# Patient Record
Sex: MALE | Race: WHITE | Employment: OTHER | URBAN - METROPOLITAN AREA
[De-identification: names, ages, dates, MRNs, and addresses within clinical notes are randomized per-mention and may not be internally consistent; named-entity substitution may affect disease eponyms.]

---

## 2018-07-19 ENCOUNTER — HOSPITAL ENCOUNTER (OUTPATIENT)
Dept: CT IMAGING | Age: 73
Discharge: HOME | End: 2018-07-21
Payer: MEDICARE

## 2018-07-19 DIAGNOSIS — R63.4 WEIGHT LOSS: ICD-10-CM

## 2018-07-19 PROCEDURE — 2500000003 HC RX 250 WO HCPCS: Performed by: INTERNAL MEDICINE

## 2018-07-19 PROCEDURE — 2580000003 HC RX 258: Performed by: INTERNAL MEDICINE

## 2018-07-19 PROCEDURE — 6360000004 HC RX CONTRAST MEDICATION: Performed by: INTERNAL MEDICINE

## 2018-07-19 PROCEDURE — 74177 CT ABD & PELVIS W/CONTRAST: CPT

## 2018-07-19 RX ORDER — SODIUM CHLORIDE 0.9 % (FLUSH) 0.9 %
10 SYRINGE (ML) INJECTION ONCE
Status: COMPLETED | OUTPATIENT
Start: 2018-07-19 | End: 2018-07-19

## 2018-07-19 RX ADMIN — IOPAMIDOL 100 ML: 612 INJECTION, SOLUTION INTRAVENOUS at 11:16

## 2018-07-19 RX ADMIN — Medication 10 ML: at 11:16

## 2018-07-19 RX ADMIN — BARIUM SULFATE 450 ML: 21 SUSPENSION ORAL at 11:16

## 2018-12-11 PROBLEM — C61 MALIGNANT NEOPLASM OF PROSTATE (HCC): Status: ACTIVE | Noted: 2018-12-11

## 2018-12-11 PROBLEM — C91.41 HAIRY CELL LEUKEMIA, IN REMISSION (HCC): Status: ACTIVE | Noted: 2018-12-11

## 2019-11-04 LAB — GLUCOSE BLD-MCNC: 106 MG/DL (ref 74–99)

## 2023-05-15 LAB
ALANINE AMINOTRANSFERASE (SGPT) (U/L) IN SER/PLAS: 24 U/L (ref 10–52)
ALBUMIN (G/DL) IN SER/PLAS: 4.3 G/DL (ref 3.4–5)
ALKALINE PHOSPHATASE (U/L) IN SER/PLAS: 36 U/L (ref 33–136)
ANION GAP IN SER/PLAS: 10 MMOL/L (ref 10–20)
ASPARTATE AMINOTRANSFERASE (SGOT) (U/L) IN SER/PLAS: 20 U/L (ref 9–39)
BILIRUBIN TOTAL (MG/DL) IN SER/PLAS: 0.5 MG/DL (ref 0–1.2)
CALCIUM (MG/DL) IN SER/PLAS: 9.2 MG/DL (ref 8.6–10.3)
CARBON DIOXIDE, TOTAL (MMOL/L) IN SER/PLAS: 30 MMOL/L (ref 21–32)
CHLORIDE (MMOL/L) IN SER/PLAS: 103 MMOL/L (ref 98–107)
CREATININE (MG/DL) IN SER/PLAS: 1.25 MG/DL (ref 0.5–1.3)
GFR MALE: 58 ML/MIN/1.73M2
GLUCOSE (MG/DL) IN SER/PLAS: 236 MG/DL (ref 74–99)
POTASSIUM (MMOL/L) IN SER/PLAS: 5.1 MMOL/L (ref 3.5–5.3)
PROTEIN TOTAL: 6.5 G/DL (ref 6.4–8.2)
SODIUM (MMOL/L) IN SER/PLAS: 138 MMOL/L (ref 136–145)
THYROTROPIN (MIU/L) IN SER/PLAS BY DETECTION LIMIT <= 0.05 MIU/L: 0.17 MIU/L (ref 0.44–3.98)
THYROXINE (T4) FREE (NG/DL) IN SER/PLAS: 0.73 NG/DL (ref 0.61–1.12)
UREA NITROGEN (MG/DL) IN SER/PLAS: 23 MG/DL (ref 6–23)

## 2023-05-16 LAB — TRIIODOTHYRONINE (T3) FREE (PG/ML) IN SER/PLAS: 3.8 PG/ML (ref 2.3–4.2)

## 2023-05-23 LAB — THYROID STIMULATING IMMUNOGLOBULIN: <1 TSI INDEX

## 2023-08-28 LAB
ALANINE AMINOTRANSFERASE (SGPT) (U/L) IN SER/PLAS: 23 U/L (ref 10–52)
ALBUMIN (G/DL) IN SER/PLAS: 4.5 G/DL (ref 3.4–5)
ALKALINE PHOSPHATASE (U/L) IN SER/PLAS: 36 U/L (ref 33–136)
ANION GAP IN SER/PLAS: 12 MMOL/L (ref 10–20)
ASPARTATE AMINOTRANSFERASE (SGOT) (U/L) IN SER/PLAS: 18 U/L (ref 9–39)
BILIRUBIN TOTAL (MG/DL) IN SER/PLAS: 0.5 MG/DL (ref 0–1.2)
CALCIUM (MG/DL) IN SER/PLAS: 10 MG/DL (ref 8.6–10.3)
CARBON DIOXIDE, TOTAL (MMOL/L) IN SER/PLAS: 26 MMOL/L (ref 21–32)
CHLORIDE (MMOL/L) IN SER/PLAS: 102 MMOL/L (ref 98–107)
CREATININE (MG/DL) IN SER/PLAS: 1.32 MG/DL (ref 0.5–1.3)
GFR MALE: 55 ML/MIN/1.73M2
GLUCOSE (MG/DL) IN SER/PLAS: 166 MG/DL (ref 74–99)
POTASSIUM (MMOL/L) IN SER/PLAS: 4.8 MMOL/L (ref 3.5–5.3)
PROTEIN TOTAL: 7 G/DL (ref 6.4–8.2)
SODIUM (MMOL/L) IN SER/PLAS: 135 MMOL/L (ref 136–145)
THYROTROPIN (MIU/L) IN SER/PLAS BY DETECTION LIMIT <= 0.05 MIU/L: 3.52 MIU/L (ref 0.44–3.98)
THYROXINE (T4) FREE (NG/DL) IN SER/PLAS: 0.71 NG/DL (ref 0.61–1.12)
TRIIODOTHYRONINE (T3) FREE (PG/ML) IN SER/PLAS: 2.9 PG/ML (ref 2.3–4.2)
UREA NITROGEN (MG/DL) IN SER/PLAS: 31 MG/DL (ref 6–23)

## 2023-08-31 LAB
PROSTATE SPECIFIC AG (NG/ML) IN SER/PLAS: 0.7 NG/ML (ref 0–4)
PROSTATE SPECIFIC AG FREE (NG/ML) IN SER/PLAS: <0.1 NG/ML
PROSTATE SPECIFIC AG FREE/PROSTATE SPECIFIC AG TOTAL IN SER/PLAS: <14 %

## 2023-09-07 PROBLEM — M20.41 ACQUIRED HAMMERTOE OF RIGHT FOOT: Status: ACTIVE | Noted: 2023-09-07

## 2023-09-07 PROBLEM — H92.01 OTALGIA OF RIGHT EAR: Status: ACTIVE | Noted: 2023-09-07

## 2023-09-07 PROBLEM — E05.90 HYPERTHYROIDISM: Status: ACTIVE | Noted: 2023-09-07

## 2023-09-07 PROBLEM — E04.2 MULTINODULAR GOITER: Status: ACTIVE | Noted: 2023-09-07

## 2023-09-07 PROBLEM — M20.5X2 HALLUX LIMITUS OF LEFT FOOT: Status: ACTIVE | Noted: 2023-09-07

## 2023-09-07 PROBLEM — L29.9 ITCHING OF EAR: Status: ACTIVE | Noted: 2023-09-07

## 2023-09-07 PROBLEM — D12.6 TUBULAR ADENOMA OF COLON: Status: ACTIVE | Noted: 2023-09-07

## 2023-09-07 PROBLEM — R12 HEARTBURN: Status: ACTIVE | Noted: 2023-09-07

## 2023-09-07 PROBLEM — I48.0 PAROXYSMAL ATRIAL FIBRILLATION (MULTI): Status: ACTIVE | Noted: 2023-09-07

## 2023-09-07 PROBLEM — E05.10 TOXIC THYROID NODULE: Status: ACTIVE | Noted: 2023-09-07

## 2023-09-07 PROBLEM — R13.19 ESOPHAGEAL DYSPHAGIA: Status: ACTIVE | Noted: 2023-09-07

## 2023-09-07 RX ORDER — ALBUTEROL SULFATE 90 UG/1
1 AEROSOL, METERED RESPIRATORY (INHALATION) EVERY 4 HOURS PRN
COMMUNITY
End: 2024-03-28 | Stop reason: ALTCHOICE

## 2023-09-07 RX ORDER — DULOXETIN HYDROCHLORIDE 60 MG/1
120 CAPSULE, DELAYED RELEASE ORAL DAILY
COMMUNITY

## 2023-09-07 RX ORDER — METFORMIN HYDROCHLORIDE 500 MG/1
250 TABLET ORAL
COMMUNITY
End: 2024-01-09 | Stop reason: WASHOUT

## 2023-09-07 RX ORDER — CARVEDILOL 25 MG/1
1 TABLET ORAL DAILY
Status: ON HOLD | COMMUNITY
End: 2024-01-22 | Stop reason: WASHOUT

## 2023-09-07 RX ORDER — NAPROXEN SODIUM 220 MG/1
81 TABLET, FILM COATED ORAL DAILY
COMMUNITY
End: 2024-03-28 | Stop reason: ALTCHOICE

## 2023-09-07 RX ORDER — GABAPENTIN 600 MG/1
TABLET ORAL
COMMUNITY

## 2023-09-07 RX ORDER — POLYETHYLENE GLYCOL 3350, SODIUM CHLORIDE, SODIUM BICARBONATE, POTASSIUM CHLORIDE 420; 11.2; 5.72; 1.48 G/4L; G/4L; G/4L; G/4L
POWDER, FOR SOLUTION ORAL
COMMUNITY
Start: 2022-05-23 | End: 2024-02-08 | Stop reason: WASHOUT

## 2023-09-07 RX ORDER — VITAMIN E MIXED 400 UNIT
400 CAPSULE ORAL DAILY
COMMUNITY
End: 2024-03-28 | Stop reason: ALTCHOICE

## 2023-09-07 RX ORDER — ISOSORBIDE MONONITRATE 30 MG/1
1 TABLET, EXTENDED RELEASE ORAL DAILY
COMMUNITY
Start: 2022-01-12

## 2023-09-07 RX ORDER — FERROUS SULFATE 325(65) MG
65 TABLET ORAL DAILY
COMMUNITY
End: 2024-03-28 | Stop reason: ALTCHOICE

## 2023-09-07 RX ORDER — NITROGLYCERIN 0.4 MG/1
0.4 TABLET SUBLINGUAL EVERY 5 MIN PRN
COMMUNITY
End: 2024-03-28 | Stop reason: ALTCHOICE

## 2023-09-07 RX ORDER — SIMVASTATIN 40 MG/1
40 TABLET, FILM COATED ORAL NIGHTLY
COMMUNITY
End: 2023-12-13 | Stop reason: WASHOUT

## 2023-09-07 RX ORDER — ISOSORBIDE DINITRATE 30 MG/1
30 TABLET ORAL DAILY
COMMUNITY
End: 2023-12-13 | Stop reason: WASHOUT

## 2023-09-07 RX ORDER — METHIMAZOLE 5 MG/1
5 TABLET ORAL DAILY
COMMUNITY
End: 2023-12-13 | Stop reason: WASHOUT

## 2023-09-07 RX ORDER — FENOFIBRATE 160 MG/1
1 TABLET ORAL DAILY
COMMUNITY
Start: 2022-03-11

## 2023-09-07 RX ORDER — CYANOCOBALAMIN 1000 UG/ML
1000 INJECTION, SOLUTION INTRAMUSCULAR; SUBCUTANEOUS
COMMUNITY
End: 2024-03-28 | Stop reason: ALTCHOICE

## 2023-09-07 RX ORDER — ACETAMINOPHEN 500 MG
2000 TABLET ORAL DAILY
COMMUNITY
End: 2024-03-04 | Stop reason: HOSPADM

## 2023-09-07 RX ORDER — IPRATROPIUM BROMIDE 21 UG/1
2 SPRAY, METERED NASAL 3 TIMES DAILY PRN
COMMUNITY
End: 2024-03-28 | Stop reason: ALTCHOICE

## 2023-09-07 RX ORDER — FERROUS GLUCONATE 256(28)MG
TABLET ORAL
COMMUNITY
End: 2023-12-13 | Stop reason: WASHOUT

## 2023-09-07 RX ORDER — DOFETILIDE 0.25 MG/1
1 CAPSULE ORAL 2 TIMES DAILY
COMMUNITY
End: 2024-01-22 | Stop reason: HOSPADM

## 2023-09-07 RX ORDER — OMEPRAZOLE 20 MG/1
20 CAPSULE, DELAYED RELEASE ORAL
Status: ON HOLD | COMMUNITY
End: 2024-01-22 | Stop reason: SDUPTHER

## 2023-09-07 RX ORDER — ROSUVASTATIN CALCIUM 20 MG/1
20 TABLET, COATED ORAL DAILY
COMMUNITY

## 2023-09-07 RX ORDER — AMOXICILLIN 500 MG/1
2000 CAPSULE ORAL
COMMUNITY
Start: 2021-10-14 | End: 2024-02-08 | Stop reason: WASHOUT

## 2023-09-07 RX ORDER — LISINOPRIL 40 MG/1
1 TABLET ORAL DAILY
COMMUNITY
Start: 2022-01-12 | End: 2024-03-04

## 2023-10-12 ENCOUNTER — OFFICE VISIT (OUTPATIENT)
Dept: ENDOCRINOLOGY | Facility: CLINIC | Age: 78
End: 2023-10-12
Payer: MEDICARE

## 2023-10-12 VITALS
DIASTOLIC BLOOD PRESSURE: 62 MMHG | HEART RATE: 72 BPM | WEIGHT: 216.6 LBS | SYSTOLIC BLOOD PRESSURE: 104 MMHG | RESPIRATION RATE: 16 BRPM | BODY MASS INDEX: 29.34 KG/M2 | HEIGHT: 72 IN

## 2023-10-12 DIAGNOSIS — E05.10 TOXIC THYROID NODULE: Primary | ICD-10-CM

## 2023-10-12 PROCEDURE — 1126F AMNT PAIN NOTED NONE PRSNT: CPT | Performed by: STUDENT IN AN ORGANIZED HEALTH CARE EDUCATION/TRAINING PROGRAM

## 2023-10-12 PROCEDURE — 1159F MED LIST DOCD IN RCRD: CPT | Performed by: STUDENT IN AN ORGANIZED HEALTH CARE EDUCATION/TRAINING PROGRAM

## 2023-10-12 PROCEDURE — 99214 OFFICE O/P EST MOD 30 MIN: CPT | Performed by: STUDENT IN AN ORGANIZED HEALTH CARE EDUCATION/TRAINING PROGRAM

## 2023-10-12 RX ORDER — METHIMAZOLE 5 MG/1
5 TABLET ORAL DAILY
Qty: 90 TABLET | Refills: 1 | Status: SHIPPED | OUTPATIENT
Start: 2023-10-12 | End: 2024-01-09 | Stop reason: WASHOUT

## 2023-10-12 ASSESSMENT — PAIN SCALES - GENERAL: PAINLEVEL: 0-NO PAIN

## 2023-10-12 ASSESSMENT — ENCOUNTER SYMPTOMS: CONSTITUTIONAL NEGATIVE: 1

## 2023-10-12 NOTE — PROGRESS NOTES
Subjective   Patient ID: Galen Villasenor is a 78 y.o. male who presents for Hyperthyroidism (Labs done 9/28/23).  HPI    The patient is a 78-year-old male with history of A-fib new diagnosed hyperthyroidism presents to follow up.   He is doing well , no new complain apart from right ear pain/itch for which he was seen by ENT    History :  5/16/23 Thyroid US showed MNG with dominant 24 mm nodule on left side.   6/13/23 Thyroid Uptake showed uptake 13 % corresponding to left thyroid nodule , with suppression of the rest of the gland     He reports not being aware of any thyroid issues prior to April lans   4/17/2023 routine work-up TSH was 0.15 , free T4 not available.  Recently he had issues with shortness of breath and chest tightness for he which has been evaluated by cardiology and PCP.  Of note he also has history of a LL prior to that he had hairy cell leukemia currently remission.  He had steroid injection to his back 1 month prior to thyroid labs  He denies overt symptoms of hyperthyroidism including weight loss , diarrhea ,anxiety and palpitations    Review of Systems   Constitutional: Negative.        Objective   Physical Exam  Constitutional:       Appearance: Normal appearance.   Neck:      Comments: Nodular thyromegaly   Cardiovascular:      Rate and Rhythm: Normal rate.   Pulmonary:      Effort: Pulmonary effort is normal.      Breath sounds: Normal breath sounds.   Neurological:      Mental Status: He is alert.   Psychiatric:         Mood and Affect: Mood normal.         Assessment/Plan     -New onset hyperthyroidism due to toxic left thyroid nodule in background of MNG  5/16/23 Thyroid US showed MNG with dominant 24 mm nodule on left side. rt sided muliple nodues ranginf from 7 mm to 12 mm( 12 mm nodule is spongiform )  6/13/23 Thyroid Uptake showed uptake 13 % corresponding to left thyroid nodule , with suppression of the rest of the gland      Doing well on MMI 5 mg daily ran out 3 weeks ago , refill  sent    - longstanding A-fib ,on beta-blockers and Eliquis managed by EP.   - DM2 managed by pcp       RTC 4-5  months with las

## 2023-11-13 ENCOUNTER — TELEPHONE (OUTPATIENT)
Dept: HEMATOLOGY/ONCOLOGY | Facility: CLINIC | Age: 78
End: 2023-11-13
Payer: MEDICARE

## 2023-11-14 DIAGNOSIS — C91.42 HAIRY CELL LEUKEMIA, IN RELAPSE (MULTI): ICD-10-CM

## 2023-11-14 DIAGNOSIS — D61.818 PANCYTOPENIA (MULTI): Primary | ICD-10-CM

## 2023-11-14 NOTE — TELEPHONE ENCOUNTER
Pt. Did bring in outside labs for review.  Per Dr. Kruse-blood counts are concerning and he recommends pt. Consider a bone marrow biopsy test.  I spoke with pt and explained Dr. Kruse's review and recommendations.  He is amenable to a bone marrow biopsy and realizes the next availability is 11/28.  He is aware he will also be scheduled for a follow up with Dr. Kruse approximately 2 weeks after the bone marrow test.  He voiced understanding and is aware he will be called with these apt.  He had no further questions.

## 2023-11-28 ENCOUNTER — PROCEDURE VISIT (OUTPATIENT)
Dept: HEMATOLOGY/ONCOLOGY | Facility: CLINIC | Age: 78
End: 2023-11-28
Payer: MEDICARE

## 2023-11-28 ENCOUNTER — LAB (OUTPATIENT)
Dept: LAB | Facility: CLINIC | Age: 78
End: 2023-11-28
Payer: MEDICARE

## 2023-11-28 VITALS
OXYGEN SATURATION: 98 % | SYSTOLIC BLOOD PRESSURE: 161 MMHG | HEART RATE: 69 BPM | RESPIRATION RATE: 16 BRPM | WEIGHT: 214.51 LBS | TEMPERATURE: 97 F | BODY MASS INDEX: 29.09 KG/M2 | DIASTOLIC BLOOD PRESSURE: 55 MMHG

## 2023-11-28 DIAGNOSIS — C91.42 HAIRY CELL LEUKEMIA, IN RELAPSE (MULTI): ICD-10-CM

## 2023-11-28 DIAGNOSIS — D61.818 PANCYTOPENIA (MULTI): ICD-10-CM

## 2023-11-28 LAB
BASOPHILS # BLD AUTO: 0.01 X10*3/UL (ref 0–0.1)
BASOPHILS NFR BLD AUTO: 0.5 %
EOSINOPHIL # BLD AUTO: 0.06 X10*3/UL (ref 0–0.4)
EOSINOPHIL NFR BLD AUTO: 2.8 %
ERYTHROCYTE [DISTWIDTH] IN BLOOD BY AUTOMATED COUNT: 16.3 % (ref 11.5–14.5)
HCT VFR BLD AUTO: 32.5 % (ref 41–52)
HGB BLD-MCNC: 10.2 G/DL (ref 13.5–17.5)
IMM GRANULOCYTES # BLD AUTO: 0.01 X10*3/UL (ref 0–0.5)
IMM GRANULOCYTES NFR BLD AUTO: 0.5 % (ref 0–0.9)
LYMPHOCYTES # BLD AUTO: 1.04 X10*3/UL (ref 0.8–3)
LYMPHOCYTES NFR BLD AUTO: 47.9 %
MCH RBC QN AUTO: 32.5 PG (ref 26–34)
MCHC RBC AUTO-ENTMCNC: 31.4 G/DL (ref 32–36)
MCV RBC AUTO: 104 FL (ref 80–100)
MONOCYTES # BLD AUTO: 0.04 X10*3/UL (ref 0.05–0.8)
MONOCYTES NFR BLD AUTO: 1.8 %
NEUTROPHILS # BLD AUTO: 1.01 X10*3/UL (ref 1.6–5.5)
NEUTROPHILS NFR BLD AUTO: 46.5 %
PLATELET # BLD AUTO: 90 X10*3/UL (ref 150–450)
RBC # BLD AUTO: 3.14 X10*6/UL (ref 4.5–5.9)
WBC # BLD AUTO: 2.2 X10*3/UL (ref 4.4–11.3)

## 2023-11-28 PROCEDURE — 85025 COMPLETE CBC W/AUTO DIFF WBC: CPT | Performed by: INTERNAL MEDICINE

## 2023-11-28 PROCEDURE — 36415 COLL VENOUS BLD VENIPUNCTURE: CPT | Performed by: PHYSICIAN ASSISTANT

## 2023-11-28 ASSESSMENT — PAIN SCALES - GENERAL: PAINLEVEL: 0-NO PAIN

## 2023-12-07 ENCOUNTER — LAB (OUTPATIENT)
Dept: LAB | Facility: CLINIC | Age: 78
End: 2023-12-07
Payer: MEDICARE

## 2023-12-07 ENCOUNTER — PROCEDURE VISIT (OUTPATIENT)
Dept: HEMATOLOGY/ONCOLOGY | Facility: CLINIC | Age: 78
End: 2023-12-07
Payer: MEDICARE

## 2023-12-07 VITALS
SYSTOLIC BLOOD PRESSURE: 130 MMHG | WEIGHT: 214.51 LBS | HEART RATE: 53 BPM | DIASTOLIC BLOOD PRESSURE: 54 MMHG | RESPIRATION RATE: 16 BRPM | TEMPERATURE: 97.3 F | BODY MASS INDEX: 29.09 KG/M2 | OXYGEN SATURATION: 95 %

## 2023-12-07 DIAGNOSIS — D61.818 PANCYTOPENIA (MULTI): Primary | ICD-10-CM

## 2023-12-07 DIAGNOSIS — C91.42 HAIRY CELL LEUKEMIA, IN RELAPSE (MULTI): ICD-10-CM

## 2023-12-07 DIAGNOSIS — D61.818 PANCYTOPENIA (MULTI): ICD-10-CM

## 2023-12-07 LAB
BASOPHILS # BLD AUTO: 0.01 X10*3/UL (ref 0–0.1)
BASOPHILS NFR BLD AUTO: 0.5 %
EOSINOPHIL # BLD AUTO: 0.05 X10*3/UL (ref 0–0.4)
EOSINOPHIL NFR BLD AUTO: 2.5 %
ERYTHROCYTE [DISTWIDTH] IN BLOOD BY AUTOMATED COUNT: 16.2 % (ref 11.5–14.5)
HCT VFR BLD AUTO: 33 % (ref 41–52)
HGB BLD-MCNC: 10.7 G/DL (ref 13.5–17.5)
HOLD SPECIMEN: NORMAL
IMM GRANULOCYTES # BLD AUTO: 0 X10*3/UL (ref 0–0.5)
IMM GRANULOCYTES NFR BLD AUTO: 0 % (ref 0–0.9)
LYMPHOCYTES # BLD AUTO: 0.88 X10*3/UL (ref 0.8–3)
LYMPHOCYTES NFR BLD AUTO: 44 %
MCH RBC QN AUTO: 32.8 PG (ref 26–34)
MCHC RBC AUTO-ENTMCNC: 32.4 G/DL (ref 32–36)
MCV RBC AUTO: 101 FL (ref 80–100)
MONOCYTES # BLD AUTO: 0.06 X10*3/UL (ref 0.05–0.8)
MONOCYTES NFR BLD AUTO: 3 %
NEUTROPHILS # BLD AUTO: 1 X10*3/UL (ref 1.6–5.5)
NEUTROPHILS NFR BLD AUTO: 50 %
PLATELET # BLD AUTO: 80 X10*3/UL (ref 150–450)
RBC # BLD AUTO: 3.26 X10*6/UL (ref 4.5–5.9)
WBC # BLD AUTO: 2 X10*3/UL (ref 4.4–11.3)

## 2023-12-07 PROCEDURE — 85025 COMPLETE CBC W/AUTO DIFF WBC: CPT

## 2023-12-07 PROCEDURE — 88311 DECALCIFY TISSUE: CPT | Performed by: PATHOLOGY

## 2023-12-07 PROCEDURE — 85097 BONE MARROW INTERPRETATION: CPT | Mod: TC | Performed by: INTERNAL MEDICINE

## 2023-12-07 PROCEDURE — 85097 BONE MARROW INTERPRETATION: CPT | Mod: TC,SUR | Performed by: INTERNAL MEDICINE

## 2023-12-07 PROCEDURE — 88341 IMHCHEM/IMCYTCHM EA ADD ANTB: CPT | Performed by: PATHOLOGY

## 2023-12-07 PROCEDURE — 36415 COLL VENOUS BLD VENIPUNCTURE: CPT

## 2023-12-07 PROCEDURE — 88237 TISSUE CULTURE BONE MARROW: CPT | Performed by: INTERNAL MEDICINE

## 2023-12-07 PROCEDURE — 88189 FLOWCYTOMETRY/READ 16 & >: CPT | Performed by: PATHOLOGY

## 2023-12-07 PROCEDURE — 88185 FLOWCYTOMETRY/TC ADD-ON: CPT | Mod: TC,MUE | Performed by: INTERNAL MEDICINE

## 2023-12-07 PROCEDURE — 88305 TISSUE EXAM BY PATHOLOGIST: CPT | Performed by: PATHOLOGY

## 2023-12-07 PROCEDURE — 38222 DX BONE MARROW BX & ASPIR: CPT | Performed by: PHYSICIAN ASSISTANT

## 2023-12-07 PROCEDURE — 88342 IMHCHEM/IMCYTCHM 1ST ANTB: CPT | Performed by: PATHOLOGY

## 2023-12-07 PROCEDURE — 88313 SPECIAL STAINS GROUP 2: CPT | Performed by: PATHOLOGY

## 2023-12-07 ASSESSMENT — ENCOUNTER SYMPTOMS
OCCASIONAL FEELINGS OF UNSTEADINESS: 1
LOSS OF SENSATION IN FEET: 1
DEPRESSION: 0

## 2023-12-07 ASSESSMENT — PAIN SCALES - GENERAL: PAINLEVEL: 0-NO PAIN

## 2023-12-07 NOTE — PROGRESS NOTES
Patient ID: Galen Villasenor is a 78 y.o. male.    Biopsy bone marrow    Date/Time: 12/7/2023 10:13 AM    Performed by: Tony Bynum PA-C  Authorized by: Tony Bynum PA-C    Consent:     Consent obtained:  Written    Consent given by:  Patient    Risks, benefits, and alternatives were discussed: yes      Risks discussed:  Bleeding, infection and pain  Universal protocol:     Procedure explained and questions answered to patient or proxy's satisfaction: yes      Relevant documents present and verified: yes      Test results available: yes      Imaging studies available: yes      Required blood products, implants, devices, and special equipment available: yes      Site/side marked: yes      Immediately prior to procedure, a time out was called: yes      Patient identity confirmed:  Verbally with patient  Indications:     Indications:  Disease assessment  Pre-procedure details:     Skin preparation:  Chlorhexidine    Preparation: Patient was prepped and draped in the usual sterile fashion    Sedation:     Sedation type:  None  Anesthesia:     Anesthesia method:  Local infiltration (Patient has a listed allergy to lidocaine ([place] early 1960's w [place] symptoms).  Located documentation of prior BMbx (7/17/18) performed using 2% Lidocaine w/o incident.)    Local anesthetic:  Lidocaine 1% w/o epi  Procedure specific details:      The procedure was explained & potential complications reviewed with the patient including the risks for bleeding, infection, & discomfort at the bone marrow biopsy site. The patient was given time for questions regarding the procedure. The patient agreeded to proceed & electronic signed consent was obtained.  The patient's most recent history & physical exam were reviewed & relevant findings were noted.  The patient's allergy history was reviewed.  Next, a pre-procedure time out was performed to properly identify the patient & the procedure to be performed.  The  patient was placed in the prone position & the left posterior iliac crest bone marrow biopsy site was identified & marked.  Next, the skin at the marked bone marrow biopsy site was cleansed with Chlorhexidine solution & sterile drapes were placed.  The skin, subcutaneous tissue, & periosteum below the marked bone marrow biopsy site were anesthetized using 10 ml of 1% Lidocaine solution.  Next, a Jamshidi needle was inserted at the marked biopsy site & slowly advanced into the posterior iliac crest.  Marrow aspirate & core biopsy were obtained & sent to Pathology for review & testing.  The needle was removed & sterile dressing was applied to the biopsy site.  The paatient tolerated the procedure well without incident.  Prior to discharge, the biopsy site was inspected & the patient was given written post procedure care instructions.   Post-procedure details:     Procedure completion:  Tolerated well, no immediate complications

## 2023-12-12 ENCOUNTER — APPOINTMENT (OUTPATIENT)
Dept: HEMATOLOGY/ONCOLOGY | Facility: CLINIC | Age: 78
End: 2023-12-12
Payer: MEDICARE

## 2023-12-12 LAB
CELL COUNT (BLOOD): 0.88 X10*3/UL
CELL POPULATIONS: NORMAL
DIAGNOSIS: NORMAL
FLOW DIFFERENTIAL: NORMAL
FLOW TEST ORDERED: NORMAL
LAB TEST METHOD: NORMAL
NUMBER OF CELLS COLLECTED: NORMAL PER TUBE
PATH REPORT.TOTAL CANCER: NORMAL
SIGNATURE COMMENT: NORMAL
SPECIMEN VIABILITY: NORMAL

## 2023-12-13 ENCOUNTER — OFFICE VISIT (OUTPATIENT)
Dept: HEMATOLOGY/ONCOLOGY | Facility: CLINIC | Age: 78
End: 2023-12-13
Payer: MEDICARE

## 2023-12-13 ENCOUNTER — OFFICE VISIT (OUTPATIENT)
Dept: CARDIOLOGY | Facility: CLINIC | Age: 78
End: 2023-12-13
Payer: MEDICARE

## 2023-12-13 VITALS
HEART RATE: 67 BPM | TEMPERATURE: 95.3 F | WEIGHT: 214.8 LBS | SYSTOLIC BLOOD PRESSURE: 112 MMHG | BODY MASS INDEX: 29.09 KG/M2 | DIASTOLIC BLOOD PRESSURE: 50 MMHG | HEIGHT: 72 IN

## 2023-12-13 VITALS
DIASTOLIC BLOOD PRESSURE: 54 MMHG | HEART RATE: 61 BPM | BODY MASS INDEX: 29.09 KG/M2 | OXYGEN SATURATION: 98 % | RESPIRATION RATE: 16 BRPM | WEIGHT: 214.51 LBS | TEMPERATURE: 96.6 F | SYSTOLIC BLOOD PRESSURE: 107 MMHG

## 2023-12-13 DIAGNOSIS — E78.2 MIXED HYPERLIPIDEMIA: ICD-10-CM

## 2023-12-13 DIAGNOSIS — I48.0 PAROXYSMAL ATRIAL FIBRILLATION (MULTI): Primary | ICD-10-CM

## 2023-12-13 DIAGNOSIS — I48.0 PAROXYSMAL ATRIAL FIBRILLATION (MULTI): ICD-10-CM

## 2023-12-13 DIAGNOSIS — I25.10 CORONARY ARTERY DISEASE INVOLVING NATIVE CORONARY ARTERY OF NATIVE HEART WITHOUT ANGINA PECTORIS: ICD-10-CM

## 2023-12-13 DIAGNOSIS — E05.90 HYPERTHYROIDISM: ICD-10-CM

## 2023-12-13 DIAGNOSIS — I10 PRIMARY HYPERTENSION: ICD-10-CM

## 2023-12-13 DIAGNOSIS — D61.818 PANCYTOPENIA (MULTI): ICD-10-CM

## 2023-12-13 DIAGNOSIS — C91.42 HAIRY CELL LEUKEMIA, IN RELAPSE (MULTI): Primary | ICD-10-CM

## 2023-12-13 PROCEDURE — 1126F AMNT PAIN NOTED NONE PRSNT: CPT | Performed by: INTERNAL MEDICINE

## 2023-12-13 PROCEDURE — 99214 OFFICE O/P EST MOD 30 MIN: CPT | Performed by: INTERNAL MEDICINE

## 2023-12-13 PROCEDURE — 1159F MED LIST DOCD IN RCRD: CPT | Performed by: INTERNAL MEDICINE

## 2023-12-13 PROCEDURE — 1036F TOBACCO NON-USER: CPT | Performed by: INTERNAL MEDICINE

## 2023-12-13 PROCEDURE — 3078F DIAST BP <80 MM HG: CPT | Performed by: INTERNAL MEDICINE

## 2023-12-13 PROCEDURE — 3074F SYST BP LT 130 MM HG: CPT | Performed by: INTERNAL MEDICINE

## 2023-12-13 PROCEDURE — 99213 OFFICE O/P EST LOW 20 MIN: CPT | Performed by: INTERNAL MEDICINE

## 2023-12-13 RX ORDER — DIPHENHYDRAMINE HYDROCHLORIDE 50 MG/ML
50 INJECTION INTRAMUSCULAR; INTRAVENOUS AS NEEDED
Status: CANCELLED | OUTPATIENT
Start: 2024-01-31

## 2023-12-13 RX ORDER — PROCHLORPERAZINE MALEATE 10 MG
10 TABLET ORAL EVERY 6 HOURS PRN
Qty: 30 TABLET | Refills: 5 | Status: SHIPPED | OUTPATIENT
Start: 2023-12-13 | End: 2024-01-09 | Stop reason: ALTCHOICE

## 2023-12-13 RX ORDER — FAMOTIDINE 10 MG/ML
20 INJECTION INTRAVENOUS ONCE AS NEEDED
Status: CANCELLED | OUTPATIENT
Start: 2024-01-31

## 2023-12-13 RX ORDER — FAMOTIDINE 10 MG/ML
20 INJECTION INTRAVENOUS ONCE AS NEEDED
Status: CANCELLED | OUTPATIENT
Start: 2024-01-30

## 2023-12-13 RX ORDER — PROCHLORPERAZINE EDISYLATE 5 MG/ML
10 INJECTION INTRAMUSCULAR; INTRAVENOUS EVERY 6 HOURS PRN
Status: CANCELLED | OUTPATIENT
Start: 2024-01-30

## 2023-12-13 RX ORDER — FAMOTIDINE 10 MG/ML
20 INJECTION INTRAVENOUS ONCE AS NEEDED
Status: CANCELLED | OUTPATIENT
Start: 2024-02-01

## 2023-12-13 RX ORDER — FAMOTIDINE 10 MG/ML
20 INJECTION INTRAVENOUS ONCE AS NEEDED
Status: CANCELLED | OUTPATIENT
Start: 2024-01-29

## 2023-12-13 RX ORDER — EPINEPHRINE 0.3 MG/.3ML
0.3 INJECTION SUBCUTANEOUS EVERY 5 MIN PRN
Status: CANCELLED | OUTPATIENT
Start: 2024-01-29

## 2023-12-13 RX ORDER — DIPHENHYDRAMINE HYDROCHLORIDE 50 MG/ML
50 INJECTION INTRAMUSCULAR; INTRAVENOUS AS NEEDED
Status: CANCELLED | OUTPATIENT
Start: 2024-02-02

## 2023-12-13 RX ORDER — PROCHLORPERAZINE MALEATE 10 MG
10 TABLET ORAL EVERY 6 HOURS PRN
Status: CANCELLED | OUTPATIENT
Start: 2024-02-01

## 2023-12-13 RX ORDER — PROCHLORPERAZINE MALEATE 10 MG
10 TABLET ORAL EVERY 6 HOURS PRN
Status: CANCELLED | OUTPATIENT
Start: 2024-02-02

## 2023-12-13 RX ORDER — ALBUTEROL SULFATE 0.83 MG/ML
3 SOLUTION RESPIRATORY (INHALATION) AS NEEDED
Status: CANCELLED | OUTPATIENT
Start: 2024-02-01

## 2023-12-13 RX ORDER — ALBUTEROL SULFATE 0.83 MG/ML
3 SOLUTION RESPIRATORY (INHALATION) AS NEEDED
Status: CANCELLED | OUTPATIENT
Start: 2024-01-30

## 2023-12-13 RX ORDER — FAMOTIDINE 10 MG/ML
20 INJECTION INTRAVENOUS ONCE AS NEEDED
Status: CANCELLED | OUTPATIENT
Start: 2024-02-02

## 2023-12-13 RX ORDER — DIPHENHYDRAMINE HYDROCHLORIDE 50 MG/ML
50 INJECTION INTRAMUSCULAR; INTRAVENOUS AS NEEDED
Status: CANCELLED | OUTPATIENT
Start: 2024-01-30

## 2023-12-13 RX ORDER — PROCHLORPERAZINE MALEATE 10 MG
10 TABLET ORAL EVERY 6 HOURS PRN
Status: CANCELLED | OUTPATIENT
Start: 2024-01-30

## 2023-12-13 RX ORDER — EPINEPHRINE 0.3 MG/.3ML
0.3 INJECTION SUBCUTANEOUS EVERY 5 MIN PRN
Status: CANCELLED | OUTPATIENT
Start: 2024-01-31

## 2023-12-13 RX ORDER — PROCHLORPERAZINE EDISYLATE 5 MG/ML
10 INJECTION INTRAMUSCULAR; INTRAVENOUS EVERY 6 HOURS PRN
Status: CANCELLED | OUTPATIENT
Start: 2024-01-29

## 2023-12-13 RX ORDER — ALBUTEROL SULFATE 0.83 MG/ML
3 SOLUTION RESPIRATORY (INHALATION) AS NEEDED
Status: CANCELLED | OUTPATIENT
Start: 2024-01-29

## 2023-12-13 RX ORDER — ALBUTEROL SULFATE 0.83 MG/ML
3 SOLUTION RESPIRATORY (INHALATION) AS NEEDED
Status: CANCELLED | OUTPATIENT
Start: 2024-01-31

## 2023-12-13 RX ORDER — DIPHENHYDRAMINE HYDROCHLORIDE 50 MG/ML
50 INJECTION INTRAMUSCULAR; INTRAVENOUS AS NEEDED
Status: CANCELLED | OUTPATIENT
Start: 2024-02-01

## 2023-12-13 RX ORDER — PROCHLORPERAZINE EDISYLATE 5 MG/ML
10 INJECTION INTRAMUSCULAR; INTRAVENOUS EVERY 6 HOURS PRN
Status: CANCELLED | OUTPATIENT
Start: 2024-01-31

## 2023-12-13 RX ORDER — PROCHLORPERAZINE MALEATE 10 MG
10 TABLET ORAL EVERY 6 HOURS PRN
Status: CANCELLED | OUTPATIENT
Start: 2024-01-31

## 2023-12-13 RX ORDER — PROCHLORPERAZINE EDISYLATE 5 MG/ML
10 INJECTION INTRAMUSCULAR; INTRAVENOUS EVERY 6 HOURS PRN
Status: CANCELLED | OUTPATIENT
Start: 2024-02-01

## 2023-12-13 RX ORDER — EPINEPHRINE 0.3 MG/.3ML
0.3 INJECTION SUBCUTANEOUS EVERY 5 MIN PRN
Status: CANCELLED | OUTPATIENT
Start: 2024-01-30

## 2023-12-13 RX ORDER — ALBUTEROL SULFATE 0.83 MG/ML
3 SOLUTION RESPIRATORY (INHALATION) AS NEEDED
Status: CANCELLED | OUTPATIENT
Start: 2024-02-02

## 2023-12-13 RX ORDER — EPINEPHRINE 0.3 MG/.3ML
0.3 INJECTION SUBCUTANEOUS EVERY 5 MIN PRN
Status: CANCELLED | OUTPATIENT
Start: 2024-02-01

## 2023-12-13 RX ORDER — HEPARIN 100 UNIT/ML
500 SYRINGE INTRAVENOUS AS NEEDED
Status: CANCELLED | OUTPATIENT
Start: 2023-12-13

## 2023-12-13 RX ORDER — EPINEPHRINE 0.3 MG/.3ML
0.3 INJECTION SUBCUTANEOUS EVERY 5 MIN PRN
Status: CANCELLED | OUTPATIENT
Start: 2024-02-02

## 2023-12-13 RX ORDER — PROCHLORPERAZINE MALEATE 10 MG
10 TABLET ORAL EVERY 6 HOURS PRN
Status: CANCELLED | OUTPATIENT
Start: 2024-01-29

## 2023-12-13 RX ORDER — DIPHENHYDRAMINE HYDROCHLORIDE 50 MG/ML
50 INJECTION INTRAMUSCULAR; INTRAVENOUS AS NEEDED
Status: CANCELLED | OUTPATIENT
Start: 2024-01-29

## 2023-12-13 RX ORDER — HEPARIN SODIUM,PORCINE/PF 10 UNIT/ML
50 SYRINGE (ML) INTRAVENOUS AS NEEDED
Status: CANCELLED | OUTPATIENT
Start: 2023-12-13

## 2023-12-13 RX ORDER — PROCHLORPERAZINE EDISYLATE 5 MG/ML
10 INJECTION INTRAMUSCULAR; INTRAVENOUS EVERY 6 HOURS PRN
Status: CANCELLED | OUTPATIENT
Start: 2024-02-02

## 2023-12-13 ASSESSMENT — PATIENT HEALTH QUESTIONNAIRE - PHQ9
1. LITTLE INTEREST OR PLEASURE IN DOING THINGS: NOT AT ALL
2. FEELING DOWN, DEPRESSED OR HOPELESS: NOT AT ALL
SUM OF ALL RESPONSES TO PHQ9 QUESTIONS 1 AND 2: 0

## 2023-12-13 ASSESSMENT — PAIN SCALES - GENERAL: PAINLEVEL: 0-NO PAIN

## 2023-12-13 NOTE — PROGRESS NOTES
Patient ID: Galen Villasenor is a 78 y.o. male.  Referring Physician: Saturnino Kruse MD  [id number]  [place] [address]  Primary Care Provider: Bladimir Ackerman MD  Visit Type: Follow Up      Subjective    HPI  What did my bone marrow show?    Review of Systems   Constitutional: Negative.    HENT:  Negative.     Eyes: Negative.    Respiratory: Negative.     Cardiovascular: Negative.    Gastrointestinal: Negative.    Endocrine: Negative.    Genitourinary: Negative.     Musculoskeletal: Negative.    Skin: Negative.    Neurological: Negative.    Hematological: Negative.    Psychiatric/Behavioral: Negative.          Objective   BSA: 2.22 meters squared  /54   Pulse 61   Temp 35.9 °C (96.6 °F)   Resp 16   Wt 97.3 kg (214 lb 8.1 oz)   SpO2 98%   BMI 29.09 kg/m²      has a past medical history of Atherosclerotic heart disease of native coronary artery without angina pectoris (04/04/2017), Personal history of malignant neoplasm of prostate (04/04/2017), Personal history of other diseases of the circulatory system (04/04/2017), Personal history of other diseases of the respiratory system (04/04/2017), Personal history of other endocrine, nutritional and metabolic disease (04/04/2017), Personal history of other endocrine, nutritional and metabolic disease (04/04/2017), and Personal history of other specified conditions (04/04/2017).   has a past surgical history that includes Rotator cuff repair (04/04/2017); Total knee arthroplasty (04/04/2017); Carpal tunnel release (04/04/2017); Coronary angioplasty (04/04/2017); Other surgical history (04/04/2017); and Spinal fusion (04/04/2017).  Family History   Problem Relation Name Age of Onset    Lung cancer Mother      Alcohol abuse Father      Other (cardiac disorder) Father      Brain cancer Sister       Oncology History    No history exists.       Galen Villasenor  reports that he quit smoking about 43 years ago. His smoking use included cigarettes  and pipe. He started smoking about 66 years ago. He has a 23.00 pack-year smoking history. He has been exposed to tobacco smoke. He has never used smokeless tobacco.  He  reports current alcohol use of about 7.0 standard drinks of alcohol per week.  He  reports that he does not currently use drugs.    Physical Exam  Vitals reviewed.   HENT:      Head: Normocephalic.      Mouth/Throat:      Mouth: Mucous membranes are moist.   Eyes:      Extraocular Movements: Extraocular movements intact.      Pupils: Pupils are equal, round, and reactive to light.   Cardiovascular:      Rate and Rhythm: Normal rate and regular rhythm.      Heart sounds: Normal heart sounds.   Pulmonary:      Breath sounds: Normal breath sounds.   Abdominal:      General: Bowel sounds are normal.      Palpations: Abdomen is soft.   Musculoskeletal:         General: Normal range of motion.      Cervical back: Normal range of motion and neck supple.   Skin:     General: Skin is warm and dry.   Neurological:      General: No focal deficit present.      Mental Status: He is alert.   Psychiatric:         Mood and Affect: Mood normal.         WBC   Date/Time Value Ref Range Status   12/07/2023 08:48 AM 2.0 (L) 4.4 - 11.3 x10*3/uL Final   11/28/2023 11:01 AM 2.2 (L) 4.4 - 11.3 x10*3/uL Final   07/21/2023 11:06 AM 2.8 (L) 4.4 - 11.3 x10E9/L Final   07/21/2023 11:04 AM 2.8 (L) 4.4 - 11.3 x10E9/L Final   10/25/2019 12:13 PM 6.4 4.4 - 11.3 x10E9/L Final     nRBC   Date Value Ref Range Status   10/25/2019 0.0 0.0 - 0.0 /100 WBC Final   09/07/2018 0.4 0.0 - 0.0 /100 WBC Final     RBC   Date Value Ref Range Status   12/07/2023 3.26 (L) 4.50 - 5.90 x10*6/uL Final   11/28/2023 3.14 (L) 4.50 - 5.90 x10*6/uL Final   07/21/2023 3.82 (L) 4.50 - 5.90 x10E12/L Final   07/21/2023 3.82 (L) 4.50 - 5.90 x10E12/L Final   10/25/2019 4.61 4.50 - 5.90 x10E12/L Final     Hemoglobin   Date Value Ref Range Status   12/07/2023 10.7 (L) 13.5 - 17.5 g/dL Final   11/28/2023 10.2 (L)  "13.5 - 17.5 g/dL Final   07/21/2023 12.5 (L) 13.5 - 17.5 g/dL Final   07/21/2023 12.5 (L) 13.5 - 17.5 g/dL Final   10/25/2019 13.5 13.5 - 17.5 g/dL Final     Hematocrit   Date Value Ref Range Status   12/07/2023 33.0 (L) 41.0 - 52.0 % Final   11/28/2023 32.5 (L) 41.0 - 52.0 % Final   07/21/2023 37.0 (L) 41.0 - 52.0 % Final   07/21/2023 37.0 (L) 41.0 - 52.0 % Final   10/25/2019 41.5 41.0 - 52.0 % Final     MCV   Date/Time Value Ref Range Status   12/07/2023 08:48  (H) 80 - 100 fL Final   11/28/2023 11:01  (H) 80 - 100 fL Final   07/21/2023 11:06 AM 97 80 - 100 fL Final   07/21/2023 11:04 AM 97 80 - 100 fL Final   10/25/2019 12:13 PM 90 80 - 100 fL Final     MCH   Date/Time Value Ref Range Status   12/07/2023 08:48 AM 32.8 26.0 - 34.0 pg Final   11/28/2023 11:01 AM 32.5 26.0 - 34.0 pg Final     MCHC   Date/Time Value Ref Range Status   12/07/2023 08:48 AM 32.4 32.0 - 36.0 g/dL Final   11/28/2023 11:01 AM 31.4 (L) 32.0 - 36.0 g/dL Final   07/21/2023 11:06 AM 33.8 32.0 - 36.0 g/dL Final   07/21/2023 11:04 AM 33.8 32.0 - 36.0 g/dL Final   10/25/2019 12:13 PM 32.5 32.0 - 36.0 g/dL Final     RDW   Date/Time Value Ref Range Status   12/07/2023 08:48 AM 16.2 (H) 11.5 - 14.5 % Final   11/28/2023 11:01 AM 16.3 (H) 11.5 - 14.5 % Final   07/21/2023 11:06 AM 13.8 11.5 - 14.5 % Final   07/21/2023 11:04 AM 13.8 11.5 - 14.5 % Final   10/25/2019 12:13 PM 13.7 11.5 - 14.5 % Final     Platelets   Date/Time Value Ref Range Status   12/07/2023 08:48 AM 80 (L) 150 - 450 x10*3/uL Final   11/28/2023 11:01 AM 90 (L) 150 - 450 x10*3/uL Final   07/21/2023 11:06  (L) 150 - 450 x10E9/L Final   07/21/2023 11:04  (L) 150 - 450 x10E9/L Final   10/25/2019 12:13  150 - 450 x10E9/L Final     No results found for: \"MPV\"  Neutrophils %   Date/Time Value Ref Range Status   12/07/2023 08:48 AM 50.0 40.0 - 80.0 % Final   11/28/2023 11:01 AM 46.5 40.0 - 80.0 % Final   07/21/2023 11:06 AM 57.0 40.0 - 80.0 % Final   07/21/2023 " 11:04 AM 57.0 40.0 - 80.0 % Final   09/07/2018 11:57 AM 55.4 40.0 - 80.0 % Final     Immature Granulocytes %, Automated   Date/Time Value Ref Range Status   12/07/2023 08:48 AM 0.0 0.0 - 0.9 % Final     Comment:     Immature Granulocyte Count (IG) includes promyelocytes, myelocytes and metamyelocytes but does not include bands. Percent differential counts (%) should be interpreted in the context of the absolute cell counts (cells/UL).   11/28/2023 11:01 AM 0.5 0.0 - 0.9 % Final     Comment:     Immature Granulocyte Count (IG) includes promyelocytes, myelocytes and metamyelocytes but does not include bands. Percent differential counts (%) should be interpreted in the context of the absolute cell counts (cells/UL).   07/21/2023 11:06 AM 0.0 0.0 - 0.9 % Final     Comment:      Immature Granulocyte Count (IG) includes promyelocytes,    myelocytes and metamyelocytes but does not include bands.   Percent differential counts (%) should be interpreted in the   context of the absolute cell counts (cells/L).     07/21/2023 11:04 AM 0.0 0.0 - 0.9 % Final     Comment:      Immature Granulocyte Count (IG) includes promyelocytes,    myelocytes and metamyelocytes but does not include bands.   Percent differential counts (%) should be interpreted in the   context of the absolute cell counts (cells/L).     09/07/2018 11:57 AM 0.5 0.0 - 0.9 % Final     Comment:      Percent differential counts (%) should be interpreted in the   context of the absolute cell counts (cells/L).       Lymphocytes %   Date/Time Value Ref Range Status   12/07/2023 08:48 AM 44.0 13.0 - 44.0 % Final   11/28/2023 11:01 AM 47.9 13.0 - 44.0 % Final   07/21/2023 11:06 AM 38.0 13.0 - 44.0 % Final   07/21/2023 11:04 AM 38.0 13.0 - 44.0 % Final   09/07/2018 11:57 AM 25.4 13.0 - 44.0 % Final     Monocytes %   Date/Time Value Ref Range Status   12/07/2023 08:48 AM 3.0 2.0 - 10.0 % Final   11/28/2023 11:01 AM 1.8 2.0 - 10.0 % Final   07/21/2023 11:06 AM 1.4 2.0 - 10.0  % Final   07/21/2023 11:04 AM 1.4 2.0 - 10.0 % Final   09/07/2018 11:57 AM 13.7 2.0 - 10.0 % Final     Eosinophils %   Date/Time Value Ref Range Status   12/07/2023 08:48 AM 2.5 0.0 - 6.0 % Final   11/28/2023 11:01 AM 2.8 0.0 - 6.0 % Final   07/21/2023 11:06 AM 3.6 0.0 - 6.0 % Final   07/21/2023 11:04 AM 3.6 0.0 - 6.0 % Final   09/07/2018 11:57 AM 4.6 0.0 - 6.0 % Final     Basophils %   Date/Time Value Ref Range Status   12/07/2023 08:48 AM 0.5 0.0 - 2.0 % Final   11/28/2023 11:01 AM 0.5 0.0 - 2.0 % Final   07/21/2023 11:06 AM 0.0 0.0 - 2.0 % Final   07/21/2023 11:04 AM 0.0 0.0 - 2.0 % Final   09/07/2018 11:57 AM 0.4 0.0 - 2.0 % Final     Neutrophils Absolute   Date/Time Value Ref Range Status   12/07/2023 08:48 AM 1.00 (L) 1.60 - 5.50 x10*3/uL Final     Comment:     Percent differential counts (%) should be interpreted in the context of the absolute cell counts (cells/uL).   11/28/2023 11:01 AM 1.01 (L) 1.60 - 5.50 x10*3/uL Final     Comment:     Percent differential counts (%) should be interpreted in the context of the absolute cell counts (cells/uL).   07/21/2023 11:06 AM 1.59 (L) 1.60 - 5.50 x10E9/L Final   07/21/2023 11:04 AM 1.59 (L) 1.60 - 5.50 x10E9/L Final   09/07/2018 11:57 AM 3.16 1.60 - 5.50 x10E9/L Final     Immature Granulocytes Absolute, Automated   Date/Time Value Ref Range Status   12/07/2023 08:48 AM 0.00 0.00 - 0.50 x10*3/uL Final   11/28/2023 11:01 AM 0.01 0.00 - 0.50 x10*3/uL Final     Lymphocytes Absolute   Date/Time Value Ref Range Status   12/07/2023 08:48 AM 0.88 0.80 - 3.00 x10*3/uL Final   11/28/2023 11:01 AM 1.04 0.80 - 3.00 x10*3/uL Final   07/21/2023 11:06 AM 1.06 0.80 - 3.00 x10E9/L Final   07/21/2023 11:04 AM 1.06 0.80 - 3.00 x10E9/L Final   09/07/2018 11:57 AM 1.45 0.80 - 3.00 x10E9/L Final     Monocytes Absolute   Date/Time Value Ref Range Status   12/07/2023 08:48 AM 0.06 0.05 - 0.80 x10*3/uL Final   11/28/2023 11:01 AM 0.04 (L) 0.05 - 0.80 x10*3/uL Final   07/21/2023 11:06 AM  "0.04 (L) 0.05 - 0.80 x10E9/L Final   07/21/2023 11:04 AM 0.04 (L) 0.05 - 0.80 x10E9/L Final   09/07/2018 11:57 AM 0.78 0.05 - 0.80 x10E9/L Final     Eosinophils Absolute   Date/Time Value Ref Range Status   12/07/2023 08:48 AM 0.05 0.00 - 0.40 x10*3/uL Final   11/28/2023 11:01 AM 0.06 0.00 - 0.40 x10*3/uL Final   07/21/2023 11:06 AM 0.10 0.00 - 0.40 x10E9/L Final   07/21/2023 11:04 AM 0.10 0.00 - 0.40 x10E9/L Final   09/07/2018 11:57 AM 0.26 0.00 - 0.40 x10E9/L Final     Basophils Absolute   Date/Time Value Ref Range Status   12/07/2023 08:48 AM 0.01 0.00 - 0.10 x10*3/uL Final   11/28/2023 11:01 AM 0.01 0.00 - 0.10 x10*3/uL Final   07/21/2023 11:06 AM 0.00 0.00 - 0.10 x10E9/L Final   07/21/2023 11:04 AM 0.00 0.00 - 0.10 x10E9/L Final   09/07/2018 11:57 AM 0.02 0.00 - 0.10 x10E9/L Final       No components found for: \"PT\"  No results found for: \"APTT\"  Medication Documentation Review Audit       Reviewed by Gayle Olsen MA (Medical Assistant) on 12/13/23 at 1043      Medication Order Taking? Sig Documenting Provider Last Dose Status   albuterol (Ventolin HFA) 90 mcg/actuation inhaler [id number] Yes Inhale 1 puff every 4 hours if needed. Historical Provider, MD Taking Active   amoxicillin (Amoxil) 500 mg capsule [id number] Yes Take 4 capsules (2,000 mg) by mouth.  ONE HOUR PRIOR TO DENTAL APPOINTMENT THEN TAKE 2 ONE HOUR AFTER DENTAL APPOINTMENT Historical Provider, MD Taking Active   apixaban (Eliquis) 5 mg tablet [id number] Yes Take by mouth 2 times a day. Historical Provider, MD Taking Active   aspirin 81 mg chewable tablet [id number] Yes Chew 1 tablet (81 mg) once daily. Historical Provider, MD Taking Active   carvedilol (Coreg) 25 mg tablet [id number] Yes Take 1 tablet (25 mg) by mouth once daily. Historical Provider, MD Taking Active   cholecalciferol (Vitamin D-3) 50 mcg (2,000 unit) capsule [id number] Yes Take by mouth. Historical Provider, MD Taking Active   cyanocobalamin (Vitamin B-12) 1,000 mcg/mL " injection [id number] Yes Cyanocobalamin 1000 MCG/ML Injection Solution   Refills: 0       Active Historical MD Luz Taking Active   dofetilide (Tikosyn) 250 mcg capsule [id number] Yes Take 1 capsule (250 mcg) by mouth 2 times a day. Calli Escobar MD Taking Active   DULoxetine (Cymbalta) 60 mg DR capsule [id number] Yes Take 1 capsule (60 mg) by mouth 2 times a day. Calli Escobar MD Taking Active   fenofibrate (Triglide) 160 mg tablet [id number] Yes Take 1 tablet (160 mg) by mouth once daily.  WITH FOOD Historical MD Luz Taking Active     Discontinued 12/13/23 0935   ferrous sulfate 325 (65 Fe) MG tablet [id number] Yes Take 1 tablet by mouth once daily. Historical MD Luz Taking Active   gabapentin (Neurontin) 600 mg tablet [id number] Yes Take 1 tablet (600 mg) by mouth 3 times a day. Historical MD Luz Taking Active   ipratropium (Atrovent) 21 mcg (0.03 %) nasal spray [id number] Yes Administer 2 sprays into each nostril. 2-3 times daily Calli Escobar MD Taking Active     Discontinued 12/13/23 0935   isosorbide mononitrate ER (Imdur) 30 mg 24 hr tablet [id number] Yes Take 1 tablet (30 mg) by mouth once daily. Historical MD Luz Taking Active   lisinopril 40 mg tablet [id number] Yes Take 1 tablet (40 mg) by mouth once daily. Historical MD Luz Taking Active   MEN'S MULTI-VITAMIN ORAL [id number] Yes Take 1 tablet by mouth once daily. Historical MD Luz Taking Active   metFORMIN (Glucophage) 500 mg tablet [id number] Yes Take 0.5 tablets (250 mg) by mouth 2 times a day with meals. Historical MD Luz Taking Active     Discontinued 12/13/23 0935   methIMAzole (Tapazole) 5 mg tablet [id number] Yes Take 1 tablet (5 mg) by mouth once daily. Brisa Skinner MD Taking Active   nitroglycerin (Nitrostat) 0.4 mg SL tablet [id number] Yes Place 1 tablet (0.4 mg) under the tongue every 5 minutes if needed for chest pain. Historical MD Luz Taking Active   omeprazole (PriLOSEC) 20  mg DR capsule [id number] Yes Take 1 capsule (20 mg) by mouth once daily in the morning. Take before meals. Historical Provider, MD Taking Active   polyethylene glycol-electrolytes 420 gram solution [id number] Yes Take by mouth. Historical Provider, MD Taking Active   rosuvastatin (Crestor) 20 mg tablet [id number] Yes Take 1 tablet (20 mg) by mouth once daily. Historical Provider, MD Taking Active     Discontinued 12/13/23 0936   tiotropium-olodateroL (Stiolto Respimat) 2.5-2.5 mcg/actuation mist inhaler [id number] Yes Inhale 2 Inhalations once daily. Historical Provider, MD Taking Active   vit A/vit C/vit E/zinc/copper (PRESERVISION AREDS ORAL) [id number] Yes Take 1 tablet by mouth twice a day. Historical Provider, MD Taking Active   vitamin E 180 mg (400 unit) capsule [id number] Yes Take by mouth. 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  -had bone marrow bx done on 11/28/2023--showed extensive marrow involvement by hairy cell leukemia; no evidence of T cell LGL; positive for BRAF  -discussed treatment options--cladribine vs cladribine + rituximab  -he opted for cladribine alone  -in the future, if he relapses again, he could go on either ibrutinib or vemurafenib + rituximab  -last recorded CBC showed wbc 2.0, hgb 10.7, plt 80,000, ANC 1000  -he has a cardiac ablation booked for Jan 9, 2024; because of potential for treatment induced cytopenias, will start cladribine about 2 weeks after the ablation  -benefits, risks, potential morbidity related to cladribine were reviewed with Galen and he signed informed consent to proceed  -each day starting January 22 he will  receive cladribine 0.14 mg/kg IV  -tumor lysis labs will be followed on Mon, Wed and Fri    2) CAD  -on ASA  -on imdur     3) atrial fibrillation  -on dofetilide  -on eliquis  -will have ablation done on 1/9     4) hyperlipidemia  -on fenofibrate  -on rosuvastatin     5) hypertension  -on lisinopril     6) diabetes  -on metformin     7) hyperthyroidism  -on methimazole      Problem List Items Addressed This Visit             ICD-10-CM    Hairy cell leukemia, in relapse (CMS/HCC) - Primary C91.42    Relevant Medications    prochlorperazine (Compazine) 10 mg tablet    Other Relevant Orders    Clinic Appointment Request Chemo Follow Up; SATURNINO KRUSE; [facility]    Infusion Appointment Request [facility]    CBC and Auto Differential    Comprehensive metabolic panel    Hepatitis B surface antigen    Hepatitis B Core Antibody, Total    Hepatitis B surface antibody    Uric Acid    Phosphorus    Lactate dehydrogenase    Infusion Appointment Request [facility] INFUSION    Infusion Appointment Request [facility] INFUSION    CBC and Auto Differential    Comprehensive metabolic panel    Uric Acid    Phosphorus    Lactate dehydrogenase    Infusion Appointment Request [facility] INFUSION    Infusion Appointment Request [facility] INFUSION    CBC and Auto Differential    Comprehensive metabolic panel    Uric Acid    Phosphorus    Lactate dehydrogenase     Other Visit Diagnoses         Codes    Pancytopenia (CMS/HCC)     D61.818                 Saturnino Kruse MD

## 2023-12-13 NOTE — PATIENT INSTRUCTIONS
Your cardiac ablation will be on January 9.    Your course of cladribine will be the week of Jan 22, 2024

## 2023-12-14 ENCOUNTER — EDUCATION (OUTPATIENT)
Dept: HEMATOLOGY/ONCOLOGY | Facility: CLINIC | Age: 78
End: 2023-12-14
Payer: MEDICARE

## 2023-12-14 PROBLEM — I10 PRIMARY HYPERTENSION: Status: ACTIVE | Noted: 2023-12-14

## 2023-12-14 PROBLEM — D61.818 PANCYTOPENIA (MULTI): Status: ACTIVE | Noted: 2023-12-14

## 2023-12-14 PROBLEM — I25.10 CORONARY ARTERY DISEASE INVOLVING NATIVE CORONARY ARTERY OF NATIVE HEART WITHOUT ANGINA PECTORIS: Status: ACTIVE | Noted: 2023-12-14

## 2023-12-14 PROBLEM — E78.2 MIXED HYPERLIPIDEMIA: Status: ACTIVE | Noted: 2023-12-14

## 2023-12-14 LAB
CHROM ANALY OVERALL INTERP-IMP: NORMAL
ELECTRONICALLY SIGNED BY CYTOGENETICS: NORMAL
PATH REPORT.ADDENDUM SPEC: NORMAL
PATH REPORT.COMMENTS IMP SPEC: NORMAL
PATH REPORT.FINAL DX SPEC: NORMAL
PATH REPORT.GROSS SPEC: NORMAL
PATH REPORT.MICROSCOPIC SPEC OTHER STN: NORMAL
PATH REPORT.RELEVANT HX SPEC: NORMAL
PATH REPORT.TOTAL CANCER: NORMAL

## 2023-12-14 PROCEDURE — 88342 IMHCHEM/IMCYTCHM 1ST ANTB: CPT | Mod: TC,SUR | Performed by: INTERNAL MEDICINE

## 2023-12-14 PROCEDURE — 88342 IMHCHEM/IMCYTCHM 1ST ANTB: CPT | Performed by: PATHOLOGY

## 2023-12-14 ASSESSMENT — ENCOUNTER SYMPTOMS
GASTROINTESTINAL NEGATIVE: 1
CARDIOVASCULAR NEGATIVE: 1
NEUROLOGICAL NEGATIVE: 1
MUSCULOSKELETAL NEGATIVE: 1
HEMATOLOGIC/LYMPHATIC NEGATIVE: 1
PSYCHIATRIC NEGATIVE: 1
RESPIRATORY NEGATIVE: 1
ENDOCRINE NEGATIVE: 1
CONSTITUTIONAL NEGATIVE: 1
EYES NEGATIVE: 1

## 2023-12-14 NOTE — PROGRESS NOTES
I met with pt and his spouse Vesta.  Pt. Was initially treated with cladribine in 2002 for HCL.  He met with Dr. Kruse and elected to repeat this treatment.  He will wait until after a cardiac ablation in JAN. 2024.  He states he had a mediport placed with his initial treatment and remembered a home infusion pump.  I explained that the current treatment plan is for him to come in daily x 5 days.  He is aware he will likely be here for 3-4 hours with these infusions.  We discussed possible side effects focusing on blood counts, risk of infection, fever.  He is aware there is a risk of hypersensitivity reactions.  He declined disease information.  He was provided written information on the medication, when to call/fever threshold, red bag/hand book.  He is encouraged to call with any questions.  He will need ongoing support and reinforcement.

## 2024-01-09 ENCOUNTER — OFFICE VISIT (OUTPATIENT)
Dept: CARDIOLOGY | Facility: CLINIC | Age: 79
End: 2024-01-09
Payer: MEDICARE

## 2024-01-09 ENCOUNTER — TELEPHONE (OUTPATIENT)
Dept: HEMATOLOGY/ONCOLOGY | Facility: CLINIC | Age: 79
End: 2024-01-09

## 2024-01-09 VITALS
SYSTOLIC BLOOD PRESSURE: 123 MMHG | WEIGHT: 214 LBS | DIASTOLIC BLOOD PRESSURE: 49 MMHG | RESPIRATION RATE: 16 BRPM | TEMPERATURE: 97.1 F | OXYGEN SATURATION: 96 % | BODY MASS INDEX: 30.64 KG/M2 | HEART RATE: 44 BPM | HEIGHT: 70 IN

## 2024-01-09 DIAGNOSIS — I48.0 PAROXYSMAL ATRIAL FIBRILLATION (MULTI): ICD-10-CM

## 2024-01-09 DIAGNOSIS — C91.42 HAIRY CELL LEUKEMIA, IN RELAPSE (MULTI): Primary | ICD-10-CM

## 2024-01-09 PROCEDURE — 3078F DIAST BP <80 MM HG: CPT | Performed by: INTERNAL MEDICINE

## 2024-01-09 PROCEDURE — 99215 OFFICE O/P EST HI 40 MIN: CPT | Mod: 25 | Performed by: INTERNAL MEDICINE

## 2024-01-09 PROCEDURE — 93005 ELECTROCARDIOGRAM TRACING: CPT

## 2024-01-09 PROCEDURE — 99205 OFFICE O/P NEW HI 60 MIN: CPT | Performed by: INTERNAL MEDICINE

## 2024-01-09 PROCEDURE — 93010 ELECTROCARDIOGRAM REPORT: CPT | Performed by: INTERNAL MEDICINE

## 2024-01-09 PROCEDURE — 3074F SYST BP LT 130 MM HG: CPT | Performed by: INTERNAL MEDICINE

## 2024-01-09 PROCEDURE — 1036F TOBACCO NON-USER: CPT | Performed by: INTERNAL MEDICINE

## 2024-01-09 PROCEDURE — 1159F MED LIST DOCD IN RCRD: CPT | Performed by: INTERNAL MEDICINE

## 2024-01-09 PROCEDURE — 1126F AMNT PAIN NOTED NONE PRSNT: CPT | Performed by: INTERNAL MEDICINE

## 2024-01-09 PROCEDURE — 1157F ADVNC CARE PLAN IN RCRD: CPT | Performed by: INTERNAL MEDICINE

## 2024-01-09 NOTE — PROGRESS NOTES
5 cardioversions  Started on tikosyn 11/16/2020  Last     Out of breath all the time  Feels weak      Referring Provider: Darrick Alfonso MD  Reason for Consult: Atrial fibrillation    History of Present Illness:      Galen Villasenor is a 78 y.o. year old male patient with a history significant for persistent atrial fibrillation, chronic ischemic heart disease, hairy cell leukemia, hyperlipidemia,  who is referred by Dr. Alfonso for management of atrial fibrillation.    Mr. Villasenor was initially diagnosed with paroxysmal atrial fibrillation in 2018 when he had a period of illness that was prolonged and associated with fatigue.  He was found to have atrial fibrillation.  He has had multiple cardioversions 18, and was ultimately started on amiodarone.  He did well over the intervening years, with improvement of his symptoms while in sinus rhythm.  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  He was started on dofetilide in November 2022, which has worked fairly well until recently, where he has begun to have paroxysms of atrial arrhythmias being on dofetilide.  He has a 4% A-fib burden on monitoring.  Since the summer, he has complained of worsening exertional fatigue and dyspnea.  Some of this may be due to recurrence of his hairy cell leukemia, and his hemoglobin has been trending down.  However, he does attribute some of the symptoms to his atrial fibrillation as well.  He is planning to start his chemotherapy for his leukemia soon.    Focused Cardiovascular Problem List:  Persistent atrial fibrillation: [id number] score 5, hypertension, CAD, type 2 diabetes, age.  On Eliquis and dofetilide.  Previously on amiodarone, but caused carditis.  Has had 5 prior cardioversions.  Now with breakthrough on dofetilide.  Chronic ischemic heart disease: Remote PCI, preserved ejection fraction  Hypertension  Hairy-cell leukemia      Past Medical and Surgical History:  Mr. Villasenor  has a past medical history of  Atherosclerotic heart disease of native coronary artery without angina pectoris (2017), Personal history of malignant neoplasm of prostate (2017), Personal history of other diseases of the circulatory system (2017), Personal history of other diseases of the respiratory system (2017), Personal history of other endocrine, nutritional and metabolic disease (2017), Personal history of other endocrine, nutritional and metabolic disease (2017), and Personal history of other specified conditions (2017).    has a past surgical history that includes Rotator cuff repair (2017); Total knee arthroplasty (2017); Carpal tunnel release (2017); Coronary angioplasty (2017); Other surgical history (2017); and Spinal fusion (2017).    Social History:  Social History     Tobacco Use    Smoking status: Former     Packs/day: 1.00     Years: 23.00     Additional pack years: 0.00     Total pack years: 23.00     Types: Cigarettes, Pipe     Start date: 1957     Quit date: 1980     Years since quittin.0     Passive exposure: Past    Smokeless tobacco: Never   Substance Use Topics    Alcohol use: Yes     Alcohol/week: 7.0 standard drinks of alcohol     Types: 7 Glasses of wine per week      Tobacco: Quit /0, prior 4 packs/day for 23 years  Alcohol:  1 glass of wine / day  Drug use:  Denies    Relevant Family History:   Family History   Problem Relation Name Age of Onset    Lung cancer Mother      Alcohol abuse Father      Other (cardiac disorder) Father      Brain cancer Sister          Allergies:  Allergies   Allergen Reactions    Anesthetics - Renetta Type- Parabens Seizure    Procaine Seizure    Quinolones Unknown and Other     drop foot    Xylocaine [Lidocaine Hcl] Unknown        Medications:  Current Outpatient Medications   Medication Instructions    albuterol (Ventolin HFA) 90 mcg/actuation inhaler 1 puff, inhalation, Every 4 hours  PRN    amoxicillin (AMOXIL) 2,000 mg, oral,  ONE HOUR PRIOR TO DENTAL APPOINTMENT THEN TAKE 2 ONE HOUR AFTER DENTAL APPOINTMENT    apixaban (Eliquis) 5 mg tablet oral, 2 times daily    aspirin 81 mg, oral, Daily    carvedilol (Coreg) 25 mg tablet 1 tablet, oral, Daily    cholecalciferol (Vitamin D-3) 50 mcg (2,000 unit) capsule oral    cyanocobalamin (Vitamin B-12) 1,000 mcg/mL injection Cyanocobalamin 1000 MCG/ML Injection Solution   Refills: 0       Active    dofetilide (Tikosyn) 250 mcg capsule 1 capsule, oral, 2 times daily    DULoxetine (CYMBALTA) 60 mg, oral, 2 times daily    empagliflozin (JARDIANCE) 12.5 mg, oral, Daily    fenofibrate (Triglide) 160 mg tablet 1 tablet, oral, Daily,  WITH FOOD    ferrous sulfate 325 (65 Fe) MG tablet 65 mg of iron, oral, Daily    gabapentin (Neurontin) 600 mg tablet 1 tablet, oral, 3 times daily    ipratropium (Atrovent) 21 mcg (0.03 %) nasal spray 2 sprays, Each Nostril, 2-3 times daily    isosorbide mononitrate ER (Imdur) 30 mg 24 hr tablet 1 tablet, oral, Daily    lisinopril 40 mg tablet 1 tablet, oral, Daily    MEN'S MULTI-VITAMIN ORAL 1 tablet, oral, Daily    nitroglycerin (NITROSTAT) 0.4 mg, sublingual, Every 5 min PRN    omeprazole (PRILOSEC) 20 mg, oral, Daily before breakfast    polyethylene glycol-electrolytes 420 gram solution oral    rosuvastatin (CRESTOR) 20 mg, oral, Daily    tiotropium-olodateroL (Stiolto Respimat) 2.5-2.5 mcg/actuation mist inhaler 2 Inhalations, inhalation, Daily RT    vit A/vit C/vit E/zinc/copper (PRESERVISION AREDS ORAL) 1 tablet, oral, 2 times daily    vitamin E 180 mg (400 unit) capsule oral         Objective   Physical Exam:  Last Recorded Vitals:      6/15/2023     9:51 AM 10/12/2023     9:54 AM 11/28/2023    11:08 AM 12/7/2023     8:56 AM 12/13/2023     9:30 AM 12/13/2023    10:42 AM 1/9/2024     8:19 AM   Vitals   Systolic 122 104 161 130 112 107 123   Diastolic 64 62 55 54 50 54 49   Heart Rate 60 72 69 53 67 61 44   Temp   36.1 °C  "(97 °F) 36.3 °C (97.3 °F) 35.2 °C (95.3 °F) 35.9 °C (96.6 °F) 36.2 °C (97.1 °F)   Resp 16 16 16 16  16 16   Height (in)  1.829 m (6')   1.829 m (6')  1.778 m (5' 10\")   Weight (lb) 216.6 216.6 214.51 214.51 214.8 214.51 214   BMI 30.21 kg/m2 29.38 kg/m2 29.09 kg/m2 29.09 kg/m2 29.13 kg/m2 29.09 kg/m2 30.71 kg/m2   BSA (m2) 2.22 m2 2.23 m2 2.22 m2 2.22 m2 2.22 m2 2.22 m2 2.19 m2   Visit Report  Report   Report    Report Report    Report Report    Visit Vitals  BP (!) 123/49 (BP Location: Right arm, Patient Position: Sitting)   Pulse (!) 44   Temp 36.2 °C (97.1 °F) (Temporal)   Resp 16   Ht 1.778 m (5' 10\")   Wt 97.1 kg (214 lb)   SpO2 96%   BMI 30.71 kg/m²   Smoking Status Former   BSA 2.19 m²      Gen: NAD, sitting comfortably  HEENT: NC/AT  Card: RRR, no m/r/g  Pulm: Clear to auscultation bilaterally  Ext: No LE edema  Neuro: No focal deficits    Diagnostic Results      My Interpretation of Reviewed Study(s):  Prior ECGs (reviewed and my interpretation):   1/9/2024: Sinus rhythm, heart rate 78 bpm, ventricular bigeminy, Para-hisian PVCs    Echocardiography:  11/14/2023  CONCLUSIONS:   - Exam indication: Chest Pain   - The left ventricle is normal in size. There is mild left ventricular   hypertrophy. Left ventricular systolic function is normal. EF = 60 ± 5% (2D   biplane) Grade I left ventricular diastolic dysfunction.   - The right ventricle is normal in size. Right ventricular systolic function is   normal.   - The left atrial cavity is mildly dilated.   - Exam was compared with the prior  echocardiographic exam performed on   05/02/2019. No significant change     Stress Test:   6/12/2023  CONCLUSIONS:    1. SPECT Perfusion Study: Normal.    2. There is no scintigraphic evidence for inducible ischemia.    3. No evidence of scarred myocardium.    4. Left ventricle is normal in size. The left ventricle systolic   function is normal.    5. This is a low risk scan.       Relevant Labs:  Lab Results   Component " Value Date    CREATININE 1.32 (H) 08/28/2023    CREATININE 1.28 07/21/2023    CREATININE 1.25 05/15/2023    K 4.8 08/28/2023    K 4.6 07/21/2023    K 5.1 05/15/2023    HGB 10.7 (L) 12/07/2023    HGB 10.2 (L) 11/28/2023    HGB 12.5 (L) 07/21/2023    AST 18 08/28/2023    AST 18 07/21/2023    AST 20 05/15/2023    ALT 23 08/28/2023    ALT 21 07/21/2023    ALT 24 05/15/2023       Assessment/Plan   Assessment and Plan:  Galen Villasenor is a 78 y.o. year old male patient who is referred for management and evaluation of symptomatic drug refractory persistent atrial fibrillation, has been maintained on dofetillide, who presents today for initial consultation. Symptoms include fatigue and exertional dyspnea. Treatment options of atrial fibrillation were discussed with the patient and all questions were answered. These options included: 1. Rate control drug therapy with AVN blockers to slow down the heart rate, 2. Rhythm control with anti-arrhythmic drugs and with cardioversion to restore normal sinus rhythm, and/or 3. Radiofrequency (RF) ablation. In particular, RF ablation of atrial fibrillation was discussed in detail.    We discussed the potential benefits of AF ablation including freedom from AF without any medical therapy or, in some cases, a significant improvement in AF burden on medical therapy. We also discussed that AF ablation has been shown to be the most effective way to maintain a normal rhythm. We also discussed that ablation procedure is not indicated for the purpose of discontinuing oral anticoagulation, and the success rate for catheter ablation for AF, meaning no recurrence of any atrial fibrillation, is currently approximately 60-70% for persistent atrial fibrillation. A minority of patients may require an additional touch-up procedure. However, it is highly successful at reducing overall A-fib burden as well as symptomatic recurrences. The risks of the procedure were also extensively discussed, including  but not limited to infection, blood vessel damage, heart injury or perforation necessitating emergency cardiac surgery, heart attack or stroke, atrial-esophageal fistula, pulmonary vein stenosis, phrenic nerve injury, even death.  The risk of stroke associated with atrial fibrillation was discussed - as the patient has a [id number] score of   5, the patient will be maintained on eliquis for CVA prevention.    Finally, the patient was noted to have ventricular bigeminy with frequent Para-Hisian PVCs.  Explained that we could make an attempt to ablate these PVCs during his A-fib ablation, if they are not suppressed with the anesthesia.  However, I did explain that the PVCs appear to be coming from very close to his cardiac conduction system, and ablating here could permanently damage his normal electrical conduction and require him to be pacemaker dependent.  Therefore, we will be conservative in our approach to ablation and not ablate if it is in close proximity to his conduction system after mapping.    After extensive discussion, Mr. Villasenor wishes to proceed with AF ablation.  Continue his Eliquis uninterrupted including on the day of the procedure.  He should hold his dofetilide and lisinopril on the morning of the procedure.       Return to Clinic:  Return after ablation    Thank you very much for allowing me to participate in the care of this patient. Please do not hesitate to contact me with any further questions or concerns.    Evelyne Ambrose MD  Clinical Cardiac Electrophysiologist  Director of Atrial Fibrillation Ablation, [facility]  Director of Ventricular Arrhythmias Research, [facility]  Office Phone Number: [phone]

## 2024-01-09 NOTE — PATIENT INSTRUCTIONS
Continue Eliquis including the morning of the procedure  Hold Tikosyn, lisinopril, morning of procedure

## 2024-01-09 NOTE — H&P (VIEW-ONLY)
5 cardioversions  Started on tikosyn 11/16/2020  Last     Out of breath all the time  Feels weak      Referring Provider: Darrick Alfonso MD  Reason for Consult: Atrial fibrillation    History of Present Illness:      Galen Villasenor is a 78 y.o. year old male patient with a history significant for persistent atrial fibrillation, chronic ischemic heart disease, hairy cell leukemia, hyperlipidemia,  who is referred by Dr. Alfonso for management of atrial fibrillation.    Mr. Villasenor was initially diagnosed with paroxysmal atrial fibrillation in 2018 when he had a period of illness that was prolonged and associated with fatigue.  He was found to have atrial fibrillation.  He has had multiple cardioversions 18, and was ultimately started on amiodarone.  He did well over the intervening years, with improvement of his symptoms while in sinus rhythm.  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  He was started on dofetilide in November 2022, which has worked fairly well until recently, where he has begun to have paroxysms of atrial arrhythmias being on dofetilide.  He has a 4% A-fib burden on monitoring.  Since the summer, he has complained of worsening exertional fatigue and dyspnea.  Some of this may be due to recurrence of his hairy cell leukemia, and his hemoglobin has been trending down.  However, he does attribute some of the symptoms to his atrial fibrillation as well.  He is planning to start his chemotherapy for his leukemia soon.    Focused Cardiovascular Problem List:  Persistent atrial fibrillation: [id number] score 5, hypertension, CAD, type 2 diabetes, age.  On Eliquis and dofetilide.  Previously on amiodarone, but caused carditis.  Has had 5 prior cardioversions.  Now with breakthrough on dofetilide.  Chronic ischemic heart disease: Remote PCI, preserved ejection fraction  Hypertension  Hairy-cell leukemia      Past Medical and Surgical History:  Mr. Villasenor  has a past medical history of  Atherosclerotic heart disease of native coronary artery without angina pectoris (2017), Personal history of malignant neoplasm of prostate (2017), Personal history of other diseases of the circulatory system (2017), Personal history of other diseases of the respiratory system (2017), Personal history of other endocrine, nutritional and metabolic disease (2017), Personal history of other endocrine, nutritional and metabolic disease (2017), and Personal history of other specified conditions (2017).    has a past surgical history that includes Rotator cuff repair (2017); Total knee arthroplasty (2017); Carpal tunnel release (2017); Coronary angioplasty (2017); Other surgical history (2017); and Spinal fusion (2017).    Social History:  Social History     Tobacco Use    Smoking status: Former     Packs/day: 1.00     Years: 23.00     Additional pack years: 0.00     Total pack years: 23.00     Types: Cigarettes, Pipe     Start date: 1957     Quit date: 1980     Years since quittin.0     Passive exposure: Past    Smokeless tobacco: Never   Substance Use Topics    Alcohol use: Yes     Alcohol/week: 7.0 standard drinks of alcohol     Types: 7 Glasses of wine per week      Tobacco: Quit /0, prior 4 packs/day for 23 years  Alcohol:  1 glass of wine / day  Drug use:  Denies    Relevant Family History:   Family History   Problem Relation Name Age of Onset    Lung cancer Mother      Alcohol abuse Father      Other (cardiac disorder) Father      Brain cancer Sister          Allergies:  Allergies   Allergen Reactions    Anesthetics - Renetta Type- Parabens Seizure    Procaine Seizure    Quinolones Unknown and Other     drop foot    Xylocaine [Lidocaine Hcl] Unknown        Medications:  Current Outpatient Medications   Medication Instructions    albuterol (Ventolin HFA) 90 mcg/actuation inhaler 1 puff, inhalation, Every 4 hours  PRN    amoxicillin (AMOXIL) 2,000 mg, oral,  ONE HOUR PRIOR TO DENTAL APPOINTMENT THEN TAKE 2 ONE HOUR AFTER DENTAL APPOINTMENT    apixaban (Eliquis) 5 mg tablet oral, 2 times daily    aspirin 81 mg, oral, Daily    carvedilol (Coreg) 25 mg tablet 1 tablet, oral, Daily    cholecalciferol (Vitamin D-3) 50 mcg (2,000 unit) capsule oral    cyanocobalamin (Vitamin B-12) 1,000 mcg/mL injection Cyanocobalamin 1000 MCG/ML Injection Solution   Refills: 0       Active    dofetilide (Tikosyn) 250 mcg capsule 1 capsule, oral, 2 times daily    DULoxetine (CYMBALTA) 60 mg, oral, 2 times daily    empagliflozin (JARDIANCE) 12.5 mg, oral, Daily    fenofibrate (Triglide) 160 mg tablet 1 tablet, oral, Daily,  WITH FOOD    ferrous sulfate 325 (65 Fe) MG tablet 65 mg of iron, oral, Daily    gabapentin (Neurontin) 600 mg tablet 1 tablet, oral, 3 times daily    ipratropium (Atrovent) 21 mcg (0.03 %) nasal spray 2 sprays, Each Nostril, 2-3 times daily    isosorbide mononitrate ER (Imdur) 30 mg 24 hr tablet 1 tablet, oral, Daily    lisinopril 40 mg tablet 1 tablet, oral, Daily    MEN'S MULTI-VITAMIN ORAL 1 tablet, oral, Daily    nitroglycerin (NITROSTAT) 0.4 mg, sublingual, Every 5 min PRN    omeprazole (PRILOSEC) 20 mg, oral, Daily before breakfast    polyethylene glycol-electrolytes 420 gram solution oral    rosuvastatin (CRESTOR) 20 mg, oral, Daily    tiotropium-olodateroL (Stiolto Respimat) 2.5-2.5 mcg/actuation mist inhaler 2 Inhalations, inhalation, Daily RT    vit A/vit C/vit E/zinc/copper (PRESERVISION AREDS ORAL) 1 tablet, oral, 2 times daily    vitamin E 180 mg (400 unit) capsule oral         Objective   Physical Exam:  Last Recorded Vitals:      6/15/2023     9:51 AM 10/12/2023     9:54 AM 11/28/2023    11:08 AM 12/7/2023     8:56 AM 12/13/2023     9:30 AM 12/13/2023    10:42 AM 1/9/2024     8:19 AM   Vitals   Systolic 122 104 161 130 112 107 123   Diastolic 64 62 55 54 50 54 49   Heart Rate 60 72 69 53 67 61 44   Temp   36.1 °C  "(97 °F) 36.3 °C (97.3 °F) 35.2 °C (95.3 °F) 35.9 °C (96.6 °F) 36.2 °C (97.1 °F)   Resp 16 16 16 16  16 16   Height (in)  1.829 m (6')   1.829 m (6')  1.778 m (5' 10\")   Weight (lb) 216.6 216.6 214.51 214.51 214.8 214.51 214   BMI 30.21 kg/m2 29.38 kg/m2 29.09 kg/m2 29.09 kg/m2 29.13 kg/m2 29.09 kg/m2 30.71 kg/m2   BSA (m2) 2.22 m2 2.23 m2 2.22 m2 2.22 m2 2.22 m2 2.22 m2 2.19 m2   Visit Report  Report   Report    Report Report    Report Report    Visit Vitals  BP (!) 123/49 (BP Location: Right arm, Patient Position: Sitting)   Pulse (!) 44   Temp 36.2 °C (97.1 °F) (Temporal)   Resp 16   Ht 1.778 m (5' 10\")   Wt 97.1 kg (214 lb)   SpO2 96%   BMI 30.71 kg/m²   Smoking Status Former   BSA 2.19 m²      Gen: NAD, sitting comfortably  HEENT: NC/AT  Card: RRR, no m/r/g  Pulm: Clear to auscultation bilaterally  Ext: No LE edema  Neuro: No focal deficits    Diagnostic Results      My Interpretation of Reviewed Study(s):  Prior ECGs (reviewed and my interpretation):   1/9/2024: Sinus rhythm, heart rate 78 bpm, ventricular bigeminy, Para-hisian PVCs    Echocardiography:  11/14/2023  CONCLUSIONS:   - Exam indication: Chest Pain   - The left ventricle is normal in size. There is mild left ventricular   hypertrophy. Left ventricular systolic function is normal. EF = 60 ± 5% (2D   biplane) Grade I left ventricular diastolic dysfunction.   - The right ventricle is normal in size. Right ventricular systolic function is   normal.   - The left atrial cavity is mildly dilated.   - Exam was compared with the prior  echocardiographic exam performed on   05/02/2019. No significant change     Stress Test:   6/12/2023  CONCLUSIONS:    1. SPECT Perfusion Study: Normal.    2. There is no scintigraphic evidence for inducible ischemia.    3. No evidence of scarred myocardium.    4. Left ventricle is normal in size. The left ventricle systolic   function is normal.    5. This is a low risk scan.       Relevant Labs:  Lab Results   Component " Value Date    CREATININE 1.32 (H) 08/28/2023    CREATININE 1.28 07/21/2023    CREATININE 1.25 05/15/2023    K 4.8 08/28/2023    K 4.6 07/21/2023    K 5.1 05/15/2023    HGB 10.7 (L) 12/07/2023    HGB 10.2 (L) 11/28/2023    HGB 12.5 (L) 07/21/2023    AST 18 08/28/2023    AST 18 07/21/2023    AST 20 05/15/2023    ALT 23 08/28/2023    ALT 21 07/21/2023    ALT 24 05/15/2023       Assessment/Plan   Assessment and Plan:  Galen Villasenor is a 78 y.o. year old male patient who is referred for management and evaluation of symptomatic drug refractory persistent atrial fibrillation, has been maintained on dofetillide, who presents today for initial consultation. Symptoms include fatigue and exertional dyspnea. Treatment options of atrial fibrillation were discussed with the patient and all questions were answered. These options included: 1. Rate control drug therapy with AVN blockers to slow down the heart rate, 2. Rhythm control with anti-arrhythmic drugs and with cardioversion to restore normal sinus rhythm, and/or 3. Radiofrequency (RF) ablation. In particular, RF ablation of atrial fibrillation was discussed in detail.    We discussed the potential benefits of AF ablation including freedom from AF without any medical therapy or, in some cases, a significant improvement in AF burden on medical therapy. We also discussed that AF ablation has been shown to be the most effective way to maintain a normal rhythm. We also discussed that ablation procedure is not indicated for the purpose of discontinuing oral anticoagulation, and the success rate for catheter ablation for AF, meaning no recurrence of any atrial fibrillation, is currently approximately 60-70% for persistent atrial fibrillation. A minority of patients may require an additional touch-up procedure. However, it is highly successful at reducing overall A-fib burden as well as symptomatic recurrences. The risks of the procedure were also extensively discussed, including  but not limited to infection, blood vessel damage, heart injury or perforation necessitating emergency cardiac surgery, heart attack or stroke, atrial-esophageal fistula, pulmonary vein stenosis, phrenic nerve injury, even death.  The risk of stroke associated with atrial fibrillation was discussed - as the patient has a [id number]-MHLf score of   5, the patient will be maintained on eliquis for CVA prevention.    Finally, the patient was noted to have ventricular bigeminy with frequent Para-Hisian PVCs.  Explained that we could make an attempt to ablate these PVCs during his A-fib ablation, if they are not suppressed with the anesthesia.  However, I did explain that the PVCs appear to be coming from very close to his cardiac conduction system, and ablating here could permanently damage his normal electrical conduction and require him to be pacemaker dependent.  Therefore, we will be conservative in our approach to ablation and not ablate if it is in close proximity to his conduction system after mapping.    After extensive discussion, Mr. Villasenor wishes to proceed with AF ablation.  Continue his Eliquis uninterrupted including on the day of the procedure.  He should hold his dofetilide and lisinopril on the morning of the procedure.       Return to Clinic:  Return after ablation    Thank you very much for allowing me to participate in the care of this patient. Please do not hesitate to contact me with any further questions or concerns.    Evelyne Ambrose MD  Clinical Cardiac Electrophysiologist  Director of Atrial Fibrillation Ablation, [facility]  Director of Ventricular Arrhythmias Research, [facility]  Office Phone Number: [phone]

## 2024-01-19 ENCOUNTER — APPOINTMENT (OUTPATIENT)
Dept: HEMATOLOGY/ONCOLOGY | Facility: CLINIC | Age: 79
End: 2024-01-19
Payer: MEDICARE

## 2024-01-22 ENCOUNTER — APPOINTMENT (OUTPATIENT)
Dept: CARDIOLOGY | Facility: HOSPITAL | Age: 79
End: 2024-01-22
Payer: MEDICARE

## 2024-01-22 ENCOUNTER — APPOINTMENT (OUTPATIENT)
Dept: HEMATOLOGY/ONCOLOGY | Facility: CLINIC | Age: 79
End: 2024-01-22
Payer: MEDICARE

## 2024-01-22 ENCOUNTER — ANESTHESIA (OUTPATIENT)
Dept: CARDIOLOGY | Facility: HOSPITAL | Age: 79
End: 2024-01-22
Payer: MEDICARE

## 2024-01-22 ENCOUNTER — ANESTHESIA EVENT (OUTPATIENT)
Dept: CARDIOLOGY | Facility: HOSPITAL | Age: 79
End: 2024-01-22
Payer: MEDICARE

## 2024-01-22 ENCOUNTER — HOSPITAL ENCOUNTER (OUTPATIENT)
Facility: HOSPITAL | Age: 79
Setting detail: OUTPATIENT SURGERY
Discharge: HOME | End: 2024-01-22
Attending: INTERNAL MEDICINE | Admitting: INTERNAL MEDICINE
Payer: MEDICARE

## 2024-01-22 ENCOUNTER — APPOINTMENT (OUTPATIENT)
Dept: RADIOLOGY | Facility: HOSPITAL | Age: 79
End: 2024-01-22
Payer: MEDICARE

## 2024-01-22 VITALS
TEMPERATURE: 97.3 F | SYSTOLIC BLOOD PRESSURE: 107 MMHG | BODY MASS INDEX: 31.58 KG/M2 | WEIGHT: 213.19 LBS | OXYGEN SATURATION: 96 % | HEART RATE: 64 BPM | HEIGHT: 69 IN | RESPIRATION RATE: 16 BRPM | DIASTOLIC BLOOD PRESSURE: 62 MMHG

## 2024-01-22 DIAGNOSIS — I48.0 PAROXYSMAL ATRIAL FIBRILLATION (MULTI): Primary | ICD-10-CM

## 2024-01-22 PROBLEM — J44.9 CHRONIC OBSTRUCTIVE PULMONARY DISEASE (MULTI): Status: ACTIVE | Noted: 2024-01-22

## 2024-01-22 LAB
ABO GROUP (TYPE) IN BLOOD: NORMAL
ALBUMIN SERPL BCP-MCNC: 4.4 G/DL (ref 3.4–5)
ALP SERPL-CCNC: 27 U/L (ref 33–136)
ALT SERPL W P-5'-P-CCNC: 18 U/L (ref 10–52)
ANION GAP SERPL CALC-SCNC: 9 MMOL/L (ref 10–20)
ANTIBODY SCREEN: NORMAL
APTT PPP: 44 SECONDS (ref 27–38)
AST SERPL W P-5'-P-CCNC: 22 U/L (ref 9–39)
BILIRUB SERPL-MCNC: 0.6 MG/DL (ref 0–1.2)
BUN SERPL-MCNC: 30 MG/DL (ref 6–23)
CALCIUM SERPL-MCNC: 9 MG/DL (ref 8.6–10.3)
CHLORIDE SERPL-SCNC: 106 MMOL/L (ref 98–107)
CO2 SERPL-SCNC: 28 MMOL/L (ref 21–32)
CREAT SERPL-MCNC: 1.67 MG/DL (ref 0.5–1.3)
EGFRCR SERPLBLD CKD-EPI 2021: 41 ML/MIN/1.73M*2
ERYTHROCYTE [DISTWIDTH] IN BLOOD BY AUTOMATED COUNT: 16.9 % (ref 11.5–14.5)
GLUCOSE SERPL-MCNC: 129 MG/DL (ref 74–99)
HCT VFR BLD AUTO: 29.5 % (ref 41–52)
HGB BLD-MCNC: 9.8 G/DL (ref 13.5–17.5)
HOLD SPECIMEN: NORMAL
INR PPP: 1.7 (ref 0.9–1.1)
MCH RBC QN AUTO: 33.2 PG (ref 26–34)
MCHC RBC AUTO-ENTMCNC: 33.2 G/DL (ref 32–36)
MCV RBC AUTO: 100 FL (ref 80–100)
NRBC BLD-RTO: 0 /100 WBCS (ref 0–0)
PLATELET # BLD AUTO: 70 X10*3/UL (ref 150–450)
POTASSIUM SERPL-SCNC: 4.3 MMOL/L (ref 3.5–5.3)
PROT SERPL-MCNC: 6.9 G/DL (ref 6.4–8.2)
PROTHROMBIN TIME: 19 SECONDS (ref 9.8–12.8)
RBC # BLD AUTO: 2.95 X10*6/UL (ref 4.5–5.9)
RH FACTOR (ANTIGEN D): NORMAL
SODIUM SERPL-SCNC: 139 MMOL/L (ref 136–145)
WBC # BLD AUTO: 1.6 X10*3/UL (ref 4.4–11.3)

## 2024-01-22 PROCEDURE — 3700000002 HC GENERAL ANESTHESIA TIME - EACH INCREMENTAL 1 MINUTE: Performed by: INTERNAL MEDICINE

## 2024-01-22 PROCEDURE — 85347 COAGULATION TIME ACTIVATED: CPT | Mod: MUE

## 2024-01-22 PROCEDURE — 2500000005 HC RX 250 GENERAL PHARMACY W/O HCPCS: Performed by: NURSE ANESTHETIST, CERTIFIED REGISTERED

## 2024-01-22 PROCEDURE — C1730 CATH, EP, 19 OR FEW ELECT: HCPCS | Performed by: INTERNAL MEDICINE

## 2024-01-22 PROCEDURE — C1769 GUIDE WIRE: HCPCS | Performed by: INTERNAL MEDICINE

## 2024-01-22 PROCEDURE — C1766 INTRO/SHEATH,STRBLE,NON-PEEL: HCPCS | Performed by: INTERNAL MEDICINE

## 2024-01-22 PROCEDURE — 36415 COLL VENOUS BLD VENIPUNCTURE: CPT | Performed by: NURSE PRACTITIONER

## 2024-01-22 PROCEDURE — 2720000007 HC OR 272 NO HCPCS: Performed by: INTERNAL MEDICINE

## 2024-01-22 PROCEDURE — 7100000009 HC PHASE TWO TIME - INITIAL BASE CHARGE: Performed by: INTERNAL MEDICINE

## 2024-01-22 PROCEDURE — 93005 ELECTROCARDIOGRAM TRACING: CPT | Mod: 59

## 2024-01-22 PROCEDURE — 93656 COMPRE EP EVAL ABLTJ ATR FIB: CPT | Performed by: INTERNAL MEDICINE

## 2024-01-22 PROCEDURE — 7100000010 HC PHASE TWO TIME - EACH INCREMENTAL 1 MINUTE: Performed by: INTERNAL MEDICINE

## 2024-01-22 PROCEDURE — 85027 COMPLETE CBC AUTOMATED: CPT | Performed by: NURSE PRACTITIONER

## 2024-01-22 PROCEDURE — 3700000001 HC GENERAL ANESTHESIA TIME - INITIAL BASE CHARGE: Performed by: INTERNAL MEDICINE

## 2024-01-22 PROCEDURE — 85347 COAGULATION TIME ACTIVATED: CPT | Performed by: INTERNAL MEDICINE

## 2024-01-22 PROCEDURE — 86901 BLOOD TYPING SEROLOGIC RH(D): CPT | Performed by: NURSE PRACTITIONER

## 2024-01-22 PROCEDURE — 76937 US GUIDE VASCULAR ACCESS: CPT | Performed by: INTERNAL MEDICINE

## 2024-01-22 PROCEDURE — C1760 CLOSURE DEV, VASC: HCPCS | Performed by: INTERNAL MEDICINE

## 2024-01-22 PROCEDURE — C1732 CATH, EP, DIAG/ABL, 3D/VECT: HCPCS | Performed by: INTERNAL MEDICINE

## 2024-01-22 PROCEDURE — G0269 OCCLUSIVE DEVICE IN VEIN ART: HCPCS | Mod: TC | Performed by: INTERNAL MEDICINE

## 2024-01-22 PROCEDURE — 2500000004 HC RX 250 GENERAL PHARMACY W/ HCPCS (ALT 636 FOR OP/ED): Performed by: NURSE ANESTHETIST, CERTIFIED REGISTERED

## 2024-01-22 PROCEDURE — C1759 CATH, INTRA ECHOCARDIOGRAPHY: HCPCS | Performed by: INTERNAL MEDICINE

## 2024-01-22 PROCEDURE — 80053 COMPREHEN METABOLIC PANEL: CPT | Performed by: NURSE PRACTITIONER

## 2024-01-22 PROCEDURE — 93657 TX L/R ATRIAL FIB ADDL: CPT | Performed by: INTERNAL MEDICINE

## 2024-01-22 PROCEDURE — 93005 ELECTROCARDIOGRAM TRACING: CPT

## 2024-01-22 PROCEDURE — 85730 THROMBOPLASTIN TIME PARTIAL: CPT | Performed by: NURSE PRACTITIONER

## 2024-01-22 PROCEDURE — 71046 X-RAY EXAM CHEST 2 VIEWS: CPT | Mod: FR

## 2024-01-22 PROCEDURE — 2780000003 HC OR 278 NO HCPCS: Performed by: INTERNAL MEDICINE

## 2024-01-22 PROCEDURE — 2500000005 HC RX 250 GENERAL PHARMACY W/O HCPCS: Performed by: INTERNAL MEDICINE

## 2024-01-22 RX ORDER — ROCURONIUM BROMIDE 10 MG/ML
INJECTION, SOLUTION INTRAVENOUS AS NEEDED
Status: DISCONTINUED | OUTPATIENT
Start: 2024-01-22 | End: 2024-01-22

## 2024-01-22 RX ORDER — IBUPROFEN 400 MG/1
400 TABLET ORAL EVERY 8 HOURS PRN
Status: DISCONTINUED | OUTPATIENT
Start: 2024-01-22 | End: 2024-01-22 | Stop reason: HOSPADM

## 2024-01-22 RX ORDER — SODIUM CHLORIDE 9 MG/ML
INJECTION, SOLUTION INTRAVENOUS CONTINUOUS PRN
Status: DISCONTINUED | OUTPATIENT
Start: 2024-01-22 | End: 2024-01-22

## 2024-01-22 RX ORDER — LIDOCAINE HYDROCHLORIDE 10 MG/ML
INJECTION, SOLUTION EPIDURAL; INFILTRATION; INTRACAUDAL; PERINEURAL AS NEEDED
Status: DISCONTINUED | OUTPATIENT
Start: 2024-01-22 | End: 2024-01-22 | Stop reason: HOSPADM

## 2024-01-22 RX ORDER — ACETAMINOPHEN 325 MG/1
650 TABLET ORAL EVERY 4 HOURS PRN
Status: DISCONTINUED | OUTPATIENT
Start: 2024-01-22 | End: 2024-01-22 | Stop reason: HOSPADM

## 2024-01-22 RX ORDER — ONDANSETRON HYDROCHLORIDE 2 MG/ML
4 INJECTION, SOLUTION INTRAVENOUS EVERY 8 HOURS PRN
Status: DISCONTINUED | OUTPATIENT
Start: 2024-01-22 | End: 2024-01-22 | Stop reason: HOSPADM

## 2024-01-22 RX ORDER — PHENYLEPHRINE HCL IN 0.9% NACL 0.4MG/10ML
SYRINGE (ML) INTRAVENOUS AS NEEDED
Status: DISCONTINUED | OUTPATIENT
Start: 2024-01-22 | End: 2024-01-22

## 2024-01-22 RX ORDER — ONDANSETRON HYDROCHLORIDE 2 MG/ML
INJECTION, SOLUTION INTRAVENOUS AS NEEDED
Status: DISCONTINUED | OUTPATIENT
Start: 2024-01-22 | End: 2024-01-22

## 2024-01-22 RX ORDER — PROPOFOL 10 MG/ML
INJECTION, EMULSION INTRAVENOUS AS NEEDED
Status: DISCONTINUED | OUTPATIENT
Start: 2024-01-22 | End: 2024-01-22

## 2024-01-22 RX ORDER — LIDOCAINE HYDROCHLORIDE 20 MG/ML
INJECTION, SOLUTION INFILTRATION; PERINEURAL AS NEEDED
Status: DISCONTINUED | OUTPATIENT
Start: 2024-01-22 | End: 2024-01-22

## 2024-01-22 RX ORDER — HEPARIN SODIUM 1000 [USP'U]/ML
INJECTION, SOLUTION INTRAVENOUS; SUBCUTANEOUS AS NEEDED
Status: DISCONTINUED | OUTPATIENT
Start: 2024-01-22 | End: 2024-01-22

## 2024-01-22 RX ORDER — SODIUM CHLORIDE, SODIUM LACTATE, POTASSIUM CHLORIDE, CALCIUM CHLORIDE 600; 310; 30; 20 MG/100ML; MG/100ML; MG/100ML; MG/100ML
INJECTION, SOLUTION INTRAVENOUS CONTINUOUS PRN
Status: DISCONTINUED | OUTPATIENT
Start: 2024-01-22 | End: 2024-01-22

## 2024-01-22 RX ORDER — PHENYLEPHRINE 10 MG/250 ML(40 MCG/ML)IN 0.9 % SOD.CHLORIDE INTRAVENOUS
CONTINUOUS PRN
Status: DISCONTINUED | OUTPATIENT
Start: 2024-01-22 | End: 2024-01-22

## 2024-01-22 RX ORDER — OMEPRAZOLE 20 MG/1
20 CAPSULE, DELAYED RELEASE ORAL
Qty: 90 CAPSULE | Refills: 0 | Status: SHIPPED | OUTPATIENT
Start: 2024-01-22 | End: 2024-03-07

## 2024-01-22 RX ORDER — DEXAMETHASONE SODIUM PHOSPHATE 4 MG/ML
INJECTION, SOLUTION INTRA-ARTICULAR; INTRALESIONAL; INTRAMUSCULAR; INTRAVENOUS; SOFT TISSUE AS NEEDED
Status: DISCONTINUED | OUTPATIENT
Start: 2024-01-22 | End: 2024-01-22

## 2024-01-22 RX ORDER — FENTANYL CITRATE 50 UG/ML
INJECTION, SOLUTION INTRAMUSCULAR; INTRAVENOUS AS NEEDED
Status: DISCONTINUED | OUTPATIENT
Start: 2024-01-22 | End: 2024-01-22

## 2024-01-22 RX ORDER — PROTAMINE SULFATE 10 MG/ML
INJECTION, SOLUTION INTRAVENOUS AS NEEDED
Status: DISCONTINUED | OUTPATIENT
Start: 2024-01-22 | End: 2024-01-22

## 2024-01-22 RX ADMIN — SODIUM CHLORIDE: 9 INJECTION, SOLUTION INTRAVENOUS at 11:20

## 2024-01-22 RX ADMIN — LIDOCAINE HYDROCHLORIDE 80 MG: 20 INJECTION, SOLUTION INFILTRATION; PERINEURAL at 11:30

## 2024-01-22 RX ADMIN — ROCURONIUM BROMIDE 30 MG: 50 INJECTION, SOLUTION INTRAVENOUS at 12:45

## 2024-01-22 RX ADMIN — HEPARIN SODIUM 10000 UNITS: 1000 INJECTION, SOLUTION INTRAVENOUS; SUBCUTANEOUS at 11:50

## 2024-01-22 RX ADMIN — Medication 100 MCG: at 11:56

## 2024-01-22 RX ADMIN — Medication 200 MCG: at 11:50

## 2024-01-22 RX ADMIN — DEXAMETHASONE SODIUM PHOSPHATE 4 MG: 4 INJECTION, SOLUTION INTRAMUSCULAR; INTRAVENOUS at 11:35

## 2024-01-22 RX ADMIN — PROPOFOL 160 MG: 10 INJECTION, EMULSION INTRAVENOUS at 11:30

## 2024-01-22 RX ADMIN — FENTANYL CITRATE 50 MCG: 50 INJECTION, SOLUTION INTRAMUSCULAR; INTRAVENOUS at 11:29

## 2024-01-22 RX ADMIN — HEPARIN SODIUM 2000 UNITS: 1000 INJECTION, SOLUTION INTRAVENOUS; SUBCUTANEOUS at 12:35

## 2024-01-22 RX ADMIN — Medication 0.2 MCG/KG/MIN: at 12:04

## 2024-01-22 RX ADMIN — SODIUM CHLORIDE, SODIUM LACTATE, POTASSIUM CHLORIDE, AND CALCIUM CHLORIDE: 600; 310; 30; 20 INJECTION, SOLUTION INTRAVENOUS at 11:20

## 2024-01-22 RX ADMIN — Medication 100 MCG: at 12:01

## 2024-01-22 RX ADMIN — ROCURONIUM BROMIDE 50 MG: 50 INJECTION, SOLUTION INTRAVENOUS at 11:30

## 2024-01-22 RX ADMIN — PROTAMINE SULFATE 40 MG: 10 INJECTION, SOLUTION INTRAVENOUS at 14:06

## 2024-01-22 RX ADMIN — ONDANSETRON 4 MG: 2 INJECTION, SOLUTION INTRAMUSCULAR; INTRAVENOUS at 14:13

## 2024-01-22 RX ADMIN — HEPARIN SODIUM 1000 UNITS: 1000 INJECTION, SOLUTION INTRAVENOUS; SUBCUTANEOUS at 12:55

## 2024-01-22 RX ADMIN — Medication 100 MCG: at 11:51

## 2024-01-22 RX ADMIN — SUGAMMADEX 400 MG: 100 INJECTION, SOLUTION INTRAVENOUS at 14:15

## 2024-01-22 RX ADMIN — ROCURONIUM BROMIDE 20 MG: 50 INJECTION, SOLUTION INTRAVENOUS at 13:57

## 2024-01-22 RX ADMIN — Medication 100 MCG: at 12:17

## 2024-01-22 RX ADMIN — FENTANYL CITRATE 50 MCG: 50 INJECTION, SOLUTION INTRAMUSCULAR; INTRAVENOUS at 14:22

## 2024-01-22 SDOH — HEALTH STABILITY: MENTAL HEALTH: CURRENT SMOKER: 0

## 2024-01-22 ASSESSMENT — PAIN SCALES - GENERAL
PAINLEVEL_OUTOF10: 0 - NO PAIN

## 2024-01-22 ASSESSMENT — COLUMBIA-SUICIDE SEVERITY RATING SCALE - C-SSRS
2. HAVE YOU ACTUALLY HAD ANY THOUGHTS OF KILLING YOURSELF?: NO
1. IN THE PAST MONTH, HAVE YOU WISHED YOU WERE DEAD OR WISHED YOU COULD GO TO SLEEP AND NOT WAKE UP?: NO
6. HAVE YOU EVER DONE ANYTHING, STARTED TO DO ANYTHING, OR PREPARED TO DO ANYTHING TO END YOUR LIFE?: NO

## 2024-01-22 ASSESSMENT — PAIN - FUNCTIONAL ASSESSMENT
PAIN_FUNCTIONAL_ASSESSMENT: 0-10
PAIN_FUNCTIONAL_ASSESSMENT: 0-10

## 2024-01-22 NOTE — ANESTHESIA PROCEDURE NOTES
Arterial Line:    Date/Time: 1/22/2024 11:07 AM    Staffing  Performed: attending   Authorized by: RAVEN Lacey-CRNA    Performed by: Omid Lobato MD    An arterial line was placed. Procedure performed using ultrasound guidance.in the pre-op for the following indication(s): continuous blood pressure monitoring and blood sampling needed.    A 20 gauge (size), 1 and 3/8 inch (length), Arrow (type) catheter was placed into the Left radial artery, secured by Tegadeseth   Seldinger technique used.  Events:  patient tolerated procedure well with no complications.

## 2024-01-22 NOTE — ANESTHESIA PREPROCEDURE EVALUATION
Galen Villasenor Jr. is a 78 y.o. male here for:    Ablation A-Fib  With Evelyne Ambrose MD  Pre-Op Diagnosis Codes:     * Atrial fibrillation (irregular heartbeat) [I48.0]    Relevant Problems   Cardiovascular   (+) Coronary artery disease involving native coronary artery of native heart without angina pectoris   (+) Mixed hyperlipidemia   (+) Paroxysmal atrial fibrillation (CMS/HCC)   (+) Primary hypertension      Endocrine   (+) Hyperthyroidism   (+) Multinodular goiter      Pulmonary   (+) Chronic obstructive pulmonary disease (CMS/HCC)      Hematology   (+) Hairy cell leukemia, in relapse (CMS/HCC)   (+) Pancytopenia (CMS/HCC)      Digestive   (+) Esophageal dysphagia      Other   (+) Hairy cell leukemia, in relapse (CMS/HCC)       Lab Results   Component Value Date    HGB 9.8 (L) 2024    HCT 29.5 (L) 2024    WBC 1.6 (L) 2024    PLT 70 (L) 2024     2024    K 4.3 2024     2024    CREATININE 1.67 (H) 2024    BUN 30 (H) 2024     EKG 2024:  Undetermined rhythm  Low voltage QRS  Cannot rule out Inferior infarct , age undetermined  Abnormal ECG    NM Stress :  IMPRESSION:  Normal stress myocardial perfusion imaging in response to  pharmacologic stress. No ischemia or infarct on this non-ECG gated  study.    Social History     Tobacco Use   Smoking Status Former    Packs/day: 1.00    Years: 23.00    Additional pack years: 0.00    Total pack years: 23.00    Types: Cigarettes, Pipe    Start date: 1957    Quit date: 1980    Years since quittin.0    Passive exposure: Past   Smokeless Tobacco Never       Allergies   Allergen Reactions    Anesthetics - Renetta Type- Parabens Seizure    Procaine Seizure    Quinolones Unknown and Other     drop foot       Current Outpatient Medications   Medication Instructions    albuterol (Ventolin HFA) 90 mcg/actuation inhaler 1 puff, inhalation, Every 4 hours PRN    amoxicillin (AMOXIL) 2,000 mg, oral,  ONE  HOUR PRIOR TO DENTAL APPOINTMENT THEN TAKE 2 ONE HOUR AFTER DENTAL APPOINTMENT    apixaban (Eliquis) 5 mg tablet oral, 2 times daily    aspirin 81 mg, oral, Daily    cholecalciferol (Vitamin D-3) 50 mcg (2,000 unit) capsule oral    cyanocobalamin (Vitamin B-12) 1,000 mcg/mL injection Cyanocobalamin 1000 MCG/ML Injection Solution   Refills: 0       Active    dofetilide (Tikosyn) 250 mcg capsule 1 capsule, oral, 2 times daily    DULoxetine (CYMBALTA) 120 mg, oral, Daily    empagliflozin (JARDIANCE) 12.5 mg, oral, Daily    fenofibrate (Triglide) 160 mg tablet 1 tablet, oral, Daily,  WITH FOOD    ferrous sulfate 325 (65 Fe) MG tablet 65 mg of iron, oral, Daily    gabapentin (Neurontin) 600 mg tablet 1 tablet, oral, 3 times daily    ipratropium (Atrovent) 21 mcg (0.03 %) nasal spray 2 sprays, Each Nostril, 2-3 times daily    isosorbide mononitrate ER (Imdur) 30 mg 24 hr tablet 1 tablet, oral, Daily    lisinopril 40 mg tablet 1 tablet, oral, Daily    MEN'S MULTI-VITAMIN ORAL 1 tablet, oral, Daily    nitroglycerin (NITROSTAT) 0.4 mg, sublingual, Every 5 min PRN    omeprazole (PRILOSEC) 20 mg, oral, Daily before breakfast    polyethylene glycol-electrolytes 420 gram solution oral    rosuvastatin (CRESTOR) 20 mg, oral, Daily    tiotropium-olodateroL (Stiolto Respimat) 2.5-2.5 mcg/actuation mist inhaler 2 Inhalations, inhalation, Daily RT    vit A/vit C/vit E/zinc/copper (PRESERVISION AREDS ORAL) 1 tablet, oral, 2 times daily    vitamin E 180 mg (400 unit) capsule oral       Past Surgical History:   Procedure Laterality Date    CARPAL TUNNEL RELEASE  04/04/2017    Neuroplasty Decompression Median Nerve At Carpal Tunnel    CORONARY ANGIOPLASTY  04/04/2017    PTCA    OTHER SURGICAL HISTORY  04/04/2017    Cystoscopy With Insertion Of Permanent Urethral Stent    ROTATOR CUFF REPAIR  04/04/2017    Rotator Cuff Repair    SPINAL FUSION  04/04/2017    Spinal Arthrodesis    TOTAL KNEE ARTHROPLASTY  04/04/2017    Knee Replacement        Family History   Problem Relation Name Age of Onset    Lung cancer Mother      Alcohol abuse Father      Other (cardiac disorder) Father      Brain cancer Sister         NPO Details:  NPO/Void Status  Date of Last Liquid: 01/22/24  Time of Last Liquid: 0630  Date of Last Solid: 01/21/24  Time of Last Solid: 2330        Physical Exam    Airway  Mallampati: III  TM distance: >3 FB     Cardiovascular    Dental - normal exam  (+) implants     Pulmonary    Abdominal            Anesthesia Plan    History of general anesthesia?: yes  History of complications of general anesthesia?: no    ASA 3     general     The patient is not a current smoker.    intravenous induction   Postoperative administration of opioids is intended.  Trial extubation is planned.  Anesthetic plan and risks discussed with patient.    Plan discussed with CRNA.

## 2024-01-22 NOTE — ANESTHESIA POSTPROCEDURE EVALUATION
Patient: Galen Villasenor Jr.    Procedure Summary       Date: 01/22/24 Room / Location: [facility] / [facility]    Anesthesia Start: 1120 Anesthesia Stop:     Procedure: Ablation A-Fib Diagnosis:       Paroxysmal atrial fibrillation (CMS/HCC)      (Paroxysmal atrial fibrillation (CMS/HCC) [I48.0])    Providers: Evelyne Ambrose MD Responsible Provider: SHERINE Lacey    Anesthesia Type: general ASA Status: 3            Anesthesia Type: general    Vitals Value Taken Time   /57 01/22/24 1437   Temp 36 01/22/24 1437   Pulse 68 01/22/24 1435   Resp 12 01/22/24 1435   SpO2 99 % 01/22/24 1435   Vitals shown include unvalidated device data.    Anesthesia Post Evaluation    Patient location during evaluation: PACU  Patient participation: waiting for patient participation  Level of consciousness: sleepy but conscious  Pain management: adequate  Airway patency: patent  Cardiovascular status: acceptable  Respiratory status: acceptable  Hydration status: acceptable  Postoperative Nausea and Vomiting: none      No notable events documented.

## 2024-01-22 NOTE — ANESTHESIA PROCEDURE NOTES
Airway  Date/Time: 1/22/2024 11:32 AM  Urgency: elective    Airway not difficult    Staffing  Performed: JULIANA   Authorized by: SHERINE Lacey    Performed by: SHERINE Lacey  Patient location during procedure: OR    Indications and Patient Condition  Indications for airway management: anesthesia  Spontaneous ventilation: present  Sedation level: deep  Preoxygenated: yes  Patient position: sniffing  MILS maintained throughout  Mask difficulty assessment: 2 - vent by mask + OA or adjuvant +/- NMBA    Final Airway Details  Final airway type: endotracheal airway      Successful airway: ETT  Cuffed: yes   Successful intubation technique: direct laryngoscopy  Endotracheal tube insertion site: oral  Blade: Sanna  Blade size: #4  ETT size (mm): 7.5  Cormack-Lehane Classification: grade I - full view of glottis  Placement verified by: capnometry   Measured from: lips  ETT to lips (cm): 24  Number of attempts at approach: 1

## 2024-01-23 ENCOUNTER — APPOINTMENT (OUTPATIENT)
Dept: HEMATOLOGY/ONCOLOGY | Facility: CLINIC | Age: 79
End: 2024-01-23
Payer: MEDICARE

## 2024-01-23 LAB
ACT BLD: 172 SEC (ref 89–169)
ACT BLD: 287 SEC (ref 89–169)
ACT BLD: 289 SEC (ref 89–169)
ACT BLD: 326 SEC (ref 89–169)
ACT BLD: 361 SEC (ref 89–169)
ACT BLD: 368 SEC (ref 89–169)

## 2024-01-23 NOTE — NURSING NOTE
Reviewed discharge instructions with patient and wife. Verbalize understanding of activity restrictions, groin care, medications and follow-up appointment.

## 2024-01-24 ENCOUNTER — APPOINTMENT (OUTPATIENT)
Dept: HEMATOLOGY/ONCOLOGY | Facility: CLINIC | Age: 79
End: 2024-01-24
Payer: MEDICARE

## 2024-01-24 LAB
ATRIAL RATE: 70 BPM
P AXIS: 83 DEGREES
PR INTERVAL: 296 MS
Q ONSET: 226 MS
QRS COUNT: 12 BEATS
QRS DURATION: 82 MS
QT INTERVAL: 426 MS
QTC CALCULATION(BAZETT): 460 MS
QTC FREDERICIA: 448 MS
R AXIS: 15 DEGREES
T AXIS: 7 DEGREES
T OFFSET: 439 MS
VENTRICULAR RATE: 70 BPM

## 2024-01-25 ENCOUNTER — APPOINTMENT (OUTPATIENT)
Dept: HEMATOLOGY/ONCOLOGY | Facility: CLINIC | Age: 79
End: 2024-01-25
Payer: MEDICARE

## 2024-01-26 ENCOUNTER — LAB (OUTPATIENT)
Dept: LAB | Facility: LAB | Age: 79
End: 2024-01-26
Payer: MEDICARE

## 2024-01-26 ENCOUNTER — APPOINTMENT (OUTPATIENT)
Dept: HEMATOLOGY/ONCOLOGY | Facility: CLINIC | Age: 79
End: 2024-01-26
Payer: MEDICARE

## 2024-01-26 DIAGNOSIS — C91.42 HAIRY CELL LEUKEMIA, IN RELAPSE (MULTI): ICD-10-CM

## 2024-01-26 LAB
ALBUMIN SERPL BCP-MCNC: 4.2 G/DL (ref 3.4–5)
ALP SERPL-CCNC: 36 U/L (ref 33–136)
ALT SERPL W P-5'-P-CCNC: 17 U/L (ref 10–52)
ANION GAP SERPL CALC-SCNC: 14 MMOL/L (ref 10–20)
AST SERPL W P-5'-P-CCNC: 18 U/L (ref 9–39)
BASOPHILS # BLD AUTO: 0 X10*3/UL (ref 0–0.1)
BASOPHILS NFR BLD AUTO: 0 %
BILIRUB SERPL-MCNC: 0.7 MG/DL (ref 0–1.2)
BUN SERPL-MCNC: 30 MG/DL (ref 6–23)
CALCIUM SERPL-MCNC: 9.5 MG/DL (ref 8.6–10.3)
CHLORIDE SERPL-SCNC: 105 MMOL/L (ref 98–107)
CO2 SERPL-SCNC: 26 MMOL/L (ref 21–32)
CREAT SERPL-MCNC: 1.35 MG/DL (ref 0.5–1.3)
EGFRCR SERPLBLD CKD-EPI 2021: 53 ML/MIN/1.73M*2
EOSINOPHIL # BLD AUTO: 0.03 X10*3/UL (ref 0–0.4)
EOSINOPHIL NFR BLD AUTO: 1.9 %
ERYTHROCYTE [DISTWIDTH] IN BLOOD BY AUTOMATED COUNT: 17.1 % (ref 11.5–14.5)
GLUCOSE SERPL-MCNC: 155 MG/DL (ref 74–99)
HCT VFR BLD AUTO: 29.3 % (ref 41–52)
HGB BLD-MCNC: 9.5 G/DL (ref 13.5–17.5)
IMM GRANULOCYTES # BLD AUTO: 0.01 X10*3/UL (ref 0–0.5)
IMM GRANULOCYTES NFR BLD AUTO: 0.6 % (ref 0–0.9)
LDH SERPL L TO P-CCNC: 173 U/L (ref 84–246)
LYMPHOCYTES # BLD AUTO: 0.71 X10*3/UL (ref 0.8–3)
LYMPHOCYTES NFR BLD AUTO: 44.1 %
MCH RBC QN AUTO: 32.9 PG (ref 26–34)
MCHC RBC AUTO-ENTMCNC: 32.4 G/DL (ref 32–36)
MCV RBC AUTO: 101 FL (ref 80–100)
MONOCYTES # BLD AUTO: 0.02 X10*3/UL (ref 0.05–0.8)
MONOCYTES NFR BLD AUTO: 1.2 %
NEUTROPHILS # BLD AUTO: 0.84 X10*3/UL (ref 1.6–5.5)
NEUTROPHILS NFR BLD AUTO: 52.2 %
NRBC BLD-RTO: 0 /100 WBCS (ref 0–0)
PHOSPHATE SERPL-MCNC: 3.2 MG/DL (ref 2.5–4.9)
PLATELET # BLD AUTO: 58 X10*3/UL (ref 150–450)
POTASSIUM SERPL-SCNC: 4.7 MMOL/L (ref 3.5–5.3)
PROT SERPL-MCNC: 6.2 G/DL (ref 6.4–8.2)
RBC # BLD AUTO: 2.89 X10*6/UL (ref 4.5–5.9)
SODIUM SERPL-SCNC: 140 MMOL/L (ref 136–145)
URATE SERPL-MCNC: 4.1 MG/DL (ref 4–7.5)
WBC # BLD AUTO: 1.6 X10*3/UL (ref 4.4–11.3)

## 2024-01-26 PROCEDURE — 84550 ASSAY OF BLOOD/URIC ACID: CPT

## 2024-01-26 PROCEDURE — 80053 COMPREHEN METABOLIC PANEL: CPT

## 2024-01-26 PROCEDURE — 36415 COLL VENOUS BLD VENIPUNCTURE: CPT

## 2024-01-26 PROCEDURE — 83615 LACTATE (LD) (LDH) ENZYME: CPT

## 2024-01-26 PROCEDURE — 84100 ASSAY OF PHOSPHORUS: CPT

## 2024-01-26 PROCEDURE — 85025 COMPLETE CBC W/AUTO DIFF WBC: CPT

## 2024-01-29 ENCOUNTER — INFUSION (OUTPATIENT)
Dept: HEMATOLOGY/ONCOLOGY | Facility: CLINIC | Age: 79
End: 2024-01-29
Payer: MEDICARE

## 2024-01-29 ENCOUNTER — OFFICE VISIT (OUTPATIENT)
Dept: HEMATOLOGY/ONCOLOGY | Facility: CLINIC | Age: 79
End: 2024-01-29
Payer: MEDICARE

## 2024-01-29 VITALS
RESPIRATION RATE: 16 BRPM | SYSTOLIC BLOOD PRESSURE: 106 MMHG | TEMPERATURE: 97 F | BODY MASS INDEX: 30.58 KG/M2 | DIASTOLIC BLOOD PRESSURE: 57 MMHG | WEIGHT: 213.63 LBS | OXYGEN SATURATION: 99 % | HEART RATE: 84 BPM | HEIGHT: 70 IN

## 2024-01-29 DIAGNOSIS — E05.90 HYPERTHYROIDISM: ICD-10-CM

## 2024-01-29 DIAGNOSIS — C91.42 HAIRY CELL LEUKEMIA, IN RELAPSE (MULTI): ICD-10-CM

## 2024-01-29 DIAGNOSIS — E11.9 TYPE 2 DIABETES MELLITUS WITHOUT COMPLICATION, WITHOUT LONG-TERM CURRENT USE OF INSULIN (MULTI): ICD-10-CM

## 2024-01-29 DIAGNOSIS — I48.20 CHRONIC ATRIAL FIBRILLATION (MULTI): ICD-10-CM

## 2024-01-29 DIAGNOSIS — C91.42 HAIRY CELL LEUKEMIA, IN RELAPSE (MULTI): Primary | ICD-10-CM

## 2024-01-29 DIAGNOSIS — I10 PRIMARY HYPERTENSION: ICD-10-CM

## 2024-01-29 DIAGNOSIS — E78.2 MIXED HYPERLIPIDEMIA: ICD-10-CM

## 2024-01-29 DIAGNOSIS — I48.0 PAROXYSMAL ATRIAL FIBRILLATION (MULTI): ICD-10-CM

## 2024-01-29 DIAGNOSIS — I25.10 CORONARY ARTERY DISEASE INVOLVING NATIVE CORONARY ARTERY OF NATIVE HEART WITHOUT ANGINA PECTORIS: ICD-10-CM

## 2024-01-29 LAB
ATRIAL RATE: 65 BPM
HBV CORE AB SER QL: NONREACTIVE
HBV SURFACE AB SER-ACNC: <3.1 MIU/ML
HBV SURFACE AG SERPL QL IA: NONREACTIVE
P AXIS: 55 DEGREES
Q ONSET: 224 MS
QRS COUNT: 9 BEATS
QRS DURATION: 88 MS
QT INTERVAL: 442 MS
QTC CALCULATION(BAZETT): 407 MS
QTC FREDERICIA: 419 MS
R AXIS: -2 DEGREES
T AXIS: 18 DEGREES
T OFFSET: 445 MS
VENTRICULAR RATE: 51 BPM

## 2024-01-29 PROCEDURE — 1157F ADVNC CARE PLAN IN RCRD: CPT | Performed by: INTERNAL MEDICINE

## 2024-01-29 PROCEDURE — 96413 CHEMO IV INFUSION 1 HR: CPT

## 2024-01-29 PROCEDURE — 87340 HEPATITIS B SURFACE AG IA: CPT | Performed by: INTERNAL MEDICINE

## 2024-01-29 PROCEDURE — 86706 HEP B SURFACE ANTIBODY: CPT | Performed by: INTERNAL MEDICINE

## 2024-01-29 PROCEDURE — 99214 OFFICE O/P EST MOD 30 MIN: CPT | Performed by: INTERNAL MEDICINE

## 2024-01-29 PROCEDURE — 3078F DIAST BP <80 MM HG: CPT | Performed by: INTERNAL MEDICINE

## 2024-01-29 PROCEDURE — 1159F MED LIST DOCD IN RCRD: CPT | Performed by: INTERNAL MEDICINE

## 2024-01-29 PROCEDURE — 86704 HEP B CORE ANTIBODY TOTAL: CPT | Performed by: INTERNAL MEDICINE

## 2024-01-29 PROCEDURE — 1036F TOBACCO NON-USER: CPT | Performed by: INTERNAL MEDICINE

## 2024-01-29 PROCEDURE — 3074F SYST BP LT 130 MM HG: CPT | Performed by: INTERNAL MEDICINE

## 2024-01-29 PROCEDURE — 1126F AMNT PAIN NOTED NONE PRSNT: CPT | Performed by: INTERNAL MEDICINE

## 2024-01-29 PROCEDURE — 96415 CHEMO IV INFUSION ADDL HR: CPT

## 2024-01-29 PROCEDURE — 2500000004 HC RX 250 GENERAL PHARMACY W/ HCPCS (ALT 636 FOR OP/ED): Performed by: INTERNAL MEDICINE

## 2024-01-29 RX ORDER — PROCHLORPERAZINE EDISYLATE 5 MG/ML
10 INJECTION INTRAMUSCULAR; INTRAVENOUS EVERY 6 HOURS PRN
Status: DISCONTINUED | OUTPATIENT
Start: 2024-01-29 | End: 2024-01-29 | Stop reason: HOSPADM

## 2024-01-29 RX ORDER — HEPARIN SODIUM,PORCINE/PF 10 UNIT/ML
50 SYRINGE (ML) INTRAVENOUS AS NEEDED
Status: CANCELLED | OUTPATIENT
Start: 2024-01-29

## 2024-01-29 RX ORDER — PROCHLORPERAZINE MALEATE 10 MG
10 TABLET ORAL EVERY 6 HOURS PRN
Status: DISCONTINUED | OUTPATIENT
Start: 2024-01-29 | End: 2024-01-29 | Stop reason: HOSPADM

## 2024-01-29 RX ORDER — EPINEPHRINE 0.3 MG/.3ML
0.3 INJECTION SUBCUTANEOUS EVERY 5 MIN PRN
Status: DISCONTINUED | OUTPATIENT
Start: 2024-01-29 | End: 2024-01-29 | Stop reason: HOSPADM

## 2024-01-29 RX ORDER — DIPHENHYDRAMINE HYDROCHLORIDE 50 MG/ML
50 INJECTION INTRAMUSCULAR; INTRAVENOUS AS NEEDED
Status: DISCONTINUED | OUTPATIENT
Start: 2024-01-29 | End: 2024-01-29 | Stop reason: HOSPADM

## 2024-01-29 RX ORDER — FAMOTIDINE 10 MG/ML
20 INJECTION INTRAVENOUS ONCE AS NEEDED
Status: DISCONTINUED | OUTPATIENT
Start: 2024-01-29 | End: 2024-01-29 | Stop reason: HOSPADM

## 2024-01-29 RX ORDER — HEPARIN 100 UNIT/ML
500 SYRINGE INTRAVENOUS AS NEEDED
Status: CANCELLED | OUTPATIENT
Start: 2024-01-29

## 2024-01-29 RX ORDER — ALBUTEROL SULFATE 0.83 MG/ML
3 SOLUTION RESPIRATORY (INHALATION) AS NEEDED
Status: DISCONTINUED | OUTPATIENT
Start: 2024-01-29 | End: 2024-01-29 | Stop reason: HOSPADM

## 2024-01-29 RX ADMIN — SODIUM CHLORIDE 10.2 MG: 9 INJECTION, SOLUTION INTRAVENOUS at 11:38

## 2024-01-29 ASSESSMENT — PAIN SCALES - GENERAL: PAINLEVEL: 5

## 2024-01-29 NOTE — PROGRESS NOTES
"Patient ID: Galen Villasenor Jr. is a 78 y.o. male.  Referring Physician: No referring provider defined for this encounter.  Primary Care Provider: Bladimir Ackerman MD  Visit Type: Follow Up      Subjective    HPI I had the ablation done 1 week ago    Review of Systems   Constitutional:  Positive for fatigue.   HENT:  Negative.     Eyes: Negative.    Respiratory: Negative.     Cardiovascular: Negative.    Gastrointestinal: Negative.    Endocrine: Negative.    Genitourinary: Negative.     Musculoskeletal: Negative.    Skin: Negative.    Neurological: Negative.    Hematological: Negative.    Psychiatric/Behavioral: Negative.          Objective   BSA: 2.19 meters squared  /57 (BP Location: Right arm)   Pulse 84 Comment: 84-94 AFIB (LS)  Temp 36.1 °C (97 °F) (Temporal)   Resp 16   Ht (S) 1.787 m (5' 10.35\")   Wt 96.9 kg (213 lb 10 oz)   SpO2 99%   BMI 30.34 kg/m²      has a past medical history of Atherosclerotic heart disease of native coronary artery without angina pectoris (04/04/2017), Diabetes mellitus (CMS/[facility]), Personal history of malignant neoplasm of prostate (04/04/2017), Personal history of other diseases of the circulatory system (04/04/2017), Personal history of other diseases of the respiratory system (04/04/2017), Personal history of other endocrine, nutritional and metabolic disease (04/04/2017), Personal history of other endocrine, nutritional and metabolic disease (04/04/2017), and Personal history of other specified conditions (04/04/2017).   has a past surgical history that includes Rotator cuff repair (04/04/2017); Total knee arthroplasty (04/04/2017); Carpal tunnel release (04/04/2017); Coronary angioplasty (04/04/2017); Other surgical history (04/04/2017); Spinal fusion (04/04/2017); and Cardiac electrophysiology procedure (N/A, 1/22/2024).  Family History   Problem Relation Name Age of Onset    Lung cancer Mother      Alcohol abuse Father      Other (cardiac disorder) Father      Brain " cancer Sister       Oncology History   Hairy cell leukemia, in relapse (CMS/HCC)   12/13/2023 Initial Diagnosis    Hairy cell leukemia, in relapse (CMS/HCC)     1/29/2024 -  Chemotherapy    Cladribine, 5 Day Cycles         Galen Villasenor JrChloe  reports that he quit smoking about 44 years ago. His smoking use included cigarettes and pipe. He started smoking about 67 years ago. He has a 23.00 pack-year smoking history. He has been exposed to tobacco smoke. He has never used smokeless tobacco.  He  reports current alcohol use of about 7.0 standard drinks of alcohol per week.  He  reports that he does not currently use drugs.    Physical Exam  Vitals reviewed.   HENT:      Head: Normocephalic.      Mouth/Throat:      Mouth: Mucous membranes are moist.   Eyes:      Extraocular Movements: Extraocular movements intact.      Pupils: Pupils are equal, round, and reactive to light.   Cardiovascular:      Rate and Rhythm: Normal rate and regular rhythm.      Heart sounds: Normal heart sounds.   Pulmonary:      Breath sounds: Normal breath sounds.   Abdominal:      General: Bowel sounds are normal.      Palpations: Abdomen is soft.   Musculoskeletal:         General: Normal range of motion.      Cervical back: Normal range of motion and neck supple.   Skin:     General: Skin is warm.   Neurological:      General: No focal deficit present.      Mental Status: He is alert and oriented to person, place, and time.   Psychiatric:         Mood and Affect: Mood normal.         Behavior: Behavior normal.         WBC   Date/Time Value Ref Range Status   01/26/2024 09:16 AM 1.6 (L) 4.4 - 11.3 x10*3/uL Final   01/22/2024 08:58 AM 1.6 (L) 4.4 - 11.3 x10*3/uL Final   12/07/2023 08:48 AM 2.0 (L) 4.4 - 11.3 x10*3/uL Final     nRBC   Date Value Ref Range Status   01/26/2024 0.0 0.0 - 0.0 /100 WBCs Final   01/22/2024 0.0 0.0 - 0.0 /100 WBCs Final   10/25/2019 0.0 0.0 - 0.0 /100 WBC Final   09/07/2018 0.4 0.0 - 0.0 /100 WBC Final     RBC   Date  "Value Ref Range Status   01/26/2024 2.89 (L) 4.50 - 5.90 x10*6/uL Final   01/22/2024 2.95 (L) 4.50 - 5.90 x10*6/uL Final   12/07/2023 3.26 (L) 4.50 - 5.90 x10*6/uL Final     Hemoglobin   Date Value Ref Range Status   01/26/2024 9.5 (L) 13.5 - 17.5 g/dL Final   01/22/2024 9.8 (L) 13.5 - 17.5 g/dL Final   12/07/2023 10.7 (L) 13.5 - 17.5 g/dL Final     Hematocrit   Date Value Ref Range Status   01/26/2024 29.3 (L) 41.0 - 52.0 % Final   01/22/2024 29.5 (L) 41.0 - 52.0 % Final   12/07/2023 33.0 (L) 41.0 - 52.0 % Final     MCV   Date/Time Value Ref Range Status   01/26/2024 09:16  (H) 80 - 100 fL Final   01/22/2024 08:58  80 - 100 fL Final   12/07/2023 08:48  (H) 80 - 100 fL Final     MCH   Date/Time Value Ref Range Status   01/26/2024 09:16 AM 32.9 26.0 - 34.0 pg Final   01/22/2024 08:58 AM 33.2 26.0 - 34.0 pg Final   12/07/2023 08:48 AM 32.8 26.0 - 34.0 pg Final     MCHC   Date/Time Value Ref Range Status   01/26/2024 09:16 AM 32.4 32.0 - 36.0 g/dL Final   01/22/2024 08:58 AM 33.2 32.0 - 36.0 g/dL Final   12/07/2023 08:48 AM 32.4 32.0 - 36.0 g/dL Final     RDW   Date/Time Value Ref Range Status   01/26/2024 09:16 AM 17.1 (H) 11.5 - 14.5 % Final   01/22/2024 08:58 AM 16.9 (H) 11.5 - 14.5 % Final   12/07/2023 08:48 AM 16.2 (H) 11.5 - 14.5 % Final     Platelets   Date/Time Value Ref Range Status   01/26/2024 09:16 AM 58 (L) 150 - 450 x10*3/uL Final   01/22/2024 08:58 AM 70 (L) 150 - 450 x10*3/uL Final     Comment:     Platelet count verified by smear review.   12/07/2023 08:48 AM 80 (L) 150 - 450 x10*3/uL Final     No results found for: \"MPV\"  Neutrophils %   Date/Time Value Ref Range Status   01/26/2024 09:16 AM 52.2 40.0 - 80.0 % Final   12/07/2023 08:48 AM 50.0 40.0 - 80.0 % Final   11/28/2023 11:01 AM 46.5 40.0 - 80.0 % Final     Immature Granulocytes %, Automated   Date/Time Value Ref Range Status   01/26/2024 09:16 AM 0.6 0.0 - 0.9 % Final     Comment:     Immature Granulocyte Count (IG) includes " promyelocytes, myelocytes and metamyelocytes but does not include bands. Percent differential counts (%) should be interpreted in the context of the absolute cell counts (cells/UL).   12/07/2023 08:48 AM 0.0 0.0 - 0.9 % Final     Comment:     Immature Granulocyte Count (IG) includes promyelocytes, myelocytes and metamyelocytes but does not include bands. Percent differential counts (%) should be interpreted in the context of the absolute cell counts (cells/UL).   11/28/2023 11:01 AM 0.5 0.0 - 0.9 % Final     Comment:     Immature Granulocyte Count (IG) includes promyelocytes, myelocytes and metamyelocytes but does not include bands. Percent differential counts (%) should be interpreted in the context of the absolute cell counts (cells/UL).     Lymphocytes %   Date/Time Value Ref Range Status   01/26/2024 09:16 AM 44.1 13.0 - 44.0 % Final   12/07/2023 08:48 AM 44.0 13.0 - 44.0 % Final   11/28/2023 11:01 AM 47.9 13.0 - 44.0 % Final     Monocytes %   Date/Time Value Ref Range Status   01/26/2024 09:16 AM 1.2 2.0 - 10.0 % Final   12/07/2023 08:48 AM 3.0 2.0 - 10.0 % Final   11/28/2023 11:01 AM 1.8 2.0 - 10.0 % Final     Eosinophils %   Date/Time Value Ref Range Status   01/26/2024 09:16 AM 1.9 0.0 - 6.0 % Final   12/07/2023 08:48 AM 2.5 0.0 - 6.0 % Final   11/28/2023 11:01 AM 2.8 0.0 - 6.0 % Final     Basophils %   Date/Time Value Ref Range Status   01/26/2024 09:16 AM 0.0 0.0 - 2.0 % Final   12/07/2023 08:48 AM 0.5 0.0 - 2.0 % Final   11/28/2023 11:01 AM 0.5 0.0 - 2.0 % Final     Neutrophils Absolute   Date/Time Value Ref Range Status   01/26/2024 09:16 AM 0.84 (L) 1.60 - 5.50 x10*3/uL Final     Comment:     Percent differential counts (%) should be interpreted in the context of the absolute cell counts (cells/uL).   12/07/2023 08:48 AM 1.00 (L) 1.60 - 5.50 x10*3/uL Final     Comment:     Percent differential counts (%) should be interpreted in the context of the absolute cell counts (cells/uL).   11/28/2023 11:01 AM  "1.01 (L) 1.60 - 5.50 x10*3/uL Final     Comment:     Percent differential counts (%) should be interpreted in the context of the absolute cell counts (cells/uL).     Immature Granulocytes Absolute, Automated   Date/Time Value Ref Range Status   01/26/2024 09:16 AM 0.01 0.00 - 0.50 x10*3/uL Final   12/07/2023 08:48 AM 0.00 0.00 - 0.50 x10*3/uL Final   11/28/2023 11:01 AM 0.01 0.00 - 0.50 x10*3/uL Final     Lymphocytes Absolute   Date/Time Value Ref Range Status   01/26/2024 09:16 AM 0.71 (L) 0.80 - 3.00 x10*3/uL Final   12/07/2023 08:48 AM 0.88 0.80 - 3.00 x10*3/uL Final   11/28/2023 11:01 AM 1.04 0.80 - 3.00 x10*3/uL Final     Monocytes Absolute   Date/Time Value Ref Range Status   01/26/2024 09:16 AM 0.02 (L) 0.05 - 0.80 x10*3/uL Final   12/07/2023 08:48 AM 0.06 0.05 - 0.80 x10*3/uL Final   11/28/2023 11:01 AM 0.04 (L) 0.05 - 0.80 x10*3/uL Final     Eosinophils Absolute   Date/Time Value Ref Range Status   01/26/2024 09:16 AM 0.03 0.00 - 0.40 x10*3/uL Final   12/07/2023 08:48 AM 0.05 0.00 - 0.40 x10*3/uL Final   11/28/2023 11:01 AM 0.06 0.00 - 0.40 x10*3/uL Final     Basophils Absolute   Date/Time Value Ref Range Status   01/26/2024 09:16 AM 0.00 0.00 - 0.10 x10*3/uL Final   12/07/2023 08:48 AM 0.01 0.00 - 0.10 x10*3/uL Final   11/28/2023 11:01 AM 0.01 0.00 - 0.10 x10*3/uL Final       No components found for: \"PT\"  aPTT   Date/Time Value Ref Range Status   01/22/2024 08:58 AM 44 (H) 27 - 38 seconds Final     Medication Documentation Review Audit       Reviewed by Louann Prather MA (Medical Assistant) on 01/29/24 at 0853      Medication Order Taking? Sig Documenting Provider Last Dose Status   albuterol (Ventolin HFA) 90 mcg/actuation inhaler [id number] Yes Inhale 1 puff every 4 hours if needed. Historical Provider, MD Taking Active   amoxicillin (Amoxil) 500 mg capsule [id number] Yes Take 4 capsules (2,000 mg) by mouth.  ONE HOUR PRIOR TO DENTAL APPOINTMENT THEN TAKE 2 ONE HOUR AFTER DENTAL APPOINTMENT Historical " Provider, MD Taking Active   apixaban (Eliquis) 5 mg tablet [id number] Yes Take by mouth 2 times a day. Historical Provider, MD Taking Active   aspirin 81 mg chewable tablet [id number] Yes Chew 1 tablet (81 mg) once daily. Historical Provider, MD Taking Active   cholecalciferol (Vitamin D-3) 50 mcg (2,000 unit) capsule [id number] Yes Take by mouth. Historical MD Luz Taking Active   cyanocobalamin (Vitamin B-12) 1,000 mcg/mL injection [id number] Yes Cyanocobalamin 1000 MCG/ML Injection Solution   Refills: 0       Active Historical Provider, MD Taking Active   DULoxetine (Cymbalta) 60 mg DR capsule [id number] Yes Take 2 capsules (120 mg) by mouth once daily. Historical MD Luz Taking Active   empagliflozin (Jardiance) 25 mg [id number] Yes Take 0.5 tablets (12.5 mg) by mouth once daily. Historical MD Luz Taking Active   fenofibrate (Triglide) 160 mg tablet [id number] Yes Take 1 tablet (160 mg) by mouth once daily.  WITH FOOD Historical MD Luz Taking Active   ferrous sulfate 325 (65 Fe) MG tablet [id number] Yes Take 1 tablet by mouth once daily. Historical Provider, MD Taking Active   gabapentin (Neurontin) 600 mg tablet [id number] Yes Take 1 tablet (600 mg) by mouth 3 times a day. Historical MD Luz Taking Active   ipratropium (Atrovent) 21 mcg (0.03 %) nasal spray [id number] Yes Administer 2 sprays into each nostril. 2-3 times daily Historical MD Luz Taking Active   isosorbide mononitrate ER (Imdur) 30 mg 24 hr tablet [id number] Yes Take 1 tablet (30 mg) by mouth once daily. Historical Provider, MD Taking Active   lisinopril 40 mg tablet [id number] Yes Take 1 tablet (40 mg) by mouth once daily. Historical Provider, MD Taking Active   MEN'S MULTI-VITAMIN ORAL [id number] Yes Take 1 tablet by mouth once daily. Historical Provider, MD Taking Active   nitroglycerin (Nitrostat) 0.4 mg SL tablet [id number] Yes Place 1 tablet (0.4 mg) under the tongue every 5 minutes if needed for chest pain. Historical  Provider, MD Taking Active   omeprazole (PriLOSEC) 20 mg DR capsule [id number] Yes Take 1 capsule (20 mg) by mouth 2 times a day before meals. For 45 days then resume once daily dosing RAVEN Aldana-CNP Taking Active   polyethylene glycol-electrolytes 420 gram solution [id number] Yes Take by mouth. Historical Provider, MD Taking Active   rosuvastatin (Crestor) 20 mg tablet [id number] Yes Take 1 tablet (20 mg) by mouth once daily. Historical Provider, MD Taking Active   tiotropium-olodateroL (Stiolto Respimat) 2.5-2.5 mcg/actuation mist inhaler [id number] Yes Inhale 2 Inhalations once daily. Historical Provider, MD Taking Active   vit A/vit C/vit E/zinc/copper (PRESERVISION AREDS ORAL) [id number] Yes Take 1 tablet by mouth twice a day. Historical Provider, MD Taking Active   vitamin E 180 mg (400 unit) capsule [id number] Yes Take by mouth. Historical Provider, MD Taking Active                   Assessment/Plan    1) hairy cell leukemia  -old records from outside hematology group were finally obtained on 12/7/2023: on 7/17/2018 CBC showed wbc 8.8, hgb 14.7, plt 109,000; bmbx was done to rule out relapse- bmbx was sent to [facility]:  no morphologic or immunophenotypic evidence of residual hairy cell leukemia; expanded population of CD8+ T cells; mildly hypercellular marrow with maturing trilineage hematopoiesis (30-40%), BRAF negative  -diagnosed in 2002, treated with cladribine  -remained mildly thrombocytopenic ever since  -last flow cytometry of peripheral blood done on 7/21/2023 was negative  -became more neutropenic than usual over the summer--[facility], however, 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  -had bone marrow bx done on 11/28/2023--showed extensive marrow involvement by hairy cell leukemia; no evidence of T cell LGL; positive for BRAF  -discussed treatment options--cladribine vs cladribine + rituximab  -he opted for cladribine alone  -in the future, if he relapses again, he could go  on either ibrutinib or vemurafenib + rituximab  -cardiac ablation was delayed until last Monday--his wife said that it was an outpatient procedure, and after it was done, no one came out to let her know that Larry was doing okay and in the PACU recovery area; Larry said that everyone in the PACU left for the day, and he was left alone in the PACU until the facility was about to close for the day  -he is here to finally start cladribine  -benefits, risks, potential morbidity related to cladribine were reviewed with Galen and he provided informed consent to proceed  -every day this week he will receive cladribine 0.14 mg/kg IV  -tumor lysis labs will be followed on Mon, Wed and Fri (days 1, 3 and 5)  -he was cautioned that his ANC and platelet count may dip down further, after treatment, before they improve  -labs done last Friday included CBC, COMP, and hep B serologies  -results reviewed--wbc 1.6, hgb 9.5, plt 58,000, , phos 3.2, uric acid 4.1, creatinine 1.35, calcium 9.5, AST 18, ALT 17, hep B serologies negative  -will recheck his counts in 1 week and in 2 weeks  -he was cautioned that he may need to be put in prophylactic antibiotics and may have intermittent low grade fevers over the next 30 days     2) CAD  -on ASA  -on imdur     3) atrial fibrillation  -on dofetilide  -on eliquis  -will have ablation done on 1/9     4) hyperlipidemia  -on fenofibrate  -on rosuvastatin     5) hypertension  -on lisinopril     6) diabetes  -on metformin     7) hyperthyroidism  -on methimazole              Problem List Items Addressed This Visit    None           Saturnino Kruse MD

## 2024-01-30 ENCOUNTER — INFUSION (OUTPATIENT)
Dept: HEMATOLOGY/ONCOLOGY | Facility: CLINIC | Age: 79
End: 2024-01-30
Payer: MEDICARE

## 2024-01-30 VITALS
BODY MASS INDEX: 30.41 KG/M2 | WEIGHT: 214.07 LBS | TEMPERATURE: 97.7 F | RESPIRATION RATE: 18 BRPM | DIASTOLIC BLOOD PRESSURE: 59 MMHG | OXYGEN SATURATION: 97 % | SYSTOLIC BLOOD PRESSURE: 168 MMHG | HEART RATE: 66 BPM

## 2024-01-30 DIAGNOSIS — C91.42 HAIRY CELL LEUKEMIA, IN RELAPSE (MULTI): Primary | ICD-10-CM

## 2024-01-30 DIAGNOSIS — I48.20 CHRONIC ATRIAL FIBRILLATION (MULTI): ICD-10-CM

## 2024-01-30 DIAGNOSIS — C91.42 HAIRY CELL LEUKEMIA, IN RELAPSE (MULTI): ICD-10-CM

## 2024-01-30 PROCEDURE — 96415 CHEMO IV INFUSION ADDL HR: CPT

## 2024-01-30 PROCEDURE — 2500000004 HC RX 250 GENERAL PHARMACY W/ HCPCS (ALT 636 FOR OP/ED): Performed by: INTERNAL MEDICINE

## 2024-01-30 PROCEDURE — 2500000004 HC RX 250 GENERAL PHARMACY W/ HCPCS (ALT 636 FOR OP/ED): Mod: JZ | Performed by: INTERNAL MEDICINE

## 2024-01-30 PROCEDURE — 96361 HYDRATE IV INFUSION ADD-ON: CPT | Mod: INF

## 2024-01-30 PROCEDURE — 96413 CHEMO IV INFUSION 1 HR: CPT

## 2024-01-30 RX ORDER — EPINEPHRINE 0.3 MG/.3ML
0.3 INJECTION SUBCUTANEOUS EVERY 5 MIN PRN
Status: DISCONTINUED | OUTPATIENT
Start: 2024-01-30 | End: 2024-01-30 | Stop reason: HOSPADM

## 2024-01-30 RX ORDER — PROCHLORPERAZINE EDISYLATE 5 MG/ML
10 INJECTION INTRAMUSCULAR; INTRAVENOUS EVERY 6 HOURS PRN
Status: DISCONTINUED | OUTPATIENT
Start: 2024-01-30 | End: 2024-01-30 | Stop reason: HOSPADM

## 2024-01-30 RX ORDER — FAMOTIDINE 10 MG/ML
20 INJECTION INTRAVENOUS ONCE AS NEEDED
Status: DISCONTINUED | OUTPATIENT
Start: 2024-01-30 | End: 2024-01-30 | Stop reason: HOSPADM

## 2024-01-30 RX ORDER — DIPHENHYDRAMINE HYDROCHLORIDE 50 MG/ML
50 INJECTION INTRAMUSCULAR; INTRAVENOUS AS NEEDED
Status: DISCONTINUED | OUTPATIENT
Start: 2024-01-30 | End: 2024-01-30 | Stop reason: HOSPADM

## 2024-01-30 RX ORDER — PROCHLORPERAZINE MALEATE 10 MG
10 TABLET ORAL EVERY 6 HOURS PRN
Status: DISCONTINUED | OUTPATIENT
Start: 2024-01-30 | End: 2024-01-30 | Stop reason: HOSPADM

## 2024-01-30 RX ORDER — ALBUTEROL SULFATE 0.83 MG/ML
3 SOLUTION RESPIRATORY (INHALATION) AS NEEDED
Status: DISCONTINUED | OUTPATIENT
Start: 2024-01-30 | End: 2024-01-30 | Stop reason: HOSPADM

## 2024-01-30 RX ADMIN — SODIUM CHLORIDE 10 MG: 9 INJECTION, SOLUTION INTRAVENOUS at 12:17

## 2024-01-30 RX ADMIN — SODIUM CHLORIDE 500 ML: 9 INJECTION, SOLUTION INTRAVENOUS at 11:30

## 2024-01-30 ASSESSMENT — PAIN SCALES - GENERAL: PAINLEVEL: 0-NO PAIN

## 2024-01-30 NOTE — PROGRESS NOTES
"At 1055a Rapid Response team called to parking lot. [facility] called. Patient was being wheeled into [facility] when team arrived to [facility].  Fall witnessed by another patient and patients wife,\" patient was getting out of his car and went to place feet on the ground and lost his balance and fell to his hands and on his Right Knee\". Patient denied any dizziness or symptoms when falling. Patient assessed by Dr Kruse in [facility] . Patient denies any injury from fall.  Squad arrived patient refused to be assessed at ED. Ok per Dr Kruse, Due to patients recent Ablation EKG performed and Dr Kruse notified with results. Ok per Dr Kruse to proceed with treatment today .   "

## 2024-01-31 ENCOUNTER — INFUSION (OUTPATIENT)
Dept: HEMATOLOGY/ONCOLOGY | Facility: CLINIC | Age: 79
End: 2024-01-31
Payer: MEDICARE

## 2024-01-31 VITALS
OXYGEN SATURATION: 99 % | DIASTOLIC BLOOD PRESSURE: 55 MMHG | SYSTOLIC BLOOD PRESSURE: 95 MMHG | BODY MASS INDEX: 30.34 KG/M2 | WEIGHT: 213.63 LBS | RESPIRATION RATE: 18 BRPM | TEMPERATURE: 97 F | HEART RATE: 76 BPM

## 2024-01-31 DIAGNOSIS — C91.42 HAIRY CELL LEUKEMIA, IN RELAPSE (MULTI): ICD-10-CM

## 2024-01-31 LAB
ALBUMIN SERPL BCP-MCNC: 4.3 G/DL (ref 3.4–5)
ALP SERPL-CCNC: 31 U/L (ref 33–136)
ALT SERPL W P-5'-P-CCNC: 15 U/L (ref 10–52)
ANION GAP SERPL CALC-SCNC: 12 MMOL/L (ref 10–20)
AST SERPL W P-5'-P-CCNC: 17 U/L (ref 9–39)
BASOPHILS # BLD AUTO: 0 X10*3/UL (ref 0–0.1)
BASOPHILS NFR BLD AUTO: 0 %
BILIRUB SERPL-MCNC: 0.6 MG/DL (ref 0–1.2)
BUN SERPL-MCNC: 25 MG/DL (ref 6–23)
CALCIUM SERPL-MCNC: 9.2 MG/DL (ref 8.6–10.3)
CHLORIDE SERPL-SCNC: 104 MMOL/L (ref 98–107)
CO2 SERPL-SCNC: 24 MMOL/L (ref 21–32)
CREAT SERPL-MCNC: 1.42 MG/DL (ref 0.5–1.3)
EGFRCR SERPLBLD CKD-EPI 2021: 50 ML/MIN/1.73M*2
EOSINOPHIL # BLD AUTO: 0.03 X10*3/UL (ref 0–0.4)
EOSINOPHIL NFR BLD AUTO: 2.4 %
ERYTHROCYTE [DISTWIDTH] IN BLOOD BY AUTOMATED COUNT: 17.9 % (ref 11.5–14.5)
GLUCOSE SERPL-MCNC: 183 MG/DL (ref 74–99)
HCT VFR BLD AUTO: 28.1 % (ref 41–52)
HGB BLD-MCNC: 9 G/DL (ref 13.5–17.5)
IMM GRANULOCYTES # BLD AUTO: 0.01 X10*3/UL (ref 0–0.5)
IMM GRANULOCYTES NFR BLD AUTO: 0.8 % (ref 0–0.9)
LDH SERPL L TO P-CCNC: 177 U/L (ref 84–246)
LYMPHOCYTES # BLD AUTO: 0.38 X10*3/UL (ref 0.8–3)
LYMPHOCYTES NFR BLD AUTO: 30.2 %
MCH RBC QN AUTO: 32.5 PG (ref 26–34)
MCHC RBC AUTO-ENTMCNC: 32 G/DL (ref 32–36)
MCV RBC AUTO: 101 FL (ref 80–100)
MONOCYTES # BLD AUTO: 0.02 X10*3/UL (ref 0.05–0.8)
MONOCYTES NFR BLD AUTO: 1.6 %
NEUTROPHILS # BLD AUTO: 0.82 X10*3/UL (ref 1.6–5.5)
NEUTROPHILS NFR BLD AUTO: 65 %
NRBC BLD-RTO: ABNORMAL /100{WBCS}
PHOSPHATE SERPL-MCNC: 2.5 MG/DL (ref 2.5–4.9)
PHOSPHATE SERPL-MCNC: 2.5 MG/DL (ref 2.5–4.9)
PLATELET # BLD AUTO: 60 X10*3/UL (ref 150–450)
POTASSIUM SERPL-SCNC: 4.4 MMOL/L (ref 3.5–5.3)
PROT SERPL-MCNC: 6.5 G/DL (ref 6.4–8.2)
RBC # BLD AUTO: 2.77 X10*6/UL (ref 4.5–5.9)
SODIUM SERPL-SCNC: 136 MMOL/L (ref 136–145)
URATE SERPL-MCNC: 3.9 MG/DL (ref 4–7.5)
WBC # BLD AUTO: 1.3 X10*3/UL (ref 4.4–11.3)

## 2024-01-31 PROCEDURE — 2500000004 HC RX 250 GENERAL PHARMACY W/ HCPCS (ALT 636 FOR OP/ED): Performed by: INTERNAL MEDICINE

## 2024-01-31 PROCEDURE — 83615 LACTATE (LD) (LDH) ENZYME: CPT | Performed by: INTERNAL MEDICINE

## 2024-01-31 PROCEDURE — 96415 CHEMO IV INFUSION ADDL HR: CPT

## 2024-01-31 PROCEDURE — 96361 HYDRATE IV INFUSION ADD-ON: CPT | Mod: INF

## 2024-01-31 PROCEDURE — 96413 CHEMO IV INFUSION 1 HR: CPT

## 2024-01-31 PROCEDURE — 84100 ASSAY OF PHOSPHORUS: CPT | Performed by: INTERNAL MEDICINE

## 2024-01-31 PROCEDURE — 2500000004 HC RX 250 GENERAL PHARMACY W/ HCPCS (ALT 636 FOR OP/ED): Mod: JZ | Performed by: INTERNAL MEDICINE

## 2024-01-31 PROCEDURE — 84550 ASSAY OF BLOOD/URIC ACID: CPT | Performed by: INTERNAL MEDICINE

## 2024-01-31 RX ORDER — ALBUTEROL SULFATE 0.83 MG/ML
3 SOLUTION RESPIRATORY (INHALATION) AS NEEDED
Status: DISCONTINUED | OUTPATIENT
Start: 2024-01-31 | End: 2024-01-31 | Stop reason: HOSPADM

## 2024-01-31 RX ORDER — PROCHLORPERAZINE EDISYLATE 5 MG/ML
10 INJECTION INTRAMUSCULAR; INTRAVENOUS EVERY 6 HOURS PRN
Status: DISCONTINUED | OUTPATIENT
Start: 2024-01-31 | End: 2024-01-31 | Stop reason: HOSPADM

## 2024-01-31 RX ORDER — EPINEPHRINE 0.3 MG/.3ML
0.3 INJECTION SUBCUTANEOUS EVERY 5 MIN PRN
Status: DISCONTINUED | OUTPATIENT
Start: 2024-01-31 | End: 2024-01-31 | Stop reason: HOSPADM

## 2024-01-31 RX ORDER — PROCHLORPERAZINE MALEATE 10 MG
10 TABLET ORAL EVERY 6 HOURS PRN
Status: DISCONTINUED | OUTPATIENT
Start: 2024-01-31 | End: 2024-01-31 | Stop reason: HOSPADM

## 2024-01-31 RX ORDER — FAMOTIDINE 10 MG/ML
20 INJECTION INTRAVENOUS ONCE AS NEEDED
Status: DISCONTINUED | OUTPATIENT
Start: 2024-01-31 | End: 2024-01-31 | Stop reason: HOSPADM

## 2024-01-31 RX ORDER — DIPHENHYDRAMINE HYDROCHLORIDE 50 MG/ML
50 INJECTION INTRAMUSCULAR; INTRAVENOUS AS NEEDED
Status: DISCONTINUED | OUTPATIENT
Start: 2024-01-31 | End: 2024-01-31 | Stop reason: HOSPADM

## 2024-01-31 RX ADMIN — SODIUM CHLORIDE 500 ML: 9 INJECTION, SOLUTION INTRAVENOUS at 11:23

## 2024-01-31 RX ADMIN — SODIUM CHLORIDE 10 MG: 9 INJECTION, SOLUTION INTRAVENOUS at 12:11

## 2024-01-31 ASSESSMENT — PAIN SCALES - GENERAL: PAINLEVEL: 5

## 2024-01-31 NOTE — PROGRESS NOTES
Same Nurse Jasmyn Baker RN on 1/30/24 assessed patient today 1/31/24 patient reports no changes as previously charted 1/30/24

## 2024-02-01 ENCOUNTER — APPOINTMENT (OUTPATIENT)
Dept: CARDIOLOGY | Facility: CLINIC | Age: 79
End: 2024-02-01
Payer: MEDICARE

## 2024-02-01 ENCOUNTER — INFUSION (OUTPATIENT)
Dept: HEMATOLOGY/ONCOLOGY | Facility: CLINIC | Age: 79
End: 2024-02-01
Payer: MEDICARE

## 2024-02-01 VITALS
DIASTOLIC BLOOD PRESSURE: 52 MMHG | BODY MASS INDEX: 30.56 KG/M2 | RESPIRATION RATE: 16 BRPM | OXYGEN SATURATION: 97 % | HEART RATE: 71 BPM | SYSTOLIC BLOOD PRESSURE: 120 MMHG | TEMPERATURE: 97.7 F | WEIGHT: 215.17 LBS

## 2024-02-01 DIAGNOSIS — C91.42 HAIRY CELL LEUKEMIA, IN RELAPSE (MULTI): ICD-10-CM

## 2024-02-01 PROCEDURE — 96361 HYDRATE IV INFUSION ADD-ON: CPT | Mod: INF

## 2024-02-01 PROCEDURE — 2500000004 HC RX 250 GENERAL PHARMACY W/ HCPCS (ALT 636 FOR OP/ED): Mod: JZ | Performed by: INTERNAL MEDICINE

## 2024-02-01 PROCEDURE — 96415 CHEMO IV INFUSION ADDL HR: CPT

## 2024-02-01 PROCEDURE — 2500000004 HC RX 250 GENERAL PHARMACY W/ HCPCS (ALT 636 FOR OP/ED): Performed by: INTERNAL MEDICINE

## 2024-02-01 PROCEDURE — 96413 CHEMO IV INFUSION 1 HR: CPT

## 2024-02-01 RX ORDER — HEPARIN 100 UNIT/ML
500 SYRINGE INTRAVENOUS AS NEEDED
Status: CANCELLED | OUTPATIENT
Start: 2024-02-01

## 2024-02-01 RX ORDER — FAMOTIDINE 10 MG/ML
20 INJECTION INTRAVENOUS ONCE AS NEEDED
Status: DISCONTINUED | OUTPATIENT
Start: 2024-02-01 | End: 2024-02-01 | Stop reason: HOSPADM

## 2024-02-01 RX ORDER — ALBUTEROL SULFATE 0.83 MG/ML
3 SOLUTION RESPIRATORY (INHALATION) AS NEEDED
Status: DISCONTINUED | OUTPATIENT
Start: 2024-02-01 | End: 2024-02-01 | Stop reason: HOSPADM

## 2024-02-01 RX ORDER — HEPARIN 100 UNIT/ML
500 SYRINGE INTRAVENOUS AS NEEDED
Status: DISCONTINUED | OUTPATIENT
Start: 2024-02-01 | End: 2024-02-01 | Stop reason: HOSPADM

## 2024-02-01 RX ORDER — PROCHLORPERAZINE EDISYLATE 5 MG/ML
10 INJECTION INTRAMUSCULAR; INTRAVENOUS EVERY 6 HOURS PRN
Status: DISCONTINUED | OUTPATIENT
Start: 2024-02-01 | End: 2024-02-01 | Stop reason: HOSPADM

## 2024-02-01 RX ORDER — PROCHLORPERAZINE MALEATE 10 MG
10 TABLET ORAL EVERY 6 HOURS PRN
Status: DISCONTINUED | OUTPATIENT
Start: 2024-02-01 | End: 2024-02-01 | Stop reason: HOSPADM

## 2024-02-01 RX ORDER — EPINEPHRINE 0.3 MG/.3ML
0.3 INJECTION SUBCUTANEOUS EVERY 5 MIN PRN
Status: DISCONTINUED | OUTPATIENT
Start: 2024-02-01 | End: 2024-02-01 | Stop reason: HOSPADM

## 2024-02-01 RX ORDER — HEPARIN SODIUM,PORCINE/PF 10 UNIT/ML
50 SYRINGE (ML) INTRAVENOUS AS NEEDED
Status: CANCELLED | OUTPATIENT
Start: 2024-02-01

## 2024-02-01 RX ORDER — HEPARIN SODIUM,PORCINE/PF 10 UNIT/ML
50 SYRINGE (ML) INTRAVENOUS AS NEEDED
Status: DISCONTINUED | OUTPATIENT
Start: 2024-02-01 | End: 2024-02-01 | Stop reason: HOSPADM

## 2024-02-01 RX ORDER — DIPHENHYDRAMINE HYDROCHLORIDE 50 MG/ML
50 INJECTION INTRAMUSCULAR; INTRAVENOUS AS NEEDED
Status: DISCONTINUED | OUTPATIENT
Start: 2024-02-01 | End: 2024-02-01 | Stop reason: HOSPADM

## 2024-02-01 RX ADMIN — SODIUM CHLORIDE 500 ML: 9 INJECTION, SOLUTION INTRAVENOUS at 09:18

## 2024-02-01 RX ADMIN — SODIUM CHLORIDE 10 MG: 9 INJECTION, SOLUTION INTRAVENOUS at 09:55

## 2024-02-01 ASSESSMENT — PAIN SCALES - GENERAL: PAINLEVEL: 0-NO PAIN

## 2024-02-02 ENCOUNTER — TELEPHONE (OUTPATIENT)
Dept: HEMATOLOGY/ONCOLOGY | Facility: CLINIC | Age: 79
End: 2024-02-02
Payer: MEDICARE

## 2024-02-02 ENCOUNTER — INFUSION (OUTPATIENT)
Dept: HEMATOLOGY/ONCOLOGY | Facility: CLINIC | Age: 79
End: 2024-02-02
Payer: MEDICARE

## 2024-02-02 VITALS
DIASTOLIC BLOOD PRESSURE: 56 MMHG | BODY MASS INDEX: 30.85 KG/M2 | HEART RATE: 88 BPM | RESPIRATION RATE: 16 BRPM | WEIGHT: 217.15 LBS | TEMPERATURE: 97.3 F | OXYGEN SATURATION: 97 % | SYSTOLIC BLOOD PRESSURE: 119 MMHG

## 2024-02-02 DIAGNOSIS — C91.42 HAIRY CELL LEUKEMIA, IN RELAPSE (MULTI): ICD-10-CM

## 2024-02-02 LAB
ALBUMIN SERPL BCP-MCNC: 4 G/DL (ref 3.4–5)
ALP SERPL-CCNC: 36 U/L (ref 33–136)
ALT SERPL W P-5'-P-CCNC: 13 U/L (ref 10–52)
ANION GAP SERPL CALC-SCNC: 11 MMOL/L (ref 10–20)
AST SERPL W P-5'-P-CCNC: 15 U/L (ref 9–39)
BASOPHILS # BLD AUTO: 0 X10*3/UL (ref 0–0.1)
BASOPHILS NFR BLD AUTO: 0 %
BILIRUB SERPL-MCNC: 0.6 MG/DL (ref 0–1.2)
BUN SERPL-MCNC: 25 MG/DL (ref 6–23)
CALCIUM SERPL-MCNC: 9.2 MG/DL (ref 8.6–10.3)
CHLORIDE SERPL-SCNC: 105 MMOL/L (ref 98–107)
CO2 SERPL-SCNC: 24 MMOL/L (ref 21–32)
CREAT SERPL-MCNC: 1.39 MG/DL (ref 0.5–1.3)
EGFRCR SERPLBLD CKD-EPI 2021: 51 ML/MIN/1.73M*2
EOSINOPHIL # BLD AUTO: 0.02 X10*3/UL (ref 0–0.4)
EOSINOPHIL NFR BLD AUTO: 2.5 %
ERYTHROCYTE [DISTWIDTH] IN BLOOD BY AUTOMATED COUNT: 17.5 % (ref 11.5–14.5)
GLUCOSE SERPL-MCNC: 168 MG/DL (ref 74–99)
HCT VFR BLD AUTO: 25.3 % (ref 41–52)
HGB BLD-MCNC: 8.3 G/DL (ref 13.5–17.5)
IMM GRANULOCYTES # BLD AUTO: 0 X10*3/UL (ref 0–0.5)
IMM GRANULOCYTES NFR BLD AUTO: 0 % (ref 0–0.9)
LDH SERPL L TO P-CCNC: 164 U/L (ref 84–246)
LYMPHOCYTES # BLD AUTO: 0.15 X10*3/UL (ref 0.8–3)
LYMPHOCYTES NFR BLD AUTO: 18.8 %
MCH RBC QN AUTO: 33.2 PG (ref 26–34)
MCHC RBC AUTO-ENTMCNC: 32.8 G/DL (ref 32–36)
MCV RBC AUTO: 101 FL (ref 80–100)
MONOCYTES # BLD AUTO: 0.01 X10*3/UL (ref 0.05–0.8)
MONOCYTES NFR BLD AUTO: 1.3 %
NEUTROPHILS # BLD AUTO: 0.62 X10*3/UL (ref 1.6–5.5)
NEUTROPHILS NFR BLD AUTO: 77.4 %
NRBC BLD-RTO: ABNORMAL /100{WBCS}
PLATELET # BLD AUTO: 63 X10*3/UL (ref 150–450)
POLYCHROMASIA BLD QL SMEAR: NORMAL
POTASSIUM SERPL-SCNC: 4.3 MMOL/L (ref 3.5–5.3)
PROT SERPL-MCNC: 6.3 G/DL (ref 6.4–8.2)
RBC # BLD AUTO: 2.5 X10*6/UL (ref 4.5–5.9)
RBC MORPH BLD: NORMAL
SODIUM SERPL-SCNC: 136 MMOL/L (ref 136–145)
URATE SERPL-MCNC: 3.9 MG/DL (ref 4–7.5)
WBC # BLD AUTO: 0.8 X10*3/UL (ref 4.4–11.3)

## 2024-02-02 PROCEDURE — 83615 LACTATE (LD) (LDH) ENZYME: CPT | Performed by: INTERNAL MEDICINE

## 2024-02-02 PROCEDURE — 84550 ASSAY OF BLOOD/URIC ACID: CPT | Performed by: INTERNAL MEDICINE

## 2024-02-02 PROCEDURE — 96361 HYDRATE IV INFUSION ADD-ON: CPT | Mod: INF

## 2024-02-02 PROCEDURE — 96415 CHEMO IV INFUSION ADDL HR: CPT

## 2024-02-02 PROCEDURE — 96413 CHEMO IV INFUSION 1 HR: CPT

## 2024-02-02 PROCEDURE — 2500000004 HC RX 250 GENERAL PHARMACY W/ HCPCS (ALT 636 FOR OP/ED): Performed by: INTERNAL MEDICINE

## 2024-02-02 RX ORDER — PROCHLORPERAZINE MALEATE 10 MG
10 TABLET ORAL EVERY 6 HOURS PRN
Status: DISCONTINUED | OUTPATIENT
Start: 2024-02-02 | End: 2024-02-02 | Stop reason: HOSPADM

## 2024-02-02 RX ORDER — FAMOTIDINE 10 MG/ML
20 INJECTION INTRAVENOUS ONCE AS NEEDED
Status: DISCONTINUED | OUTPATIENT
Start: 2024-02-02 | End: 2024-02-02 | Stop reason: HOSPADM

## 2024-02-02 RX ORDER — ALBUTEROL SULFATE 0.83 MG/ML
3 SOLUTION RESPIRATORY (INHALATION) AS NEEDED
Status: DISCONTINUED | OUTPATIENT
Start: 2024-02-02 | End: 2024-02-02 | Stop reason: HOSPADM

## 2024-02-02 RX ORDER — EPINEPHRINE 0.3 MG/.3ML
0.3 INJECTION SUBCUTANEOUS EVERY 5 MIN PRN
Status: DISCONTINUED | OUTPATIENT
Start: 2024-02-02 | End: 2024-02-02 | Stop reason: HOSPADM

## 2024-02-02 RX ORDER — PROCHLORPERAZINE EDISYLATE 5 MG/ML
10 INJECTION INTRAMUSCULAR; INTRAVENOUS EVERY 6 HOURS PRN
Status: DISCONTINUED | OUTPATIENT
Start: 2024-02-02 | End: 2024-02-02 | Stop reason: HOSPADM

## 2024-02-02 RX ORDER — DIPHENHYDRAMINE HYDROCHLORIDE 50 MG/ML
50 INJECTION INTRAMUSCULAR; INTRAVENOUS AS NEEDED
Status: DISCONTINUED | OUTPATIENT
Start: 2024-02-02 | End: 2024-02-02 | Stop reason: HOSPADM

## 2024-02-02 RX ADMIN — SODIUM CHLORIDE 500 ML: 9 INJECTION, SOLUTION INTRAVENOUS at 11:47

## 2024-02-02 RX ADMIN — SODIUM CHLORIDE 10 MG: 9 INJECTION, SOLUTION INTRAVENOUS at 12:46

## 2024-02-02 ASSESSMENT — PAIN SCALES - GENERAL: PAINLEVEL: 0-NO PAIN

## 2024-02-05 ENCOUNTER — TELEPHONE (OUTPATIENT)
Dept: HEMATOLOGY/ONCOLOGY | Facility: CLINIC | Age: 79
End: 2024-02-05
Payer: MEDICARE

## 2024-02-05 ENCOUNTER — INFUSION (OUTPATIENT)
Dept: HEMATOLOGY/ONCOLOGY | Facility: CLINIC | Age: 79
End: 2024-02-05
Payer: MEDICARE

## 2024-02-05 VITALS
SYSTOLIC BLOOD PRESSURE: 136 MMHG | RESPIRATION RATE: 18 BRPM | DIASTOLIC BLOOD PRESSURE: 67 MMHG | OXYGEN SATURATION: 96 % | TEMPERATURE: 95.7 F | WEIGHT: 213.41 LBS | BODY MASS INDEX: 30.31 KG/M2 | HEART RATE: 70 BPM

## 2024-02-05 DIAGNOSIS — C91.42 HAIRY CELL LEUKEMIA, IN RELAPSE (MULTI): ICD-10-CM

## 2024-02-05 DIAGNOSIS — D70.2 OTHER DRUG-INDUCED NEUTROPENIA (CMS-HCC): Primary | ICD-10-CM

## 2024-02-05 LAB
BASOPHILS # BLD AUTO: 0 X10*3/UL (ref 0–0.1)
BASOPHILS NFR BLD AUTO: ABNORMAL %
BURR CELLS BLD QL SMEAR: NORMAL
DACRYOCYTES BLD QL SMEAR: NORMAL
EOSINOPHIL # BLD AUTO: 0.02 X10*3/UL (ref 0–0.4)
EOSINOPHIL NFR BLD AUTO: ABNORMAL %
ERYTHROCYTE [DISTWIDTH] IN BLOOD BY AUTOMATED COUNT: 17.9 % (ref 11.5–14.5)
HCT VFR BLD AUTO: 27 % (ref 41–52)
HGB BLD-MCNC: 8.2 G/DL (ref 13.5–17.5)
IMM GRANULOCYTES # BLD AUTO: 0.02 X10*3/UL (ref 0–0.5)
IMM GRANULOCYTES NFR BLD AUTO: ABNORMAL %
LYMPHOCYTES # BLD AUTO: 0.15 X10*3/UL (ref 0.8–3)
LYMPHOCYTES NFR BLD AUTO: ABNORMAL %
MCH RBC QN AUTO: 32.9 PG (ref 26–34)
MCHC RBC AUTO-ENTMCNC: 30.4 G/DL (ref 32–36)
MCV RBC AUTO: 108 FL (ref 80–100)
MONOCYTES # BLD AUTO: 0.02 X10*3/UL (ref 0.05–0.8)
MONOCYTES NFR BLD AUTO: ABNORMAL %
NEUTROPHILS # BLD AUTO: 0.24 X10*3/UL (ref 1.6–5.5)
NEUTROPHILS NFR BLD AUTO: ABNORMAL %
NRBC BLD-RTO: ABNORMAL /100{WBCS}
OVALOCYTES BLD QL SMEAR: NORMAL
PLATELET # BLD AUTO: 65 X10*3/UL (ref 150–450)
POLYCHROMASIA BLD QL SMEAR: NORMAL
RBC # BLD AUTO: 2.49 X10*6/UL (ref 4.5–5.9)
RBC MORPH BLD: NORMAL
WBC # BLD AUTO: 0.5 X10*3/UL (ref 4.4–11.3)

## 2024-02-05 PROCEDURE — 36415 COLL VENOUS BLD VENIPUNCTURE: CPT

## 2024-02-05 RX ORDER — SULFAMETHOXAZOLE AND TRIMETHOPRIM 800; 160 MG/1; MG/1
1 TABLET ORAL 3 TIMES WEEKLY
Qty: 12 TABLET | Refills: 1 | Status: SHIPPED | OUTPATIENT
Start: 2024-02-05 | End: 2024-03-04 | Stop reason: HOSPADM

## 2024-02-05 RX ORDER — ACYCLOVIR 400 MG/1
400 TABLET ORAL 2 TIMES DAILY
Qty: 60 TABLET | Refills: 1 | Status: SHIPPED | OUTPATIENT
Start: 2024-02-05 | End: 2024-04-05

## 2024-02-05 ASSESSMENT — PAIN SCALES - GENERAL: PAINLEVEL: 0-NO PAIN

## 2024-02-05 NOTE — TELEPHONE ENCOUNTER
Spoke with the patient. Provided update from Dr. Kruse. Pt verbalized understanding. Also discussed that patient needs to call the office back should he develop a fever or other s/s infection- reviewed. Pt verbalized understanding and had no further questions or concerns at this time.

## 2024-02-08 ENCOUNTER — OFFICE VISIT (OUTPATIENT)
Dept: CARDIOLOGY | Facility: CLINIC | Age: 79
End: 2024-02-08
Payer: MEDICARE

## 2024-02-08 VITALS
BODY MASS INDEX: 27.7 KG/M2 | SYSTOLIC BLOOD PRESSURE: 112 MMHG | HEIGHT: 73 IN | WEIGHT: 209 LBS | DIASTOLIC BLOOD PRESSURE: 60 MMHG | HEART RATE: 68 BPM

## 2024-02-08 DIAGNOSIS — I48.0 PAROXYSMAL ATRIAL FIBRILLATION (MULTI): Primary | ICD-10-CM

## 2024-02-08 DIAGNOSIS — I25.10 CORONARY ARTERY DISEASE INVOLVING NATIVE CORONARY ARTERY OF NATIVE HEART WITHOUT ANGINA PECTORIS: ICD-10-CM

## 2024-02-08 PROCEDURE — 3074F SYST BP LT 130 MM HG: CPT | Performed by: NURSE PRACTITIONER

## 2024-02-08 PROCEDURE — 3078F DIAST BP <80 MM HG: CPT | Performed by: NURSE PRACTITIONER

## 2024-02-08 PROCEDURE — 99213 OFFICE O/P EST LOW 20 MIN: CPT | Performed by: NURSE PRACTITIONER

## 2024-02-08 PROCEDURE — 1036F TOBACCO NON-USER: CPT | Performed by: NURSE PRACTITIONER

## 2024-02-08 PROCEDURE — 1159F MED LIST DOCD IN RCRD: CPT | Performed by: NURSE PRACTITIONER

## 2024-02-08 PROCEDURE — 1157F ADVNC CARE PLAN IN RCRD: CPT | Performed by: NURSE PRACTITIONER

## 2024-02-08 PROCEDURE — 1126F AMNT PAIN NOTED NONE PRSNT: CPT | Performed by: NURSE PRACTITIONER

## 2024-02-08 ASSESSMENT — ENCOUNTER SYMPTOMS
ORTHOPNEA: 0
MUSCULOSKELETAL NEGATIVE: 1
FATIGUE: 1
NEUROLOGICAL NEGATIVE: 1
HEMATOLOGIC/LYMPHATIC NEGATIVE: 1
NEAR-SYNCOPE: 0
PND: 0
MUSCULOSKELETAL NEGATIVE: 1
GASTROINTESTINAL NEGATIVE: 1
EYES NEGATIVE: 1
DYSPNEA ON EXERTION: 0
PSYCHIATRIC NEGATIVE: 1
CONSTITUTIONAL NEGATIVE: 1
ENDOCRINE NEGATIVE: 1
RESPIRATORY NEGATIVE: 1
ENDOCRINE NEGATIVE: 1
RESPIRATORY NEGATIVE: 1
PALPITATIONS: 0
GASTROINTESTINAL NEGATIVE: 1
EYES NEGATIVE: 1
CARDIOVASCULAR NEGATIVE: 1

## 2024-02-08 NOTE — PROGRESS NOTES
CARDIOLOGY OFFICE VISIT      CHIEF COMPLAINT  Chief Complaint   Patient presents with    Follow-up     Follow up from hospital   Paroxysmal atrial fibrillation (CMS/HCC)       HISTORY OF PRESENT ILLNESS  78-year-old male with hospital discharge follow-up visit for pulmonary vein isolation with Dr. Ambrose.  No complications.  Patient denies any chest pain or shortness of breath.  Also denies any groin.  Bilateral pedal pulses present.  Right groin stable with small hematoma.  Zero bruit.  Denies any difficulty ambulating.  Patient has scheduled follow-up for the appropriate consultants.      Past Medical History  Past Medical History:   Diagnosis Date    Atherosclerotic heart disease of native coronary artery without angina pectoris 2017    Coronary artery arteriosclerosis    Diabetes mellitus (CMS/HCC)     Personal history of malignant neoplasm of prostate 2017    History of malignant neoplasm of prostate    Personal history of other diseases of the circulatory system 2017    History of hypertension    Personal history of other diseases of the respiratory system 2017    History of chronic obstructive lung disease    Personal history of other endocrine, nutritional and metabolic disease 2017    History of diabetes mellitus    Personal history of other endocrine, nutritional and metabolic disease 2017    History of hyperlipidemia    Personal history of other specified conditions 2017    History of urinary incontinence       Social History  Social History     Tobacco Use    Smoking status: Former     Packs/day: 1.00     Years: 23.00     Additional pack years: 0.00     Total pack years: 23.00     Types: Cigarettes, Pipe     Start date: 1957     Quit date: 1980     Years since quittin.1     Passive exposure: Past    Smokeless tobacco: Never   Substance Use Topics    Alcohol use: Yes     Alcohol/week: 7.0 standard drinks of alcohol     Types: 7 Glasses of wine  per week     Comment: once a night    Drug use: Not Currently       Family History     Family History   Problem Relation Name Age of Onset    Lung cancer Mother      Cancer Father      Alcohol abuse Father      Other (cardiac disorder) Father      Brain cancer Sister      Cancer Brother          Allergies:  Allergies   Allergen Reactions    Anesthetics - Renetta Type- Parabens Seizure    Procaine Seizure    Quinolones Unknown and Other     drop foot        Outpatient Medications:  Current Outpatient Medications   Medication Instructions    acyclovir (ZOVIRAX) 400 mg, oral, 2 times daily    albuterol (Ventolin HFA) 90 mcg/actuation inhaler 1 puff, inhalation, Every 4 hours PRN    apixaban (Eliquis) 5 mg tablet oral, 2 times daily    aspirin 81 mg, oral, Daily    cholecalciferol (Vitamin D-3) 50 mcg (2,000 unit) capsule oral    cyanocobalamin (Vitamin B-12) 1,000 mcg/mL injection Cyanocobalamin 1000 MCG/ML Injection Solution   Refills: 0       Active    DULoxetine (CYMBALTA) 120 mg, oral, Daily    empagliflozin (JARDIANCE) 12.5 mg, oral, Daily    fenofibrate (Triglide) 160 mg tablet 1 tablet, oral, Daily,  WITH FOOD    ferrous sulfate 325 (65 Fe) MG tablet 65 mg of iron, oral, Daily    gabapentin (Neurontin) 600 mg tablet 1 tablet, oral, 3 times daily    ipratropium (Atrovent) 21 mcg (0.03 %) nasal spray 2 sprays, Each Nostril, 2-3 times daily    isosorbide mononitrate ER (Imdur) 30 mg 24 hr tablet 1 tablet, oral, Daily    lisinopril 40 mg tablet 1 tablet, oral, Daily    MEN'S MULTI-VITAMIN ORAL 1 tablet, oral, Daily    nitroglycerin (NITROSTAT) 0.4 mg, sublingual, Every 5 min PRN    omeprazole (PRILOSEC) 20 mg, oral, 2 times daily before meals, For 45 days then resume once daily dosing    rosuvastatin (CRESTOR) 20 mg, oral, Daily    sulfamethoxazole-trimethoprim (Bactrim DS) 800-160 mg tablet 1 tablet, oral, 3 times weekly, On Mondays, Wednesdays and Fridays    tiotropium-olodateroL (Stiolto Respimat) 2.5-2.5  mcg/actuation mist inhaler 2 Inhalations, inhalation, Daily RT    vit A/vit C/vit E/zinc/copper (PRESERVISION AREDS ORAL) 1 tablet, oral, 2 times daily    vitamin E 180 mg (400 unit) capsule oral          REVIEW OF SYSTEMS  Review of Systems   Constitutional: Negative.   HENT: Negative.     Eyes: Negative.    Cardiovascular:  Negative for chest pain, dyspnea on exertion, near-syncope, orthopnea, palpitations and paroxysmal nocturnal dyspnea.   Respiratory: Negative.     Endocrine: Negative.    Skin: Negative.    Musculoskeletal: Negative.    Gastrointestinal: Negative.          VITALS  Vitals:    02/08/24 1433   BP: 112/60   Pulse: 68       PHYSICAL EXAM  Vitals reviewed.   Constitutional:       Appearance: Healthy appearance.   Eyes:      Pupils: Pupils are equal, round, and reactive to light.   Pulmonary:      Effort: Pulmonary effort is normal.   Musculoskeletal:      Cervical back: Normal range of motion. Skin:     General: Skin is warm and dry.           ASSESSMENT AND PLAN  Wound check status post pulmonary vein isolation  -Status post pulmonary vein isolation with no complications.  Groin site stable.  Pulses palpable.  Paroxysmal paroxysmal atrial fibrillation  -Continue rate controlling measures and anticoagulation with Eliquis  Hypertension  -Continue antihypertensive      [facility]  Adult Gerontology Acute Care Nurse Practitioner   [facility] [place]   [facility]  Pager: [phone]    [unfilled]    **Disclaimer: This note was dictated by speech recognition, and every effort has been made to prevent any error in transcription, however minor errors may be present**

## 2024-02-12 ENCOUNTER — LAB (OUTPATIENT)
Dept: LAB | Facility: CLINIC | Age: 79
End: 2024-02-12
Payer: MEDICARE

## 2024-02-12 ENCOUNTER — DOCUMENTATION (OUTPATIENT)
Dept: HOME HEALTH SERVICES | Facility: HOME HEALTH | Age: 79
End: 2024-02-12

## 2024-02-12 ENCOUNTER — INFUSION (OUTPATIENT)
Dept: HEMATOLOGY/ONCOLOGY | Facility: CLINIC | Age: 79
End: 2024-02-12
Payer: MEDICARE

## 2024-02-12 ENCOUNTER — OFFICE VISIT (OUTPATIENT)
Dept: HEMATOLOGY/ONCOLOGY | Facility: CLINIC | Age: 79
End: 2024-02-12
Payer: MEDICARE

## 2024-02-12 ENCOUNTER — HOME HEALTH ADMISSION (OUTPATIENT)
Dept: HOME HEALTH SERVICES | Facility: HOME HEALTH | Age: 79
End: 2024-02-12
Payer: MEDICARE

## 2024-02-12 VITALS
HEART RATE: 77 BPM | SYSTOLIC BLOOD PRESSURE: 112 MMHG | TEMPERATURE: 98.4 F | DIASTOLIC BLOOD PRESSURE: 57 MMHG | OXYGEN SATURATION: 95 % | RESPIRATION RATE: 18 BRPM

## 2024-02-12 DIAGNOSIS — C91.42 HAIRY CELL LEUKEMIA, IN RELAPSE (MULTI): ICD-10-CM

## 2024-02-12 DIAGNOSIS — E78.2 MIXED HYPERLIPIDEMIA: ICD-10-CM

## 2024-02-12 DIAGNOSIS — I48.0 PAROXYSMAL ATRIAL FIBRILLATION (MULTI): ICD-10-CM

## 2024-02-12 DIAGNOSIS — E11.9 TYPE 2 DIABETES MELLITUS WITHOUT COMPLICATION, WITHOUT LONG-TERM CURRENT USE OF INSULIN (MULTI): ICD-10-CM

## 2024-02-12 DIAGNOSIS — I10 PRIMARY HYPERTENSION: ICD-10-CM

## 2024-02-12 DIAGNOSIS — I25.10 CORONARY ARTERY DISEASE INVOLVING NATIVE CORONARY ARTERY OF NATIVE HEART WITHOUT ANGINA PECTORIS: Primary | ICD-10-CM

## 2024-02-12 DIAGNOSIS — E05.90 HYPERTHYROIDISM: ICD-10-CM

## 2024-02-12 LAB
ALBUMIN SERPL BCP-MCNC: 4.1 G/DL (ref 3.4–5)
ALP SERPL-CCNC: 32 U/L (ref 33–136)
ALT SERPL W P-5'-P-CCNC: 15 U/L (ref 10–52)
ANION GAP SERPL CALC-SCNC: 13 MMOL/L (ref 10–20)
AST SERPL W P-5'-P-CCNC: 18 U/L (ref 9–39)
BASOPHILS # BLD AUTO: 0 X10*3/UL (ref 0–0.1)
BASOPHILS NFR BLD AUTO: 0 %
BILIRUB SERPL-MCNC: 0.9 MG/DL (ref 0–1.2)
BUN SERPL-MCNC: 29 MG/DL (ref 6–23)
CALCIUM SERPL-MCNC: 9.5 MG/DL (ref 8.6–10.3)
CHLORIDE SERPL-SCNC: 105 MMOL/L (ref 98–107)
CO2 SERPL-SCNC: 22 MMOL/L (ref 21–32)
CREAT SERPL-MCNC: 1.32 MG/DL (ref 0.5–1.3)
EGFRCR SERPLBLD CKD-EPI 2021: 55 ML/MIN/1.73M*2
EOSINOPHIL # BLD AUTO: 0.02 X10*3/UL (ref 0–0.4)
EOSINOPHIL NFR BLD AUTO: 3.8 %
ERYTHROCYTE [DISTWIDTH] IN BLOOD BY AUTOMATED COUNT: 16.9 % (ref 11.5–14.5)
GLUCOSE SERPL-MCNC: 133 MG/DL (ref 74–99)
HCT VFR BLD AUTO: 26.2 % (ref 41–52)
HGB BLD-MCNC: 8.7 G/DL (ref 13.5–17.5)
IMM GRANULOCYTES # BLD AUTO: 0 X10*3/UL (ref 0–0.5)
IMM GRANULOCYTES NFR BLD AUTO: 0 % (ref 0–0.9)
LYMPHOCYTES # BLD AUTO: 0.29 X10*3/UL (ref 0.8–3)
LYMPHOCYTES NFR BLD AUTO: 54.7 %
MCH RBC QN AUTO: 32.8 PG (ref 26–34)
MCHC RBC AUTO-ENTMCNC: 33.2 G/DL (ref 32–36)
MCV RBC AUTO: 99 FL (ref 80–100)
MONOCYTES # BLD AUTO: 0.01 X10*3/UL (ref 0.05–0.8)
MONOCYTES NFR BLD AUTO: 1.9 %
NEUTROPHILS # BLD AUTO: 0.21 X10*3/UL (ref 1.6–5.5)
NEUTROPHILS NFR BLD AUTO: 39.6 %
NRBC BLD-RTO: ABNORMAL /100{WBCS}
OVALOCYTES BLD QL SMEAR: NORMAL
PLATELET # BLD AUTO: 83 X10*3/UL (ref 150–450)
POTASSIUM SERPL-SCNC: 4.2 MMOL/L (ref 3.5–5.3)
PROT SERPL-MCNC: 6.7 G/DL (ref 6.4–8.2)
RBC # BLD AUTO: 2.65 X10*6/UL (ref 4.5–5.9)
RBC MORPH BLD: NORMAL
SODIUM SERPL-SCNC: 136 MMOL/L (ref 136–145)
WBC # BLD AUTO: 0.5 X10*3/UL (ref 4.4–11.3)

## 2024-02-12 PROCEDURE — 85025 COMPLETE CBC W/AUTO DIFF WBC: CPT

## 2024-02-12 PROCEDURE — 99214 OFFICE O/P EST MOD 30 MIN: CPT | Performed by: INTERNAL MEDICINE

## 2024-02-12 PROCEDURE — 1159F MED LIST DOCD IN RCRD: CPT | Performed by: INTERNAL MEDICINE

## 2024-02-12 PROCEDURE — 3078F DIAST BP <80 MM HG: CPT | Performed by: INTERNAL MEDICINE

## 2024-02-12 PROCEDURE — 1036F TOBACCO NON-USER: CPT | Performed by: INTERNAL MEDICINE

## 2024-02-12 PROCEDURE — 36415 COLL VENOUS BLD VENIPUNCTURE: CPT

## 2024-02-12 PROCEDURE — 3074F SYST BP LT 130 MM HG: CPT | Performed by: INTERNAL MEDICINE

## 2024-02-12 PROCEDURE — 80053 COMPREHEN METABOLIC PANEL: CPT

## 2024-02-12 PROCEDURE — 1157F ADVNC CARE PLAN IN RCRD: CPT | Performed by: INTERNAL MEDICINE

## 2024-02-12 PROCEDURE — 1126F AMNT PAIN NOTED NONE PRSNT: CPT | Performed by: INTERNAL MEDICINE

## 2024-02-12 ASSESSMENT — PAIN SCALES - GENERAL: PAINLEVEL: 0-NO PAIN

## 2024-02-12 NOTE — PROGRESS NOTES
Patient ID: Galen Villasenor Jr. is a 78 y.o. male.  Referring Physician: Saturnino Kruse MD  [address]  [place] [address]  Primary Care Provider: Bladimir Ackerman MD  Visit Type: Follow Up      Subjective    HPI    Review of Systems - Oncology     Objective   BSA: There is no height or weight on file to calculate BSA.  /57 (BP Location: Right arm)   Pulse 77   Temp 36.9 °C (98.4 °F) (Temporal)   Resp 18   SpO2 95%      has a past medical history of Atherosclerotic heart disease of native coronary artery without angina pectoris (04/04/2017), Diabetes mellitus (CMS/[facility]), Personal history of malignant neoplasm of prostate (04/04/2017), Personal history of other diseases of the circulatory system (04/04/2017), Personal history of other diseases of the respiratory system (04/04/2017), Personal history of other endocrine, nutritional and metabolic disease (04/04/2017), Personal history of other endocrine, nutritional and metabolic disease (04/04/2017), and Personal history of other specified conditions (04/04/2017).   has a past surgical history that includes Rotator cuff repair (04/04/2017); Total knee arthroplasty (04/04/2017); Carpal tunnel release (04/04/2017); Coronary angioplasty (04/04/2017); Other surgical history (04/04/2017); Spinal fusion (04/04/2017); and Cardiac electrophysiology procedure (N/A, 1/22/2024).  Family History   Problem Relation Name Age of Onset    Lung cancer Mother      Cancer Father      Alcohol abuse Father      Other (cardiac disorder) Father      Brain cancer Sister      Cancer Brother       Oncology History   Hairy cell leukemia, in relapse (CMS/HCC)   12/13/2023 Initial Diagnosis    Hairy cell leukemia, in relapse (CMS/[facility])     1/29/2024 -  Chemotherapy    Cladribine, 5 Day Cycles         Galen Villasenor Jr.  reports that he quit smoking about 44 years ago. His smoking use included cigarettes and pipe. He started smoking about 67 years ago. He has a 23.00  pack-year smoking history. He has been exposed to tobacco smoke. He has never used smokeless tobacco.  He  reports current alcohol use of about 7.0 standard drinks of alcohol per week.  He  reports that he does not currently use drugs.    Physical Exam    WBC   Date/Time Value Ref Range Status   02/05/2024 12:45 PM 0.5 (LL) 4.4 - 11.3 x10*3/uL Final   02/02/2024 11:06 AM 0.8 (LL) 4.4 - 11.3 x10*3/uL Final   01/31/2024 10:53 AM 1.3 (L) 4.4 - 11.3 x10*3/uL Final     nRBC   Date Value Ref Range Status   02/05/2024   Final     Comment:     Not Measured   02/02/2024   Final     Comment:     Not Measured   01/31/2024   Final     Comment:     Not Measured     RBC   Date Value Ref Range Status   02/05/2024 2.49 (L) 4.50 - 5.90 x10*6/uL Final   02/02/2024 2.50 (L) 4.50 - 5.90 x10*6/uL Final   01/31/2024 2.77 (L) 4.50 - 5.90 x10*6/uL Final     Hemoglobin   Date Value Ref Range Status   02/05/2024 8.2 (L) 13.5 - 17.5 g/dL Final   02/02/2024 8.3 (L) 13.5 - 17.5 g/dL Final   01/31/2024 9.0 (L) 13.5 - 17.5 g/dL Final     Hematocrit   Date Value Ref Range Status   02/05/2024 27.0 (L) 41.0 - 52.0 % Final   02/02/2024 25.3 (L) 41.0 - 52.0 % Final   01/31/2024 28.1 (L) 41.0 - 52.0 % Final     MCV   Date/Time Value Ref Range Status   02/05/2024 12:45  (H) 80 - 100 fL Final   02/02/2024 11:06  (H) 80 - 100 fL Final   01/31/2024 10:53  (H) 80 - 100 fL Final     MCH   Date/Time Value Ref Range Status   02/05/2024 12:45 PM 32.9 26.0 - 34.0 pg Final   02/02/2024 11:06 AM 33.2 26.0 - 34.0 pg Final   01/31/2024 10:53 AM 32.5 26.0 - 34.0 pg Final     MCHC   Date/Time Value Ref Range Status   02/05/2024 12:45 PM 30.4 (L) 32.0 - 36.0 g/dL Final   02/02/2024 11:06 AM 32.8 32.0 - 36.0 g/dL Final   01/31/2024 10:53 AM 32.0 32.0 - 36.0 g/dL Final     RDW   Date/Time Value Ref Range Status   02/05/2024 12:45 PM 17.9 (H) 11.5 - 14.5 % Final   02/02/2024 11:06 AM 17.5 (H) 11.5 - 14.5 % Final   01/31/2024 10:53 AM 17.9 (H) 11.5 - 14.5  "% Final     Platelets   Date/Time Value Ref Range Status   02/05/2024 12:45 PM 65 (L) 150 - 450 x10*3/uL Final   02/02/2024 11:06 AM 63 (L) 150 - 450 x10*3/uL Final   01/31/2024 10:53 AM 60 (L) 150 - 450 x10*3/uL Final     No results found for: \"MPV\"  Neutrophils %   Date/Time Value Ref Range Status   02/05/2024 12:45 PM   Final     Comment:     Not Measured   02/02/2024 11:06 AM 77.4 40.0 - 80.0 % Final   01/31/2024 10:53 AM 65.0 40.0 - 80.0 % Final     Immature Granulocytes %, Automated   Date/Time Value Ref Range Status   02/05/2024 12:45 PM   Final     Comment:     Not Measured   02/02/2024 11:06 AM 0.0 0.0 - 0.9 % Final     Comment:     Immature Granulocyte Count (IG) includes promyelocytes, myelocytes and metamyelocytes but does not include bands. Percent differential counts (%) should be interpreted in the context of the absolute cell counts (cells/UL).   01/31/2024 10:53 AM 0.8 0.0 - 0.9 % Final     Comment:     Immature Granulocyte Count (IG) includes promyelocytes, myelocytes and metamyelocytes but does not include bands. Percent differential counts (%) should be interpreted in the context of the absolute cell counts (cells/UL).     Lymphocytes %   Date/Time Value Ref Range Status   02/05/2024 12:45 PM   Final     Comment:     Not Measured   02/02/2024 11:06 AM 18.8 13.0 - 44.0 % Final   01/31/2024 10:53 AM 30.2 13.0 - 44.0 % Final     Monocytes %   Date/Time Value Ref Range Status   02/05/2024 12:45 PM   Final     Comment:     Not Measured   02/02/2024 11:06 AM 1.3 2.0 - 10.0 % Final   01/31/2024 10:53 AM 1.6 2.0 - 10.0 % Final     Eosinophils %   Date/Time Value Ref Range Status   02/05/2024 12:45 PM   Final     Comment:     Not Measured   02/02/2024 11:06 AM 2.5 0.0 - 6.0 % Final   01/31/2024 10:53 AM 2.4 0.0 - 6.0 % Final     Basophils %   Date/Time Value Ref Range Status   02/05/2024 12:45 PM   Final     Comment:     Not Measured   02/02/2024 11:06 AM 0.0 0.0 - 2.0 % Final   01/31/2024 10:53 AM 0.0 " "0.0 - 2.0 % Final     Neutrophils Absolute   Date/Time Value Ref Range Status   02/05/2024 12:45 PM 0.24 (L) 1.60 - 5.50 x10*3/uL Final   02/02/2024 11:06 AM 0.62 (L) 1.60 - 5.50 x10*3/uL Final     Comment:     Percent differential counts (%) should be interpreted in the context of the absolute cell counts (cells/uL).   01/31/2024 10:53 AM 0.82 (L) 1.60 - 5.50 x10*3/uL Final     Comment:     Percent differential counts (%) should be interpreted in the context of the absolute cell counts (cells/uL).     Immature Granulocytes Absolute, Automated   Date/Time Value Ref Range Status   02/05/2024 12:45 PM 0.02 0.00 - 0.50 x10*3/uL Final   02/02/2024 11:06 AM 0.00 0.00 - 0.50 x10*3/uL Final   01/31/2024 10:53 AM 0.01 0.00 - 0.50 x10*3/uL Final     Lymphocytes Absolute   Date/Time Value Ref Range Status   02/05/2024 12:45 PM 0.15 (L) 0.80 - 3.00 x10*3/uL Final   02/02/2024 11:06 AM 0.15 (L) 0.80 - 3.00 x10*3/uL Final   01/31/2024 10:53 AM 0.38 (L) 0.80 - 3.00 x10*3/uL Final     Monocytes Absolute   Date/Time Value Ref Range Status   02/05/2024 12:45 PM 0.02 (L) 0.05 - 0.80 x10*3/uL Final   02/02/2024 11:06 AM 0.01 (L) 0.05 - 0.80 x10*3/uL Final   01/31/2024 10:53 AM 0.02 (L) 0.05 - 0.80 x10*3/uL Final     Eosinophils Absolute   Date/Time Value Ref Range Status   02/05/2024 12:45 PM 0.02 0.00 - 0.40 x10*3/uL Final   02/02/2024 11:06 AM 0.02 0.00 - 0.40 x10*3/uL Final   01/31/2024 10:53 AM 0.03 0.00 - 0.40 x10*3/uL Final     Basophils Absolute   Date/Time Value Ref Range Status   02/05/2024 12:45 PM 0.00 0.00 - 0.10 x10*3/uL Final     Comment:     Automated WBC differential has been confirmed by manual smear.   02/02/2024 11:06 AM 0.00 0.00 - 0.10 x10*3/uL Final     Comment:     Automated WBC differential has been confirmed by manual smear.   01/31/2024 10:53 AM 0.00 0.00 - 0.10 x10*3/uL Final       No components found for: \"PT\"  aPTT   Date/Time Value Ref Range Status   01/22/2024 08:58 AM 44 (H) 27 - 38 seconds Final "     Medication Documentation Review Audit       Reviewed by Stalin Schwab MA (Medical Assistant) on 02/08/24 at 1433      Medication Order Taking? Sig Documenting Provider Last Dose Status   acyclovir (Zovirax) 400 mg tablet [id number] Yes Take 1 tablet (400 mg) by mouth 2 times a day. Saturnino Kruse MD Taking Active   albuterol (Ventolin HFA) 90 mcg/actuation inhaler [id number] Yes Inhale 1 puff every 4 hours if needed. Calli Escobar MD Taking Active     Discontinued 02/08/24 1430   apixaban (Eliquis) 5 mg tablet [id number] Yes Take by mouth 2 times a day. Historical MD Luz Taking Active   aspirin 81 mg chewable tablet [id number] Yes Chew 1 tablet (81 mg) once daily. Historical Provider, MD Taking Active   cholecalciferol (Vitamin D-3) 50 mcg (2,000 unit) capsule [id number] Yes Take by mouth. Historical MD Luz Taking Active   cyanocobalamin (Vitamin B-12) 1,000 mcg/mL injection [id number] Yes Cyanocobalamin 1000 MCG/ML Injection Solution   Refills: 0       Active Historical Provider, MD Taking Active   DULoxetine (Cymbalta) 60 mg DR capsule [id number] Yes Take 2 capsules (120 mg) by mouth once daily. Historical Provider, MD Taking Active   empagliflozin (Jardiance) 25 mg [id number] Yes Take 0.5 tablets (12.5 mg) by mouth once daily. Historical MD Luz Taking Active   fenofibrate (Triglide) 160 mg tablet [id number] Yes Take 1 tablet (160 mg) by mouth once daily.  WITH FOOD Historical MD Luz Taking Active   ferrous sulfate 325 (65 Fe) MG tablet [id number] Yes Take 1 tablet by mouth once daily. Historical Provider, MD Taking Active   gabapentin (Neurontin) 600 mg tablet [id number] Yes Take 1 tablet (600 mg) by mouth 3 times a day. Historical Provider, MD Taking Active   ipratropium (Atrovent) 21 mcg (0.03 %) nasal spray [id number] Yes Administer 2 sprays into each nostril. 2-3 times daily Historical MD Luz Taking Active   isosorbide mononitrate ER (Imdur) 30 mg 24 hr tablet [id number] Yes  Take 1 tablet (30 mg) by mouth once daily. Historical MD Luz Taking Active   lisinopril 40 mg tablet [id number] Yes Take 1 tablet (40 mg) by mouth once daily. Calli Escobar MD Taking Active   MEN'S MULTI-VITAMIN ORAL [id number] Yes Take 1 tablet by mouth once daily. Calli Escobar MD Taking Active   nitroglycerin (Nitrostat) 0.4 mg SL tablet [id number] Yes Place 1 tablet (0.4 mg) under the tongue every 5 minutes if needed for chest pain. Calli Escobar MD Taking Active   omeprazole (PriLOSEC) 20 mg DR capsule [id number] Yes Take 1 capsule (20 mg) by mouth 2 times a day before meals. For 45 days then resume once daily dosing RAVEN Aldana-CNP Taking Active     Discontinued 02/08/24 1432   rosuvastatin (Crestor) 20 mg tablet [id number] Yes Take 1 tablet (20 mg) by mouth once daily. Calli Escobar MD Taking Active   sulfamethoxazole-trimethoprim (Bactrim DS) 800-160 mg tablet [id number] Yes Take 1 tablet by mouth 3 (three) times a week. On Mondays, Wednesdays and Fridays Saturnino Kruse MD Taking Active   tiotropium-olodateroL (Stiolto Respimat) 2.5-2.5 mcg/actuation mist inhaler [id number] Yes Inhale 2 Inhalations once daily. Calli Escobar MD Taking Active   vit A/vit C/vit E/zinc/copper (PRESERVISION AREDS ORAL) [id number] Yes Take 1 tablet by mouth twice a day. Calli Escobar MD Taking Active   vitamin E 180 mg (400 unit) capsule [id number] Yes Take by mouth. Historical MD Luz Taking Active                   Assessment/Plan    1) hairy cell leukemia  -old records from outside hematology group were finally obtained on 12/7/2023: on 7/17/2018 CBC showed wbc 8.8, hgb 14.7, plt 109,000; bmbx was done to rule out relapse- bmbx was sent to [facility]:  no morphologic or immunophenotypic evidence of residual hairy cell leukemia; expanded population of CD8+ T cells; mildly hypercellular marrow with maturing trilineage hematopoiesis (30-40%), BRAF negative  -diagnosed in 2002,  treated with cladribine  -remained mildly thrombocytopenic ever since  -last flow cytometry of peripheral blood done on 7/21/2023 was negative  -became more neutropenic than usual over the summer--[facility], [facility], 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  -had bone marrow bx done on 11/28/2023--showed extensive marrow involvement by hairy cell leukemia; no evidence of T cell LGL; positive for BRAF  -discussed treatment options--cladribine vs cladribine + rituximab  -he opted for cladribine alone  -in the future, if he relapses again, he could go on either ibrutinib or vemurafenib + rituximab  -he completed course of cladribine from January 29 through Feb 2, 2024  -he was cautioned that his ANC and platelet count may dip down further, after treatment, before they improve and that he may have intermittent low grade fevers  -this is day 16 from start of cladribine  -WBC has dipped down to 0.5, and so 1 week ago he was put on bactrim three times per week and acyclovir BID, which he will continue taking until his wbc is at least 2000 or ANC is at least 1000  -today we checked CBC + COMP  -results reviewed--wbc 0.5, hgb 8.7, plt 83,000, , creatinine 1.32, calcium 9.5, AST 18, ALT 15  -he was already feeling weak and lightheaded (and in fact on day 2 of chemo, his knee gave out under him outside our office and he fell to his knees--our nurse manager called wily, when Dennis DILL arrived, Galen refused to go as he wanted to get his chemo) after the ablation, and after completion of cladribine, he has gotten even weaker  -per wife--he spends most of his time in bed, and then will have sudden good days (like this past Saturday, however, by Saturday evening, he was extremely pooped again); he says when he is seated, he is okay, but when he attempts to stand up, his legs started shaking and he is afraid he will fall  -I think he would benefit from physical therapy/occupational therapy, but  given that he is markedly immunocompromised (we do not know when his WBC/ANC will improve to normal), I have recommended that he limit contact with groups of people (he is wearing a mask in the office) and therefore would benefit for now from home based PT/OT  -we will continue to check his CBC weekly     2) CAD  -on ASA  -on imdur     3) atrial fibrillation  -on dofetilide  -on eliquis  -his ablation was delayed until January 22, 2024 at [facility]  -since then he has been feeling very weak and lightheaded at times, legs will just give out under him, so that he at times is afraid to get up to walk around     4) hyperlipidemia  -on fenofibrate  -on rosuvastatin     5) hypertension  -on lisinopril     6) diabetes  -on metformin     7) hyperthyroidism  -on methimazole              Problem List Items Addressed This Visit             ICD-10-CM    Hairy cell leukemia, in relapse (CMS/HCC) C91.42    Relevant Orders    Infusion Appointment Request [facility] INFUSION    CBC and Auto Differential    Comprehensive Metabolic Panel    Clinic Appointment Request Follow Up; SATURNINO KRUSE; [facility] MEDONC1    CBC and Auto Differential    Comprehensive metabolic panel    Referral to Home Health            Saturnino Kruse MD

## 2024-02-12 NOTE — HH CARE COORDINATION
Home Care received a Referral for Physical Therapy. We have processed the referral for a Start of Care on 2.13.24.     If you have any questions or concerns, please feel free to contact us at [phone]. Follow the prompts, enter your five digit zip code, and you will be directed to your care team on [facility] 2.

## 2024-02-14 ENCOUNTER — HOME CARE VISIT (OUTPATIENT)
Dept: HOME HEALTH SERVICES | Facility: HOME HEALTH | Age: 79
End: 2024-02-14
Payer: MEDICARE

## 2024-02-14 VITALS
TEMPERATURE: 97.3 F | SYSTOLIC BLOOD PRESSURE: 112 MMHG | DIASTOLIC BLOOD PRESSURE: 58 MMHG | HEART RATE: 72 BPM | RESPIRATION RATE: 14 BRPM

## 2024-02-14 PROCEDURE — 169592 NO-PAY CLAIM PROCEDURE

## 2024-02-14 PROCEDURE — 1090000001 HH PPS REVENUE CREDIT

## 2024-02-14 PROCEDURE — G0151 HHCP-SERV OF PT,EA 15 MIN: HCPCS | Mod: HHH

## 2024-02-14 PROCEDURE — 0023 HH SOC

## 2024-02-14 PROCEDURE — 1090000002 HH PPS REVENUE DEBIT

## 2024-02-14 SDOH — HEALTH STABILITY: PHYSICAL HEALTH: PHYSICAL EXERCISE: SEATED

## 2024-02-14 SDOH — HEALTH STABILITY: PHYSICAL HEALTH: PHYSICAL EXERCISE: 15

## 2024-02-14 SDOH — HEALTH STABILITY: PHYSICAL HEALTH: EXERCISE ACTIVITY: LAQ

## 2024-02-14 SDOH — HEALTH STABILITY: PHYSICAL HEALTH: EXERCISE ACTIVITY: MARCHING

## 2024-02-14 SDOH — HEALTH STABILITY: PHYSICAL HEALTH: EXERCISE ACTIVITIES SETS: 1

## 2024-02-14 SDOH — HEALTH STABILITY: PHYSICAL HEALTH: EXERCISE ACTIVITY: ANKLE DF/PF

## 2024-02-14 SDOH — HEALTH STABILITY: PHYSICAL HEALTH: EXERCISE TYPE: LE EXERCISES

## 2024-02-14 ASSESSMENT — ACTIVITIES OF DAILY LIVING (ADL)
CURRENT_FUNCTION: STAND BY ASSIST
AMBULATION ASSISTANCE ON FLAT SURFACES: 1
AMBULATION ASSISTANCE: STAND BY ASSIST
AMBULATION_DISTANCE/DURATION_TOLERATED: 50 FT
ENTERING_EXITING_HOME: STAND BY ASSIST
OASIS_M1830: 03

## 2024-02-14 ASSESSMENT — ENCOUNTER SYMPTOMS
HYPERTENSION: 1
DENIES PAIN: 1
SHORTNESS OF BREATH: T
MUSCLE WEAKNESS: 1
OCCASIONAL FEELINGS OF UNSTEADINESS: 1
DYSPNEA ON EXERTION: 1
PERSON REPORTING PAIN: PATIENT

## 2024-02-14 ASSESSMENT — BALANCE ASSESSMENTS
ATTEMPTS TO ARISE: 2 - ABLE TO RISE, ONE ATTEMPT
TURNING 360 DEGREES STEPS: 1 - CONTINUOUS STEPS
NUDGED SCORE: 1
EYES CLOSED AT MAXIMUM POSITION NUDGED: 0 - UNSTEADY
ARISING SCORE: 1
BALANCE SCORE: 11
SITTING DOWN: 1 - USES ARMS OR NOT SMOOTH MOTION
IMMEDIATE STANDING BALANCE FIRST 5 SECONDS: 2 - STEADY WITHOUT WALKER OR OTHER SUPPORT
ARISES: 1 - ABLE, USES ARMS TO HELP
SITTING BALANCE: 1 - STEADY, SAFE
NUDGED: 1 - STAGGERS, GRABS, CATCHES SELF
STANDING BALANCE: 1 - STEADY BUT WIDE STANCE AND USES CANE OR OTHER SUPPORT

## 2024-02-14 ASSESSMENT — GAIT ASSESSMENTS
BALANCE AND GAIT SCORE: 21
PATH: 1 - MILD/MODERATE DEVIATION OR USES WALKING AID
TRUNK: 1 - NO SWAY BUT FLEXION OF KNEES OR BACK OR SPREADS ARMS WHILE WALKING
INITIATION OF GAIT IMMEDIATELY AFTER GO: 1 - NO HESITANCY
STEP SYMMETRY: 1 - RIGHT AND LEFT STEP LENGTH APPEAR EQUAL
PATH SCORE: 1
STEP CONTINUITY: 1 - STEPS APPEAR CONTINUOUS
TRUNK SCORE: 1
WALKING STANCE: 1 - HEELS ALMOST TOUCHING WHILE WALKING
GAIT SCORE: 10

## 2024-02-15 ENCOUNTER — HOME CARE VISIT (OUTPATIENT)
Dept: HOME HEALTH SERVICES | Facility: HOME HEALTH | Age: 79
End: 2024-02-15
Payer: MEDICARE

## 2024-02-15 VITALS
OXYGEN SATURATION: 96 % | RESPIRATION RATE: 18 BRPM | SYSTOLIC BLOOD PRESSURE: 122 MMHG | HEART RATE: 72 BPM | DIASTOLIC BLOOD PRESSURE: 56 MMHG | TEMPERATURE: 97.3 F

## 2024-02-15 PROCEDURE — 1090000002 HH PPS REVENUE DEBIT

## 2024-02-15 PROCEDURE — G0152 HHCP-SERV OF OT,EA 15 MIN: HCPCS | Mod: HHH

## 2024-02-15 PROCEDURE — 1090000001 HH PPS REVENUE CREDIT

## 2024-02-15 SDOH — ECONOMIC STABILITY: HOUSING INSECURITY
HOME SAFETY: 80 YO MALE WITH HISTORY OF HAIRY CELL LEUKEMIA AND CAD, REFERRED TO HH SERVICES AFTER RECENT OFFICE VISIT AND REPORT OF INCREASED WEAKNESS AND FATIGUE.   PLOF: INDEP, COOKING , LAUNDRY, ADLS, NOT USING AD IN HOME, SPC IN COMMUNITY, DRIVING  PATIENT L

## 2024-02-15 SDOH — ECONOMIC STABILITY: HOUSING INSECURITY
HOME SAFETY: IVES IN A 1SH, 2SH WITH HR, LIVES WITH SUPPORTIVE SPOUSE  OVER THE PAST MONTH, SPOUSE COMPLETING IADLS, PATIENT COMPLETING ADLS WITH INCREASED TIME AND EFFORT, REQUIRING INCREASED REST BREAKS SECONDARY TO FATIGUE.

## 2024-02-15 ASSESSMENT — ACTIVITIES OF DAILY LIVING (ADL)
BATHING_CURRENT_FUNCTION: SUPERVISION
DRESSING_UB_CURRENT_FUNCTION: INDEPENDENT
CURRENT_FUNCTION: SUPERVISION
PHYSICAL TRANSFERS ASSESSED: 1
GROOMING ASSESSED: 1
FEEDING_WITHIN_DEFINED_LIMITS: 1
GROOMING_WITHIN_DEFINED_LIMITS: 1
WASHING_UPB_CURRENT_FUNCTION: SUPERVISION
TOILETING: 1
GROOMING_CURRENT_FUNCTION: INDEPENDENT
AMBULATION ASSISTANCE: SUPERVISION
DRESSING_LB_CURRENT_FUNCTION: SUPERVISION
WASHING_LB_CURRENT_FUNCTION: SUPERVISION
AMBULATION ASSISTANCE: 1
TOILETING: INDEPENDENT
BATHING ASSESSED: 1

## 2024-02-15 ASSESSMENT — ENCOUNTER SYMPTOMS
PAIN LOCATION - PAIN FREQUENCY: WITH ACTIVITY
HIGHEST PAIN SEVERITY IN PAST 24 HOURS: 5/10
PAIN LOCATION - PAIN QUALITY: ACHING
SUBJECTIVE PAIN PROGRESSION: UNCHANGED
PAIN LOCATION - PAIN SEVERITY: 5/10
PAIN LOCATION: BACK
PAIN LOCATION - PAIN QUALITY: ACHING
PAIN LOCATION - PAIN SEVERITY: 5/10
LOWEST PAIN SEVERITY IN PAST 24 HOURS: 0/10
PAIN LOCATION - PAIN QUALITY: HEAVINESS
PAIN SEVERITY GOAL: 0/10
PAIN LOCATION - PAIN QUALITY: HEAVINESS
PAIN LOCATION - PAIN SEVERITY: 5/10
PAIN LOCATION: RIGHT LEG
PAIN LOCATION: ABDOMEN
PAIN LOCATION - PAIN FREQUENCY: INTERMITTENT
PAIN LOCATION: LEFT LEG
PAIN LOCATION - PAIN SEVERITY: 5/10
FATIGUE: 1
PAIN: 1
PAIN LOCATION - PAIN FREQUENCY: WITH ACTIVITY
PAIN LOCATION - PAIN FREQUENCY: INTERMITTENT
PERSON REPORTING PAIN: PATIENT

## 2024-02-16 PROCEDURE — 1090000002 HH PPS REVENUE DEBIT

## 2024-02-16 PROCEDURE — 1090000001 HH PPS REVENUE CREDIT

## 2024-02-17 PROCEDURE — 1090000001 HH PPS REVENUE CREDIT

## 2024-02-17 PROCEDURE — 1090000002 HH PPS REVENUE DEBIT

## 2024-02-18 PROCEDURE — 1090000002 HH PPS REVENUE DEBIT

## 2024-02-18 PROCEDURE — 1090000001 HH PPS REVENUE CREDIT

## 2024-02-19 ENCOUNTER — INFUSION (OUTPATIENT)
Dept: HEMATOLOGY/ONCOLOGY | Facility: CLINIC | Age: 79
End: 2024-02-19
Payer: MEDICARE

## 2024-02-19 DIAGNOSIS — C91.42 HAIRY CELL LEUKEMIA, IN RELAPSE (MULTI): ICD-10-CM

## 2024-02-19 LAB
ALBUMIN SERPL BCP-MCNC: 4 G/DL (ref 3.4–5)
ALP SERPL-CCNC: 38 U/L (ref 33–136)
ALT SERPL W P-5'-P-CCNC: 13 U/L (ref 10–52)
ANION GAP SERPL CALC-SCNC: 13 MMOL/L (ref 10–20)
AST SERPL W P-5'-P-CCNC: 16 U/L (ref 9–39)
BASOPHILS # BLD AUTO: 0 X10*3/UL (ref 0–0.1)
BASOPHILS NFR BLD AUTO: 0 %
BILIRUB SERPL-MCNC: 0.8 MG/DL (ref 0–1.2)
BUN SERPL-MCNC: 29 MG/DL (ref 6–23)
CALCIUM SERPL-MCNC: 9.2 MG/DL (ref 8.6–10.3)
CHLORIDE SERPL-SCNC: 104 MMOL/L (ref 98–107)
CO2 SERPL-SCNC: 23 MMOL/L (ref 21–32)
CREAT SERPL-MCNC: 1.46 MG/DL (ref 0.5–1.3)
DACRYOCYTES BLD QL SMEAR: NORMAL
EGFRCR SERPLBLD CKD-EPI 2021: 48 ML/MIN/1.73M*2
EOSINOPHIL # BLD AUTO: 0.01 X10*3/UL (ref 0–0.4)
EOSINOPHIL NFR BLD AUTO: 1.7 %
ERYTHROCYTE [DISTWIDTH] IN BLOOD BY AUTOMATED COUNT: 17 % (ref 11.5–14.5)
GLUCOSE SERPL-MCNC: 108 MG/DL (ref 74–99)
HCT VFR BLD AUTO: 24.9 % (ref 41–52)
HGB BLD-MCNC: 8 G/DL (ref 13.5–17.5)
IMM GRANULOCYTES # BLD AUTO: 0 X10*3/UL (ref 0–0.5)
IMM GRANULOCYTES NFR BLD AUTO: 0 % (ref 0–0.9)
LYMPHOCYTES # BLD AUTO: 0.19 X10*3/UL (ref 0.8–3)
LYMPHOCYTES NFR BLD AUTO: 31.7 %
MCH RBC QN AUTO: 33.1 PG (ref 26–34)
MCHC RBC AUTO-ENTMCNC: 32.1 G/DL (ref 32–36)
MCV RBC AUTO: 103 FL (ref 80–100)
MONOCYTES # BLD AUTO: 0.01 X10*3/UL (ref 0.05–0.8)
MONOCYTES NFR BLD AUTO: 1.7 %
NEUTROPHILS # BLD AUTO: 0.39 X10*3/UL (ref 1.6–5.5)
NEUTROPHILS NFR BLD AUTO: 64.9 %
NRBC BLD-RTO: ABNORMAL /100{WBCS}
OVALOCYTES BLD QL SMEAR: NORMAL
PLATELET # BLD AUTO: 92 X10*3/UL (ref 150–450)
POLYCHROMASIA BLD QL SMEAR: NORMAL
POTASSIUM SERPL-SCNC: 4.2 MMOL/L (ref 3.5–5.3)
PROT SERPL-MCNC: 6.4 G/DL (ref 6.4–8.2)
RBC # BLD AUTO: 2.42 X10*6/UL (ref 4.5–5.9)
RBC MORPH BLD: NORMAL
SODIUM SERPL-SCNC: 136 MMOL/L (ref 136–145)
WBC # BLD AUTO: 0.6 X10*3/UL (ref 4.4–11.3)

## 2024-02-19 PROCEDURE — 1090000002 HH PPS REVENUE DEBIT

## 2024-02-19 PROCEDURE — 36415 COLL VENOUS BLD VENIPUNCTURE: CPT

## 2024-02-19 PROCEDURE — 80053 COMPREHEN METABOLIC PANEL: CPT | Performed by: INTERNAL MEDICINE

## 2024-02-19 PROCEDURE — 1090000001 HH PPS REVENUE CREDIT

## 2024-02-19 PROCEDURE — 85025 COMPLETE CBC W/AUTO DIFF WBC: CPT | Performed by: INTERNAL MEDICINE

## 2024-02-19 RX ORDER — HEPARIN SODIUM,PORCINE/PF 10 UNIT/ML
50 SYRINGE (ML) INTRAVENOUS AS NEEDED
Status: CANCELLED | OUTPATIENT
Start: 2024-02-19

## 2024-02-19 RX ORDER — HEPARIN 100 UNIT/ML
500 SYRINGE INTRAVENOUS AS NEEDED
Status: CANCELLED | OUTPATIENT
Start: 2024-02-19

## 2024-02-20 ENCOUNTER — OFFICE VISIT (OUTPATIENT)
Dept: CARDIOLOGY | Facility: CLINIC | Age: 79
End: 2024-02-20
Payer: MEDICARE

## 2024-02-20 ENCOUNTER — HOME CARE VISIT (OUTPATIENT)
Dept: HOME HEALTH SERVICES | Facility: HOME HEALTH | Age: 79
End: 2024-02-20
Payer: MEDICARE

## 2024-02-20 VITALS
SYSTOLIC BLOOD PRESSURE: 100 MMHG | HEIGHT: 71 IN | WEIGHT: 202 LBS | RESPIRATION RATE: 16 BRPM | TEMPERATURE: 96.6 F | OXYGEN SATURATION: 100 % | BODY MASS INDEX: 28.28 KG/M2 | DIASTOLIC BLOOD PRESSURE: 35 MMHG | HEART RATE: 79 BPM

## 2024-02-20 VITALS
DIASTOLIC BLOOD PRESSURE: 50 MMHG | OXYGEN SATURATION: 99 % | SYSTOLIC BLOOD PRESSURE: 118 MMHG | RESPIRATION RATE: 16 BRPM | HEART RATE: 62 BPM | TEMPERATURE: 97.2 F

## 2024-02-20 DIAGNOSIS — I48.4 ATYPICAL ATRIAL FLUTTER (MULTI): Primary | ICD-10-CM

## 2024-02-20 DIAGNOSIS — I48.0 PAROXYSMAL ATRIAL FIBRILLATION (MULTI): ICD-10-CM

## 2024-02-20 PROBLEM — I48.92 ATRIAL FLUTTER (MULTI): Status: ACTIVE | Noted: 2024-02-20

## 2024-02-20 PROCEDURE — 99213 OFFICE O/P EST LOW 20 MIN: CPT | Mod: 25 | Performed by: INTERNAL MEDICINE

## 2024-02-20 PROCEDURE — 1159F MED LIST DOCD IN RCRD: CPT | Performed by: INTERNAL MEDICINE

## 2024-02-20 PROCEDURE — 1036F TOBACCO NON-USER: CPT | Performed by: INTERNAL MEDICINE

## 2024-02-20 PROCEDURE — 1090000002 HH PPS REVENUE DEBIT

## 2024-02-20 PROCEDURE — 99213 OFFICE O/P EST LOW 20 MIN: CPT | Performed by: INTERNAL MEDICINE

## 2024-02-20 PROCEDURE — 1090000001 HH PPS REVENUE CREDIT

## 2024-02-20 PROCEDURE — 93005 ELECTROCARDIOGRAM TRACING: CPT | Performed by: INTERNAL MEDICINE

## 2024-02-20 PROCEDURE — G0151 HHCP-SERV OF PT,EA 15 MIN: HCPCS | Mod: HHH

## 2024-02-20 PROCEDURE — 1126F AMNT PAIN NOTED NONE PRSNT: CPT | Performed by: INTERNAL MEDICINE

## 2024-02-20 PROCEDURE — 93010 ELECTROCARDIOGRAM REPORT: CPT | Performed by: INTERNAL MEDICINE

## 2024-02-20 PROCEDURE — 3078F DIAST BP <80 MM HG: CPT | Performed by: INTERNAL MEDICINE

## 2024-02-20 PROCEDURE — 1157F ADVNC CARE PLAN IN RCRD: CPT | Performed by: INTERNAL MEDICINE

## 2024-02-20 PROCEDURE — 3074F SYST BP LT 130 MM HG: CPT | Performed by: INTERNAL MEDICINE

## 2024-02-20 SDOH — HEALTH STABILITY: PHYSICAL HEALTH: EXERCISE ACTIVITY: MARCHING

## 2024-02-20 SDOH — HEALTH STABILITY: PHYSICAL HEALTH: RESISTANCE: ORANGE THERABAND

## 2024-02-20 SDOH — HEALTH STABILITY: PHYSICAL HEALTH: PHYSICAL EXERCISE: SEATED

## 2024-02-20 SDOH — HEALTH STABILITY: PHYSICAL HEALTH: PHYSICAL EXERCISE: 15

## 2024-02-20 SDOH — HEALTH STABILITY: PHYSICAL HEALTH: EXERCISE ACTIVITY: HIP EXTENSION

## 2024-02-20 SDOH — HEALTH STABILITY: PHYSICAL HEALTH: EXERCISE ACTIVITIES SETS: 1

## 2024-02-20 SDOH — HEALTH STABILITY: PHYSICAL HEALTH: EXERCISE TYPE: LE EXERCISES

## 2024-02-20 SDOH — HEALTH STABILITY: PHYSICAL HEALTH

## 2024-02-20 SDOH — HEALTH STABILITY: PHYSICAL HEALTH: EXERCISE ACTIVITY: LAQ

## 2024-02-20 SDOH — HEALTH STABILITY: PHYSICAL HEALTH: EXERCISE ACTIVITY: ANKLE DF/PF

## 2024-02-20 SDOH — HEALTH STABILITY: PHYSICAL HEALTH: EXERCISE ACTIVITY: HIP ABDUCTION

## 2024-02-20 SDOH — HEALTH STABILITY: PHYSICAL HEALTH: EXERCISE ACTIVITY: HS CURLS

## 2024-02-20 ASSESSMENT — ENCOUNTER SYMPTOMS
PERSON REPORTING PAIN: PATIENT
PAIN: 1
LOWEST PAIN SEVERITY IN PAST 24 HOURS: 0/10
PAIN LOCATION: BACK
PAIN LOCATION - PAIN QUALITY: ACHING
PAIN LOCATION - EXACERBATING FACTORS: MOVEMENT, BENDING
PAIN SEVERITY GOAL: 0/10
PAIN LOCATION - PAIN SEVERITY: 1/10
SUBJECTIVE PAIN PROGRESSION: UNCHANGED
MUSCLE WEAKNESS: 1
PAIN LOCATION - RELIEVING FACTORS: SITTING, RESTING
HIGHEST PAIN SEVERITY IN PAST 24 HOURS: 7/10
PAIN LOCATION - PAIN FREQUENCY: INTERMITTENT

## 2024-02-20 ASSESSMENT — ACTIVITIES OF DAILY LIVING (ADL)
AMBULATION ASSISTANCE ON FLAT SURFACES: 1
AMBULATION_DISTANCE/DURATION_TOLERATED: 75 FT X 2

## 2024-02-20 NOTE — Clinical Note
Arik Sinha,  Mr. Villasenor unfortunately has recurrent atrial flutter after his ablation. I think we should probably restart his dofetilide if possible and do a cardioversion. Would like to hold off on repeat ablation for a while given his chemotherapy. He would prefer to have it done at [facility]. Would you want to admit him for Tikosyn loading and cardioversion at [facility], or do you want me to do it at [facility]? Thanks!   Evelyne

## 2024-02-20 NOTE — PROGRESS NOTES
Referring Provider: No ref. provider found  Reason for Consult: Atrial fibrillation    History of Present Illness:      Galen Villasenor Jr. is a 78 y.o. year old male patient with a history significant for persistent atrial fibrillation, chronic ischemic heart disease, hairy cell leukemia, hyperlipidemia,  who was referred by Dr. Alfonso for management of atrial fibrillation. He is here today for follow-up after recent ablation at the end of January.     Mr. Villasenor was initially diagnosed with paroxysmal atrial fibrillation in 2018 when he had a period of illness that was prolonged and associated with fatigue.  He was found to have atrial fibrillation.  He has had multiple cardioversions 18, and was ultimately started on amiodarone.  He did well over the intervening years, with improvement of his symptoms while in sinus rhythm.  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  He was started on dofetilide in November 2022, which has worked fairly well until recently, where he has begun to have paroxysms of atrial arrhythmias being on dofetilide.  He has a 4% A-fib burden on monitoring.  Since the summer, he has complained of worsening exertional fatigue and dyspnea.  Some of this may be due to recurrence of his hairy cell leukemia, and his hemoglobin has been trending down.  However, he does attribute some of the symptoms to his atrial fibrillation as well.  He is planning to start his chemotherapy for his leukemia soon.    He had a successful AF ablation on 1/22/2024 with PVI, LA PWI, and CTI line. No complications after the procedure. An EKG done on 1/30 showed atypical atrial flutter, but this was not brought to my attention. Since the ablation, he has started chemotherapy, and has significant weakness and fatigue since then.     Focused Cardiovascular Problem List:  Persistent atrial fibrillation: [id number] score 5, hypertension, CAD, type 2 diabetes, age.  On Eliquis and dofetilide.  Previously on amiodarone,  but caused carditis.  Has had 5 prior cardioversions and developed breakthrough on dofetilide. He underwent successful AF ablation on 2024 with PVI, LA PWI, and CTI line. His dofetilide was stopped after the procedure. On 2024, he had an EKG which showed atypical atrial flutter.   Chronic ischemic heart disease: Remote PCI, preserved ejection fraction  Hypertension  Hairy-cell leukemia      Past Medical and Surgical History:  Mr. Villasenor  has a past medical history of Atherosclerotic heart disease of native coronary artery without angina pectoris (2017), Diabetes mellitus (CMS/[facility]), Personal history of malignant neoplasm of prostate (2017), Personal history of other diseases of the circulatory system (2017), Personal history of other diseases of the respiratory system (2017), Personal history of other endocrine, nutritional and metabolic disease (2017), Personal history of other endocrine, nutritional and metabolic disease (2017), and Personal history of other specified conditions (2017).    has a past surgical history that includes Rotator cuff repair (2017); Total knee arthroplasty (2017); Carpal tunnel release (2017); Coronary angioplasty (2017); Other surgical history (2017); Spinal fusion (2017); and Cardiac electrophysiology procedure (N/A, 2024).    Social History:  Social History     Tobacco Use    Smoking status: Former     Packs/day: 1.00     Years: 23.00     Additional pack years: 0.00     Total pack years: 23.00     Types: Cigarettes, Pipe     Start date: 1957     Quit date: 1980     Years since quittin.1     Passive exposure: Past    Smokeless tobacco: Never   Substance Use Topics    Alcohol use: Yes     Alcohol/week: 7.0 standard drinks of alcohol     Types: 7 Glasses of wine per week     Comment: once a night      Tobacco: Quit /0, prior 4 packs/day for 23 years  Alcohol:  1 glass  of wine / day  Drug use:  Denies    Relevant Family History:   Family History   Problem Relation Name Age of Onset    Lung cancer Mother      Cancer Father      Alcohol abuse Father      Other (cardiac disorder) Father      Brain cancer Sister      Cancer Brother          Allergies:  Allergies   Allergen Reactions    Anesthetics - Renetta Type- Parabens Seizure    Procaine Seizure    Quinolones Unknown and Other     drop foot        Medications:  Current Outpatient Medications   Medication Instructions    acyclovir (ZOVIRAX) 400 mg, oral, 2 times daily    albuterol (Ventolin HFA) 90 mcg/actuation inhaler 1 puff, inhalation, Every 4 hours PRN    apixaban (Eliquis) 5 mg tablet oral, 2 times daily    aspirin 81 mg, oral, Daily    cholecalciferol (Vitamin D-3) 50 mcg (2,000 unit) capsule oral    cyanocobalamin (Vitamin B-12) 1,000 mcg/mL injection Cyanocobalamin 1000 MCG/ML Injection Solution   Refills: 0       Active    DULoxetine (CYMBALTA) 120 mg, oral, Daily    empagliflozin (JARDIANCE) 12.5 mg, oral, Daily    fenofibrate (Triglide) 160 mg tablet 1 tablet, oral, Daily,  WITH FOOD    ferrous sulfate 325 (65 Fe) MG tablet 65 mg of iron, oral, Daily    gabapentin (Neurontin) 600 mg tablet 1 tablet, oral, 3 times daily    ipratropium (Atrovent) 21 mcg (0.03 %) nasal spray 2 sprays, Each Nostril, 2-3 times daily    isosorbide mononitrate ER (Imdur) 30 mg 24 hr tablet 1 tablet, oral, Daily    lisinopril 40 mg tablet 1 tablet, oral, Daily    MEN'S MULTI-VITAMIN ORAL 1 tablet, oral, Daily    nitroglycerin (NITROSTAT) 0.4 mg, sublingual, Every 5 min PRN    omeprazole (PRILOSEC) 20 mg, oral, 2 times daily before meals, For 45 days then resume once daily dosing    rosuvastatin (CRESTOR) 20 mg, oral, Daily    sulfamethoxazole-trimethoprim (Bactrim DS) 800-160 mg tablet 1 tablet, oral, 3 times weekly, On Mondays, Wednesdays and Fridays    tiotropium-olodateroL (Stiolto Respimat) 2.5-2.5 mcg/actuation mist inhaler 2 Inhalations,  "inhalation, Daily RT    vit A/vit C/vit E/zinc/copper (PRESERVISION AREDS ORAL) 1 tablet, oral, 2 times daily    vitamin E 180 mg (400 unit) capsule oral         Objective   Physical Exam:  Last Recorded Vitals:      2/2/2024    11:15 AM 2/5/2024    12:37 PM 2/8/2024     2:33 PM 2/12/2024    11:35 AM 2/14/2024     9:10 AM 2/15/2024     9:20 AM 2/20/2024     9:25 AM   Vitals   Systolic 119 136 112 112 112 122 100   Diastolic 56 67 60 57 58 56 35   Heart Rate 88 70 68 77 72 72 79   Temp 36.3 °C (97.3 °F) 35.4 °C (95.7 °F)  36.9 °C (98.4 °F) 36.3 °C (97.3 °F) 36.3 °C (97.3 °F) 35.9 °C (96.6 °F)   Resp 16 18  18 14 18 16   Height (in)   1.854 m (6' 1\")    1.791 m (5' 10.5\")   Weight (lb) 217.15 213.41 209    202   BMI 30.85 kg/m2 30.31 kg/m2 27.57 kg/m2    28.57 kg/m2   BSA (m2) 2.21 m2 2.19 m2 2.21 m2    2.13 m2   Visit Report   Report Report   Report    Visit Vitals  BP (!) 100/35   Pulse 79   Temp 35.9 °C (96.6 °F) (Temporal)   Resp 16   Ht 1.791 m (5' 10.5\")   Wt 91.6 kg (202 lb)   SpO2 100%   BMI 28.57 kg/m²   Smoking Status Former   BSA 2.13 m²      Gen: NAD, sitting comfortably  HEENT: NC/AT  Card: Irregularly irregular, no m/r/g  Pulm: Clear to auscultation bilaterally  Ext: No LE edema  Neuro: No focal deficits    Diagnostic Results      My Interpretation of Reviewed Study(s):  Prior ECGs (reviewed and my interpretation):   2/20/2024: Atypical atrial flutter, variable AV block, HR 74 bpm  1/30/2024: Atypical atrial flutter with variable AV block, HR 70bpm  1/9/2024: Sinus rhythm, heart rate 78 bpm, ventricular bigeminy, Para-hisian PVCs    Echocardiography:  11/14/2023  CONCLUSIONS:   - Exam indication: Chest Pain   - The left ventricle is normal in size. There is mild left ventricular   hypertrophy. Left ventricular systolic function is normal. EF = 60 ± 5% (2D   biplane) Grade I left ventricular diastolic dysfunction.   - The right ventricle is normal in size. Right ventricular systolic function is   normal. "   - The left atrial cavity is mildly dilated.   - Exam was compared with the prior  echocardiographic exam performed on   05/02/2019. No significant change     Stress Test:   6/12/2023  CONCLUSIONS:    1. SPECT Perfusion Study: Normal.    2. There is no scintigraphic evidence for inducible ischemia.    3. No evidence of scarred myocardium.    4. Left ventricle is normal in size. The left ventricle systolic   function is normal.    5. This is a low risk scan.       Relevant Labs:  Lab Results   Component Value Date    CREATININE 1.46 (H) 02/19/2024    CREATININE 1.32 (H) 02/12/2024    CREATININE 1.39 (H) 02/02/2024    K 4.2 02/19/2024    K 4.2 02/12/2024    K 4.3 02/02/2024    HGB 8.0 (L) 02/19/2024    HGB 8.7 (L) 02/12/2024    HGB 8.2 (L) 02/05/2024    INR 1.7 (H) 01/22/2024    AST 16 02/19/2024    AST 18 02/12/2024    AST 15 02/02/2024    ALT 13 02/19/2024    ALT 15 02/12/2024    ALT 13 02/02/2024       Assessment/Plan   Assessment and Plan:  Galen Villasenor Jr. is a 78 y.o. year old male patient who underwent successful AF ablation on 1/22/2024 with PVI, LA PWI, and CTI line. No complications after the procedure. His dofetilide was stopped after the procedure. An EKG done on 1/30 showed atypical atrial flutter.     I will discuss with Dr. Alfonso regarding arranging outpatient cardioversion and dofetilide loading again. If he has recurrence of arrhythmia while on dofetilide, we can discuss a repeat procedure. However, given his active chemotherapy at this time, would prefer a medical approach if possible.     Return to Clinic:  Return after ablation    Thank you very much for allowing me to participate in the care of this patient. Please do not hesitate to contact me with any further questions or concerns.    Evelyne Ambrose MD  Clinical Cardiac Electrophysiologist  Director of Atrial Fibrillation Ablation, [facility]  Director of Ventricular Arrhythmias Research, [facility]  Office  Phone Number: [phone]

## 2024-02-21 ENCOUNTER — HOME CARE VISIT (OUTPATIENT)
Dept: HOME HEALTH SERVICES | Facility: HOME HEALTH | Age: 79
End: 2024-02-21
Payer: MEDICARE

## 2024-02-21 PROCEDURE — 1090000002 HH PPS REVENUE DEBIT

## 2024-02-21 PROCEDURE — 1090000001 HH PPS REVENUE CREDIT

## 2024-02-21 PROCEDURE — G0180 MD CERTIFICATION HHA PATIENT: HCPCS | Performed by: INTERNAL MEDICINE

## 2024-02-22 ENCOUNTER — HOME CARE VISIT (OUTPATIENT)
Dept: HOME HEALTH SERVICES | Facility: HOME HEALTH | Age: 79
End: 2024-02-22
Payer: MEDICARE

## 2024-02-22 VITALS
HEART RATE: 66 BPM | SYSTOLIC BLOOD PRESSURE: 122 MMHG | TEMPERATURE: 97.3 F | RESPIRATION RATE: 16 BRPM | DIASTOLIC BLOOD PRESSURE: 60 MMHG

## 2024-02-22 PROCEDURE — 1090000002 HH PPS REVENUE DEBIT

## 2024-02-22 PROCEDURE — G0151 HHCP-SERV OF PT,EA 15 MIN: HCPCS | Mod: HHH

## 2024-02-22 PROCEDURE — 1090000001 HH PPS REVENUE CREDIT

## 2024-02-22 SDOH — HEALTH STABILITY: PHYSICAL HEALTH: EXERCISE ACTIVITY: HIP EXTENSION

## 2024-02-22 SDOH — HEALTH STABILITY: PHYSICAL HEALTH: PHYSICAL EXERCISE: SEATED

## 2024-02-22 SDOH — HEALTH STABILITY: PHYSICAL HEALTH: EXERCISE ACTIVITY: ANKLE DF/PF

## 2024-02-22 SDOH — HEALTH STABILITY: PHYSICAL HEALTH: PHYSICAL EXERCISE: 15

## 2024-02-22 SDOH — HEALTH STABILITY: PHYSICAL HEALTH: EXERCISE ACTIVITY: HIP ABDUCTION

## 2024-02-22 SDOH — HEALTH STABILITY: PHYSICAL HEALTH: EXERCISE ACTIVITIES SETS: 1

## 2024-02-22 SDOH — HEALTH STABILITY: PHYSICAL HEALTH: EXERCISE ACTIVITY: HS CURLS

## 2024-02-22 SDOH — HEALTH STABILITY: PHYSICAL HEALTH: EXERCISE ACTIVITY: MARCHING

## 2024-02-22 SDOH — HEALTH STABILITY: PHYSICAL HEALTH: EXERCISE ACTIVITY: LAQ

## 2024-02-22 SDOH — HEALTH STABILITY: PHYSICAL HEALTH: EXERCISE TYPE: LE EXERCISES

## 2024-02-22 ASSESSMENT — ACTIVITIES OF DAILY LIVING (ADL)
AMBULATION ASSISTANCE: STAND BY ASSIST
CURRENT_FUNCTION: STAND BY ASSIST
AMBULATION_DISTANCE/DURATION_TOLERATED: 75 FT X 2
AMBULATION ASSISTANCE ON FLAT SURFACES: 1

## 2024-02-22 ASSESSMENT — ENCOUNTER SYMPTOMS
DENIES PAIN: 1
MUSCLE WEAKNESS: 1
PERSON REPORTING PAIN: PATIENT

## 2024-02-23 ENCOUNTER — TELEPHONE (OUTPATIENT)
Dept: HEMATOLOGY/ONCOLOGY | Facility: CLINIC | Age: 79
End: 2024-02-23
Payer: MEDICARE

## 2024-02-23 PROCEDURE — 1090000001 HH PPS REVENUE CREDIT

## 2024-02-23 PROCEDURE — 1090000002 HH PPS REVENUE DEBIT

## 2024-02-23 NOTE — TELEPHONE ENCOUNTER
Vesta calling to report patient has had diarrhea for last two days.  Patient has not taken anything to help stop the diarrhea.  Patient drinks water, but been sleeping a lot and not drinking or eating very much.  Please call Vesta at: [phone]

## 2024-02-23 NOTE — TELEPHONE ENCOUNTER
"Spoke with the patient's wife- Giselle & patient. States patient has had diarrhea for \"a couple days.\" States patient is weak today. States he is having loose stools, lots of gas & going 3-4 times per day. States he has abd \"aching\" all the time. No vomiting. Has taken pepto bismol x1 for the diarrhea. Discussed how to take imodium, keep hydrated and eating small meals, BRAT diet. Also discussed s/s when to go to the ED. Pt was able to verbalize back to me this plan. He had no further questions or concerns at this time & aware to call back with any further concerns/questions.  "

## 2024-02-24 PROCEDURE — 1090000001 HH PPS REVENUE CREDIT

## 2024-02-24 PROCEDURE — 1090000002 HH PPS REVENUE DEBIT

## 2024-02-25 PROCEDURE — 1090000001 HH PPS REVENUE CREDIT

## 2024-02-25 PROCEDURE — 1090000002 HH PPS REVENUE DEBIT

## 2024-02-26 ENCOUNTER — OFFICE VISIT (OUTPATIENT)
Dept: HEMATOLOGY/ONCOLOGY | Facility: CLINIC | Age: 79
End: 2024-02-26
Payer: MEDICARE

## 2024-02-26 ENCOUNTER — LAB (OUTPATIENT)
Dept: LAB | Facility: CLINIC | Age: 79
End: 2024-02-26
Payer: MEDICARE

## 2024-02-26 VITALS
DIASTOLIC BLOOD PRESSURE: 58 MMHG | TEMPERATURE: 97.2 F | RESPIRATION RATE: 18 BRPM | HEART RATE: 71 BPM | BODY MASS INDEX: 28.04 KG/M2 | OXYGEN SATURATION: 100 % | SYSTOLIC BLOOD PRESSURE: 109 MMHG | WEIGHT: 198.19 LBS

## 2024-02-26 DIAGNOSIS — I10 PRIMARY HYPERTENSION: ICD-10-CM

## 2024-02-26 DIAGNOSIS — E11.9 TYPE 2 DIABETES MELLITUS WITHOUT COMPLICATION, WITHOUT LONG-TERM CURRENT USE OF INSULIN (MULTI): ICD-10-CM

## 2024-02-26 DIAGNOSIS — C91.42 HAIRY CELL LEUKEMIA, IN RELAPSE (MULTI): ICD-10-CM

## 2024-02-26 DIAGNOSIS — E78.2 MIXED HYPERLIPIDEMIA: ICD-10-CM

## 2024-02-26 DIAGNOSIS — E05.90 HYPERTHYROIDISM: ICD-10-CM

## 2024-02-26 DIAGNOSIS — I25.10 CORONARY ARTERY DISEASE INVOLVING NATIVE CORONARY ARTERY OF NATIVE HEART WITHOUT ANGINA PECTORIS: ICD-10-CM

## 2024-02-26 DIAGNOSIS — I48.0 PAROXYSMAL ATRIAL FIBRILLATION (MULTI): ICD-10-CM

## 2024-02-26 DIAGNOSIS — D70.1 CHEMOTHERAPY-INDUCED NEUTROPENIA (CMS-HCC): Primary | ICD-10-CM

## 2024-02-26 DIAGNOSIS — T45.1X5A CHEMOTHERAPY-INDUCED NEUTROPENIA (CMS-HCC): Primary | ICD-10-CM

## 2024-02-26 LAB
ALBUMIN SERPL BCP-MCNC: 4.3 G/DL (ref 3.4–5)
ALP SERPL-CCNC: 34 U/L (ref 33–136)
ALT SERPL W P-5'-P-CCNC: 13 U/L (ref 10–52)
ANION GAP SERPL CALC-SCNC: 11 MMOL/L (ref 10–20)
AST SERPL W P-5'-P-CCNC: 20 U/L (ref 9–39)
BASOPHILS # BLD AUTO: 0 X10*3/UL (ref 0–0.1)
BASOPHILS NFR BLD AUTO: 0 %
BILIRUB SERPL-MCNC: 0.8 MG/DL (ref 0–1.2)
BUN SERPL-MCNC: 35 MG/DL (ref 6–23)
CALCIUM SERPL-MCNC: 9.5 MG/DL (ref 8.6–10.3)
CHLORIDE SERPL-SCNC: 104 MMOL/L (ref 98–107)
CO2 SERPL-SCNC: 24 MMOL/L (ref 21–32)
CREAT SERPL-MCNC: 1.51 MG/DL (ref 0.5–1.3)
DACRYOCYTES BLD QL SMEAR: NORMAL
EGFRCR SERPLBLD CKD-EPI 2021: 46 ML/MIN/1.73M*2
EOSINOPHIL # BLD AUTO: 0.01 X10*3/UL (ref 0–0.4)
EOSINOPHIL NFR BLD AUTO: 1.7 %
ERYTHROCYTE [DISTWIDTH] IN BLOOD BY AUTOMATED COUNT: 18 % (ref 11.5–14.5)
GLUCOSE SERPL-MCNC: 96 MG/DL (ref 74–99)
HCT VFR BLD AUTO: 22.4 % (ref 41–52)
HGB BLD-MCNC: 7.4 G/DL (ref 13.5–17.5)
IMM GRANULOCYTES # BLD AUTO: 0 X10*3/UL (ref 0–0.5)
IMM GRANULOCYTES NFR BLD AUTO: 0 % (ref 0–0.9)
LYMPHOCYTES # BLD AUTO: 0.17 X10*3/UL (ref 0.8–3)
LYMPHOCYTES NFR BLD AUTO: 28.8 %
MCH RBC QN AUTO: 34.3 PG (ref 26–34)
MCHC RBC AUTO-ENTMCNC: 33 G/DL (ref 32–36)
MCV RBC AUTO: 104 FL (ref 80–100)
MONOCYTES # BLD AUTO: 0.01 X10*3/UL (ref 0.05–0.8)
MONOCYTES NFR BLD AUTO: 1.7 %
NEUTROPHILS # BLD AUTO: 0.4 X10*3/UL (ref 1.6–5.5)
NEUTROPHILS NFR BLD AUTO: 67.8 %
NRBC BLD-RTO: ABNORMAL /100{WBCS}
OVALOCYTES BLD QL SMEAR: NORMAL
PLATELET # BLD AUTO: 92 X10*3/UL (ref 150–450)
POLYCHROMASIA BLD QL SMEAR: NORMAL
POTASSIUM SERPL-SCNC: 4.4 MMOL/L (ref 3.5–5.3)
PROT SERPL-MCNC: 6.6 G/DL (ref 6.4–8.2)
RBC # BLD AUTO: 2.16 X10*6/UL (ref 4.5–5.9)
RBC MORPH BLD: NORMAL
SCHISTOCYTES BLD QL SMEAR: NORMAL
SODIUM SERPL-SCNC: 135 MMOL/L (ref 136–145)
WBC # BLD AUTO: 0.6 X10*3/UL (ref 4.4–11.3)

## 2024-02-26 PROCEDURE — 1159F MED LIST DOCD IN RCRD: CPT | Performed by: INTERNAL MEDICINE

## 2024-02-26 PROCEDURE — 99214 OFFICE O/P EST MOD 30 MIN: CPT | Performed by: INTERNAL MEDICINE

## 2024-02-26 PROCEDURE — 1090000001 HH PPS REVENUE CREDIT

## 2024-02-26 PROCEDURE — 80053 COMPREHEN METABOLIC PANEL: CPT

## 2024-02-26 PROCEDURE — 3074F SYST BP LT 130 MM HG: CPT | Performed by: INTERNAL MEDICINE

## 2024-02-26 PROCEDURE — 1157F ADVNC CARE PLAN IN RCRD: CPT | Performed by: INTERNAL MEDICINE

## 2024-02-26 PROCEDURE — 3078F DIAST BP <80 MM HG: CPT | Performed by: INTERNAL MEDICINE

## 2024-02-26 PROCEDURE — 36415 COLL VENOUS BLD VENIPUNCTURE: CPT

## 2024-02-26 PROCEDURE — 1126F AMNT PAIN NOTED NONE PRSNT: CPT | Performed by: INTERNAL MEDICINE

## 2024-02-26 PROCEDURE — 85025 COMPLETE CBC W/AUTO DIFF WBC: CPT

## 2024-02-26 PROCEDURE — 1090000002 HH PPS REVENUE DEBIT

## 2024-02-26 PROCEDURE — 1036F TOBACCO NON-USER: CPT | Performed by: INTERNAL MEDICINE

## 2024-02-26 RX ORDER — EPINEPHRINE 0.3 MG/.3ML
0.3 INJECTION SUBCUTANEOUS EVERY 5 MIN PRN
Status: CANCELLED | OUTPATIENT
Start: 2024-03-04

## 2024-02-26 RX ORDER — ALBUTEROL SULFATE 0.83 MG/ML
3 SOLUTION RESPIRATORY (INHALATION) AS NEEDED
Status: CANCELLED | OUTPATIENT
Start: 2024-03-04

## 2024-02-26 RX ORDER — DIPHENHYDRAMINE HYDROCHLORIDE 50 MG/ML
50 INJECTION INTRAMUSCULAR; INTRAVENOUS AS NEEDED
Status: CANCELLED | OUTPATIENT
Start: 2024-03-04

## 2024-02-26 RX ORDER — FAMOTIDINE 10 MG/ML
20 INJECTION INTRAVENOUS ONCE AS NEEDED
Status: CANCELLED | OUTPATIENT
Start: 2024-03-04

## 2024-02-26 ASSESSMENT — PAIN SCALES - GENERAL: PAINLEVEL: 3

## 2024-02-26 NOTE — PROGRESS NOTES
Patient ID: Galen Villasenor Jr. is a 78 y.o. male.  Referring Physician: Saturnino Kruse MD  [address]  [place] [address]  Primary Care Provider: Bladimir Ackerman MD  Visit Type: Follow Up      Subjective    HPI  My atrial fibrillation is acting up again    Review of Systems   Constitutional:  Positive for fatigue.   HENT:  Negative.     Respiratory: Negative.     Cardiovascular:  Positive for palpitations.   Gastrointestinal: Negative.    Endocrine: Negative.    Genitourinary: Negative.     Musculoskeletal: Negative.    Skin: Negative.    Neurological:  Positive for light-headedness.   Hematological: Negative.    Psychiatric/Behavioral: Negative.          Objective   BSA: 2.11 meters squared  /58 (BP Location: Right arm)   Pulse 71   Temp 36.2 °C (97.2 °F) (Temporal)   Resp 18   Wt 89.9 kg (198 lb 3.1 oz)   SpO2 100%   BMI 28.04 kg/m²      has a past medical history of Atherosclerotic heart disease of native coronary artery without angina pectoris (04/04/2017), Diabetes mellitus (CMS/[facility]), Personal history of malignant neoplasm of prostate (04/04/2017), Personal history of other diseases of the circulatory system (04/04/2017), Personal history of other diseases of the respiratory system (04/04/2017), Personal history of other endocrine, nutritional and metabolic disease (04/04/2017), Personal history of other endocrine, nutritional and metabolic disease (04/04/2017), and Personal history of other specified conditions (04/04/2017).   has a past surgical history that includes Rotator cuff repair (04/04/2017); Total knee arthroplasty (04/04/2017); Carpal tunnel release (04/04/2017); Coronary angioplasty (04/04/2017); Other surgical history (04/04/2017); Spinal fusion (04/04/2017); and Cardiac electrophysiology procedure (N/A, 1/22/2024).  Family History   Problem Relation Name Age of Onset    Lung cancer Mother      Cancer Father      Alcohol abuse Father      Other (cardiac disorder)  Father      Brain cancer Sister      Cancer Brother       Oncology History   Hairy cell leukemia, in relapse (CMS/HCC)   12/13/2023 Initial Diagnosis    Hairy cell leukemia, in relapse (CMS/HCC)     1/29/2024 -  Chemotherapy    Cladribine, 5 Day Cycles         Galen SALINAS Jacklyn John  reports that he quit smoking about 44 years ago. His smoking use included cigarettes and pipe. He started smoking about 67 years ago. He has a 23.00 pack-year smoking history. He has been exposed to tobacco smoke. He has never used smokeless tobacco.  He  reports current alcohol use of about 7.0 standard drinks of alcohol per week.  He  reports that he does not currently use drugs.    Physical Exam  Vitals reviewed.   Constitutional:       Appearance: Normal appearance.   HENT:      Head: Normocephalic.      Mouth/Throat:      Mouth: Mucous membranes are moist.   Eyes:      Extraocular Movements: Extraocular movements intact.      Pupils: Pupils are equal, round, and reactive to light.   Cardiovascular:      Rate and Rhythm: Tachycardia present. Rhythm irregular.      Heart sounds: Normal heart sounds.   Pulmonary:      Breath sounds: Normal breath sounds.   Abdominal:      General: Bowel sounds are normal.      Palpations: Abdomen is soft.   Musculoskeletal:         General: Normal range of motion.      Cervical back: Normal range of motion and neck supple.   Skin:     General: Skin is warm.   Neurological:      General: No focal deficit present.      Mental Status: He is alert and oriented to person, place, and time.   Psychiatric:         Mood and Affect: Mood normal.         Behavior: Behavior normal.         WBC   Date/Time Value Ref Range Status   02/19/2024 02:08 PM 0.6 (LL) 4.4 - 11.3 x10*3/uL Final   02/12/2024 11:44 AM 0.5 (LL) 4.4 - 11.3 x10*3/uL Final   02/05/2024 12:45 PM 0.5 (LL) 4.4 - 11.3 x10*3/uL Final     nRBC   Date Value Ref Range Status   02/19/2024   Final     Comment:     Not Measured   02/12/2024   Final      "Comment:     Not Measured   02/05/2024   Final     Comment:     Not Measured     RBC   Date Value Ref Range Status   02/19/2024 2.42 (L) 4.50 - 5.90 x10*6/uL Final   02/12/2024 2.65 (L) 4.50 - 5.90 x10*6/uL Final   02/05/2024 2.49 (L) 4.50 - 5.90 x10*6/uL Final     Hemoglobin   Date Value Ref Range Status   02/19/2024 8.0 (L) 13.5 - 17.5 g/dL Final   02/12/2024 8.7 (L) 13.5 - 17.5 g/dL Final   02/05/2024 8.2 (L) 13.5 - 17.5 g/dL Final     Hematocrit   Date Value Ref Range Status   02/19/2024 24.9 (L) 41.0 - 52.0 % Final   02/12/2024 26.2 (L) 41.0 - 52.0 % Final   02/05/2024 27.0 (L) 41.0 - 52.0 % Final     MCV   Date/Time Value Ref Range Status   02/19/2024 02:08  (H) 80 - 100 fL Final   02/12/2024 11:44 AM 99 80 - 100 fL Final   02/05/2024 12:45  (H) 80 - 100 fL Final     MCH   Date/Time Value Ref Range Status   02/19/2024 02:08 PM 33.1 26.0 - 34.0 pg Final   02/12/2024 11:44 AM 32.8 26.0 - 34.0 pg Final   02/05/2024 12:45 PM 32.9 26.0 - 34.0 pg Final     MCHC   Date/Time Value Ref Range Status   02/19/2024 02:08 PM 32.1 32.0 - 36.0 g/dL Final   02/12/2024 11:44 AM 33.2 32.0 - 36.0 g/dL Final   02/05/2024 12:45 PM 30.4 (L) 32.0 - 36.0 g/dL Final     RDW   Date/Time Value Ref Range Status   02/19/2024 02:08 PM 17.0 (H) 11.5 - 14.5 % Final   02/12/2024 11:44 AM 16.9 (H) 11.5 - 14.5 % Final   02/05/2024 12:45 PM 17.9 (H) 11.5 - 14.5 % Final     Platelets   Date/Time Value Ref Range Status   02/19/2024 02:08 PM 92 (L) 150 - 450 x10*3/uL Final   02/12/2024 11:44 AM 83 (L) 150 - 450 x10*3/uL Final   02/05/2024 12:45 PM 65 (L) 150 - 450 x10*3/uL Final     No results found for: \"MPV\"  Neutrophils %   Date/Time Value Ref Range Status   02/19/2024 02:08 PM 64.9 40.0 - 80.0 % Final   02/12/2024 11:44 AM 39.6 40.0 - 80.0 % Final   02/05/2024 12:45 PM   Final     Comment:     Not Measured     Immature Granulocytes %, Automated   Date/Time Value Ref Range Status   02/19/2024 02:08 PM 0.0 0.0 - 0.9 % Final     " Comment:     Immature Granulocyte Count (IG) includes promyelocytes, myelocytes and metamyelocytes but does not include bands. Percent differential counts (%) should be interpreted in the context of the absolute cell counts (cells/UL).   02/12/2024 11:44 AM 0.0 0.0 - 0.9 % Final     Comment:     Immature Granulocyte Count (IG) includes promyelocytes, myelocytes and metamyelocytes but does not include bands. Percent differential counts (%) should be interpreted in the context of the absolute cell counts (cells/UL).   02/05/2024 12:45 PM   Final     Comment:     Not Measured     Lymphocytes %   Date/Time Value Ref Range Status   02/19/2024 02:08 PM 31.7 13.0 - 44.0 % Final   02/12/2024 11:44 AM 54.7 13.0 - 44.0 % Final   02/05/2024 12:45 PM   Final     Comment:     Not Measured     Monocytes %   Date/Time Value Ref Range Status   02/19/2024 02:08 PM 1.7 2.0 - 10.0 % Final   02/12/2024 11:44 AM 1.9 2.0 - 10.0 % Final   02/05/2024 12:45 PM   Final     Comment:     Not Measured     Eosinophils %   Date/Time Value Ref Range Status   02/19/2024 02:08 PM 1.7 0.0 - 6.0 % Final   02/12/2024 11:44 AM 3.8 0.0 - 6.0 % Final   02/05/2024 12:45 PM   Final     Comment:     Not Measured     Basophils %   Date/Time Value Ref Range Status   02/19/2024 02:08 PM 0.0 0.0 - 2.0 % Final   02/12/2024 11:44 AM 0.0 0.0 - 2.0 % Final   02/05/2024 12:45 PM   Final     Comment:     Not Measured     Neutrophils Absolute   Date/Time Value Ref Range Status   02/19/2024 02:08 PM 0.39 (L) 1.60 - 5.50 x10*3/uL Final     Comment:     Percent differential counts (%) should be interpreted in the context of the absolute cell counts (cells/uL).   02/12/2024 11:44 AM 0.21 (L) 1.60 - 5.50 x10*3/uL Final     Comment:     Percent differential counts (%) should be interpreted in the context of the absolute cell counts (cells/uL).   02/05/2024 12:45 PM 0.24 (L) 1.60 - 5.50 x10*3/uL Final     Immature Granulocytes Absolute, Automated   Date/Time Value Ref Range  "Status   02/19/2024 02:08 PM 0.00 0.00 - 0.50 x10*3/uL Final   02/12/2024 11:44 AM 0.00 0.00 - 0.50 x10*3/uL Final   02/05/2024 12:45 PM 0.02 0.00 - 0.50 x10*3/uL Final     Lymphocytes Absolute   Date/Time Value Ref Range Status   02/19/2024 02:08 PM 0.19 (L) 0.80 - 3.00 x10*3/uL Final   02/12/2024 11:44 AM 0.29 (L) 0.80 - 3.00 x10*3/uL Final   02/05/2024 12:45 PM 0.15 (L) 0.80 - 3.00 x10*3/uL Final     Monocytes Absolute   Date/Time Value Ref Range Status   02/19/2024 02:08 PM 0.01 (L) 0.05 - 0.80 x10*3/uL Final   02/12/2024 11:44 AM 0.01 (L) 0.05 - 0.80 x10*3/uL Final   02/05/2024 12:45 PM 0.02 (L) 0.05 - 0.80 x10*3/uL Final     Eosinophils Absolute   Date/Time Value Ref Range Status   02/19/2024 02:08 PM 0.01 0.00 - 0.40 x10*3/uL Final   02/12/2024 11:44 AM 0.02 0.00 - 0.40 x10*3/uL Final   02/05/2024 12:45 PM 0.02 0.00 - 0.40 x10*3/uL Final     Basophils Absolute   Date/Time Value Ref Range Status   02/19/2024 02:08 PM 0.00 0.00 - 0.10 x10*3/uL Final     Comment:     Automated WBC differential has been confirmed by manual smear.   02/12/2024 11:44 AM 0.00 0.00 - 0.10 x10*3/uL Final     Comment:     Automated WBC differential has been confirmed by manual smear.   02/05/2024 12:45 PM 0.00 0.00 - 0.10 x10*3/uL Final     Comment:     Automated WBC differential has been confirmed by manual smear.       No components found for: \"PT\"  aPTT   Date/Time Value Ref Range Status   01/22/2024 08:58 AM 44 (H) 27 - 38 seconds Final     Medication Documentation Review Audit       Reviewed by Bryson Phoenix Mackson, MA (Medical Assistant) on 03/05/24 at 1146      Medication Order Taking? Sig Documenting Provider Last Dose Status   acyclovir (Zovirax) 400 mg tablet [id number] Yes Take 1 tablet (400 mg) by mouth 2 times a day. Saturnino Kruse MD Taking Active   albuterol (Ventolin HFA) 90 mcg/actuation inhaler [id number] Yes Inhale 1 puff every 4 hours if needed for shortness of breath or wheezing. Historical Provider, MD Taking " Active   apixaban (Eliquis) 5 mg tablet [id number] Yes Take 1 tablet (5 mg) by mouth 2 times a day. Historical Provider, MD Taking Active   aspirin 81 mg chewable tablet [id number] Yes Chew 1 tablet (81 mg) once daily. Historical Provider, MD Taking Active   atovaquone (Mepron) 750 mg/5 mL suspension [id number] Yes Take 10 mL (1,500 mg) by mouth once daily. ANISHA Albarran Taking Active   atovaquone (Mepron) 750 mg/5 mL suspension [id number] Yes Take 10 mL (1,500 mg) by mouth once daily. ANISHA Albarran Taking Active     Discontinued 03/04/24 1218   cyanocobalamin (Vitamin B-12) 1,000 mcg/mL injection [id number] Yes Inject 1 mL (1,000 mcg) into the muscle every 30 (thirty) days. Historical Provider, MD Taking Active   dofetilide (Tikosyn) 125 mcg capsule [id number] Yes Take 1 capsule (125 mcg) by mouth every 12 hours. ANISHA Albarran Taking Active   DULoxetine (Cymbalta) 60 mg DR capsule [id number] Yes Take 2 capsules (120 mg) by mouth once daily. Historical Provider, MD Taking Active   empagliflozin (Jardiance) 25 mg [id number] Yes Take 0.5 tablets (12.5 mg) by mouth once daily. Historical Provider, MD Taking Active   fenofibrate (Triglide) 160 mg tablet [id number] Yes Take 1 tablet (160 mg) by mouth once daily.  WITH FOOD Historical Provider, MD Taking Active   ferrous sulfate 325 (65 Fe) MG tablet [id number] Yes Take 1 tablet by mouth once daily. Historical Provider, MD Taking Active   gabapentin (Neurontin) 600 mg tablet [id number] Yes Take 1 tablet (600 mg) by mouth in the evening. Take 2 tablets (1,200 mg) by mouth at bedtime. Historical Provider, MD Taking Active   ipratropium (Atrovent) 21 mcg (0.03 %) nasal spray [id number] Yes Administer 2 sprays into each nostril 3 times a day as needed for rhinitis. Historical Provider, MD Taking Active   isosorbide mononitrate ER (Imdur) 30 mg 24 hr tablet [id number] Yes Take 1 tablet (30 mg) by mouth once daily. Historical Provider, MD Taking Active     Discontinued 03/04/24  1241     Discontinued 02/29/24 0843   methIMAzole (Tapazole) 5 mg tablet [id number] Yes Take 1 tablet (5 mg) by mouth once daily. Historical Provider, MD Taking Active   multivitamin with minerals tablet [id number] Yes Take 1 tablet by mouth once daily. Historical Provider, MD Taking Active   nitroglycerin (Nitrostat) 0.4 mg SL tablet [id number] Yes Place 1 tablet (0.4 mg) under the tongue every 5 minutes if needed for chest pain. Historical Provider, MD Taking Active   omeprazole (PriLOSEC) 20 mg DR capsule [id number] Yes Take 1 capsule (20 mg) by mouth 2 times a day before meals. For 45 days then resume once daily dosing RAVEN Aldana-CNP Taking Active   rosuvastatin (Crestor) 20 mg tablet [id number] Yes Take 1 tablet (20 mg) by mouth once daily. Historical Provider, MD Taking Active     Discontinued 03/04/24 1218   tiotropium-olodateroL (Stiolto Respimat) 2.5-2.5 mcg/actuation mist inhaler [id number] Yes Inhale 2 Inhalations once daily. Historical MD Luz Taking Active   vit A/vit C/vit E/zinc/copper (PRESERVISION AREDS ORAL) [id number] Yes Take 1 tablet by mouth twice a day. Historical Provider, MD Taking Active   vitamin E 180 mg (400 unit) capsule [id number] Yes Take 1 capsule (400 Units) by mouth once daily. Historical Provider, MD Taking Active                   Assessment/Plan    1) hairy cell leukemia  -old records from outside hematology group were finally obtained on 12/7/2023: on 7/17/2018 CBC showed wbc 8.8, hgb 14.7, plt 109,000; bmbx was done to rule out relapse- bmbx was sent to [facility]:  no morphologic or immunophenotypic evidence of residual hairy cell leukemia; expanded population of CD8+ T cells; mildly hypercellular marrow with maturing trilineage hematopoiesis (30-40%), BRAF negative  -diagnosed in 2002, treated with cladribine  -remained mildly thrombocytopenic ever since  -last flow cytometry of peripheral blood done on 7/21/2023 was negative  -became more neutropenic than usual over the  summer--[facility], [facility], 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  -had bone marrow bx done on 11/28/2023--showed extensive marrow involvement by hairy cell leukemia; no evidence of T cell LGL; positive for BRAF  -discussed treatment options--cladribine vs cladribine + rituximab  -he opted for cladribine alone  -in the future, if he relapses again, he could go on either ibrutinib or vemurafenib + rituximab  -he completed course of cladribine from January 29 through Feb 2, 2024  -he was cautioned that his ANC and platelet count may dip down further, after treatment, before they improve and that he may have intermittent low grade fevers  -this is day 29 from start of cladribine  -WBC has dipped down to 0.5, and so 1 week ago he was put on bactrim three times per week and acyclovir BID, which he will continue taking until his wbc is at least 2000 or ANC is at least 1000  -today we checked CBC + COMP  -results reviewed--wbc 0.6, hgb 7.4, plt 92,000, , creatinine 1.51, calcium 9.5, AST 20, ALT 13  -will continue to check his CBC weekly  -on one week he will receive neulasta 6 mg SC     2) CAD  -on ASA  -on imdur     3) atrial fibrillation  -on dofetilide  -on eliquis  -his ablation was delayed until January 22, 2024 at [facility]  -since then he has been feeling very weak and lightheaded at times, legs will just give out under him, so that he at times is afraid to get up to walk around  -he says Dr Alfonso will likely admit him soon for tikosyn load     4) hyperlipidemia  -on fenofibrate  -on rosuvastatin     5) hypertension  -on lisinopril     6) diabetes  -on metformin     7) hyperthyroidism  -on methimazole                 Problem List Items Addressed This Visit             ICD-10-CM    Hairy cell leukemia, in relapse (CMS/HCC) C91.42    Relevant Orders    Infusion Appointment Request [facility] INFUSION (Completed)    CBC and Auto Differential (Completed)    Clinic Appointment Request  Follow Up; SATURNINO KRUSE; [place] [id number]    CBC and Auto Differential    Comprehensive metabolic panel    Chemotherapy-induced neutropenia (CMS/HCC) - Primary D70.1, T45.1X5A    Relevant Orders    Infusion Appointment Request [facility] INFUSION (Completed)    CBC and Auto Differential (Completed)    [facility]    [facility] and Auto Differential    Comprehensive metabolic panel            Saturnino Kruse MD

## 2024-02-26 NOTE — PATIENT INSTRUCTIONS
Continue the 2 antibiotics    You will continue with weekly blood count checks  You will receive neulasta next week

## 2024-02-27 ENCOUNTER — HOME CARE VISIT (OUTPATIENT)
Dept: HOME HEALTH SERVICES | Facility: HOME HEALTH | Age: 79
End: 2024-02-27

## 2024-02-27 PROCEDURE — 1090000002 HH PPS REVENUE DEBIT

## 2024-02-27 PROCEDURE — 1090000001 HH PPS REVENUE CREDIT

## 2024-02-28 ENCOUNTER — HOSPITAL ENCOUNTER (INPATIENT)
Facility: HOSPITAL | Age: 79
LOS: 5 days | Discharge: HOME | DRG: 308 | End: 2024-03-04
Attending: INTERNAL MEDICINE | Admitting: INTERNAL MEDICINE
Payer: MEDICARE

## 2024-02-28 ENCOUNTER — APPOINTMENT (OUTPATIENT)
Dept: CARDIOLOGY | Facility: HOSPITAL | Age: 79
DRG: 308 | End: 2024-02-28
Payer: MEDICARE

## 2024-02-28 ENCOUNTER — HOME CARE VISIT (OUTPATIENT)
Dept: HOME HEALTH SERVICES | Facility: HOME HEALTH | Age: 79
End: 2024-02-28
Payer: MEDICARE

## 2024-02-28 DIAGNOSIS — T45.1X5A CHEMOTHERAPY-INDUCED NEUTROPENIA (CMS-HCC): ICD-10-CM

## 2024-02-28 DIAGNOSIS — D70.1 CHEMOTHERAPY-INDUCED NEUTROPENIA (CMS-HCC): ICD-10-CM

## 2024-02-28 DIAGNOSIS — Z51.81 VISIT FOR MONITORING TIKOSYN THERAPY: Primary | ICD-10-CM

## 2024-02-28 DIAGNOSIS — D61.818 PANCYTOPENIA (MULTI): ICD-10-CM

## 2024-02-28 DIAGNOSIS — I48.92 ATRIAL FLUTTER (MULTI): ICD-10-CM

## 2024-02-28 DIAGNOSIS — I48.4 ATYPICAL ATRIAL FLUTTER (MULTI): ICD-10-CM

## 2024-02-28 DIAGNOSIS — Z79.899 VISIT FOR MONITORING TIKOSYN THERAPY: Primary | ICD-10-CM

## 2024-02-28 DIAGNOSIS — I48.0 PAROXYSMAL ATRIAL FIBRILLATION (MULTI): ICD-10-CM

## 2024-02-28 DIAGNOSIS — C91.42 HAIRY CELL LEUKEMIA, IN RELAPSE (MULTI): ICD-10-CM

## 2024-02-28 DIAGNOSIS — D70.2 OTHER DRUG-INDUCED NEUTROPENIA (CMS-HCC): ICD-10-CM

## 2024-02-28 LAB
ANION GAP SERPL CALC-SCNC: 12 MMOL/L (ref 10–20)
BUN SERPL-MCNC: 38 MG/DL (ref 6–23)
CALCIUM SERPL-MCNC: 9.2 MG/DL (ref 8.6–10.3)
CHLORIDE SERPL-SCNC: 105 MMOL/L (ref 98–107)
CO2 SERPL-SCNC: 23 MMOL/L (ref 21–32)
CREAT SERPL-MCNC: 2.13 MG/DL (ref 0.5–1.3)
EGFRCR SERPLBLD CKD-EPI 2021: 31 ML/MIN/1.73M*2
GLUCOSE SERPL-MCNC: 149 MG/DL (ref 74–99)
MAGNESIUM SERPL-MCNC: 2.45 MG/DL (ref 1.6–2.4)
POTASSIUM SERPL-SCNC: 5 MMOL/L (ref 3.5–5.3)
SODIUM SERPL-SCNC: 135 MMOL/L (ref 136–145)

## 2024-02-28 PROCEDURE — G0158 HHC OT ASSISTANT EA 15: HCPCS | Mod: HHH

## 2024-02-28 PROCEDURE — 83735 ASSAY OF MAGNESIUM: CPT | Performed by: NURSE PRACTITIONER

## 2024-02-28 PROCEDURE — 2500000002 HC RX 250 W HCPCS SELF ADMINISTERED DRUGS (ALT 637 FOR MEDICARE OP, ALT 636 FOR OP/ED): Mod: MUE | Performed by: NURSE PRACTITIONER

## 2024-02-28 PROCEDURE — 2500000001 HC RX 250 WO HCPCS SELF ADMINISTERED DRUGS (ALT 637 FOR MEDICARE OP): Performed by: NURSE PRACTITIONER

## 2024-02-28 PROCEDURE — 2060000001 HC INTERMEDIATE ICU ROOM DAILY

## 2024-02-28 PROCEDURE — 1090000002 HH PPS REVENUE DEBIT

## 2024-02-28 PROCEDURE — 82310 ASSAY OF CALCIUM: CPT | Performed by: NURSE PRACTITIONER

## 2024-02-28 PROCEDURE — 93005 ELECTROCARDIOGRAM TRACING: CPT

## 2024-02-28 PROCEDURE — 1090000001 HH PPS REVENUE CREDIT

## 2024-02-28 PROCEDURE — 93010 ELECTROCARDIOGRAM REPORT: CPT | Performed by: INTERNAL MEDICINE

## 2024-02-28 PROCEDURE — 36415 COLL VENOUS BLD VENIPUNCTURE: CPT | Performed by: NURSE PRACTITIONER

## 2024-02-28 RX ORDER — GABAPENTIN 600 MG/1
600 TABLET ORAL 3 TIMES DAILY
Status: DISCONTINUED | OUTPATIENT
Start: 2024-02-28 | End: 2024-02-28

## 2024-02-28 RX ORDER — IPRATROPIUM BROMIDE AND ALBUTEROL SULFATE 2.5; .5 MG/3ML; MG/3ML
3 SOLUTION RESPIRATORY (INHALATION)
Status: DISCONTINUED | OUTPATIENT
Start: 2024-02-28 | End: 2024-02-29

## 2024-02-28 RX ORDER — GABAPENTIN 600 MG/1
1200 TABLET ORAL NIGHTLY
Status: DISCONTINUED | OUTPATIENT
Start: 2024-02-28 | End: 2024-03-04 | Stop reason: HOSPADM

## 2024-02-28 RX ORDER — IPRATROPIUM BROMIDE 21 UG/1
2 SPRAY, METERED NASAL 3 TIMES DAILY
Status: DISCONTINUED | OUTPATIENT
Start: 2024-02-28 | End: 2024-02-29

## 2024-02-28 RX ORDER — ALBUTEROL SULFATE 90 UG/1
1 AEROSOL, METERED RESPIRATORY (INHALATION) EVERY 4 HOURS PRN
Status: DISCONTINUED | OUTPATIENT
Start: 2024-02-28 | End: 2024-02-28 | Stop reason: CLARIF

## 2024-02-28 RX ORDER — ROSUVASTATIN CALCIUM 20 MG/1
20 TABLET, COATED ORAL NIGHTLY
Status: DISCONTINUED | OUTPATIENT
Start: 2024-02-28 | End: 2024-03-04 | Stop reason: HOSPADM

## 2024-02-28 RX ORDER — ROSUVASTATIN CALCIUM 20 MG/1
20 TABLET, COATED ORAL DAILY
Status: DISCONTINUED | OUTPATIENT
Start: 2024-02-29 | End: 2024-02-28

## 2024-02-28 RX ORDER — DULOXETIN HYDROCHLORIDE 60 MG/1
120 CAPSULE, DELAYED RELEASE ORAL DAILY
Status: DISCONTINUED | OUTPATIENT
Start: 2024-02-29 | End: 2024-03-04 | Stop reason: HOSPADM

## 2024-02-28 RX ORDER — IPRATROPIUM BROMIDE 21 UG/1
2 SPRAY, METERED NASAL 3 TIMES DAILY
Status: DISCONTINUED | OUTPATIENT
Start: 2024-02-28 | End: 2024-02-28 | Stop reason: SDUPTHER

## 2024-02-28 RX ORDER — ACYCLOVIR 400 MG/1
400 TABLET ORAL 2 TIMES DAILY
Status: DISCONTINUED | OUTPATIENT
Start: 2024-02-28 | End: 2024-03-04 | Stop reason: HOSPADM

## 2024-02-28 RX ORDER — LISINOPRIL 40 MG/1
40 TABLET ORAL DAILY
Status: DISCONTINUED | OUTPATIENT
Start: 2024-02-29 | End: 2024-03-04 | Stop reason: HOSPADM

## 2024-02-28 RX ORDER — GABAPENTIN 600 MG/1
600 TABLET ORAL DAILY
Status: DISCONTINUED | OUTPATIENT
Start: 2024-02-29 | End: 2024-03-04 | Stop reason: HOSPADM

## 2024-02-28 RX ORDER — PANTOPRAZOLE SODIUM 40 MG/1
40 TABLET, DELAYED RELEASE ORAL
Status: DISCONTINUED | OUTPATIENT
Start: 2024-02-29 | End: 2024-02-28

## 2024-02-28 RX ORDER — ISOSORBIDE MONONITRATE 30 MG/1
30 TABLET, EXTENDED RELEASE ORAL DAILY
Status: DISCONTINUED | OUTPATIENT
Start: 2024-02-29 | End: 2024-03-04 | Stop reason: HOSPADM

## 2024-02-28 RX ORDER — PANTOPRAZOLE SODIUM 40 MG/1
40 TABLET, DELAYED RELEASE ORAL NIGHTLY
Status: DISCONTINUED | OUTPATIENT
Start: 2024-02-28 | End: 2024-03-04 | Stop reason: HOSPADM

## 2024-02-28 RX ORDER — FENOFIBRATE 160 MG/1
160 TABLET ORAL DAILY
Status: DISCONTINUED | OUTPATIENT
Start: 2024-02-29 | End: 2024-03-04 | Stop reason: HOSPADM

## 2024-02-28 RX ORDER — FERROUS SULFATE 325(65) MG
65 TABLET ORAL DAILY
Status: DISCONTINUED | OUTPATIENT
Start: 2024-02-29 | End: 2024-03-04 | Stop reason: HOSPADM

## 2024-02-28 RX ORDER — ALBUTEROL SULFATE 0.83 MG/ML
2.5 SOLUTION RESPIRATORY (INHALATION) EVERY 4 HOURS PRN
Status: DISCONTINUED | OUTPATIENT
Start: 2024-02-28 | End: 2024-03-01

## 2024-02-28 RX ORDER — FORMOTEROL FUMARATE DIHYDRATE 20 UG/2ML
20 SOLUTION RESPIRATORY (INHALATION)
Status: DISCONTINUED | OUTPATIENT
Start: 2024-02-28 | End: 2024-02-28 | Stop reason: CLARIF

## 2024-02-28 RX ORDER — NAPROXEN SODIUM 220 MG/1
81 TABLET, FILM COATED ORAL DAILY
Status: DISCONTINUED | OUTPATIENT
Start: 2024-02-29 | End: 2024-03-04 | Stop reason: HOSPADM

## 2024-02-28 RX ADMIN — GABAPENTIN 1200 MG: 600 TABLET, FILM COATED ORAL at 22:53

## 2024-02-28 RX ADMIN — PANTOPRAZOLE SODIUM 40 MG: 40 TABLET, DELAYED RELEASE ORAL at 22:54

## 2024-02-28 RX ADMIN — ROSUVASTATIN CALCIUM 20 MG: 20 TABLET, FILM COATED ORAL at 22:53

## 2024-02-28 SDOH — SOCIAL STABILITY: SOCIAL INSECURITY: DOES ANYONE TRY TO KEEP YOU FROM HAVING/CONTACTING OTHER FRIENDS OR DOING THINGS OUTSIDE YOUR HOME?: NO

## 2024-02-28 SDOH — SOCIAL STABILITY: SOCIAL INSECURITY: DO YOU FEEL UNSAFE GOING BACK TO THE PLACE WHERE YOU ARE LIVING?: NO

## 2024-02-28 SDOH — SOCIAL STABILITY: SOCIAL INSECURITY: HAVE YOU HAD THOUGHTS OF HARMING ANYONE ELSE?: NO

## 2024-02-28 SDOH — SOCIAL STABILITY: SOCIAL INSECURITY: WERE YOU ABLE TO COMPLETE ALL THE BEHAVIORAL HEALTH SCREENINGS?: YES

## 2024-02-28 SDOH — SOCIAL STABILITY: SOCIAL INSECURITY: DO YOU FEEL ANYONE HAS EXPLOITED OR TAKEN ADVANTAGE OF YOU FINANCIALLY OR OF YOUR PERSONAL PROPERTY?: NO

## 2024-02-28 SDOH — SOCIAL STABILITY: SOCIAL INSECURITY: HAS ANYONE EVER THREATENED TO HURT YOUR FAMILY OR YOUR PETS?: NO

## 2024-02-28 SDOH — SOCIAL STABILITY: SOCIAL INSECURITY: ARE THERE ANY APPARENT SIGNS OF INJURIES/BEHAVIORS THAT COULD BE RELATED TO ABUSE/NEGLECT?: NO

## 2024-02-28 SDOH — SOCIAL STABILITY: SOCIAL INSECURITY: ARE YOU OR HAVE YOU BEEN THREATENED OR ABUSED PHYSICALLY, EMOTIONALLY, OR SEXUALLY BY ANYONE?: NO

## 2024-02-28 SDOH — SOCIAL STABILITY: SOCIAL INSECURITY: ABUSE: ADULT

## 2024-02-28 ASSESSMENT — COGNITIVE AND FUNCTIONAL STATUS - GENERAL
CLIMB 3 TO 5 STEPS WITH RAILING: A LITTLE
MOBILITY SCORE: 23
PATIENT BASELINE BEDBOUND: NO
DAILY ACTIVITIY SCORE: 24

## 2024-02-28 ASSESSMENT — ENCOUNTER SYMPTOMS
HIGHEST PAIN SEVERITY IN PAST 24 HOURS: 5/10
SUBJECTIVE PAIN PROGRESSION: WAXING AND WANING
PAIN LOCATION - PAIN SEVERITY: 2/10
ANGER WITHIN DEFINED LIMITS: 1
LOWEST PAIN SEVERITY IN PAST 24 HOURS: 2/10
PAIN LOCATION: ABDOMEN
PAIN: 1
AGGRESSION WITHIN DEFINED LIMITS: 1
PAIN SEVERITY GOAL: 1/10
PERSON REPORTING PAIN: PATIENT

## 2024-02-28 ASSESSMENT — PAIN SCALES - PAIN ASSESSMENT IN ADVANCED DEMENTIA (PAINAD)
BREATHING: 0
FACIALEXPRESSION: 0 - SMILING OR INEXPRESSIVE.
CONSOLABILITY: 0
FACIALEXPRESSION: 0
CONSOLABILITY: 0 - NO NEED TO CONSOLE.
BODYLANGUAGE: 0
NEGVOCALIZATION: 0 - NONE.
NEGVOCALIZATION: 0
BODYLANGUAGE: 0 - RELAXED.
TOTALSCORE: 0

## 2024-02-28 ASSESSMENT — ACTIVITIES OF DAILY LIVING (ADL)
ADEQUATE_TO_COMPLETE_ADL: YES
TOILETING: INDEPENDENT
BATHING: INDEPENDENT
HEARING - RIGHT EAR: FUNCTIONAL
GROOMING: INDEPENDENT
WALKS IN HOME: INDEPENDENT
LACK_OF_TRANSPORTATION: NO
PATIENT'S MEMORY ADEQUATE TO SAFELY COMPLETE DAILY ACTIVITIES?: YES
HEARING - LEFT EAR: FUNCTIONAL
DRESSING YOURSELF: INDEPENDENT
JUDGMENT_ADEQUATE_SAFELY_COMPLETE_DAILY_ACTIVITIES: YES
FEEDING YOURSELF: INDEPENDENT

## 2024-02-28 ASSESSMENT — LIFESTYLE VARIABLES
HAS A RELATIVE, FRIEND, DOCTOR, OR ANOTHER HEALTH PROFESSIONAL EXPRESSED CONCERN ABOUT YOUR DRINKING OR SUGGESTED YOU CUT DOWN: NO
HOW MANY STANDARD DRINKS CONTAINING ALCOHOL DO YOU HAVE ON A TYPICAL DAY: 1 OR 2
HOW OFTEN DURING THE LAST YEAR HAVE YOU BEEN UNABLE TO REMEMBER WHAT HAPPENED THE NIGHT BEFORE BECAUSE YOU HAD BEEN DRINKING: NEVER
SKIP TO QUESTIONS 9-10: 1
AUDIT TOTAL SCORE: 4
HOW OFTEN DO YOU HAVE A DRINK CONTAINING ALCOHOL: 4 OR MORE TIMES A WEEK
HOW OFTEN DURING THE LAST YEAR HAVE YOU FAILED TO DO WHAT WAS NORMALLY EXPECTED FROM YOU BECAUSE OF DRINKING: NEVER
HOW OFTEN DURING THE LAST YEAR HAVE YOU HAD A FEELING OF GUILT OR REMORSE AFTER DRINKING: NEVER
AUDIT TOTAL SCORE: 0
HOW OFTEN DURING THE LAST YEAR HAVE YOU NEEDED AN ALCOHOLIC DRINK FIRST THING IN THE MORNING TO GET YOURSELF GOING AFTER A NIGHT OF HEAVY DRINKING: NEVER
HOW OFTEN DO YOU HAVE 6 OR MORE DRINKS ON ONE OCCASION: NEVER
AUDIT-C TOTAL SCORE: 4
AUDIT-C TOTAL SCORE: 4
HOW OFTEN DURING THE LAST YEAR HAVE YOU FOUND THAT YOU WERE NOT ABLE TO STOP DRINKING ONCE YOU HAD STARTED: NEVER
HAVE YOU OR SOMEONE ELSE BEEN INJURED AS A RESULT OF YOUR DRINKING: NO

## 2024-02-28 ASSESSMENT — PAIN - FUNCTIONAL ASSESSMENT
PAIN_FUNCTIONAL_ASSESSMENT: 0-10
PAIN_FUNCTIONAL_ASSESSMENT: 0-10

## 2024-02-28 ASSESSMENT — PATIENT HEALTH QUESTIONNAIRE - PHQ9
1. LITTLE INTEREST OR PLEASURE IN DOING THINGS: MORE THAN HALF THE DAYS
SUM OF ALL RESPONSES TO PHQ9 QUESTIONS 1 & 2: 3
2. FEELING DOWN, DEPRESSED OR HOPELESS: SEVERAL DAYS

## 2024-02-28 ASSESSMENT — COLUMBIA-SUICIDE SEVERITY RATING SCALE - C-SSRS
6. HAVE YOU EVER DONE ANYTHING, STARTED TO DO ANYTHING, OR PREPARED TO DO ANYTHING TO END YOUR LIFE?: NO
1. IN THE PAST MONTH, HAVE YOU WISHED YOU WERE DEAD OR WISHED YOU COULD GO TO SLEEP AND NOT WAKE UP?: NO
2. HAVE YOU ACTUALLY HAD ANY THOUGHTS OF KILLING YOURSELF?: NO

## 2024-02-28 ASSESSMENT — PAIN SCALES - GENERAL
PAINLEVEL_OUTOF10: 0 - NO PAIN

## 2024-02-28 NOTE — H&P
History Of Present Illness  Galen Villasenor Jr. is a 78 y.o. male  with  hx of atrial fib s/p PVI.LA PWI /CTI on 1/22/24,chronic ischemic heart diease .Leukrmia s/p chemo 3 weekss ago. HLD. Who comes in for Tikosyn re-loading . The pt had a PVI by Dr Ambrose on 1/22/24 ,however. On follow up post PVI.EKG shown;atrial flutter on 1/31/2024 along with sx of fatigue /intermittent heart flutter sx for which we plan for reloading Tikosyn.he denies any chest pain or SOB and consistent with  Eliquis without any interruption.      Past Medical History  He has a past medical history of Atherosclerotic heart disease of native coronary artery without angina pectoris (04/04/2017), Diabetes mellitus (CMS/[facility]), Personal history of malignant neoplasm of prostate (04/04/2017), Personal history of other diseases of the circulatory system (04/04/2017), Personal history of other diseases of the respiratory system (04/04/2017), Personal history of other endocrine, nutritional and metabolic disease (04/04/2017), Personal history of other endocrine, nutritional and metabolic disease (04/04/2017), and Personal history of other specified conditions (04/04/2017).    Surgical History  He has a past surgical history that includes Rotator cuff repair (04/04/2017); Total knee arthroplasty (04/04/2017); Carpal tunnel release (04/04/2017); Coronary angioplasty (04/04/2017); Other surgical history (04/04/2017); Spinal fusion (04/04/2017); and Cardiac electrophysiology procedure (N/A, 1/22/2024).     Social History  He reports that he quit smoking about 44 years ago. His smoking use included cigarettes and pipe. He started smoking about 67 years ago. He has a 23.00 pack-year smoking history. He has been exposed to tobacco smoke. He has never used smokeless tobacco. He reports current alcohol use of about 7.0 standard drinks of alcohol per week. He reports that he does not currently use drugs.    Family History  Family History   Problem Relation Name  Age of Onset    Lung cancer Mother      Cancer Father      Alcohol abuse Father      Other (cardiac disorder) Father      Brain cancer Sister      Cancer Brother          Allergies  Anesthetics - terrie type- parabens, Procaine, and Quinolones  Scheduled medications  No current facility-administered medications on file prior to encounter.     Current Outpatient Medications on File Prior to Encounter   Medication Sig Dispense Refill    acyclovir (Zovirax) 400 mg tablet Take 1 tablet (400 mg) by mouth 2 times a day. 60 tablet 1    albuterol (Ventolin HFA) 90 mcg/actuation inhaler Inhale 1 puff every 4 hours if needed.      apixaban (Eliquis) 5 mg tablet Take by mouth 2 times a day.      aspirin 81 mg chewable tablet Chew 1 tablet (81 mg) once daily.      cholecalciferol (Vitamin D-3) 50 mcg (2,000 unit) capsule Take by mouth.      cyanocobalamin (Vitamin B-12) 1,000 mcg/mL injection Cyanocobalamin 1000 MCG/ML Injection Solution   Refills: 0       Active      DULoxetine (Cymbalta) 60 mg DR capsule Take 2 capsules (120 mg) by mouth once daily.      empagliflozin (Jardiance) 25 mg Take 0.5 tablets (12.5 mg) by mouth once daily.      fenofibrate (Triglide) 160 mg tablet Take 1 tablet (160 mg) by mouth once daily.  WITH FOOD      ferrous sulfate 325 (65 Fe) MG tablet Take 1 tablet by mouth once daily.      gabapentin (Neurontin) 600 mg tablet Take 1 tablet (600 mg) by mouth 3 times a day.      ipratropium (Atrovent) 21 mcg (0.03 %) nasal spray Administer 2 sprays into each nostril. 2-3 times daily      isosorbide mononitrate ER (Imdur) 30 mg 24 hr tablet Take 1 tablet (30 mg) by mouth once daily.      lisinopril 40 mg tablet Take 1 tablet (40 mg) by mouth once daily.      MEN'S MULTI-VITAMIN ORAL Take 1 tablet by mouth once daily.      nitroglycerin (Nitrostat) 0.4 mg SL tablet Place 1 tablet (0.4 mg) under the tongue every 5 minutes if needed for chest pain.      omeprazole (PriLOSEC) 20 mg DR capsule Take 1 capsule (20  "mg) by mouth 2 times a day before meals. For 45 days then resume once daily dosing 90 capsule 0    rosuvastatin (Crestor) 20 mg tablet Take 1 tablet (20 mg) by mouth once daily.      sulfamethoxazole-trimethoprim (Bactrim DS) 800-160 mg tablet Take 1 tablet by mouth 3 (three) times a week. On Mondays, Wednesdays and Fridays 12 tablet 1    tiotropium-olodateroL (Stiolto Respimat) 2.5-2.5 mcg/actuation mist inhaler Inhale 2 Inhalations once daily.      vit A/vit C/vit E/zinc/copper (PRESERVISION AREDS ORAL) Take 1 tablet by mouth twice a day.      vitamin E 180 mg (400 unit) capsule Take by mouth.          Review of Systems   Constitutional:  Positive for fatigue.   HENT: Negative.     Eyes: Negative.    Respiratory: Negative.     Cardiovascular:  Positive for palpitations.   Gastrointestinal: Negative.    Endocrine: Negative.    Genitourinary: Negative.    Musculoskeletal: Negative.    Skin: Negative.    Allergic/Immunologic: Negative.    Neurological: Negative.    Hematological: Negative.    Psychiatric/Behavioral: Negative.          Physical Exam  HENT:      Head: Normocephalic.      Nose: Nose normal.   Cardiovascular:      Rate and Rhythm: Rhythm irregular.   Pulmonary:      Effort: Pulmonary effort is normal.   Musculoskeletal:         General: Normal range of motion.      Cervical back: Normal range of motion.   Skin:     General: Skin is warm.   Neurological:      Mental Status: He is alert and oriented to person, place, and time.   Psychiatric:         Behavior: Behavior normal.          Last Recorded Vitals  There were no vitals taken for this visit.    Relevant Results         Labs:     No results found for: \"CKTOTAL\", \"CKMB\", \"CKMBINDEX\", \"TROPONINI\"    No results found for: \"TROPHS\"   Lab Results   Component Value Date    GLUCOSE 96 02/26/2024    CALCIUM 9.5 02/26/2024     (L) 02/26/2024    K 4.4 02/26/2024    CO2 24 02/26/2024     02/26/2024    BUN 35 (H) 02/26/2024    CREATININE 1.51 (H) " "02/26/2024      Lab Results   Component Value Date    WBC 0.6 (LL) 02/26/2024    HGB 7.4 (L) 02/26/2024    HCT 22.4 (L) 02/26/2024     (H) 02/26/2024    PLT 92 (L) 02/26/2024        LABS:  ABGs: No results found for: \"PH\"  PRO-BNP: No results found for: \"PROBNP\"   TSH:   Lab Results   Component Value Date    TSH 3.52 08/28/2023      Lipid Profile: No results found for: \"TRIG\", \"HDL\", \"LDLCALC\", \"CHOL\"   Hemoglobin A1C: No results found for: \"HGBA1C\"   Magnesium:  No results found for: \"MG\"    Past Cardiology Tests (Last 3 Years):    EKG:atrial flutter Qtc 412 ms   Echo: 11/23;left atrial cavity mild dilated      Ejection Fractions:60%     No results found for this or any previous visit from the past 1095 days.            Stress Test:6/12/23; normal   No nuclear medicine results found for the past 12 months          ECG 12 lead (Clinic Performed)    Result Date: 2/20/2024  Atypical atrial flutter, variable AV block, HR 74 bpm          Assessment/Plan   Principal Problem:    Atrial flutter (CMS/HCC)  Active Problems:    Visit for monitoring Tikosyn therapy    Atrial flutter,paroxysmal; Day #1  EKG: atrial flutter  [id number] ms. K+ 4.4 Mg 2.4 creatinine 1,84 GFR 37 ( GFR 55 on 2/12/24)      A  78 y.o. male  with  hx of atrial fib s/p PVI.LA PWI /CTI on 1/22/24,chronic ischemic heart diease .Leukrmia s/p chemo 3 weekss ago. HLD. Who comes in for Tikosyn re-loading . The pt had a PVI by Dr Ambrose on 1/22/24 ,however. On follow up post PVI.EKG shown;atrial flutter on 1/31/2024 along with sx of fatigue /intermittent heart flutter sx for which we plan for reloading Tikosyn.he denies any chest pain or SOB and consistent with  Eliquis without any interruption.     -start Tikosyn 125 mcg bid giving GFR 37   -per protocol, EKG 2-3 hrs after giving Tikosyn  -continue Eliquis 5 mg bid    -lab in am; bmp mg   - discontinue  home bactrim due to contraindicate to Tikosyn  , I communicated with Dr Kruse and pharmacist,will " starting on atovaquone 1500 mg every day to replace bactrim until recover of wbc/neutrophil counts per Dr Kruse in the setting of low cbc , post chemo    -check cbc in am   Npo after 8 am for cardioversion in afternoon tomorrow                 Coty Walter, APRN-CNP

## 2024-02-29 ENCOUNTER — HOME CARE VISIT (OUTPATIENT)
Dept: HOME HEALTH SERVICES | Facility: HOME HEALTH | Age: 79
End: 2024-02-29
Payer: MEDICARE

## 2024-02-29 ENCOUNTER — APPOINTMENT (OUTPATIENT)
Dept: CARDIOLOGY | Facility: HOSPITAL | Age: 79
DRG: 308 | End: 2024-02-29
Payer: MEDICARE

## 2024-02-29 LAB
ANION GAP SERPL CALC-SCNC: 10 MMOL/L (ref 10–20)
BUN SERPL-MCNC: 36 MG/DL (ref 6–23)
CALCIUM SERPL-MCNC: 8.8 MG/DL (ref 8.6–10.3)
CHLORIDE SERPL-SCNC: 106 MMOL/L (ref 98–107)
CO2 SERPL-SCNC: 23 MMOL/L (ref 21–32)
CREAT SERPL-MCNC: 1.84 MG/DL (ref 0.5–1.3)
EGFRCR SERPLBLD CKD-EPI 2021: 37 ML/MIN/1.73M*2
GLUCOSE SERPL-MCNC: 99 MG/DL (ref 74–99)
HOLD SPECIMEN: NORMAL
MAGNESIUM SERPL-MCNC: 2.29 MG/DL (ref 1.6–2.4)
POTASSIUM SERPL-SCNC: 4.2 MMOL/L (ref 3.5–5.3)
SODIUM SERPL-SCNC: 135 MMOL/L (ref 136–145)

## 2024-02-29 PROCEDURE — 2500000002 HC RX 250 W HCPCS SELF ADMINISTERED DRUGS (ALT 637 FOR MEDICARE OP, ALT 636 FOR OP/ED): Mod: MUE | Performed by: INTERNAL MEDICINE

## 2024-02-29 PROCEDURE — 1090000002 HH PPS REVENUE DEBIT

## 2024-02-29 PROCEDURE — 94640 AIRWAY INHALATION TREATMENT: CPT | Mod: MUE

## 2024-02-29 PROCEDURE — 93005 ELECTROCARDIOGRAM TRACING: CPT

## 2024-02-29 PROCEDURE — 36415 COLL VENOUS BLD VENIPUNCTURE: CPT | Performed by: NURSE PRACTITIONER

## 2024-02-29 PROCEDURE — 93010 ELECTROCARDIOGRAM REPORT: CPT | Performed by: INTERNAL MEDICINE

## 2024-02-29 PROCEDURE — 99222 1ST HOSP IP/OBS MODERATE 55: CPT | Performed by: INTERNAL MEDICINE

## 2024-02-29 PROCEDURE — 2500000001 HC RX 250 WO HCPCS SELF ADMINISTERED DRUGS (ALT 637 FOR MEDICARE OP): Performed by: NURSE PRACTITIONER

## 2024-02-29 PROCEDURE — 83735 ASSAY OF MAGNESIUM: CPT | Performed by: NURSE PRACTITIONER

## 2024-02-29 PROCEDURE — 2500000002 HC RX 250 W HCPCS SELF ADMINISTERED DRUGS (ALT 637 FOR MEDICARE OP, ALT 636 FOR OP/ED): Mod: MUE | Performed by: NURSE PRACTITIONER

## 2024-02-29 PROCEDURE — 2060000001 HC INTERMEDIATE ICU ROOM DAILY

## 2024-02-29 PROCEDURE — 2500000002 HC RX 250 W HCPCS SELF ADMINISTERED DRUGS (ALT 637 FOR MEDICARE OP, ALT 636 FOR OP/ED): Performed by: NURSE PRACTITIONER

## 2024-02-29 PROCEDURE — 1090000001 HH PPS REVENUE CREDIT

## 2024-02-29 PROCEDURE — 80048 BASIC METABOLIC PNL TOTAL CA: CPT | Performed by: NURSE PRACTITIONER

## 2024-02-29 RX ORDER — IPRATROPIUM BROMIDE AND ALBUTEROL SULFATE 2.5; .5 MG/3ML; MG/3ML
3 SOLUTION RESPIRATORY (INHALATION) 3 TIMES DAILY
Status: DISCONTINUED | OUTPATIENT
Start: 2024-02-29 | End: 2024-03-01

## 2024-02-29 RX ORDER — MULTIVIT-MIN/IRON FUM/FOLIC AC 7.5 MG-4
1 TABLET ORAL DAILY
COMMUNITY
End: 2024-03-28 | Stop reason: ALTCHOICE

## 2024-02-29 RX ORDER — ATOVAQUONE 750 MG/5ML
1500 SUSPENSION ORAL DAILY
Qty: 300 ML | Refills: 0 | Status: SHIPPED | OUTPATIENT
Start: 2024-02-29 | End: 2024-03-30

## 2024-02-29 RX ORDER — ATOVAQUONE 750 MG/5ML
1500 SUSPENSION ORAL DAILY
Status: DISCONTINUED | OUTPATIENT
Start: 2024-02-29 | End: 2024-03-04 | Stop reason: HOSPADM

## 2024-02-29 RX ORDER — METHIMAZOLE 5 MG/1
5 TABLET ORAL DAILY
COMMUNITY
End: 2024-03-28 | Stop reason: SDUPTHER

## 2024-02-29 RX ORDER — DOFETILIDE 0.12 MG/1
125 CAPSULE ORAL EVERY 12 HOURS SCHEDULED
Status: DISCONTINUED | OUTPATIENT
Start: 2024-02-29 | End: 2024-03-04 | Stop reason: HOSPADM

## 2024-02-29 RX ORDER — METHIMAZOLE 5 MG/1
5 TABLET ORAL DAILY
Status: DISCONTINUED | OUTPATIENT
Start: 2024-02-29 | End: 2024-03-01

## 2024-02-29 RX ADMIN — IPRATROPIUM BROMIDE AND ALBUTEROL SULFATE 3 ML: 2.5; .5 SOLUTION RESPIRATORY (INHALATION) at 13:26

## 2024-02-29 RX ADMIN — EMPAGLIFLOZIN 12.5 MG: 25 TABLET, FILM COATED ORAL at 10:03

## 2024-02-29 RX ADMIN — LISINOPRIL 40 MG: 40 TABLET ORAL at 10:03

## 2024-02-29 RX ADMIN — GABAPENTIN 600 MG: 600 TABLET, FILM COATED ORAL at 10:03

## 2024-02-29 RX ADMIN — ATOVAQUONE 1500 MG: 750 SUSPENSION ORAL at 20:41

## 2024-02-29 RX ADMIN — DOFETILIDE 125 MCG: 0.12 CAPSULE ORAL at 20:40

## 2024-02-29 RX ADMIN — PANTOPRAZOLE SODIUM 40 MG: 40 TABLET, DELAYED RELEASE ORAL at 20:40

## 2024-02-29 RX ADMIN — FENOFIBRATE 160 MG: 160 TABLET ORAL at 10:04

## 2024-02-29 RX ADMIN — GABAPENTIN 1200 MG: 600 TABLET, FILM COATED ORAL at 20:41

## 2024-02-29 RX ADMIN — ACYCLOVIR 400 MG: 400 TABLET ORAL at 10:04

## 2024-02-29 RX ADMIN — ASPIRIN 81 MG CHEWABLE TABLET 81 MG: 81 TABLET CHEWABLE at 10:03

## 2024-02-29 RX ADMIN — IPRATROPIUM BROMIDE AND ALBUTEROL SULFATE 3 ML: 2.5; .5 SOLUTION RESPIRATORY (INHALATION) at 21:10

## 2024-02-29 RX ADMIN — DULOXETINE HYDROCHLORIDE 120 MG: 60 CAPSULE, DELAYED RELEASE ORAL at 10:03

## 2024-02-29 RX ADMIN — ROSUVASTATIN CALCIUM 20 MG: 20 TABLET, FILM COATED ORAL at 20:40

## 2024-02-29 RX ADMIN — DOFETILIDE 125 MCG: 0.12 CAPSULE ORAL at 10:03

## 2024-02-29 RX ADMIN — ACYCLOVIR 400 MG: 400 TABLET ORAL at 21:00

## 2024-02-29 RX ADMIN — FERROUS SULFATE TAB 325 MG (65 MG ELEMENTAL FE) 1 TABLET: 325 (65 FE) TAB at 10:04

## 2024-02-29 RX ADMIN — APIXABAN 5 MG: 5 TABLET, FILM COATED ORAL at 20:40

## 2024-02-29 RX ADMIN — ISOSORBIDE MONONITRATE 30 MG: 30 TABLET, EXTENDED RELEASE ORAL at 10:03

## 2024-02-29 RX ADMIN — APIXABAN 5 MG: 5 TABLET, FILM COATED ORAL at 10:04

## 2024-02-29 RX ADMIN — METHIMAZOLE 5 MG: 5 TABLET ORAL at 22:30

## 2024-02-29 ASSESSMENT — COGNITIVE AND FUNCTIONAL STATUS - GENERAL
DAILY ACTIVITIY SCORE: 24
CLIMB 3 TO 5 STEPS WITH RAILING: A LITTLE
MOBILITY SCORE: 23

## 2024-02-29 ASSESSMENT — PAIN - FUNCTIONAL ASSESSMENT
PAIN_FUNCTIONAL_ASSESSMENT: 0-10

## 2024-02-29 ASSESSMENT — ENCOUNTER SYMPTOMS
MUSCULOSKELETAL NEGATIVE: 1
FATIGUE: 1
GASTROINTESTINAL NEGATIVE: 1
ENDOCRINE NEGATIVE: 1
PALPITATIONS: 1
RESPIRATORY NEGATIVE: 1
ALLERGIC/IMMUNOLOGIC NEGATIVE: 1
PSYCHIATRIC NEGATIVE: 1
EYES NEGATIVE: 1
NEUROLOGICAL NEGATIVE: 1
HEMATOLOGIC/LYMPHATIC NEGATIVE: 1

## 2024-02-29 ASSESSMENT — PAIN SCALES - GENERAL
PAINLEVEL_OUTOF10: 0 - NO PAIN

## 2024-02-29 NOTE — CONSULTS
Nutrition Assessment Note  Nutrition Assessment      Reason for Assessment  Reason for Assessment: Admission nursing screening    Galen Villasenor Jr. is a 78 y.o. male admitted 2/28 for Tikosyn re-loading. Pt also recently started chemotherapy ~3 weeks ago and has had a significant weight loss as a result of decreased intakes with increased weakness.    Past Medical History   has a past medical history of Atherosclerotic heart disease of native coronary artery without angina pectoris (04/04/2017), Diabetes mellitus (CMS/[facility]), Personal history of malignant neoplasm of prostate (04/04/2017), Personal history of other diseases of the circulatory system (04/04/2017), Personal history of other diseases of the respiratory system (04/04/2017), Personal history of other endocrine, nutritional and metabolic disease (04/04/2017), Personal history of other endocrine, nutritional and metabolic disease (04/04/2017), and Personal history of other specified conditions (04/04/2017).  Surgical History   has a past surgical history that includes Rotator cuff repair (04/04/2017); Total knee arthroplasty (04/04/2017); Carpal tunnel release (04/04/2017); Coronary angioplasty (04/04/2017); Other surgical history (04/04/2017); Spinal fusion (04/04/2017); and Cardiac electrophysiology procedure (N/A, 1/22/2024).    History:  Food and Nutrient History  Energy Intake: Fair 50-75 %  Food and Nutrient History: Pt from home where he lives with his spouse independently. He used to do most of the cooking, but explains that now he is too weak. He will help on occasion, but cannot maintain the same routine he did prior to starting chemotherapy ~30 days ago. He confirms that he has lost a significant amount of weight and has gone down one pant size, but now needs to go down another pant size (in the last 30 days). He reports he is not eating as much - even skipping meals at times. However, he continues to maintain a strict adherence to the diabetic  "and cardiac diet - not allowing himself anything too high in salt or fat and limits carbs to only meal times and never between meal snacks.  Vitamin/Herbal Supplement Use: Prior to admit: Vt D3, Vt B12, Ferrous Sulfate, MVI with minerals, Vt E  Current Diet: Adult diet Cardiac; 70 gm fat; 2 - 3 grams Sodium  NPO Diet Except: Sips with meds; Effective midnight  Food allergies: NKFA. is allergic to anesthetics - terrie type- parabens, procaine, and quinolones.    GI assessment: Pt reports no GI issues at this time. LBM 2/25/24  Oral Problems: denies  Mobility: increased weakness, but still independent    Pain Assessment: 0-10  Pain Score: 0 - No pain    Vitals: Blood pressure 114/56, pulse 70, temperature 36.2 °C (97.2 °F), temperature source Temporal, resp. rate 20, height 1.803 m (5' 11\"), weight 91 kg (200 lb 9.9 oz), SpO2 97 %.    Recent Lab Results:  Lab Results   Component Value Date    GLUCOSE 99 02/29/2024    CALCIUM 8.8 02/29/2024     (L) 02/29/2024    K 4.2 02/29/2024    CO2 23 02/29/2024     02/29/2024    BUN 36 (H) 02/29/2024    CREATININE 1.84 (H) 02/29/2024     No results found for: \"HGBA1C\"        Medications reviewed:  acyclovir, 400 mg, oral, BID  apixaban, 5 mg, oral, BID  aspirin, 81 mg, oral, Daily  atovaquone, 1,500 mg, oral, Daily  dofetilide, 125 mcg, oral, q12h RONNELL  DULoxetine, 120 mg, oral, Daily  empagliflozin, 12.5 mg, oral, Daily  fenofibrate, 160 mg, oral, Daily  ferrous sulfate (325 mg ferrous sulfate), 65 mg of iron, oral, Daily  gabapentin, 1,200 mg, oral, Nightly  gabapentin, 600 mg, oral, Daily  ipratropium-albuteroL, 3 mL, nebulization, TID  isosorbide mononitrate ER, 30 mg, oral, Daily  lisinopril, 40 mg, oral, Daily  methIMAzole, 5 mg, oral, Daily  pantoprazole, 40 mg, oral, Nightly  rosuvastatin, 20 mg, oral, Nightly             Height:   Admission Weight: 91 kg (200 lb 9.9 oz)   Weight history/ % weight change:   Significant Weight Loss:  Interpretation of Weight Loss: " "                             Anthropometrics:  Height: 180.3 cm (5' 11\")  Weight: 91 kg (200 lb 9.9 oz)  BMI (Calculated): 27.99    Weight Change: 0    Weight Change  Weight History / % Weight Change: (2/20/24) 91.6kg; (1/29/24) 96.9kg; (11/28/23) 97.3 kg  Significant Weight Loss: Yes  Interpretation of Weight Loss: >5% in 1 month (-5.9kg(6%) in 4.5 weeks)         IBW/kg (Dietitian Calculated): 78.02 kg  Percent of IBW: 116.64 %                           Amputation Calculations:       BMI/Z-Score:  Facility age limit for growth %aditya is 20 years.    Energy Needs:  Calculated Energy Needs Using Equations  Height: 180.3 cm (5' 11\")  Weight Used for Equation Calculations: 91 kg (200 lb 9.9 oz)  Chantel Blanton Equation (Overweight or Obese Patients): 1642  Equation Chosen to Use by RD: Paul Mclean  Activity Factor: 1.3  Stress Factor: 1.1  Total Energy Needs: 2348  Temp: 36.2 °C (97.2 °F)    Estimated Energy Needs  Total Energy Estimated Needs (kCal): 2348 kCal  Method for Estimating Needs: MSJ (1.3) (1.1)    Estimated Protein Needs  Total Protein Estimated Needs (g):  (100-109g)  Method for Estimating Needs: 1.1-1.2g/kg    Estimated Fluid Needs  Method for Estimating Needs: 1mL/kcal or per physician         Nutrition Focused Physical Findings:  Subcutaneous Fat Loss  Orbital Fat Pads: Well nourshed (slightly bulging fat pads)  Buccal Fat Pads: Mild-Moderate (flat cheeks, minimal bounce)  Triceps: Mild-moderate (less than ample fat tissue)  Ribs: Mild-Moderate (ribs protrude, illiac crest prominent)    Muscle Wasting  Temporalis: Mild-Moderate (slight depression)  Pectoralis (Clavicular Region): Mild-Moderate (some protrusion of clavicle)  Deltoid/Trapezius: Well nourished (rounded appearance at arm, shoulder, neck)  Interosseous: Well nourished (muscle bulges)  Trapezius/Infraspinatus/Supraspinatus (Scapular Region): Defer  Quadriceps: Defer  Gastrocnemius: Defer    Edema  Edema: none         Physical Findings " "(Nutrition Deficiency/Toxicity)  Hair: Negative  Eyes: Negative  Mouth: Negative  Nails: Negative  Skin: Negative       Nutrition Diagnosis   Malnutrition Diagnosis  Patient has Malnutrition Diagnosis: Yes  Diagnosis Status: New  Malnutrition Diagnosis: Moderate malnutrition related to acute disease or injury  As Evidenced by: significant weight loss of 5.9kg(6%) in 4.5 weeks along with poor oral intakes of <75% of estimated energy requirement.                                                             Nutrition Interventions/Recommendations   Nutrition Prescription  Individualized Nutrition Prescription Provided for : Oral Diet    Food and/or Nutrient Delivery Interventions  Meals and Snacks: Carbohydrate-modified diet  Goal: Continue CARBOHYDRATE-CONTROLLED DIET (60-75g carbs per meal) with CARDIAC modifier (<70g fat and <3,000mg Sodium per day)                    Medical Food Supplement: Commercial beverage  Goal: Supplement intakes with Glucerna Shake (26g carbohydrates) at meals.    Vitamin Supplement Therapy: Other (Comment)  Goal: Continue vitamin therapy as prescribed prior to admit: Vt D3, Vt B12, Ferrous Sulfate, MVI with min and Vt E                                  Coordination of Nutrition Care by a Nutrition Professional  Goal: MARIANNA Rivera    Education Documentation  Nutrition Care Manual, taught by Trang Edwards RDN, LD at 2/29/2024  3:36 PM.  Learner: Patient  Readiness: Acceptance  Method: Explanation, Handout  Response: Verbalizes Understanding  Comment: Provided the following nutrition handouts: \"Poor Appetite\", \"Nutrition During Cancer Treatment\" and \"Planning Fast and Healthy Dinners\", \"Snack Ideas\" RD name and number provided for any further questions. Pt also given GLUCERNA coupons.             Nutrition Monitoring and Evaluation   Food and Nutrient Related History  Energy Intake: Estimated energy intake  Criteria: Pt to consume >2300 kcal per day with 60-75g carbohydrate at each of his 3 " meals and 30-45g carbs at HS snack.                                       Anthropometrics: Body Composition/Growth/Weight History  Weight: Measured weight  Criteria: Daily weights    Weight Change: Weight loss  Criteria: No further weight loss.              Biochemical Data, Medical Tests and Procedures  Electrolyte and Renal Panel: BUN, Creatinine, Sodium  Criteria: To improve to normal range.                        Nutrition Focused Physical Findings            Digestive System: Decrease in appetite, Constipation  Criteria: Appetite to improve. Constipation to resolve.              Other: Initiate bowel protocol as necessary for LBM >3 days ago.         Follow Up  Time Spent (min): 45 minutes  Last Date of Nutrition Visit: 02/29/24  Nutrition Follow-Up Needed?: 3-8 days  Follow up Comment: KB

## 2024-02-29 NOTE — PROGRESS NOTES
02/29/24 1206   Discharge Planning   Living Arrangements Spouse/significant other   Support Systems Spouse/significant other   Assistance Needed none   Type of Residence Private residence   Number of Stairs to Enter Residence 1   Number of Stairs Within Residence 0   Do you have animals or pets at home? Yes   Type of Animals or Pets dog   Who is requesting discharge planning? Provider   Home or Post Acute Services None   Patient expects to be discharged to: home   Does the patient need discharge transport arranged? No     Met with patient at bedside. Verified address and PCP. Uses [facility] in [place] for medications and has no issues obtaining them. Also states that [facility]. Does use a cane to ambulate especially when out of house, still drives, grocery shops with spouse and is independent with all ADL's. Patient will be discharged home with no needs. TCC team will follow patient for all discharge needs.

## 2024-03-01 ENCOUNTER — HOME CARE VISIT (OUTPATIENT)
Dept: HOME HEALTH SERVICES | Facility: HOME HEALTH | Age: 79
End: 2024-03-01
Payer: MEDICARE

## 2024-03-01 ENCOUNTER — APPOINTMENT (OUTPATIENT)
Dept: CARDIOLOGY | Facility: HOSPITAL | Age: 79
DRG: 308 | End: 2024-03-01
Payer: MEDICARE

## 2024-03-01 ENCOUNTER — HOSPITAL ENCOUNTER (OUTPATIENT)
Dept: CARDIOLOGY | Facility: HOSPITAL | Age: 79
Discharge: HOME | DRG: 308 | End: 2024-03-01
Payer: MEDICARE

## 2024-03-01 LAB
ANION GAP SERPL CALC-SCNC: 9 MMOL/L (ref 10–20)
BUN SERPL-MCNC: 29 MG/DL (ref 6–23)
CALCIUM SERPL-MCNC: 8.9 MG/DL (ref 8.6–10.3)
CHLORIDE SERPL-SCNC: 108 MMOL/L (ref 98–107)
CO2 SERPL-SCNC: 24 MMOL/L (ref 21–32)
CREAT SERPL-MCNC: 1.47 MG/DL (ref 0.5–1.3)
EGFRCR SERPLBLD CKD-EPI 2021: 48 ML/MIN/1.73M*2
ERYTHROCYTE [DISTWIDTH] IN BLOOD BY AUTOMATED COUNT: 19.3 % (ref 11.5–14.5)
GLUCOSE SERPL-MCNC: 117 MG/DL (ref 74–99)
HCT VFR BLD AUTO: 22.3 % (ref 41–52)
HGB BLD-MCNC: 7.1 G/DL (ref 13.5–17.5)
MAGNESIUM SERPL-MCNC: 2.34 MG/DL (ref 1.6–2.4)
MCH RBC QN AUTO: 33.6 PG (ref 26–34)
MCHC RBC AUTO-ENTMCNC: 31.8 G/DL (ref 32–36)
MCV RBC AUTO: 106 FL (ref 80–100)
NRBC BLD-RTO: 0 /100 WBCS (ref 0–0)
PLATELET # BLD AUTO: 92 X10*3/UL (ref 150–450)
POTASSIUM SERPL-SCNC: 4.4 MMOL/L (ref 3.5–5.3)
RBC # BLD AUTO: 2.11 X10*6/UL (ref 4.5–5.9)
SODIUM SERPL-SCNC: 137 MMOL/L (ref 136–145)
WBC # BLD AUTO: 0.5 X10*3/UL (ref 4.4–11.3)

## 2024-03-01 PROCEDURE — 7100000009 HC PHASE TWO TIME - INITIAL BASE CHARGE: Performed by: INTERNAL MEDICINE

## 2024-03-01 PROCEDURE — 2500000002 HC RX 250 W HCPCS SELF ADMINISTERED DRUGS (ALT 637 FOR MEDICARE OP, ALT 636 FOR OP/ED): Performed by: NURSE PRACTITIONER

## 2024-03-01 PROCEDURE — 5A2204Z RESTORATION OF CARDIAC RHYTHM, SINGLE: ICD-10-PCS | Performed by: INTERNAL MEDICINE

## 2024-03-01 PROCEDURE — 93005 ELECTROCARDIOGRAM TRACING: CPT

## 2024-03-01 PROCEDURE — 7100000010 HC PHASE TWO TIME - EACH INCREMENTAL 1 MINUTE: Performed by: INTERNAL MEDICINE

## 2024-03-01 PROCEDURE — 99153 MOD SED SAME PHYS/QHP EA: CPT | Performed by: INTERNAL MEDICINE

## 2024-03-01 PROCEDURE — 2500000001 HC RX 250 WO HCPCS SELF ADMINISTERED DRUGS (ALT 637 FOR MEDICARE OP): Performed by: NURSE PRACTITIONER

## 2024-03-01 PROCEDURE — 92960 CARDIOVERSION ELECTRIC EXT: CPT | Performed by: INTERNAL MEDICINE

## 2024-03-01 PROCEDURE — 99152 MOD SED SAME PHYS/QHP 5/>YRS: CPT | Performed by: INTERNAL MEDICINE

## 2024-03-01 PROCEDURE — 93010 ELECTROCARDIOGRAM REPORT: CPT | Performed by: INTERNAL MEDICINE

## 2024-03-01 PROCEDURE — 2720000007 HC OR 272 NO HCPCS: Performed by: INTERNAL MEDICINE

## 2024-03-01 PROCEDURE — 36415 COLL VENOUS BLD VENIPUNCTURE: CPT | Performed by: NURSE PRACTITIONER

## 2024-03-01 PROCEDURE — 85027 COMPLETE CBC AUTOMATED: CPT | Performed by: NURSE PRACTITIONER

## 2024-03-01 PROCEDURE — 1090000002 HH PPS REVENUE DEBIT

## 2024-03-01 PROCEDURE — 83735 ASSAY OF MAGNESIUM: CPT | Performed by: NURSE PRACTITIONER

## 2024-03-01 PROCEDURE — 1090000001 HH PPS REVENUE CREDIT

## 2024-03-01 PROCEDURE — 82374 ASSAY BLOOD CARBON DIOXIDE: CPT | Performed by: NURSE PRACTITIONER

## 2024-03-01 PROCEDURE — 1200000002 HC GENERAL ROOM WITH TELEMETRY DAILY

## 2024-03-01 RX ORDER — ATOVAQUONE 750 MG/5ML
1500 SUSPENSION ORAL DAILY
Qty: 300 ML | Refills: 0 | Status: SHIPPED | OUTPATIENT
Start: 2024-03-01 | End: 2024-03-31

## 2024-03-01 RX ORDER — DOFETILIDE 0.12 MG/1
125 CAPSULE ORAL EVERY 12 HOURS SCHEDULED
Qty: 60 CAPSULE | Refills: 1 | Status: SHIPPED | OUTPATIENT
Start: 2024-03-01 | End: 2024-04-30

## 2024-03-01 RX ORDER — IPRATROPIUM BROMIDE AND ALBUTEROL SULFATE 2.5; .5 MG/3ML; MG/3ML
3 SOLUTION RESPIRATORY (INHALATION) EVERY 2 HOUR PRN
Status: DISCONTINUED | OUTPATIENT
Start: 2024-03-01 | End: 2024-03-04 | Stop reason: HOSPADM

## 2024-03-01 RX ORDER — METHIMAZOLE 5 MG/1
5 TABLET ORAL NIGHTLY
Status: DISCONTINUED | OUTPATIENT
Start: 2024-03-01 | End: 2024-03-04 | Stop reason: HOSPADM

## 2024-03-01 RX ADMIN — APIXABAN 5 MG: 5 TABLET, FILM COATED ORAL at 20:47

## 2024-03-01 RX ADMIN — ACYCLOVIR 400 MG: 400 TABLET ORAL at 08:32

## 2024-03-01 RX ADMIN — GABAPENTIN 1200 MG: 600 TABLET, FILM COATED ORAL at 20:47

## 2024-03-01 RX ADMIN — DOFETILIDE 125 MCG: 0.12 CAPSULE ORAL at 20:47

## 2024-03-01 RX ADMIN — FENOFIBRATE 160 MG: 160 TABLET ORAL at 22:05

## 2024-03-01 RX ADMIN — APIXABAN 5 MG: 5 TABLET, FILM COATED ORAL at 08:35

## 2024-03-01 RX ADMIN — ASPIRIN 81 MG CHEWABLE TABLET 81 MG: 81 TABLET CHEWABLE at 08:33

## 2024-03-01 RX ADMIN — ROSUVASTATIN CALCIUM 20 MG: 20 TABLET, FILM COATED ORAL at 20:47

## 2024-03-01 RX ADMIN — GABAPENTIN 600 MG: 600 TABLET, FILM COATED ORAL at 08:32

## 2024-03-01 RX ADMIN — PANTOPRAZOLE SODIUM 40 MG: 40 TABLET, DELAYED RELEASE ORAL at 20:47

## 2024-03-01 RX ADMIN — ISOSORBIDE MONONITRATE 30 MG: 30 TABLET, EXTENDED RELEASE ORAL at 08:41

## 2024-03-01 RX ADMIN — DOFETILIDE 125 MCG: 0.12 CAPSULE ORAL at 08:33

## 2024-03-01 RX ADMIN — DULOXETINE HYDROCHLORIDE 120 MG: 60 CAPSULE, DELAYED RELEASE ORAL at 08:32

## 2024-03-01 RX ADMIN — EMPAGLIFLOZIN 12.5 MG: 25 TABLET, FILM COATED ORAL at 08:34

## 2024-03-01 RX ADMIN — ATOVAQUONE 1500 MG: 750 SUSPENSION ORAL at 20:48

## 2024-03-01 RX ADMIN — METHIMAZOLE 5 MG: 5 TABLET ORAL at 22:05

## 2024-03-01 RX ADMIN — ACYCLOVIR 400 MG: 400 TABLET ORAL at 20:48

## 2024-03-01 RX ADMIN — FERROUS SULFATE TAB 325 MG (65 MG ELEMENTAL FE) 1 TABLET: 325 (65 FE) TAB at 08:34

## 2024-03-01 ASSESSMENT — PAIN - FUNCTIONAL ASSESSMENT
PAIN_FUNCTIONAL_ASSESSMENT: 0-10

## 2024-03-01 ASSESSMENT — PAIN SCALES - GENERAL
PAINLEVEL_OUTOF10: 0 - NO PAIN
PAINLEVEL_OUTOF10: 4

## 2024-03-01 ASSESSMENT — PAIN DESCRIPTION - DESCRIPTORS: DESCRIPTORS: SHARP

## 2024-03-01 NOTE — NURSING NOTE
Report called to Jaycee in CCU. Patient taken back to room 2112 via bed w/monitor on and accompanied by cath lab staff X2.

## 2024-03-01 NOTE — CARE PLAN
The patient's goals for the shift include To have no symptoms from my heart arrythmia    Goal met. Pt. Got a cardioversion today and 3rd dose of Tikosyn. Pt. Is now in Sinus arrythmia with PACs. Qtc WDL. Pts wife is at bedside

## 2024-03-01 NOTE — PROGRESS NOTES
"Galen Villasenor Jr. is a 78 y.o. male on day 2 of admission presenting with Atrial flutter (CMS/HCC).    Subjective   Day #2 Tikosyn loading, remain in atrial flutter with slow HR. Hypotensive with systolic bp 108 this am.no sx.remain anemic due to post chemo .  Objective     Physical Exam  HENT:      Head: Normocephalic.   Cardiovascular:      Rate and Rhythm: Rhythm irregular.   Pulmonary:      Effort: Pulmonary effort is normal.   Abdominal:      General: Abdomen is flat.   Musculoskeletal:         General: No swelling.      Cervical back: Normal range of motion.   Skin:     Coloration: Skin is pale.   Neurological:      Mental Status: He is alert and oriented to person, place, and time.   Psychiatric:         Behavior: Behavior normal.         Last Recorded Vitals  Blood pressure 104/54, pulse 72, temperature 35.8 °C (96.4 °F), resp. rate 18, height 1.803 m (5' 11\"), weight 90 kg (198 lb 6.6 oz), SpO2 99 %.  Intake/Output last 3 Shifts:  I/O last 3 completed shifts:  In: 360 (4 mL/kg) [P.O.:360]  Out: - (0 mL/kg)   Weight: 90 kg     Relevant Results           This patient currently has cardiac telemetry ordered; if you would like to modify or discontinue the telemetry order, click here to go to the orders activity to modify/discontinue the order.            Assessment/Plan   Principal Problem:    Atrial flutter (CMS/HCC)  Active Problems:    Visit for monitoring Tikosyn therapy    Atrial flutter.paroxymal: Day #2  cardioversion this afternoon  -continue Tikosyn 125 mcg bid Qtc 445 ms. K+ 4.4 Mg 2.34 creatinine 1.47 GFR 48     Eliquis 5 mg bid   -I communicated with Dr Kruse and pharmacist,will starting on atovaquone 1500 mg every day to replace bactrim until recover of wbc/neutrophil counts per Dr Kruse in the setting of low cbc , post chemo    -check bmp mg  in am        RAVEN Albarran-CNP      "

## 2024-03-01 NOTE — CARE PLAN
S: Shift note.     B: A-flutter.     A:  Patient remains hds. He had no complaints of pain overnight. Patient received his second dose of tikosyn last night. Qtc 2 hours post was 0.445. Patient will be NPO at 0800 this morning for a cardioversion sometime this afternoon.     R: Hourly rounding was performed and patient needs were met. Call light within reach. No other acute events, will continue to monitor.       The patient's goals for the shift include To have no symptoms from my heart arrythmia    The clinical goals for the shift include Patient will remain hemodynamically stable by end of shift 3/1/24 at 0700.

## 2024-03-02 ENCOUNTER — HOSPITAL ENCOUNTER (OUTPATIENT)
Dept: CARDIOLOGY | Facility: HOSPITAL | Age: 79
Discharge: HOME | DRG: 308 | End: 2024-03-02
Payer: MEDICARE

## 2024-03-02 LAB
ANION GAP SERPL CALC-SCNC: 9 MMOL/L (ref 10–20)
ATRIAL RATE: 286 BPM
ATRIAL RATE: 300 BPM
BUN SERPL-MCNC: 32 MG/DL (ref 6–23)
CALCIUM SERPL-MCNC: 9.2 MG/DL (ref 8.6–10.3)
CHLORIDE SERPL-SCNC: 106 MMOL/L (ref 98–107)
CO2 SERPL-SCNC: 25 MMOL/L (ref 21–32)
CREAT SERPL-MCNC: 1.39 MG/DL (ref 0.5–1.3)
EGFRCR SERPLBLD CKD-EPI 2021: 51 ML/MIN/1.73M*2
GLUCOSE SERPL-MCNC: 110 MG/DL (ref 74–99)
HOLD SPECIMEN: NORMAL
MAGNESIUM SERPL-MCNC: 2.29 MG/DL (ref 1.6–2.4)
POTASSIUM SERPL-SCNC: 4.4 MMOL/L (ref 3.5–5.3)
Q ONSET: 223 MS
Q ONSET: 225 MS
QRS COUNT: 10 BEATS
QRS COUNT: 12 BEATS
QRS DURATION: 86 MS
QRS DURATION: 90 MS
QT INTERVAL: 406 MS
QT INTERVAL: 416 MS
QTC CALCULATION(BAZETT): 412 MS
QTC CALCULATION(BAZETT): 445 MS
QTC FREDERICIA: 410 MS
QTC FREDERICIA: 436 MS
R AXIS: -1 DEGREES
R AXIS: 5 DEGREES
SODIUM SERPL-SCNC: 136 MMOL/L (ref 136–145)
T AXIS: 17 DEGREES
T AXIS: 8 DEGREES
T OFFSET: 428 MS
T OFFSET: 431 MS
VENTRICULAR RATE: 62 BPM
VENTRICULAR RATE: 69 BPM

## 2024-03-02 PROCEDURE — 99232 SBSQ HOSP IP/OBS MODERATE 35: CPT | Performed by: INTERNAL MEDICINE

## 2024-03-02 PROCEDURE — 80048 BASIC METABOLIC PNL TOTAL CA: CPT | Performed by: NURSE PRACTITIONER

## 2024-03-02 PROCEDURE — 1090000001 HH PPS REVENUE CREDIT

## 2024-03-02 PROCEDURE — 83735 ASSAY OF MAGNESIUM: CPT | Performed by: NURSE PRACTITIONER

## 2024-03-02 PROCEDURE — 36415 COLL VENOUS BLD VENIPUNCTURE: CPT | Performed by: NURSE PRACTITIONER

## 2024-03-02 PROCEDURE — 1090000002 HH PPS REVENUE DEBIT

## 2024-03-02 PROCEDURE — 2500000002 HC RX 250 W HCPCS SELF ADMINISTERED DRUGS (ALT 637 FOR MEDICARE OP, ALT 636 FOR OP/ED): Performed by: NURSE PRACTITIONER

## 2024-03-02 PROCEDURE — 93005 ELECTROCARDIOGRAM TRACING: CPT

## 2024-03-02 PROCEDURE — 2500000001 HC RX 250 WO HCPCS SELF ADMINISTERED DRUGS (ALT 637 FOR MEDICARE OP): Performed by: NURSE PRACTITIONER

## 2024-03-02 PROCEDURE — 1200000002 HC GENERAL ROOM WITH TELEMETRY DAILY

## 2024-03-02 PROCEDURE — 93010 ELECTROCARDIOGRAM REPORT: CPT | Performed by: INTERNAL MEDICINE

## 2024-03-02 PROCEDURE — 2500000002 HC RX 250 W HCPCS SELF ADMINISTERED DRUGS (ALT 637 FOR MEDICARE OP, ALT 636 FOR OP/ED): Mod: MUE | Performed by: NURSE PRACTITIONER

## 2024-03-02 RX ADMIN — GABAPENTIN 1200 MG: 600 TABLET, FILM COATED ORAL at 21:03

## 2024-03-02 RX ADMIN — APIXABAN 5 MG: 5 TABLET, FILM COATED ORAL at 09:03

## 2024-03-02 RX ADMIN — ACYCLOVIR 400 MG: 400 TABLET ORAL at 09:03

## 2024-03-02 RX ADMIN — DOFETILIDE 125 MCG: 0.12 CAPSULE ORAL at 09:05

## 2024-03-02 RX ADMIN — GABAPENTIN 600 MG: 600 TABLET, FILM COATED ORAL at 09:05

## 2024-03-02 RX ADMIN — ISOSORBIDE MONONITRATE 30 MG: 30 TABLET, EXTENDED RELEASE ORAL at 09:05

## 2024-03-02 RX ADMIN — ASPIRIN 81 MG CHEWABLE TABLET 81 MG: 81 TABLET CHEWABLE at 09:03

## 2024-03-02 RX ADMIN — ATOVAQUONE 1500 MG: 750 SUSPENSION ORAL at 21:02

## 2024-03-02 RX ADMIN — PANTOPRAZOLE SODIUM 40 MG: 40 TABLET, DELAYED RELEASE ORAL at 21:03

## 2024-03-02 RX ADMIN — FERROUS SULFATE TAB 325 MG (65 MG ELEMENTAL FE) 1 TABLET: 325 (65 FE) TAB at 09:04

## 2024-03-02 RX ADMIN — ROSUVASTATIN CALCIUM 20 MG: 20 TABLET, FILM COATED ORAL at 21:03

## 2024-03-02 RX ADMIN — DOFETILIDE 125 MCG: 0.12 CAPSULE ORAL at 21:04

## 2024-03-02 RX ADMIN — EMPAGLIFLOZIN 12.5 MG: 25 TABLET, FILM COATED ORAL at 09:06

## 2024-03-02 RX ADMIN — FENOFIBRATE 160 MG: 160 TABLET ORAL at 09:04

## 2024-03-02 RX ADMIN — LISINOPRIL 40 MG: 40 TABLET ORAL at 09:05

## 2024-03-02 RX ADMIN — APIXABAN 5 MG: 5 TABLET, FILM COATED ORAL at 21:03

## 2024-03-02 RX ADMIN — METHIMAZOLE 5 MG: 5 TABLET ORAL at 21:02

## 2024-03-02 RX ADMIN — ACYCLOVIR 400 MG: 400 TABLET ORAL at 21:00

## 2024-03-02 RX ADMIN — DULOXETINE HYDROCHLORIDE 120 MG: 60 CAPSULE, DELAYED RELEASE ORAL at 09:04

## 2024-03-02 ASSESSMENT — COGNITIVE AND FUNCTIONAL STATUS - GENERAL
MOBILITY SCORE: 23
DAILY ACTIVITIY SCORE: 24
CLIMB 3 TO 5 STEPS WITH RAILING: A LITTLE

## 2024-03-02 ASSESSMENT — PAIN - FUNCTIONAL ASSESSMENT
PAIN_FUNCTIONAL_ASSESSMENT: 0-10

## 2024-03-02 ASSESSMENT — PAIN SCALES - GENERAL
PAINLEVEL_OUTOF10: 0 - NO PAIN

## 2024-03-02 NOTE — PROGRESS NOTES
ELECTROPHYSIOLOGY PROGRESS NOTE    PATIENT NAME: Galen Villasenor Jr.  PATIENT MRN: [id number]  SERVICE DATE:  3/2/2024  SERVICE TIME: 11:40 AM    CONSULTANT: Darrick Alfonso MD  PRIMARY CARE PHYSICIAN: Bladimir Ackerman MD  ATTENDING PHYSICIAN: Darrick Alfonso MD      IMPRESSIONS:  1. Hairy cell leukemia, on chemotherapy per Dr. Saturnino Kruse.  2. Pancytopenia from chemotherapy.  3. Paroxysmal AF/flutter, recurrent in spite of PVI 1/22/2024, back on dofetilide Rx at a low dose.  He is S/P DCC yesterday, and is maintaining SR with Wenckebach second-degree AV block.  4. CKD, stable.    RECOMMENDATIONS:  1.  The patient will continue dofetilide 125 mcg po bid, with dose #6 tonight.  2.  I plan to discharge him 3/3/2024 AM, with a follow-up EKG in 1-2 weeks.       SIGNATURE: Darrick Alfonso MD   OFFICE: [phone]    ================================================================    SUBJECTIVE: The patient is now on day #3 of dofetilide loading, on a low dose of just 125 mcg po bid because of renal insufficiency upon admission.  He was successfully cardioverted yesterday, and remains in sinus rhythm.  He had some minor chest discomfort yesterday that was worse with a deep breath, suggesting a pleuritic or musculoskeletal origin.  As noted previously, the patient is undergoing chemotherapy for hairy cell leukemia, and has pancytopenia with generalized weakness and exertional dyspnea.  He underwent PVI at [facility] by Dr. Ambrose on 1/22/2024, but had recurrence of a form of atrial flutter and is now hospitalized for resumption of dofetilide.    CURRENT CARDIOVASCULAR MEDICATIONS:    apixaban (Eliquis) tablet 5 mg, 5 mg, oral, BID    aspirin chewable tablet 81 mg, 81 mg, oral, Daily    dofetilide (Tikosyn) capsule 125 mcg, 125 mcg, oral, q12h    empagliflozin (Jardiance) tablet 12.5 mg, 12.5 mg, oral, Daily    fenofibrate (Triglide) tablet 160 mg, 160 mg, oral, Daily    isosorbide mononitrate ER (Imdur) 24 hr  "tablet 30 mg, 30 mg, oral, Daily    lisinopril tablet 40 mg, 40 mg, oral, Daily    rosuvastatin (Crestor) tablet 20 mg, 20 mg, oral, Nightly    PHYSICAL EXAM:  /69 (BP Location: Left arm, Patient Position: Lying)   Pulse 74   Temp 36 °C (96.8 °F) (Temporal)   Resp 20   Ht 1.803 m (5' 11\")   Wt 88 kg (194 lb 0.1 oz)   SpO2 98%   BMI 27.06 kg/m²   Gen: Pleasant white male in no distress, sitting comfortably in chair  Skin: Diffuse pallor  Neck: No JVD  Cardiac: Slightly irregular rhythm, with grade I/VI TRIXIE and preserved S2  Resp: Clear to auscultation  Abd: Unremarkable  Ext: No peripheral edema    EKG's: Sinus rhythm with frequent PAC's (some non-conducted) and occasional PVC's; Qtc generally around 440 msec    MONITOR: Sinus rhythm with Wenckebach second-degree AV block    LABS:  Lab Results   Component Value Date    WBC 0.5 (LL) 03/01/2024    HGB 7.1 (L) 03/01/2024    HCT 22.3 (L) 03/01/2024     (H) 03/01/2024    PLT 92 (L) 03/01/2024      Lab Results   Component Value Date    GLUCOSE 110 (H) 03/02/2024    CALCIUM 9.2 03/02/2024     03/02/2024    K 4.4 03/02/2024    CO2 25 03/02/2024     03/02/2024    BUN 32 (H) 03/02/2024    CREATININE 1.39 (H) 03/02/2024          "

## 2024-03-02 NOTE — CARE PLAN
Pt remained hemodynamically stable this RN shift. Pt remained NSR with 2nd degree AV block type 1/Mobitz I. Pt complaining of 4/10 intermittent chest pain, EKG obtained, Dr. Alfonso informed. Pt medicated per order, received 4th dose of tikosyn, QTc 0.427. Pt denied nausea, lightheadedness or SOB. Pt safety maintained, call light at reach, will continue to monitor.

## 2024-03-02 NOTE — NURSING NOTE
"Pt c/o of intermittent chest pain 4/10, no complains of nausea, lightheadedess or dizziness. EKG obtained, reading NSR with 2nd degree AV block (Mobitz I). Dr. Alfonso notified, no new orders at this time. Per Dr. Alfonso, \"EKG looks fine\". Will continue to monitor.    "

## 2024-03-02 NOTE — CARE PLAN
Problem: Safety - Adult  Goal: Free from fall injury  Outcome: Met     Problem: Discharge Planning  Goal: Discharge to home or other facility with appropriate resources  Outcome: Progressing     Problem: Chronic Conditions and Co-morbidities  Goal: Patient's chronic conditions and co-morbidity symptoms are monitored and maintained or improved  Outcome: Progressing     Problem: Arrythmia/Dysrhythmia  Goal: Serial ECG will return to baseline  Outcome: Progressing   The patient's goals for the shift include To have no symptoms from my heart arrythmia    Patient alert and oriented. Maintained on room air. Pt given Tikosyn per order. EKG obtained;  and Qtc  582. Dr. Alfonso notified of EKG results. Per Dr. Alfonso ok for night shift to give the next dose of Tikosyn. Pt had no complaints of pain. Safety maintained.

## 2024-03-03 ENCOUNTER — HOSPITAL ENCOUNTER (OUTPATIENT)
Dept: CARDIOLOGY | Facility: HOSPITAL | Age: 79
Discharge: HOME | DRG: 308 | End: 2024-03-03
Payer: MEDICARE

## 2024-03-03 DIAGNOSIS — I48.4 ATYPICAL ATRIAL FLUTTER (MULTI): Primary | ICD-10-CM

## 2024-03-03 LAB
ANION GAP SERPL CALC-SCNC: 10 MMOL/L (ref 10–20)
BASOPHILS # BLD AUTO: 0 X10*3/UL (ref 0–0.1)
BASOPHILS NFR BLD AUTO: 0 %
BUN SERPL-MCNC: 27 MG/DL (ref 6–23)
CALCIUM SERPL-MCNC: 9.4 MG/DL (ref 8.6–10.3)
CHLORIDE SERPL-SCNC: 104 MMOL/L (ref 98–107)
CO2 SERPL-SCNC: 25 MMOL/L (ref 21–32)
CREAT SERPL-MCNC: 1.43 MG/DL (ref 0.5–1.3)
EGFRCR SERPLBLD CKD-EPI 2021: 50 ML/MIN/1.73M*2
EOSINOPHIL # BLD AUTO: 0.02 X10*3/UL (ref 0–0.4)
EOSINOPHIL NFR BLD AUTO: 4.7 %
ERYTHROCYTE [DISTWIDTH] IN BLOOD BY AUTOMATED COUNT: 18.8 % (ref 11.5–14.5)
GLUCOSE BLD MANUAL STRIP-MCNC: 105 MG/DL (ref 74–99)
GLUCOSE SERPL-MCNC: 157 MG/DL (ref 74–99)
HCT VFR BLD AUTO: 21.7 % (ref 41–52)
HGB BLD-MCNC: 7.1 G/DL (ref 13.5–17.5)
IMM GRANULOCYTES # BLD AUTO: 0 X10*3/UL (ref 0–0.5)
IMM GRANULOCYTES NFR BLD AUTO: 0 % (ref 0–0.9)
LYMPHOCYTES # BLD AUTO: 0.16 X10*3/UL (ref 0.8–3)
LYMPHOCYTES NFR BLD AUTO: 37.2 %
MAGNESIUM SERPL-MCNC: 2.07 MG/DL (ref 1.6–2.4)
MCH RBC QN AUTO: 34.1 PG (ref 26–34)
MCHC RBC AUTO-ENTMCNC: 32.7 G/DL (ref 32–36)
MCV RBC AUTO: 104 FL (ref 80–100)
MONOCYTES # BLD AUTO: 0 X10*3/UL (ref 0.05–0.8)
MONOCYTES NFR BLD AUTO: 0 %
NEUTROPHILS # BLD AUTO: 0.25 X10*3/UL (ref 1.6–5.5)
NEUTROPHILS NFR BLD AUTO: 58.1 %
NRBC BLD-RTO: 0 /100 WBCS (ref 0–0)
PLATELET # BLD AUTO: 81 X10*3/UL (ref 150–450)
POTASSIUM SERPL-SCNC: 4.3 MMOL/L (ref 3.5–5.3)
RBC # BLD AUTO: 2.08 X10*6/UL (ref 4.5–5.9)
SODIUM SERPL-SCNC: 135 MMOL/L (ref 136–145)
WBC # BLD AUTO: 0.4 X10*3/UL (ref 4.4–11.3)

## 2024-03-03 PROCEDURE — 99238 HOSP IP/OBS DSCHRG MGMT 30/<: CPT | Performed by: INTERNAL MEDICINE

## 2024-03-03 PROCEDURE — 1090000001 HH PPS REVENUE CREDIT

## 2024-03-03 PROCEDURE — 2500000004 HC RX 250 GENERAL PHARMACY W/ HCPCS (ALT 636 FOR OP/ED): Performed by: INTERNAL MEDICINE

## 2024-03-03 PROCEDURE — 36415 COLL VENOUS BLD VENIPUNCTURE: CPT | Performed by: INTERNAL MEDICINE

## 2024-03-03 PROCEDURE — 93010 ELECTROCARDIOGRAM REPORT: CPT | Performed by: INTERNAL MEDICINE

## 2024-03-03 PROCEDURE — 82947 ASSAY GLUCOSE BLOOD QUANT: CPT

## 2024-03-03 PROCEDURE — 2500000001 HC RX 250 WO HCPCS SELF ADMINISTERED DRUGS (ALT 637 FOR MEDICARE OP): Performed by: NURSE PRACTITIONER

## 2024-03-03 PROCEDURE — 2500000002 HC RX 250 W HCPCS SELF ADMINISTERED DRUGS (ALT 637 FOR MEDICARE OP, ALT 636 FOR OP/ED): Mod: MUE | Performed by: NURSE PRACTITIONER

## 2024-03-03 PROCEDURE — 2500000004 HC RX 250 GENERAL PHARMACY W/ HCPCS (ALT 636 FOR OP/ED): Performed by: NURSE PRACTITIONER

## 2024-03-03 PROCEDURE — 93005 ELECTROCARDIOGRAM TRACING: CPT

## 2024-03-03 PROCEDURE — 85025 COMPLETE CBC W/AUTO DIFF WBC: CPT | Performed by: INTERNAL MEDICINE

## 2024-03-03 PROCEDURE — 2500000002 HC RX 250 W HCPCS SELF ADMINISTERED DRUGS (ALT 637 FOR MEDICARE OP, ALT 636 FOR OP/ED): Performed by: NURSE PRACTITIONER

## 2024-03-03 PROCEDURE — 85060 BLOOD SMEAR INTERPRETATION: CPT | Performed by: INTERNAL MEDICINE

## 2024-03-03 PROCEDURE — 83735 ASSAY OF MAGNESIUM: CPT | Performed by: INTERNAL MEDICINE

## 2024-03-03 PROCEDURE — 80048 BASIC METABOLIC PNL TOTAL CA: CPT | Performed by: INTERNAL MEDICINE

## 2024-03-03 PROCEDURE — 1200000002 HC GENERAL ROOM WITH TELEMETRY DAILY

## 2024-03-03 PROCEDURE — 1090000002 HH PPS REVENUE DEBIT

## 2024-03-03 RX ADMIN — PANTOPRAZOLE SODIUM 40 MG: 40 TABLET, DELAYED RELEASE ORAL at 20:50

## 2024-03-03 RX ADMIN — APIXABAN 5 MG: 5 TABLET, FILM COATED ORAL at 20:50

## 2024-03-03 RX ADMIN — GABAPENTIN 1200 MG: 600 TABLET, FILM COATED ORAL at 20:51

## 2024-03-03 RX ADMIN — ASPIRIN 81 MG CHEWABLE TABLET 81 MG: 81 TABLET CHEWABLE at 08:37

## 2024-03-03 RX ADMIN — GABAPENTIN 600 MG: 600 TABLET, FILM COATED ORAL at 08:38

## 2024-03-03 RX ADMIN — DOFETILIDE 125 MCG: 0.12 CAPSULE ORAL at 08:39

## 2024-03-03 RX ADMIN — FENOFIBRATE 160 MG: 160 TABLET ORAL at 08:38

## 2024-03-03 RX ADMIN — FERROUS SULFATE TAB 325 MG (65 MG ELEMENTAL FE) 1 TABLET: 325 (65 FE) TAB at 08:37

## 2024-03-03 RX ADMIN — ISOSORBIDE MONONITRATE 30 MG: 30 TABLET, EXTENDED RELEASE ORAL at 10:14

## 2024-03-03 RX ADMIN — LISINOPRIL 40 MG: 40 TABLET ORAL at 10:14

## 2024-03-03 RX ADMIN — EMPAGLIFLOZIN 12.5 MG: 25 TABLET, FILM COATED ORAL at 08:38

## 2024-03-03 RX ADMIN — DULOXETINE HYDROCHLORIDE 120 MG: 60 CAPSULE, DELAYED RELEASE ORAL at 08:37

## 2024-03-03 RX ADMIN — ACYCLOVIR 400 MG: 400 TABLET ORAL at 08:37

## 2024-03-03 RX ADMIN — ROSUVASTATIN CALCIUM 20 MG: 20 TABLET, FILM COATED ORAL at 20:50

## 2024-03-03 RX ADMIN — DOFETILIDE 125 MCG: 0.12 CAPSULE ORAL at 20:50

## 2024-03-03 RX ADMIN — SODIUM CHLORIDE 250 ML: 9 INJECTION, SOLUTION INTRAVENOUS at 14:42

## 2024-03-03 RX ADMIN — METHIMAZOLE 5 MG: 5 TABLET ORAL at 20:50

## 2024-03-03 RX ADMIN — APIXABAN 5 MG: 5 TABLET, FILM COATED ORAL at 08:37

## 2024-03-03 RX ADMIN — SODIUM CHLORIDE 250 ML: 9 INJECTION, SOLUTION INTRAVENOUS at 18:41

## 2024-03-03 RX ADMIN — ATOVAQUONE 1500 MG: 750 SUSPENSION ORAL at 19:29

## 2024-03-03 RX ADMIN — ACYCLOVIR 400 MG: 400 TABLET ORAL at 20:50

## 2024-03-03 ASSESSMENT — COGNITIVE AND FUNCTIONAL STATUS - GENERAL
CLIMB 3 TO 5 STEPS WITH RAILING: A LITTLE
MOBILITY SCORE: 23
DAILY ACTIVITIY SCORE: 24

## 2024-03-03 ASSESSMENT — PAIN - FUNCTIONAL ASSESSMENT
PAIN_FUNCTIONAL_ASSESSMENT: 0-10

## 2024-03-03 ASSESSMENT — PAIN SCALES - GENERAL
PAINLEVEL_OUTOF10: 0 - NO PAIN

## 2024-03-03 NOTE — PROGRESS NOTES
" ELECTROPHYSIOLOGY PROGRESS NOTE    PATIENT NAME: Galen Villasenor Jr.  PATIENT MRN: [id number]  SERVICE DATE:  3/3/2024  SERVICE TIME: 11:49 AM    CONSULTANT: Darrick Alfonso MD  PRIMARY CARE PHYSICIAN: Bladimir Ackerman MD  ATTENDING PHYSICIAN: Darrick Alfonso MD      IMPRESSIONS:  1. Hairy cell leukemia, on chemotherapy per Dr. Saturnino Kruse.  2. Pancytopenia from chemotherapy, at risk of infection.  3. Paroxysmal AF/flutter despite PVI 1/22/2024, and recurring even on low-dose dofetilide within 36 hours post-cardioversion.  4. CKD, stable.     RECOMMENDATIONS:  1.  The patient was given the option of staying in-hospital for another cardioversion on 3/4/2024.  2.  Instead, he wishes to be discharged home, and will likely undergo another elective cardioversion attempt in 1-2 weeks, when his dofetilide is at steady-state.      SIGNATURE: Darrick Alfonso MD   OFFICE: [phone]    ================================================================    SUBJECTIVE: The patient is now status post 7 total doses of dofetilide at just 125 mcg p.o. twice daily.  He was successfully cardioverted out of atrial flutter on 3/1/2024, but unfortunately that recurrence of this rhythm by the evening of 3/2/2024.  He is anxious to go home.    CURRENT CARDIOVASCULAR MEDICATIONS:    apixaban (Eliquis) tablet 5 mg, 5 mg, oral, BI    aspirin chewable tablet 81 mg, 81 mg, oral, Daily    dofetilide (Tikosyn) capsule 125 mcg, 125 mcg, oral, q12h    empagliflozin (Jardiance) tablet 12.5 mg, 12.5 mg, oral, Daily    fenofibrate (Triglide) tablet 160 mg, 160 mg, oral, Daily    isosorbide mononitrate ER (Imdur) 24 hr tablet 30 mg, 30 mg, oral, Daily    lisinopril tablet 40 mg, 40 mg, oral, Daily    rosuvastatin (Crestor) tablet 20 mg, 20 mg, oral, Nightly    VITALS:  /50 (BP Location: Left arm, Patient Position: Lying)   Pulse 62   Temp 36.3 °C (97.3 °F) (Temporal)   Resp 20   Ht 1.803 m (5' 11\")   Wt 88.2 kg (194 lb 7.1 oz)   " SpO2 97%   BMI 27.12 kg/m²   Exam: Not examined by me today    EKG's (3/2/2024 PM and 3/3/2024 AM): Recurrent atrial flutter with ventricular response around 60-70 bpm; Qtc around 430 msec    LABS:  Lab Results   Component Value Date    WBC 0.4 (LL) 03/03/2024    HGB 7.1 (L) 03/03/2024    HCT 21.7 (L) 03/03/2024     (H) 03/03/2024    PLT 81 (L) 03/03/2024      Lab Results   Component Value Date    GLUCOSE 157 (H) 03/03/2024    CALCIUM 9.4 03/03/2024     (L) 03/03/2024    K 4.3 03/03/2024    CO2 25 03/03/2024     03/03/2024    BUN 27 (H) 03/03/2024    CREATININE 1.43 (H) 03/03/2024

## 2024-03-03 NOTE — SIGNIFICANT EVENT
Rapid response called for patient with hypotension.  Patient in discharge status.  IV was discontinued.  Patient seen and evaluated.  Mentating all right and blood pressure is in 60s on repeated check.  Patient reporting little bit of lightheadedness but no other complain.  Will give 250 cc bolus and cancel discharge.  Will hold off on lisinopril and Imdur for now.  Primary service notified via secure messaging  Will continue to monitor and reestablish IV  Family at bedside

## 2024-03-03 NOTE — DISCHARGE SUMMARY
Discharge Diagnosis  Atrial flutter (CMS/HCC)    Issues Requiring Follow-Up  EKG in 1 week    Hospital Course  The patient was electively admitted on 2/28/2024 because of recurrent atrial flutter following a pulmonary vein isolation procedure on 1/22/2024.  His admission laboratory studies showed some new renal insufficiency (BUN 38, creatinine 2.13), as well as chemotherapy-induced pancytopenia (white blood cell count 500, hemoglobin 7.1, platelets 92,000).  On 2/29/2024, the patient was started on dofetilide 125 mcg p.o. twice daily, lower than his previous dose of 250 mcg p.o. twice daily because of his renal insufficiency.  He remained in a form of atrial flutter over his first few days in hospital, but was successfully cardioverted on 3/1/2024 after his third dose of dofetilide.  He maintains sinus rhythm with Wenckebach second-degree AV block until the evening of 3/2/2024, when his atrial flutter recurred.  His renal function improved somewhat, with a BUN of 27 and creatinine of 1.43 on the day of discharge, but his dofetilide was kept at 125 mcg p.o. twice daily.  His corrected QT was variable, but typically around 440 ms.  We elected to discharge the patient home on his low-dose dofetilide, even though he was back in atrial flutter with a ventricular response in the 60s, with a plan for another cardioversion attempt in 1 to 2 weeks.    Pertinent Physical Exam At Time of Discharge  Irregular rhythm with variable S1; diffuse pallor    Home Medications     Medication List      START taking these medications     * atovaquone 750 mg/5 mL suspension; Commonly known as: Mepron; Take 10   mL (1,500 mg) by mouth once daily.   * atovaquone 750 mg/5 mL suspension; Commonly known as: Mepron; Take 10   mL (1,500 mg) by mouth once daily.   dofetilide 125 mcg capsule; Commonly known as: Tikosyn; Take 1 capsule   (125 mcg) by mouth every 12 hours.  * This list has 2 medication(s) that are the same as other medications    prescribed for you. Read the directions carefully, and ask your doctor or   other care provider to review them with you.     CONTINUE taking these medications     acyclovir 400 mg tablet; Commonly known as: Zovirax; Take 1 tablet (400   mg) by mouth 2 times a day.   aspirin 81 mg chewable tablet   Cymbalta 60 mg DR capsule; Generic drug: DULoxetine   Eliquis 5 mg tablet; Generic drug: apixaban   fenofibrate 160 mg tablet; Commonly known as: Triglide   ferrous sulfate (325 mg ferrous sulfate) tablet   gabapentin 600 mg tablet; Commonly known as: Neurontin   isosorbide mononitrate ER 30 mg 24 hr tablet; Commonly known as: Imdur   Jardiance 25 mg; Generic drug: empagliflozin   lisinopril 40 mg tablet   methIMAzole 5 mg tablet; Commonly known as: Tapazole   omeprazole 20 mg DR capsule; Commonly known as: PriLOSEC; Take 1 capsule   (20 mg) by mouth 2 times a day before meals. For 45 days then resume once   daily dosing   rosuvastatin 20 mg tablet; Commonly known as: Crestor     STOP taking these medications     cholecalciferol 50 mcg (2,000 unit) capsule; Commonly known as: Vitamin   D-3   cyanocobalamin 1,000 mcg/mL injection; Commonly known as: Vitamin B-12   ipratropium 21 mcg (0.03 %) nasal spray; Commonly known as: Atrovent   multivitamin with minerals tablet   Nitrostat 0.4 mg SL tablet; Generic drug: nitroglycerin   PRESERVISION AREDS ORAL   sulfamethoxazole-trimethoprim 800-160 mg tablet; Commonly known as:   Bactrim DS   tiotropium-olodateroL 2.5-2.5 mcg/actuation mist inhaler; Commonly known   as: Stiolto Respimat   Ventolin HFA 90 mcg/actuation inhaler; Generic drug: albuterol   vitamin E 180 mg (400 unit) capsule       Outpatient Follow-Up  Future Appointments   Date Time Provider Department Center   3/4/2024  1:15 PM INF 03 [id number] [place]   3/11/2024  1:00 PM [id number] [place]   3/28/2024 10:00 AM Brisa Skinner MD [id number] [place]   4/22/2024  1:00 PM MARU PERES  ECG/[facility]TER [id number] [place]   5/21/2024  9:00 AM Evelyne Ambrose MD [id number] [place]   6/12/2024  9:20 AM Darrick Alfonso MD [id number] Sabino Alfonso MD

## 2024-03-03 NOTE — NURSING NOTE
While attempting to discharge patient, patient started to complain of dizziness, BP checked SBP 60 to 70s (see VS flowsheet). Rapid response called.

## 2024-03-03 NOTE — CARE PLAN
Pt remained HDS this RN shift. Pt back into A fib. Pt denied chest pain, nausea and SOB. Pt Qtc post tikosyn 0.415. Pt medicated per order, safety maintained. Pt WBC this AM 0.4, [place] notified. Pt to be DC home today

## 2024-03-03 NOTE — CARE PLAN
Pt alert and oriented. Pt was to be discharged today, prior to discharge pt complained of dizziness; BP checked SBP 60 to 70s. Rapid response called. Fluids given per order. Discharge placed on hold per provider. Pt had episode of bradycardia, pt was asymptomatic and, Dr. Alfonso notified. Bed alarm on. Safety maintained.       Problem: Discharge Planning  Goal: Discharge to home or other facility with appropriate resources  Outcome: Met     Problem: Chronic Conditions and Co-morbidities  Goal: Patient's chronic conditions and co-morbidity symptoms are monitored and maintained or improved  Outcome: Progressing     Problem: Arrythmia/Dysrhythmia  Goal: Serial ECG will return to baseline  Outcome: Progressing  Goal: Vital signs return to baseline  Outcome: Progressing

## 2024-03-04 ENCOUNTER — HOSPITAL ENCOUNTER (OUTPATIENT)
Dept: CARDIOLOGY | Facility: HOSPITAL | Age: 79
Discharge: HOME | DRG: 308 | End: 2024-03-04
Payer: MEDICARE

## 2024-03-04 VITALS
TEMPERATURE: 97.7 F | SYSTOLIC BLOOD PRESSURE: 134 MMHG | HEART RATE: 64 BPM | BODY MASS INDEX: 27.16 KG/M2 | OXYGEN SATURATION: 99 % | HEIGHT: 71 IN | RESPIRATION RATE: 18 BRPM | DIASTOLIC BLOOD PRESSURE: 72 MMHG | WEIGHT: 194 LBS

## 2024-03-04 LAB — PATH REVIEW-CBC DIFFERENTIAL: NORMAL

## 2024-03-04 PROCEDURE — 99231 SBSQ HOSP IP/OBS SF/LOW 25: CPT | Performed by: INTERNAL MEDICINE

## 2024-03-04 PROCEDURE — 1090000001 HH PPS REVENUE CREDIT

## 2024-03-04 PROCEDURE — 1090000002 HH PPS REVENUE DEBIT

## 2024-03-04 PROCEDURE — 2500000001 HC RX 250 WO HCPCS SELF ADMINISTERED DRUGS (ALT 637 FOR MEDICARE OP): Performed by: NURSE PRACTITIONER

## 2024-03-04 PROCEDURE — 93010 ELECTROCARDIOGRAM REPORT: CPT | Performed by: INTERNAL MEDICINE

## 2024-03-04 PROCEDURE — 93005 ELECTROCARDIOGRAM TRACING: CPT

## 2024-03-04 PROCEDURE — 2500000002 HC RX 250 W HCPCS SELF ADMINISTERED DRUGS (ALT 637 FOR MEDICARE OP, ALT 636 FOR OP/ED): Performed by: NURSE PRACTITIONER

## 2024-03-04 RX ADMIN — ACYCLOVIR 400 MG: 400 TABLET ORAL at 08:56

## 2024-03-04 RX ADMIN — DOFETILIDE 125 MCG: 0.12 CAPSULE ORAL at 08:57

## 2024-03-04 RX ADMIN — FENOFIBRATE 160 MG: 160 TABLET ORAL at 08:57

## 2024-03-04 RX ADMIN — APIXABAN 5 MG: 5 TABLET, FILM COATED ORAL at 11:01

## 2024-03-04 RX ADMIN — FERROUS SULFATE TAB 325 MG (65 MG ELEMENTAL FE) 1 TABLET: 325 (65 FE) TAB at 08:58

## 2024-03-04 RX ADMIN — DULOXETINE HYDROCHLORIDE 120 MG: 60 CAPSULE, DELAYED RELEASE ORAL at 08:57

## 2024-03-04 RX ADMIN — EMPAGLIFLOZIN 12.5 MG: 25 TABLET, FILM COATED ORAL at 08:57

## 2024-03-04 RX ADMIN — GABAPENTIN 600 MG: 600 TABLET, FILM COATED ORAL at 08:57

## 2024-03-04 RX ADMIN — ASPIRIN 81 MG CHEWABLE TABLET 81 MG: 81 TABLET CHEWABLE at 08:57

## 2024-03-04 ASSESSMENT — COGNITIVE AND FUNCTIONAL STATUS - GENERAL
DAILY ACTIVITIY SCORE: 21
CLIMB 3 TO 5 STEPS WITH RAILING: A LITTLE
DRESSING REGULAR UPPER BODY CLOTHING: A LITTLE
HELP NEEDED FOR BATHING: A LITTLE
MOBILITY SCORE: 23
PERSONAL GROOMING: A LITTLE

## 2024-03-04 ASSESSMENT — PAIN SCALES - GENERAL
PAINLEVEL_OUTOF10: 0 - NO PAIN

## 2024-03-04 ASSESSMENT — PAIN - FUNCTIONAL ASSESSMENT
PAIN_FUNCTIONAL_ASSESSMENT: 0-10

## 2024-03-04 NOTE — PROGRESS NOTES
ELECTROPHYSIOLOGY PROGRESS NOTE    PATIENT NAME: Galen Villasenor Jr.  PATIENT MRN: [id number]  SERVICE DATE:  3/4/2024  SERVICE TIME: 11:24 AM    CONSULTANT: Darrick Alfonso MD  PRIMARY CARE PHYSICIAN: Bladimir Ackerman MD  ATTENDING PHYSICIAN: Darrick Alfonso MD      IMPRESSIONS:  1.  Hairy cell leukemia, managed by oncologist Dr. Saturnino Kruse.  2.  Pancytopenia from chemotherapy, at risk of infection.  3.  Paroxysmal AF/flutter despite PVI 1/22/2024, and recurring after 3/1/2024 cardioversion.  The patient spontaneously converted back to sinus rhythm 3/4/2024 AM, however, and does not require  repeat cardioversion today  4.  Orthostatic hypotension, prompting discontinuation of the patient's nitrates and lisinopril.  5.  CKD, stable.     RECOMMENDATIONS:  1.  The patient is doing well today, and is free for discharge home on dofetilide 125 mcg po bid.  2.  He will be seen in Dr. Kruse's office today in regard to his malignancy and pancytopenia.  3.  He will follow up in 1-2 weeks with me for an EKG to see if his rhythm remains sinus and his corrected QT is <500 msec.       SIGNATURE: Darrick Alfonso MD   OFFICE: [phone]    ================================================================    SUBJECTIVE: The patient's history is detailed in prior notes.  He was hospitalized on 2/28/2024 and started on dofetilide on 2/29/2024.  He was successfully cardioverted on 3/1/2024 but had recurrent atrial flutter by the evening of 3/2/2024.  I wrote for his discharge on 3/3/2024, but he subsequently was found to be lightheaded with orthostatic hypotension and a systolic blood pressure dropped to the 70s.  His discharge was canceled and he was kept in hospital for some intravenous fluids.  His lisinopril was discontinued.  I had originally planned another cardioversion on 3/4/2024, then discharging the patient, as he has an appointment with Dr. Saturnino Kruse this afternoon for a Neupogen injection, I  "believe.    CURRENT CARDIOVASCULAR MEDICATIONS:    apixaban (Eliquis) tablet 5 mg, 5 mg, oral, BID    aspirin chewable tablet 81 mg, 81 mg, oral, Daily    dofetilide (Tikosyn) capsule 125 mcg, 125 mcg, oral, q12h    empagliflozin (Jardiance) tablet 12.5 mg, 12.5 mg, oral, Daily    fenofibrate (Triglide) tablet 160 mg, 160 mg, oral, Daily    [Held by provider] isosorbide mononitrate ER (Imdur) 24 hr tablet 30 mg, 30 mg, oral, Daily    [Held by provider] lisinopril tablet 40 mg, 40 mg, oral, Daily    rosuvastatin (Crestor) tablet 20 mg, 20 mg, oral, Nightly    VITALS:  /72   Pulse 64   Temp 36.5 °C (97.7 °F) (Temporal)   Resp 18   Ht 1.803 m (5' 11\")   Wt 88 kg (194 lb 0.1 oz)   SpO2 99%   BMI 27.06 kg/m²     MONITOR: Atrial flutter with controlled ventricular responses overnight, but spontaneous conversion back to sinus rhythm 3/4/2024 at 06:38 AM; now in sinus rhythm with Wenckebach second-degree AV block and occasional PVCs    LABS:  Lab Results   Component Value Date    WBC 0.4 (LL) 03/03/2024    HGB 7.1 (L) 03/03/2024    HCT 21.7 (L) 03/03/2024     (H) 03/03/2024    PLT 81 (L) 03/03/2024      Lab Results   Component Value Date    GLUCOSE 157 (H) 03/03/2024    CALCIUM 9.4 03/03/2024     (L) 03/03/2024    K 4.3 03/03/2024    CO2 25 03/03/2024     03/03/2024    BUN 27 (H) 03/03/2024    CREATININE 1.43 (H) 03/03/2024          "

## 2024-03-04 NOTE — SIGNIFICANT EVENT
discontinue lisinopril at discharge due to hypotension ,informed the pt,family and nurse and hand writing on the discharge meds on after visiting information

## 2024-03-04 NOTE — NURSING NOTE
Pt remained safe throughout shift. Pt endorses lightheadedness with soft BP after second 250ml bolus. Jonelle Bourgeois notified. RUMA hose applied and orthostatic BP obtained. BP and associated lightheadedness resolved throughout shift. Rhythm remains A-fib with rates . Brief dips in HR as low as 45 occurred near end of shift. Pt remained asymptomatic and vitals otherwise stable.

## 2024-03-05 ENCOUNTER — PATIENT OUTREACH (OUTPATIENT)
Dept: PRIMARY CARE | Facility: CLINIC | Age: 79
End: 2024-03-05
Payer: MEDICARE

## 2024-03-05 ENCOUNTER — TELEPHONE (OUTPATIENT)
Dept: HEMATOLOGY/ONCOLOGY | Facility: CLINIC | Age: 79
End: 2024-03-05
Payer: MEDICARE

## 2024-03-05 ENCOUNTER — INFUSION (OUTPATIENT)
Dept: HEMATOLOGY/ONCOLOGY | Facility: CLINIC | Age: 79
End: 2024-03-05
Payer: MEDICARE

## 2024-03-05 VITALS
SYSTOLIC BLOOD PRESSURE: 113 MMHG | HEART RATE: 69 BPM | RESPIRATION RATE: 16 BRPM | TEMPERATURE: 97.3 F | WEIGHT: 194.89 LBS | BODY MASS INDEX: 27.18 KG/M2 | DIASTOLIC BLOOD PRESSURE: 52 MMHG | OXYGEN SATURATION: 100 %

## 2024-03-05 DIAGNOSIS — Z79.899 VISIT FOR MONITORING TIKOSYN THERAPY: ICD-10-CM

## 2024-03-05 DIAGNOSIS — Z51.81 VISIT FOR MONITORING TIKOSYN THERAPY: ICD-10-CM

## 2024-03-05 DIAGNOSIS — D70.1 CHEMOTHERAPY-INDUCED NEUTROPENIA (CMS-HCC): ICD-10-CM

## 2024-03-05 DIAGNOSIS — T45.1X5A CHEMOTHERAPY-INDUCED NEUTROPENIA (CMS-HCC): ICD-10-CM

## 2024-03-05 DIAGNOSIS — C91.42 HAIRY CELL LEUKEMIA, IN RELAPSE (MULTI): ICD-10-CM

## 2024-03-05 DIAGNOSIS — I48.92 ATRIAL FLUTTER, UNSPECIFIED TYPE (MULTI): ICD-10-CM

## 2024-03-05 LAB
BASOPHILS # BLD AUTO: 0.01 X10*3/UL (ref 0–0.1)
BASOPHILS NFR BLD AUTO: 1.8 %
EOSINOPHIL # BLD AUTO: 0.01 X10*3/UL (ref 0–0.4)
EOSINOPHIL NFR BLD AUTO: 1.8 %
ERYTHROCYTE [DISTWIDTH] IN BLOOD BY AUTOMATED COUNT: 19.3 % (ref 11.5–14.5)
HCT VFR BLD AUTO: 22.9 % (ref 41–52)
HGB BLD-MCNC: 7.4 G/DL (ref 13.5–17.5)
IMM GRANULOCYTES # BLD AUTO: 0 X10*3/UL (ref 0–0.5)
IMM GRANULOCYTES NFR BLD AUTO: 0 % (ref 0–0.9)
LYMPHOCYTES # BLD AUTO: 0.16 X10*3/UL (ref 0.8–3)
LYMPHOCYTES NFR BLD AUTO: 28.1 %
MCH RBC QN AUTO: 34.6 PG (ref 26–34)
MCHC RBC AUTO-ENTMCNC: 32.3 G/DL (ref 32–36)
MCV RBC AUTO: 107 FL (ref 80–100)
MONOCYTES # BLD AUTO: 0.01 X10*3/UL (ref 0.05–0.8)
MONOCYTES NFR BLD AUTO: 1.8 %
NEUTROPHILS # BLD AUTO: 0.38 X10*3/UL (ref 1.6–5.5)
NEUTROPHILS NFR BLD AUTO: 66.5 %
PLATELET # BLD AUTO: 106 X10*3/UL (ref 150–450)
RBC # BLD AUTO: 2.14 X10*6/UL (ref 4.5–5.9)
WBC # BLD AUTO: 0.6 X10*3/UL (ref 4.4–11.3)

## 2024-03-05 PROCEDURE — 1090000001 HH PPS REVENUE CREDIT

## 2024-03-05 PROCEDURE — 86900 BLOOD TYPING SEROLOGIC ABO: CPT | Performed by: INTERNAL MEDICINE

## 2024-03-05 PROCEDURE — 2500000004 HC RX 250 GENERAL PHARMACY W/ HCPCS (ALT 636 FOR OP/ED): Mod: JZ | Performed by: INTERNAL MEDICINE

## 2024-03-05 PROCEDURE — 1090000002 HH PPS REVENUE DEBIT

## 2024-03-05 PROCEDURE — 85025 COMPLETE CBC W/AUTO DIFF WBC: CPT | Performed by: INTERNAL MEDICINE

## 2024-03-05 PROCEDURE — 96372 THER/PROPH/DIAG INJ SC/IM: CPT

## 2024-03-05 PROCEDURE — 96372 THER/PROPH/DIAG INJ SC/IM: CPT | Performed by: INTERNAL MEDICINE

## 2024-03-05 RX ORDER — ALBUTEROL SULFATE 0.83 MG/ML
3 SOLUTION RESPIRATORY (INHALATION) AS NEEDED
OUTPATIENT
Start: 2024-03-05

## 2024-03-05 RX ORDER — DIPHENHYDRAMINE HYDROCHLORIDE 50 MG/ML
50 INJECTION INTRAMUSCULAR; INTRAVENOUS AS NEEDED
OUTPATIENT
Start: 2024-03-05

## 2024-03-05 RX ORDER — FAMOTIDINE 10 MG/ML
20 INJECTION INTRAVENOUS ONCE AS NEEDED
OUTPATIENT
Start: 2024-03-05

## 2024-03-05 RX ORDER — EPINEPHRINE 0.3 MG/.3ML
0.3 INJECTION SUBCUTANEOUS EVERY 5 MIN PRN
OUTPATIENT
Start: 2024-03-05

## 2024-03-05 RX ORDER — HEPARIN 100 UNIT/ML
500 SYRINGE INTRAVENOUS AS NEEDED
Status: CANCELLED | OUTPATIENT
Start: 2024-03-05

## 2024-03-05 RX ORDER — HEPARIN SODIUM,PORCINE/PF 10 UNIT/ML
50 SYRINGE (ML) INTRAVENOUS AS NEEDED
Status: CANCELLED | OUTPATIENT
Start: 2024-03-05

## 2024-03-05 RX ADMIN — PEGFILGRASTIM-CBQV 6 MG: 6 INJECTION, SOLUTION SUBCUTANEOUS at 12:36

## 2024-03-05 ASSESSMENT — PAIN SCALES - GENERAL: PAINLEVEL: 0-NO PAIN

## 2024-03-05 NOTE — PROGRESS NOTES
Discharge Facility:  [facility]    Discharge Diagnosis:  Atrial flutter (CMS/HCC)   Tikosyn therapy     Admission Date:2/28/24  Discharge Date: 3/4/24    PCP Appointment Date:   MAR 13  2024 01:30 PM - Office Visit  [facility] Bladimir Hardy MD     Specialist Appointment Date: Message sent to Cardiology office requesting appt within 14 days with Dr Alfonso per hospital notes. Wife aware   MAR 5  2024 11:45 AM - Infusion  [facility] [address] - INF 17 [facility]     MAR 11  2024 01:00 PM - Hematology and Oncology Established Patient  [facility] Dr Sanna Kruse MD     MAR 28  2024 10:00 AM - Established Patient  [facility] - Brisa Skinner MD     APR 16 2024 10:00 AM - Office Visit  [facility]     4/22/2024 1:00 PM     [facility]TER OR EVENT CARDIAC MONITOR   Authorized   [id number] ([place])   ELY [id number] ECG/HOLTER      5/21/2024 9:00 AM     CARDIOLOGY HOSP DISCHARGE   Authorization not required   [id number] ([place])   Evelyne Ambrose MD      6/12/2024 9:20 AM     CARDIOLOGY ESTABLISHED PATIENT   Authorization not required   [id number] ([place])   Darrick Alfonso MD     Hospital Encounter and Summary: Linked  Engagement  Call Start Time: 0952 (Spoke with Wife) (3/5/2024  9:51 AM)    Medications  Medications reviewed with patient/caregiver?: Yes (3/5/2024  9:51 AM)  Is the patient having any side effects they believe may be caused by any medication additions or changes?: No (3/5/2024  9:51 AM)  Does the patient have all medications ordered at discharge?: Yes (3/5/2024  9:51 AM)  Care Management Interventions: Provided patient education (3/5/2024  9:51 AM)  Prescription Comments: Per discharge summary- START taking:  atovaquone (Mepron)   dofetilide (Tikosyn)    STOP taking:  cholecalciferol 50 mcg (2,000 unit) capsule (Vitamin  D-3)   sulfamethoxazole-trimethoprim 800-160 mg  tablet (Bactrim DS)  ----as  "noted in hospital records , Wife confirmed they are aware to HOLD Lisinopril- \"Significant Event  Coty Walter APRN-CNP (Nurse Practitioner) o Cardiology- Electrophysiology  discontinue lisinopril at discharge due to hypotension ,informed the pt,family and nurse and hand writing on the discharge meds on after visiting information\" (3/5/2024  9:51 AM)  Is the patient taking all medications as directed (includes completed medication regime)?: Yes (3/5/2024  9:51 AM)  Care Management Interventions: Provided patient education (3/5/2024  9:51 AM)  Medication Comments: Wife also mentioned that they wre told to STOP -AREDS2 vitamin supplements for his eyes- She s worried about stopping them and his vision getting worse so is going to ask Dr Alfonso when they go to follow up (3/5/2024  9:51 AM)    Appointments  Does the patient have a primary care provider?: Yes (3/5/2024  9:51 AM)  Care Management Interventions: Verified appointment date/time/provider (3/5/2024  9:51 AM)  Has the patient kept scheduled appointments due by today?: Yes (3/5/2024  9:51 AM)  Care Management Interventions: Advised to schedule with specialist (Wife aware that she needs to contact Cardio for appt in next 14 days. I also sent message to office staff to please reach out to her to schedule for FU with Dr Alfonso and EKG) (3/5/2024  9:51 AM)    Self Management  Has home health visited the patient within 72 hours of discharge?: Not applicable (3/5/2024  9:51 AM)  What Durable Medical Equipment (DME) was ordered?: Not applicable (3/5/2024  9:51 AM)    Patient Teaching  Does the patient have access to their discharge instructions?: Yes (3/5/2024  9:51 AM)  Care Management Interventions: Reviewed instructions with patient (3/5/2024  9:51 AM)  What is the patient's perception of their health status since discharge?: Same (having a bad morning with being fatigued. Headed to Oncology this morning to get Infusion.) (3/5/2024  9:51 AM)  Is the " patient/caregiver able to teach back the hierarchy of who to call/visit for symptoms/problems? PCP, Specialist, Home Health nurse, Urgent Care, ED, 911: Yes (3/5/2024  9:51 AM)    Wrap Up  Is the patient/caregiver familiar with Advance Care Planning?: Yes (scanned into chart already) (3/5/2024  9:51 AM)  Wrap Up Additional Comments: See discharge assessment below for further details  I introduced myself and the TCM program to Galen Villasenor Jr's wife. Galen was in background. Reviewed hospital stay and answered any questions. I gave my contact information and encouraged to call me if needing assistance or has any further questions prior to my next outreach. (3/5/2024  9:51 AM)  Call End Time: 1005 (3/5/2024  9:51 AM)

## 2024-03-06 LAB
ABO GROUP (TYPE) IN BLOOD: NORMAL
ANTIBODY SCREEN: NORMAL
RH FACTOR (ANTIGEN D): NORMAL

## 2024-03-06 PROCEDURE — 1090000002 HH PPS REVENUE DEBIT

## 2024-03-06 PROCEDURE — 1090000001 HH PPS REVENUE CREDIT

## 2024-03-07 LAB
ATRIAL RATE: 267 BPM
ATRIAL RATE: 277 BPM
ATRIAL RATE: 277 BPM
ATRIAL RATE: 288 BPM
ATRIAL RATE: 300 BPM
ATRIAL RATE: 300 BPM
ATRIAL RATE: 340 BPM
ATRIAL RATE: 64 BPM
ATRIAL RATE: 64 BPM
ATRIAL RATE: 83 BPM
ATRIAL RATE: 85 BPM
ATRIAL RATE: 87 BPM
P AXIS: 3 DEGREES
P AXIS: 67 DEGREES
P AXIS: 77 DEGREES
P AXIS: 78 DEGREES
P AXIS: 86 DEGREES
P AXIS: 96 DEGREES
PR INTERVAL: 256 MS
PR INTERVAL: 360 MS
Q ONSET: 221 MS
Q ONSET: 221 MS
Q ONSET: 222 MS
Q ONSET: 223 MS
Q ONSET: 223 MS
Q ONSET: 224 MS
QRS COUNT: 10 BEATS
QRS COUNT: 11 BEATS
QRS COUNT: 12 BEATS
QRS COUNT: 12 BEATS
QRS COUNT: 13 BEATS
QRS COUNT: 16 BEATS
QRS DURATION: 82 MS
QRS DURATION: 84 MS
QRS DURATION: 86 MS
QRS DURATION: 86 MS
QRS DURATION: 88 MS
QRS DURATION: 92 MS
QT INTERVAL: 396 MS
QT INTERVAL: 398 MS
QT INTERVAL: 400 MS
QT INTERVAL: 404 MS
QT INTERVAL: 410 MS
QT INTERVAL: 414 MS
QT INTERVAL: 416 MS
QT INTERVAL: 428 MS
QT INTERVAL: 428 MS
QT INTERVAL: 442 MS
QTC CALCULATION(BAZETT): 398 MS
QTC CALCULATION(BAZETT): 413 MS
QTC CALCULATION(BAZETT): 421 MS
QTC CALCULATION(BAZETT): 427 MS
QTC CALCULATION(BAZETT): 437 MS
QTC CALCULATION(BAZETT): 441 MS
QTC CALCULATION(BAZETT): 447 MS
QTC CALCULATION(BAZETT): 458 MS
QTC CALCULATION(BAZETT): 463 MS
QTC CALCULATION(BAZETT): 464 MS
QTC CALCULATION(BAZETT): 470 MS
QTC CALCULATION(BAZETT): 512 MS
QTC FREDERICIA: 398 MS
QTC FREDERICIA: 410 MS
QTC FREDERICIA: 413 MS
QTC FREDERICIA: 422 MS
QTC FREDERICIA: 429 MS
QTC FREDERICIA: 436 MS
QTC FREDERICIA: 437 MS
QTC FREDERICIA: 446 MS
QTC FREDERICIA: 447 MS
QTC FREDERICIA: 448 MS
QTC FREDERICIA: 456 MS
QTC FREDERICIA: 476 MS
R AXIS: -1 DEGREES
R AXIS: -13 DEGREES
R AXIS: -2 DEGREES
R AXIS: -2 DEGREES
R AXIS: -6 DEGREES
R AXIS: 1 DEGREES
R AXIS: 1 DEGREES
R AXIS: 14 DEGREES
R AXIS: 16 DEGREES
R AXIS: 19 DEGREES
R AXIS: 6 DEGREES
R AXIS: 7 DEGREES
T AXIS: -11 DEGREES
T AXIS: 1 DEGREES
T AXIS: 1 DEGREES
T AXIS: 10 DEGREES
T AXIS: 17 DEGREES
T AXIS: 17 DEGREES
T AXIS: 2 DEGREES
T AXIS: 22 DEGREES
T AXIS: 3 DEGREES
T AXIS: 38 DEGREES
T AXIS: 5 DEGREES
T AXIS: 8 DEGREES
T OFFSET: 420 MS
T OFFSET: 423 MS
T OFFSET: 425 MS
T OFFSET: 426 MS
T OFFSET: 429 MS
T OFFSET: 430 MS
T OFFSET: 430 MS
T OFFSET: 431 MS
T OFFSET: 438 MS
T OFFSET: 438 MS
T OFFSET: 442 MS
T OFFSET: 470 MS
VENTRICULAR RATE: 60 BPM
VENTRICULAR RATE: 63 BPM
VENTRICULAR RATE: 64 BPM
VENTRICULAR RATE: 64 BPM
VENTRICULAR RATE: 66 BPM
VENTRICULAR RATE: 67 BPM
VENTRICULAR RATE: 68 BPM
VENTRICULAR RATE: 69 BPM
VENTRICULAR RATE: 70 BPM
VENTRICULAR RATE: 75 BPM
VENTRICULAR RATE: 83 BPM
VENTRICULAR RATE: 94 BPM

## 2024-03-07 PROCEDURE — 1090000002 HH PPS REVENUE DEBIT

## 2024-03-07 PROCEDURE — 1090000001 HH PPS REVENUE CREDIT

## 2024-03-08 PROCEDURE — 1090000001 HH PPS REVENUE CREDIT

## 2024-03-08 PROCEDURE — 1090000002 HH PPS REVENUE DEBIT

## 2024-03-09 PROCEDURE — 1090000002 HH PPS REVENUE DEBIT

## 2024-03-09 PROCEDURE — 1090000001 HH PPS REVENUE CREDIT

## 2024-03-10 PROCEDURE — 1090000002 HH PPS REVENUE DEBIT

## 2024-03-10 PROCEDURE — 1090000001 HH PPS REVENUE CREDIT

## 2024-03-10 ASSESSMENT — ENCOUNTER SYMPTOMS
RESPIRATORY NEGATIVE: 1
GASTROINTESTINAL NEGATIVE: 1
LIGHT-HEADEDNESS: 1
HEMATOLOGIC/LYMPHATIC NEGATIVE: 1
FATIGUE: 1
ENDOCRINE NEGATIVE: 1
PSYCHIATRIC NEGATIVE: 1
PALPITATIONS: 1
MUSCULOSKELETAL NEGATIVE: 1

## 2024-03-11 ENCOUNTER — OFFICE VISIT (OUTPATIENT)
Dept: HEMATOLOGY/ONCOLOGY | Facility: CLINIC | Age: 79
End: 2024-03-11
Payer: MEDICARE

## 2024-03-11 ENCOUNTER — LAB (OUTPATIENT)
Dept: LAB | Facility: CLINIC | Age: 79
End: 2024-03-11
Payer: MEDICARE

## 2024-03-11 VITALS
WEIGHT: 197.53 LBS | RESPIRATION RATE: 16 BRPM | DIASTOLIC BLOOD PRESSURE: 48 MMHG | BODY MASS INDEX: 27.55 KG/M2 | TEMPERATURE: 96.8 F | OXYGEN SATURATION: 99 % | SYSTOLIC BLOOD PRESSURE: 110 MMHG | HEART RATE: 81 BPM

## 2024-03-11 DIAGNOSIS — E05.10 TOXIC THYROID NODULE: ICD-10-CM

## 2024-03-11 DIAGNOSIS — D70.1 CHEMOTHERAPY-INDUCED NEUTROPENIA (CMS-HCC): ICD-10-CM

## 2024-03-11 DIAGNOSIS — T45.1X5A CHEMOTHERAPY-INDUCED NEUTROPENIA (CMS-HCC): ICD-10-CM

## 2024-03-11 DIAGNOSIS — D64.9 ANEMIA, UNSPECIFIED TYPE: ICD-10-CM

## 2024-03-11 DIAGNOSIS — E78.2 MIXED HYPERLIPIDEMIA: ICD-10-CM

## 2024-03-11 DIAGNOSIS — I25.10 CORONARY ARTERY DISEASE INVOLVING NATIVE CORONARY ARTERY OF NATIVE HEART WITHOUT ANGINA PECTORIS: ICD-10-CM

## 2024-03-11 DIAGNOSIS — E05.90 HYPERTHYROIDISM: ICD-10-CM

## 2024-03-11 DIAGNOSIS — E11.9 TYPE 2 DIABETES MELLITUS WITHOUT COMPLICATION, WITHOUT LONG-TERM CURRENT USE OF INSULIN (MULTI): ICD-10-CM

## 2024-03-11 DIAGNOSIS — I48.0 PAROXYSMAL ATRIAL FIBRILLATION (MULTI): ICD-10-CM

## 2024-03-11 DIAGNOSIS — D64.9 ANEMIA, UNSPECIFIED TYPE: Primary | ICD-10-CM

## 2024-03-11 DIAGNOSIS — C91.42 HAIRY CELL LEUKEMIA, IN RELAPSE (MULTI): ICD-10-CM

## 2024-03-11 LAB
ABO GROUP (TYPE) IN BLOOD: NORMAL
ALBUMIN SERPL BCP-MCNC: 4.2 G/DL (ref 3.4–5)
ALP SERPL-CCNC: 41 U/L (ref 33–136)
ALT SERPL W P-5'-P-CCNC: 13 U/L (ref 10–52)
ANION GAP SERPL CALC-SCNC: 14 MMOL/L (ref 10–20)
ANTIBODY SCREEN: NORMAL
AST SERPL W P-5'-P-CCNC: 15 U/L (ref 9–39)
BASOPHILS # BLD MANUAL: 0.02 X10*3/UL (ref 0–0.1)
BASOPHILS NFR BLD MANUAL: 1 %
BILIRUB SERPL-MCNC: 0.9 MG/DL (ref 0–1.2)
BUN SERPL-MCNC: 27 MG/DL (ref 6–23)
CALCIUM SERPL-MCNC: 9.2 MG/DL (ref 8.6–10.3)
CHLORIDE SERPL-SCNC: 107 MMOL/L (ref 98–107)
CO2 SERPL-SCNC: 23 MMOL/L (ref 21–32)
CREAT SERPL-MCNC: 1.32 MG/DL (ref 0.5–1.3)
DACRYOCYTES BLD QL SMEAR: ABNORMAL
EGFRCR SERPLBLD CKD-EPI 2021: 55 ML/MIN/1.73M*2
EOSINOPHIL # BLD MANUAL: 0.04 X10*3/UL (ref 0–0.4)
EOSINOPHIL NFR BLD MANUAL: 2 %
ERYTHROCYTE [DISTWIDTH] IN BLOOD BY AUTOMATED COUNT: 20 % (ref 11.5–14.5)
GLUCOSE SERPL-MCNC: 114 MG/DL (ref 74–99)
HCT VFR BLD AUTO: 21 % (ref 41–52)
HGB BLD-MCNC: 6.8 G/DL (ref 13.5–17.5)
IMM GRANULOCYTES # BLD AUTO: 0.02 X10*3/UL (ref 0–0.5)
IMM GRANULOCYTES NFR BLD AUTO: 0.9 % (ref 0–0.9)
LYMPHOCYTES # BLD MANUAL: 0.18 X10*3/UL (ref 0.8–3)
LYMPHOCYTES NFR BLD MANUAL: 8 %
MCH RBC QN AUTO: 35.1 PG (ref 26–34)
MCHC RBC AUTO-ENTMCNC: 32.4 G/DL (ref 32–36)
MCV RBC AUTO: 108 FL (ref 80–100)
METAMYELOCYTES # BLD MANUAL: 0.02 X10*3/UL
METAMYELOCYTES NFR BLD MANUAL: 1 %
MONOCYTES # BLD MANUAL: 0 X10*3/UL (ref 0.05–0.8)
MONOCYTES NFR BLD MANUAL: 0 %
NEUTROPHILS # BLD MANUAL: 1.94 X10*3/UL (ref 1.6–5.5)
NEUTS BAND # BLD MANUAL: 0.09 X10*3/UL (ref 0–0.5)
NEUTS BAND NFR BLD MANUAL: 4 %
NEUTS SEG # BLD MANUAL: 1.85 X10*3/UL (ref 1.6–5)
NEUTS SEG NFR BLD MANUAL: 84 %
NRBC BLD-RTO: ABNORMAL /100{WBCS}
OVALOCYTES BLD QL SMEAR: ABNORMAL
PLATELET # BLD AUTO: 101 X10*3/UL (ref 150–450)
POLYCHROMASIA BLD QL SMEAR: ABNORMAL
POTASSIUM SERPL-SCNC: 4 MMOL/L (ref 3.5–5.3)
PROT SERPL-MCNC: 6.4 G/DL (ref 6.4–8.2)
RBC # BLD AUTO: 1.94 X10*6/UL (ref 4.5–5.9)
RBC MORPH BLD: ABNORMAL
RH FACTOR (ANTIGEN D): NORMAL
SODIUM SERPL-SCNC: 140 MMOL/L (ref 136–145)
T4 FREE SERPL-MCNC: 0.71 NG/DL (ref 0.61–1.12)
TOTAL CELLS COUNTED BLD: 100
WBC # BLD AUTO: 2.2 X10*3/UL (ref 4.4–11.3)

## 2024-03-11 PROCEDURE — 1090000001 HH PPS REVENUE CREDIT

## 2024-03-11 PROCEDURE — 36415 COLL VENOUS BLD VENIPUNCTURE: CPT

## 2024-03-11 PROCEDURE — 3074F SYST BP LT 130 MM HG: CPT | Performed by: INTERNAL MEDICINE

## 2024-03-11 PROCEDURE — 85027 COMPLETE CBC AUTOMATED: CPT

## 2024-03-11 PROCEDURE — 80053 COMPREHEN METABOLIC PANEL: CPT

## 2024-03-11 PROCEDURE — 1111F DSCHRG MED/CURRENT MED MERGE: CPT | Performed by: INTERNAL MEDICINE

## 2024-03-11 PROCEDURE — 99214 OFFICE O/P EST MOD 30 MIN: CPT | Performed by: INTERNAL MEDICINE

## 2024-03-11 PROCEDURE — 3078F DIAST BP <80 MM HG: CPT | Performed by: INTERNAL MEDICINE

## 2024-03-11 PROCEDURE — 1036F TOBACCO NON-USER: CPT | Performed by: INTERNAL MEDICINE

## 2024-03-11 PROCEDURE — 84439 ASSAY OF FREE THYROXINE: CPT | Performed by: STUDENT IN AN ORGANIZED HEALTH CARE EDUCATION/TRAINING PROGRAM

## 2024-03-11 PROCEDURE — 1126F AMNT PAIN NOTED NONE PRSNT: CPT | Performed by: INTERNAL MEDICINE

## 2024-03-11 PROCEDURE — 85007 BL SMEAR W/DIFF WBC COUNT: CPT

## 2024-03-11 PROCEDURE — 1159F MED LIST DOCD IN RCRD: CPT | Performed by: INTERNAL MEDICINE

## 2024-03-11 PROCEDURE — 86901 BLOOD TYPING SEROLOGIC RH(D): CPT | Performed by: INTERNAL MEDICINE

## 2024-03-11 PROCEDURE — 1157F ADVNC CARE PLAN IN RCRD: CPT | Performed by: INTERNAL MEDICINE

## 2024-03-11 PROCEDURE — 1090000002 HH PPS REVENUE DEBIT

## 2024-03-11 RX ORDER — EPINEPHRINE 0.3 MG/.3ML
0.3 INJECTION SUBCUTANEOUS EVERY 5 MIN PRN
Status: CANCELLED | OUTPATIENT
Start: 2024-03-11

## 2024-03-11 RX ORDER — ACETAMINOPHEN 325 MG/1
650 TABLET ORAL ONCE
Status: CANCELLED | OUTPATIENT
Start: 2024-03-11

## 2024-03-11 RX ORDER — ALBUTEROL SULFATE 0.83 MG/ML
3 SOLUTION RESPIRATORY (INHALATION) AS NEEDED
Status: CANCELLED | OUTPATIENT
Start: 2024-03-11

## 2024-03-11 RX ORDER — FAMOTIDINE 10 MG/ML
20 INJECTION INTRAVENOUS ONCE AS NEEDED
Status: CANCELLED | OUTPATIENT
Start: 2024-03-11

## 2024-03-11 RX ORDER — DIPHENHYDRAMINE HCL 25 MG
25 CAPSULE ORAL ONCE
Status: CANCELLED | OUTPATIENT
Start: 2024-03-11

## 2024-03-11 RX ORDER — DIPHENHYDRAMINE HYDROCHLORIDE 50 MG/ML
50 INJECTION INTRAMUSCULAR; INTRAVENOUS AS NEEDED
Status: CANCELLED | OUTPATIENT
Start: 2024-03-11

## 2024-03-11 ASSESSMENT — PAIN SCALES - GENERAL: PAINLEVEL: 0-NO PAIN

## 2024-03-11 NOTE — PROGRESS NOTES
Patient ID: Galen Villasenor Jr. is a 78 y.o. male.  Referring Physician: Saturnino Kruse MD  [address]  [place] [address]  Primary Care Provider: Bladimir Ackerman MD  Visit Type: Follow Up      Subjective    HPI I can walk better now, the mepron tastes awful    Review of Systems   Constitutional:  Positive for fatigue.   HENT:  Negative.     Eyes: Negative.    Respiratory: Negative.     Cardiovascular:  Positive for palpitations.   Gastrointestinal: Negative.    Endocrine: Negative.    Genitourinary: Negative.     Skin: Negative.    Neurological:  Positive for extremity weakness.   Hematological: Negative.    Psychiatric/Behavioral: Negative.          Objective   BSA: 2.12 meters squared  BP (!) 110/48 (BP Location: Right arm)   Pulse 81   Temp 36 °C (96.8 °F) (Temporal)   Resp 16   Wt 89.6 kg (197 lb 8.5 oz)   SpO2 99%   BMI 27.55 kg/m²      has a past medical history of Atherosclerotic heart disease of native coronary artery without angina pectoris (04/04/2017), Diabetes mellitus (CMS/[facility]), Personal history of malignant neoplasm of prostate (04/04/2017), Personal history of other diseases of the circulatory system (04/04/2017), Personal history of other diseases of the respiratory system (04/04/2017), Personal history of other endocrine, nutritional and metabolic disease (04/04/2017), Personal history of other endocrine, nutritional and metabolic disease (04/04/2017), and Personal history of other specified conditions (04/04/2017).   has a past surgical history that includes Rotator cuff repair (04/04/2017); Total knee arthroplasty (04/04/2017); Carpal tunnel release (04/04/2017); Coronary angioplasty (04/04/2017); Other surgical history (04/04/2017); Spinal fusion (04/04/2017); Cardiac electrophysiology procedure (N/A, 1/22/2024); and Cardiac electrophysiology procedure (N/A, 3/1/2024).  Family History   Problem Relation Name Age of Onset    Lung cancer Mother      Cancer Father       Alcohol abuse Father      Other (cardiac disorder) Father      Brain cancer Sister      Cancer Brother       Oncology History   Hairy cell leukemia, in relapse (CMS/HCC)   12/13/2023 Initial Diagnosis    Hairy cell leukemia, in relapse (CMS/HCC)     1/29/2024 - 2/2/2024 Chemotherapy    Cladribine, 5 Day Cycles         Galen Johnsonrandy John  reports that he quit smoking about 44 years ago. His smoking use included cigarettes and pipe. He started smoking about 67 years ago. He has a 23.00 pack-year smoking history. He has been exposed to tobacco smoke. He has never used smokeless tobacco.  He  reports current alcohol use of about 7.0 standard drinks of alcohol per week.  He  reports that he does not currently use drugs.    Physical Exam  Vitals reviewed.   Constitutional:       Appearance: Normal appearance.   HENT:      Head: Normocephalic.      Mouth/Throat:      Mouth: Mucous membranes are moist.   Eyes:      Extraocular Movements: Extraocular movements intact.      Pupils: Pupils are equal, round, and reactive to light.   Cardiovascular:      Rate and Rhythm: Normal rate and regular rhythm.      Heart sounds: Normal heart sounds.   Pulmonary:      Breath sounds: Normal breath sounds.   Abdominal:      General: Bowel sounds are normal.      Palpations: Abdomen is soft.   Musculoskeletal:         General: Normal range of motion.      Cervical back: Normal range of motion and neck supple.   Skin:     General: Skin is warm.   Neurological:      General: No focal deficit present.      Mental Status: He is alert and oriented to person, place, and time.   Psychiatric:         Mood and Affect: Mood normal.         Behavior: Behavior normal.         WBC   Date/Time Value Ref Range Status   03/05/2024 12:14 PM 0.6 (LL) 4.4 - 11.3 x10*3/uL Final   03/03/2024 04:53 AM 0.4 (LL) 4.4 - 11.3 x10*3/uL Final   03/01/2024 05:28 AM 0.5 (LL) 4.4 - 11.3 x10*3/uL Final     nRBC   Date Value Ref Range Status   03/03/2024 0.0 0.0 - 0.0 /100  "WBCs Final   03/01/2024 0.0 0.0 - 0.0 /100 WBCs Final   02/26/2024   Final     Comment:     Not Measured     RBC   Date Value Ref Range Status   03/05/2024 2.14 (L) 4.50 - 5.90 x10*6/uL Final   03/03/2024 2.08 (L) 4.50 - 5.90 x10*6/uL Final   03/01/2024 2.11 (L) 4.50 - 5.90 x10*6/uL Final     Hemoglobin   Date Value Ref Range Status   03/05/2024 7.4 (L) 13.5 - 17.5 g/dL Final   03/03/2024 7.1 (L) 13.5 - 17.5 g/dL Final   03/01/2024 7.1 (L) 13.5 - 17.5 g/dL Final     Hematocrit   Date Value Ref Range Status   03/05/2024 22.9 (L) 41.0 - 52.0 % Final   03/03/2024 21.7 (L) 41.0 - 52.0 % Final   03/01/2024 22.3 (L) 41.0 - 52.0 % Final     MCV   Date/Time Value Ref Range Status   03/05/2024 12:14  (H) 80 - 100 fL Final   03/03/2024 04:53  (H) 80 - 100 fL Final   03/01/2024 05:28  (H) 80 - 100 fL Final     MCH   Date/Time Value Ref Range Status   03/05/2024 12:14 PM 34.6 (H) 26.0 - 34.0 pg Final   03/03/2024 04:53 AM 34.1 (H) 26.0 - 34.0 pg Final   03/01/2024 05:28 AM 33.6 26.0 - 34.0 pg Final     MCHC   Date/Time Value Ref Range Status   03/05/2024 12:14 PM 32.3 32.0 - 36.0 g/dL Final   03/03/2024 04:53 AM 32.7 32.0 - 36.0 g/dL Final   03/01/2024 05:28 AM 31.8 (L) 32.0 - 36.0 g/dL Final     RDW   Date/Time Value Ref Range Status   03/05/2024 12:14 PM 19.3 (H) 11.5 - 14.5 % Final   03/03/2024 04:53 AM 18.8 (H) 11.5 - 14.5 % Final   03/01/2024 05:28 AM 19.3 (H) 11.5 - 14.5 % Final     Platelets   Date/Time Value Ref Range Status   03/05/2024 12:14  (L) 150 - 450 x10*3/uL Final   03/03/2024 04:53 AM 81 (L) 150 - 450 x10*3/uL Final   03/01/2024 05:28 AM 92 (L) 150 - 450 x10*3/uL Final     No results found for: \"MPV\"  Neutrophils %   Date/Time Value Ref Range Status   03/05/2024 12:14 PM 66.5 40.0 - 80.0 % Final   03/03/2024 04:53 AM 58.1 40.0 - 80.0 % Final   02/26/2024 01:51 PM 67.8 40.0 - 80.0 % Final     Immature Granulocytes %, Automated   Date/Time Value Ref Range Status   03/05/2024 12:14 PM 0.0 " 0.0 - 0.9 % Final     Comment:     Immature Granulocyte Count (IG) includes promyelocytes, myelocytes and metamyelocytes but does not include bands. Percent differential counts (%) should be interpreted in the context of the absolute cell counts (cells/UL).   03/03/2024 04:53 AM 0.0 0.0 - 0.9 % Final     Comment:     Immature Granulocyte Count (IG) includes promyelocytes, myelocytes and metamyelocytes but does not include bands. Percent differential counts (%) should be interpreted in the context of the absolute cell counts (cells/UL).   02/26/2024 01:51 PM 0.0 0.0 - 0.9 % Final     Comment:     Immature Granulocyte Count (IG) includes promyelocytes, myelocytes and metamyelocytes but does not include bands. Percent differential counts (%) should be interpreted in the context of the absolute cell counts (cells/UL).     Lymphocytes %   Date/Time Value Ref Range Status   03/05/2024 12:14 PM 28.1 13.0 - 44.0 % Final   03/03/2024 04:53 AM 37.2 13.0 - 44.0 % Final   02/26/2024 01:51 PM 28.8 13.0 - 44.0 % Final     Monocytes %   Date/Time Value Ref Range Status   03/05/2024 12:14 PM 1.8 2.0 - 10.0 % Final   03/03/2024 04:53 AM 0.0 2.0 - 10.0 % Final   02/26/2024 01:51 PM 1.7 2.0 - 10.0 % Final     Eosinophils %   Date/Time Value Ref Range Status   03/05/2024 12:14 PM 1.8 0.0 - 6.0 % Final   03/03/2024 04:53 AM 4.7 0.0 - 6.0 % Final   02/26/2024 01:51 PM 1.7 0.0 - 6.0 % Final     Basophils %   Date/Time Value Ref Range Status   03/05/2024 12:14 PM 1.8 0.0 - 2.0 % Final   03/03/2024 04:53 AM 0.0 0.0 - 2.0 % Final   02/26/2024 01:51 PM 0.0 0.0 - 2.0 % Final     Neutrophils Absolute   Date/Time Value Ref Range Status   03/05/2024 12:14 PM 0.38 (L) 1.60 - 5.50 x10*3/uL Final     Comment:     Percent differential counts (%) should be interpreted in the context of the absolute cell counts (cells/uL).   03/03/2024 04:53 AM 0.25 (L) 1.60 - 5.50 x10*3/uL Final     Comment:     Percent differential counts (%) should be interpreted in  "the context of the absolute cell counts (cells/uL).   02/26/2024 01:51 PM 0.40 (L) 1.60 - 5.50 x10*3/uL Final     Comment:     Percent differential counts (%) should be interpreted in the context of the absolute cell counts (cells/uL).     Immature Granulocytes Absolute, Automated   Date/Time Value Ref Range Status   03/05/2024 12:14 PM 0.00 0.00 - 0.50 x10*3/uL Final   03/03/2024 04:53 AM 0.00 0.00 - 0.50 x10*3/uL Final   02/26/2024 01:51 PM 0.00 0.00 - 0.50 x10*3/uL Final     Lymphocytes Absolute   Date/Time Value Ref Range Status   03/05/2024 12:14 PM 0.16 (L) 0.80 - 3.00 x10*3/uL Final   03/03/2024 04:53 AM 0.16 (L) 0.80 - 3.00 x10*3/uL Final   02/26/2024 01:51 PM 0.17 (L) 0.80 - 3.00 x10*3/uL Final     Monocytes Absolute   Date/Time Value Ref Range Status   03/05/2024 12:14 PM 0.01 (L) 0.05 - 0.80 x10*3/uL Final   03/03/2024 04:53 AM 0.00 (L) 0.05 - 0.80 x10*3/uL Final   02/26/2024 01:51 PM 0.01 (L) 0.05 - 0.80 x10*3/uL Final     Eosinophils Absolute   Date/Time Value Ref Range Status   03/05/2024 12:14 PM 0.01 0.00 - 0.40 x10*3/uL Final   03/03/2024 04:53 AM 0.02 0.00 - 0.40 x10*3/uL Final   02/26/2024 01:51 PM 0.01 0.00 - 0.40 x10*3/uL Final     Basophils Absolute   Date/Time Value Ref Range Status   03/05/2024 12:14 PM 0.01 0.00 - 0.10 x10*3/uL Final   03/03/2024 04:53 AM 0.00 0.00 - 0.10 x10*3/uL Final   02/26/2024 01:51 PM 0.00 0.00 - 0.10 x10*3/uL Final     Comment:     Automated WBC differential has been confirmed by manual smear.       No components found for: \"PT\"  aPTT   Date/Time Value Ref Range Status   01/22/2024 08:58 AM 44 (H) 27 - 38 seconds Final     Medication Documentation Review Audit       Reviewed by Torsten Flores MA (Medical Assistant) on 03/18/24 at 1346      Medication Order Taking? Sig Documenting Provider Last Dose Status   acyclovir (Zovirax) 400 mg tablet [id number] Yes Take 1 tablet (400 mg) by mouth 2 times a day. Saturnino Kruse MD Taking Active   albuterol (Ventolin HFA) 90 " mcg/actuation inhaler [id number] No Inhale 1 puff every 4 hours if needed for shortness of breath or wheezing. Historical MD Luz Not Taking Active   apixaban (Eliquis) 5 mg tablet [id number] Yes Take 1 tablet (5 mg) by mouth 2 times a day. Calli Escobar MD Taking Active   aspirin 81 mg chewable tablet [id number] Yes Chew 1 tablet (81 mg) once daily. Calli Escobar MD Taking Active   atovaquone (Mepron) 750 mg/5 mL suspension [id number] Yes Take 10 mL (1,500 mg) by mouth once daily. ANISHA Albarran Taking Active   atovaquone (Mepron) 750 mg/5 mL suspension [id number] Yes Take 10 mL (1,500 mg) by mouth once daily. ANISHA Albarran Taking Active   cyanocobalamin (Vitamin B-12) 1,000 mcg/mL injection [id number] No Inject 1 mL (1,000 mcg) into the muscle every 30 (thirty) days. Calli Escobar MD Not Taking Active   dofetilide (Tikosyn) 125 mcg capsule [id number] Yes Take 1 capsule (125 mcg) by mouth every 12 hours. ANISHA Albarran Taking Active   DULoxetine (Cymbalta) 60 mg DR capsule [id number] Yes Take 2 capsules (120 mg) by mouth once daily. Calli Escobar MD Taking Active   empagliflozin (Jardiance) 25 mg [id number] Yes Take 0.5 tablets (12.5 mg) by mouth once daily. Calli Escobar MD Taking Active   fenofibrate (Triglide) 160 mg tablet [id number] Yes Take 1 tablet (160 mg) by mouth once daily.  WITH FOOD Historical MD Luz Taking Active   ferrous sulfate 325 (65 Fe) MG tablet [id number] No Take 1 tablet by mouth once daily. Calli Escobar MD Not Taking Active   gabapentin (Neurontin) 600 mg tablet [id number]  Take 1 tablet (600 mg) by mouth in the evening. Take 2 tablets (1,200 mg) by mouth at bedtime. Historical Provider, MD  Active   ipratropium (Atrovent) 21 mcg (0.03 %) nasal spray [id number] Yes Administer 2 sprays into each nostril 3 times a day as needed for rhinitis. Calli Escobar MD Taking Active   isosorbide mononitrate ER (Imdur) 30 mg 24 hr tablet [id number]   Take 1 tablet (30 mg) by mouth once daily. Historical Provider, MD  Active   methIMAzole (Tapazole) 5 mg tablet [id number] Yes Take 1 tablet (5 mg) by mouth once daily. Historical Provider, MD Taking Active   multivitamin with minerals tablet [id number] No Take 1 tablet by mouth once daily. Historical MD Luz Not Taking Active   nitroglycerin (Nitrostat) 0.4 mg SL tablet [id number] No Place 1 tablet (0.4 mg) under the tongue every 5 minutes if needed for chest pain. Calli Escobar MD Not Taking Active   omeprazole (PriLOSEC) 20 mg DR capsule [id number]  Take 1 capsule (20 mg) by mouth 2 times a day before meals. For 45 days then resume once daily dosing Eufemia Rodrigez, APRN-CNP   24 2359   rosuvastatin (Crestor) 20 mg tablet [id number] Yes Take 1 tablet (20 mg) by mouth once daily. Historical MD Luz Taking Active   tiotropium-olodateroL (Stiolto Respimat) 2.5-2.5 mcg/actuation mist inhaler [id number] Yes Inhale 2 Inhalations once daily. Historical MD Luz Taking Active   vit A/vit C/vit E/zinc/copper (PRESERVISION AREDS ORAL) [id number] No Take 1 tablet by mouth twice a day. Calli Escobar MD Not Taking Active   vitamin E 180 mg (400 unit) capsule [id number] No Take 1 capsule (400 Units) by mouth once daily. Historical MD Luz Not Taking Active                   Assessment/Plan    1) hairy cell leukemia  -old records from outside hematology group were finally obtained on 2023: on 2018 CBC showed wbc 8.8, hgb 14.7, plt 109,000; bmbx was done to rule out relapse- bmbx was sent to [facility]:  no morphologic or immunophenotypic evidence of residual hairy cell leukemia; expanded population of CD8+ T cells; mildly hypercellular marrow with maturing trilineage hematopoiesis (30-40%), BRAF negative  -diagnosed in , treated with cladribine  -remained mildly thrombocytopenic ever since  -last flow cytometry of peripheral blood done on 2023 was negative  -became more  neutropenic than usual over the summer--[facility], however, 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  -had bone marrow bx done on 11/28/2023--showed extensive marrow involvement by hairy cell leukemia; no evidence of T cell LGL; positive for BRAF  -discussed treatment options--cladribine vs cladribine + rituximab  -he opted for cladribine alone  -in the future, if he relapses again, he could go on either ibrutinib or vemurafenib + rituximab  -he completed course of cladribine from January 29 through Feb 2, 2024  -he was cautioned that his ANC and platelet count may dip down further, after treatment, before they improve and that he may have intermittent low grade fevers  --WBC then dipped down to 0.5, and so he was put on bactrim three times per week and acyclovir BID, which he will continue taking until his wbc is at least 2000 or ANC is at least 1000  -he received neulasta 1 week ago  -he was admitted to [facility] last week for tikosyn load-because of interaction with bactrim, he had to be converted over to mepron suspension, which he says tastes awful  -today we checked CBC + COMP  -results reviewed--wbc 2.2, hgb 6.8, plt 101,000, ANC 1850, creatinine 1.32, calcium 9.2, AST 15, ALT 13  -will continue to check his CBC weekly  -he walked into our office today without having to use a wheelchair  -however, hgb is now <7 and given his significant cardiac co-morbidities, I have recommended a blood transfusion  -benefits, risks, potential morbidity related to blood product transfusion were reviewed with Galen and he signed informed consent to proceed  -he will be transfused 1 unit PRBC when hgb <7     2) CAD  -on ASA  -on imdur     3) atrial fibrillation  -on dofetilide  -on eliquis  -his ablation was delayed until January 22, 2024 at [facility]  -since then he has been feeling very weak and lightheaded at times, legs will just give out under him, so that he at times is afraid to get up to walk around  -was  in the hospital last week for loading on tikosyn     4) hyperlipidemia  -on fenofibrate  -on rosuvastatin     5) hypertension  -on lisinopril     6) diabetes  -on metformin     7) hyperthyroidism  -on methimazole              Problem List Items Addressed This Visit             ICD-10-CM    Hairy cell leukemia, in relapse (CMS/HCC) C91.42    Relevant Orders    CBC and Auto Differential    Infusion Appointment Request [facility] INFUSION (Completed)    Clinic Appointment Request Follow Up; SATURNINO KRUSE; [facility] MEDON    CBC and Auto Differential    Comprehensive metabolic panel    Chemotherapy-induced neutropenia (CMS/HCC) D70.1, T45.1X5A    Anemia - Primary D64.9    Relevant Orders    VERAB/Verify Abo/Rh Group Test    Infusion Appointment Request [facility] INFUSION (blood transfusion at 1245pm. Room 12 per EM pt aware & agreeable) (Completed)    Type and screen (Completed)            Saturnino Kruse MD

## 2024-03-11 NOTE — PATIENT INSTRUCTIONS
Continue the 2 antibiotics for now    You will be transfused 1 unit of blood this week    You will have blood counts rechecked on 3/18 and then on 4/1

## 2024-03-12 ENCOUNTER — ANCILLARY PROCEDURE (OUTPATIENT)
Dept: CARDIOLOGY | Facility: CLINIC | Age: 79
End: 2024-03-12
Payer: MEDICARE

## 2024-03-12 ENCOUNTER — INFUSION (OUTPATIENT)
Dept: HEMATOLOGY/ONCOLOGY | Facility: CLINIC | Age: 79
End: 2024-03-12
Payer: MEDICARE

## 2024-03-12 VITALS
HEART RATE: 72 BPM | SYSTOLIC BLOOD PRESSURE: 141 MMHG | TEMPERATURE: 98.4 F | WEIGHT: 193.78 LBS | DIASTOLIC BLOOD PRESSURE: 69 MMHG | OXYGEN SATURATION: 99 % | BODY MASS INDEX: 27.03 KG/M2 | RESPIRATION RATE: 16 BRPM

## 2024-03-12 DIAGNOSIS — C91.42 HAIRY CELL LEUKEMIA, IN RELAPSE (MULTI): ICD-10-CM

## 2024-03-12 DIAGNOSIS — D64.9 ANEMIA, UNSPECIFIED TYPE: ICD-10-CM

## 2024-03-12 DIAGNOSIS — I48.4 ATYPICAL ATRIAL FLUTTER (MULTI): ICD-10-CM

## 2024-03-12 LAB
BLOOD EXPIRATION DATE: NORMAL
DISPENSE STATUS: NORMAL
PRODUCT BLOOD TYPE: 6200
PRODUCT CODE: NORMAL
UNIT ABO: NORMAL
UNIT NUMBER: NORMAL
UNIT RH: NORMAL
UNIT VOLUME: 350
XM INTEP: NORMAL

## 2024-03-12 PROCEDURE — 93000 ELECTROCARDIOGRAM COMPLETE: CPT | Performed by: INTERNAL MEDICINE

## 2024-03-12 PROCEDURE — 1090000002 HH PPS REVENUE DEBIT

## 2024-03-12 PROCEDURE — P9040 RBC LEUKOREDUCED IRRADIATED: HCPCS

## 2024-03-12 PROCEDURE — 36430 TRANSFUSION BLD/BLD COMPNT: CPT

## 2024-03-12 PROCEDURE — 1090000001 HH PPS REVENUE CREDIT

## 2024-03-12 PROCEDURE — 2500000001 HC RX 250 WO HCPCS SELF ADMINISTERED DRUGS (ALT 637 FOR MEDICARE OP): Performed by: INTERNAL MEDICINE

## 2024-03-12 PROCEDURE — 86920 COMPATIBILITY TEST SPIN: CPT

## 2024-03-12 RX ORDER — ACETAMINOPHEN 325 MG/1
650 TABLET ORAL ONCE
Status: CANCELLED | OUTPATIENT
Start: 2024-03-12

## 2024-03-12 RX ORDER — ACETAMINOPHEN 325 MG/1
650 TABLET ORAL ONCE
Status: COMPLETED | OUTPATIENT
Start: 2024-03-12 | End: 2024-03-12

## 2024-03-12 RX ORDER — DIPHENHYDRAMINE HCL 25 MG
25 CAPSULE ORAL ONCE
Status: COMPLETED | OUTPATIENT
Start: 2024-03-12 | End: 2024-03-12

## 2024-03-12 RX ORDER — HEPARIN SODIUM,PORCINE/PF 10 UNIT/ML
50 SYRINGE (ML) INTRAVENOUS AS NEEDED
OUTPATIENT
Start: 2024-03-12

## 2024-03-12 RX ORDER — EPINEPHRINE 0.3 MG/.3ML
0.3 INJECTION SUBCUTANEOUS EVERY 5 MIN PRN
Status: DISCONTINUED | OUTPATIENT
Start: 2024-03-12 | End: 2024-03-12 | Stop reason: HOSPADM

## 2024-03-12 RX ORDER — ALBUTEROL SULFATE 0.83 MG/ML
3 SOLUTION RESPIRATORY (INHALATION) AS NEEDED
Status: DISCONTINUED | OUTPATIENT
Start: 2024-03-12 | End: 2024-03-12 | Stop reason: HOSPADM

## 2024-03-12 RX ORDER — EPINEPHRINE 0.3 MG/.3ML
0.3 INJECTION SUBCUTANEOUS EVERY 5 MIN PRN
Status: CANCELLED | OUTPATIENT
Start: 2024-03-12

## 2024-03-12 RX ORDER — DIPHENHYDRAMINE HCL 25 MG
25 CAPSULE ORAL ONCE
Status: CANCELLED | OUTPATIENT
Start: 2024-03-12

## 2024-03-12 RX ORDER — HEPARIN 100 UNIT/ML
500 SYRINGE INTRAVENOUS AS NEEDED
OUTPATIENT
Start: 2024-03-12

## 2024-03-12 RX ORDER — DIPHENHYDRAMINE HYDROCHLORIDE 50 MG/ML
50 INJECTION INTRAMUSCULAR; INTRAVENOUS AS NEEDED
Status: DISCONTINUED | OUTPATIENT
Start: 2024-03-12 | End: 2024-03-12 | Stop reason: HOSPADM

## 2024-03-12 RX ORDER — FAMOTIDINE 10 MG/ML
20 INJECTION INTRAVENOUS ONCE AS NEEDED
Status: CANCELLED | OUTPATIENT
Start: 2024-03-12

## 2024-03-12 RX ORDER — ALBUTEROL SULFATE 0.83 MG/ML
3 SOLUTION RESPIRATORY (INHALATION) AS NEEDED
Status: CANCELLED | OUTPATIENT
Start: 2024-03-12

## 2024-03-12 RX ORDER — DIPHENHYDRAMINE HYDROCHLORIDE 50 MG/ML
50 INJECTION INTRAMUSCULAR; INTRAVENOUS AS NEEDED
Status: CANCELLED | OUTPATIENT
Start: 2024-03-12

## 2024-03-12 RX ORDER — FAMOTIDINE 10 MG/ML
20 INJECTION INTRAVENOUS ONCE AS NEEDED
Status: DISCONTINUED | OUTPATIENT
Start: 2024-03-12 | End: 2024-03-12 | Stop reason: HOSPADM

## 2024-03-12 RX ADMIN — DIPHENHYDRAMINE HYDROCHLORIDE 25 MG: 25 CAPSULE ORAL at 13:39

## 2024-03-12 RX ADMIN — ACETAMINOPHEN 650 MG: 325 TABLET ORAL at 13:39

## 2024-03-12 ASSESSMENT — PAIN SCALES - GENERAL: PAINLEVEL: 0-NO PAIN

## 2024-03-12 NOTE — PROGRESS NOTES
Patient present for EKG post cardioversion for atrial flutter as ordered by DR Alfonso. Dr Alfonso reviewed EKG.

## 2024-03-13 PROCEDURE — 1090000002 HH PPS REVENUE DEBIT

## 2024-03-13 PROCEDURE — 1090000001 HH PPS REVENUE CREDIT

## 2024-03-14 PROCEDURE — 1090000002 HH PPS REVENUE DEBIT

## 2024-03-14 PROCEDURE — 1090000001 HH PPS REVENUE CREDIT

## 2024-03-15 PROCEDURE — 1090000002 HH PPS REVENUE DEBIT

## 2024-03-15 PROCEDURE — 0023 HH SOC

## 2024-03-15 PROCEDURE — 1090000001 HH PPS REVENUE CREDIT

## 2024-03-16 PROCEDURE — 1090000001 HH PPS REVENUE CREDIT

## 2024-03-16 PROCEDURE — 1090000002 HH PPS REVENUE DEBIT

## 2024-03-17 PROCEDURE — 1090000001 HH PPS REVENUE CREDIT

## 2024-03-17 PROCEDURE — 1090000002 HH PPS REVENUE DEBIT

## 2024-03-18 ENCOUNTER — INFUSION (OUTPATIENT)
Dept: HEMATOLOGY/ONCOLOGY | Facility: CLINIC | Age: 79
End: 2024-03-18
Payer: MEDICARE

## 2024-03-18 VITALS
DIASTOLIC BLOOD PRESSURE: 79 MMHG | RESPIRATION RATE: 16 BRPM | TEMPERATURE: 97.5 F | WEIGHT: 196.43 LBS | BODY MASS INDEX: 27.4 KG/M2 | HEART RATE: 68 BPM | SYSTOLIC BLOOD PRESSURE: 159 MMHG | OXYGEN SATURATION: 98 %

## 2024-03-18 DIAGNOSIS — D64.9 ANEMIA, UNSPECIFIED TYPE: ICD-10-CM

## 2024-03-18 DIAGNOSIS — C91.42 HAIRY CELL LEUKEMIA, IN RELAPSE (MULTI): ICD-10-CM

## 2024-03-18 LAB
ABO GROUP (TYPE) IN BLOOD: NORMAL
ANTIBODY SCREEN: NORMAL
BASO STIPL BLD QL SMEAR: PRESENT
BASOPHILS # BLD AUTO: 0.01 X10*3/UL (ref 0–0.1)
BASOPHILS NFR BLD AUTO: 0.6 %
EOSINOPHIL # BLD AUTO: 0.02 X10*3/UL (ref 0–0.4)
EOSINOPHIL NFR BLD AUTO: 1.1 %
ERYTHROCYTE [DISTWIDTH] IN BLOOD BY AUTOMATED COUNT: 20.5 % (ref 11.5–14.5)
GIANT PLATELETS BLD QL SMEAR: NORMAL
HCT VFR BLD AUTO: 24.6 % (ref 41–52)
HGB BLD-MCNC: 7.8 G/DL (ref 13.5–17.5)
IMM GRANULOCYTES # BLD AUTO: 0.01 X10*3/UL (ref 0–0.5)
IMM GRANULOCYTES NFR BLD AUTO: 0.6 % (ref 0–0.9)
LYMPHOCYTES # BLD AUTO: 0.2 X10*3/UL (ref 0.8–3)
LYMPHOCYTES NFR BLD AUTO: 11.5 %
MCH RBC QN AUTO: 34.1 PG (ref 26–34)
MCHC RBC AUTO-ENTMCNC: 31.7 G/DL (ref 32–36)
MCV RBC AUTO: 107 FL (ref 80–100)
MONOCYTES # BLD AUTO: 0.01 X10*3/UL (ref 0.05–0.8)
MONOCYTES NFR BLD AUTO: 0.6 %
NEUTROPHILS # BLD AUTO: 1.49 X10*3/UL (ref 1.6–5.5)
NEUTROPHILS NFR BLD AUTO: 85.6 %
NRBC BLD-RTO: ABNORMAL /100{WBCS}
OVALOCYTES BLD QL SMEAR: NORMAL
PLATELET # BLD AUTO: 128 X10*3/UL (ref 150–450)
POLYCHROMASIA BLD QL SMEAR: NORMAL
RBC # BLD AUTO: 2.29 X10*6/UL (ref 4.5–5.9)
RBC MORPH BLD: NORMAL
RH FACTOR (ANTIGEN D): NORMAL
TOXIC GRANULES BLD QL SMEAR: PRESENT
WBC # BLD AUTO: 1.7 X10*3/UL (ref 4.4–11.3)

## 2024-03-18 PROCEDURE — 86850 RBC ANTIBODY SCREEN: CPT | Performed by: INTERNAL MEDICINE

## 2024-03-18 PROCEDURE — 86901 BLOOD TYPING SEROLOGIC RH(D): CPT | Performed by: INTERNAL MEDICINE

## 2024-03-18 PROCEDURE — 36415 COLL VENOUS BLD VENIPUNCTURE: CPT

## 2024-03-18 PROCEDURE — 85025 COMPLETE CBC W/AUTO DIFF WBC: CPT | Performed by: INTERNAL MEDICINE

## 2024-03-18 PROCEDURE — 1090000001 HH PPS REVENUE CREDIT

## 2024-03-18 PROCEDURE — 1090000002 HH PPS REVENUE DEBIT

## 2024-03-18 RX ORDER — DIPHENHYDRAMINE HYDROCHLORIDE 50 MG/ML
50 INJECTION INTRAMUSCULAR; INTRAVENOUS AS NEEDED
OUTPATIENT
Start: 2024-03-18

## 2024-03-18 RX ORDER — EPINEPHRINE 0.3 MG/.3ML
0.3 INJECTION SUBCUTANEOUS EVERY 5 MIN PRN
OUTPATIENT
Start: 2024-03-18

## 2024-03-18 RX ORDER — DIPHENHYDRAMINE HCL 25 MG
25 CAPSULE ORAL ONCE
OUTPATIENT
Start: 2024-03-18

## 2024-03-18 RX ORDER — FAMOTIDINE 10 MG/ML
20 INJECTION INTRAVENOUS ONCE AS NEEDED
OUTPATIENT
Start: 2024-03-18

## 2024-03-18 RX ORDER — ACETAMINOPHEN 325 MG/1
650 TABLET ORAL ONCE
OUTPATIENT
Start: 2024-03-18

## 2024-03-18 RX ORDER — ALBUTEROL SULFATE 0.83 MG/ML
3 SOLUTION RESPIRATORY (INHALATION) AS NEEDED
OUTPATIENT
Start: 2024-03-18

## 2024-03-18 ASSESSMENT — PAIN SCALES - GENERAL: PAINLEVEL: 0-NO PAIN

## 2024-03-19 PROCEDURE — 1090000001 HH PPS REVENUE CREDIT

## 2024-03-19 PROCEDURE — 1090000002 HH PPS REVENUE DEBIT

## 2024-03-20 PROCEDURE — 1090000001 HH PPS REVENUE CREDIT

## 2024-03-20 PROCEDURE — 1090000002 HH PPS REVENUE DEBIT

## 2024-03-20 ASSESSMENT — ENCOUNTER SYMPTOMS
FATIGUE: 1
EYES NEGATIVE: 1
EXTREMITY WEAKNESS: 1
ENDOCRINE NEGATIVE: 1
PALPITATIONS: 1
GASTROINTESTINAL NEGATIVE: 1
HEMATOLOGIC/LYMPHATIC NEGATIVE: 1
PSYCHIATRIC NEGATIVE: 1
RESPIRATORY NEGATIVE: 1

## 2024-03-21 PROCEDURE — 1090000001 HH PPS REVENUE CREDIT

## 2024-03-21 PROCEDURE — 1090000002 HH PPS REVENUE DEBIT

## 2024-03-22 PROCEDURE — 1090000002 HH PPS REVENUE DEBIT

## 2024-03-22 PROCEDURE — 1090000001 HH PPS REVENUE CREDIT

## 2024-03-23 PROCEDURE — 1090000001 HH PPS REVENUE CREDIT

## 2024-03-23 PROCEDURE — 1090000002 HH PPS REVENUE DEBIT

## 2024-03-24 PROCEDURE — 1090000002 HH PPS REVENUE DEBIT

## 2024-03-24 PROCEDURE — 1090000001 HH PPS REVENUE CREDIT

## 2024-03-25 PROCEDURE — 1090000001 HH PPS REVENUE CREDIT

## 2024-03-25 PROCEDURE — 1090000002 HH PPS REVENUE DEBIT

## 2024-03-26 ENCOUNTER — PATIENT OUTREACH (OUTPATIENT)
Dept: PRIMARY CARE | Facility: CLINIC | Age: 79
End: 2024-03-26
Payer: MEDICARE

## 2024-03-26 PROCEDURE — 1090000002 HH PPS REVENUE DEBIT

## 2024-03-26 PROCEDURE — 1090000001 HH PPS REVENUE CREDIT

## 2024-03-26 NOTE — PROGRESS NOTES
Call regarding appt. with PCP on 3/13/24 after hospitalization.  Appt with Endo 3/28/24  Appt with Oncology 4/2/24  He does not have appt with Slim until June. Galen will call him and request sooner appt if he has any issues again.   At time of outreach call the patient feels as if their condition has started to get a little strength back since blood transfusion.

## 2024-03-27 PROCEDURE — 1090000002 HH PPS REVENUE DEBIT

## 2024-03-27 PROCEDURE — 1090000001 HH PPS REVENUE CREDIT

## 2024-03-28 ENCOUNTER — OFFICE VISIT (OUTPATIENT)
Dept: ENDOCRINOLOGY | Facility: CLINIC | Age: 79
End: 2024-03-28
Payer: MEDICARE

## 2024-03-28 VITALS
BODY MASS INDEX: 28.07 KG/M2 | SYSTOLIC BLOOD PRESSURE: 126 MMHG | WEIGHT: 196.1 LBS | DIASTOLIC BLOOD PRESSURE: 60 MMHG | HEIGHT: 70 IN

## 2024-03-28 DIAGNOSIS — E04.2 MULTINODULAR GOITER: ICD-10-CM

## 2024-03-28 DIAGNOSIS — E05.90 HYPERTHYROIDISM: Primary | ICD-10-CM

## 2024-03-28 PROCEDURE — 1090000001 HH PPS REVENUE CREDIT

## 2024-03-28 PROCEDURE — 3074F SYST BP LT 130 MM HG: CPT | Performed by: STUDENT IN AN ORGANIZED HEALTH CARE EDUCATION/TRAINING PROGRAM

## 2024-03-28 PROCEDURE — 1159F MED LIST DOCD IN RCRD: CPT | Performed by: STUDENT IN AN ORGANIZED HEALTH CARE EDUCATION/TRAINING PROGRAM

## 2024-03-28 PROCEDURE — 1111F DSCHRG MED/CURRENT MED MERGE: CPT | Performed by: STUDENT IN AN ORGANIZED HEALTH CARE EDUCATION/TRAINING PROGRAM

## 2024-03-28 PROCEDURE — 1157F ADVNC CARE PLAN IN RCRD: CPT | Performed by: STUDENT IN AN ORGANIZED HEALTH CARE EDUCATION/TRAINING PROGRAM

## 2024-03-28 PROCEDURE — 1090000002 HH PPS REVENUE DEBIT

## 2024-03-28 PROCEDURE — 99214 OFFICE O/P EST MOD 30 MIN: CPT | Performed by: STUDENT IN AN ORGANIZED HEALTH CARE EDUCATION/TRAINING PROGRAM

## 2024-03-28 PROCEDURE — 3078F DIAST BP <80 MM HG: CPT | Performed by: STUDENT IN AN ORGANIZED HEALTH CARE EDUCATION/TRAINING PROGRAM

## 2024-03-28 RX ORDER — METHIMAZOLE 5 MG/1
5 TABLET ORAL DAILY
Qty: 90 TABLET | Refills: 1 | Status: SHIPPED | OUTPATIENT
Start: 2024-03-28 | End: 2025-03-28

## 2024-03-28 NOTE — PROGRESS NOTES
"Subjective   Patient ID: Galen Villasenor Jr. is a 78 y.o. male who presents for Hypothyroidism (Pt takes methimazole 5mg every day, has not missed any doses   Free T4 done 3/11/24 =0.71   TSH don3 8/28/23 = 3.52)   Lab Results   Component Value Date    TSH 3.52 08/28/2023      HPI    The patient is a 78-year-old male with history of A-fib new diagnosed hyperthyroidism presents to follow up.  He finished a course of chemotherapy for leukemia  He also had a cardiac ablation surgery for A-fib but that was not successful     History :  5/16/23 Thyroid US showed MNG with dominant 24 mm nodule on left side.   6/13/23 Thyroid Uptake showed uptake 13 % corresponding to left thyroid nodule , with suppression of the rest of the gland      He reports not being aware of any thyroid issues prior to April lans   4/17/2023 routine work-up TSH was 0.15 , free T4 not available.  Recently he had issues with shortness of breath and chest tightness for he which has been evaluated by cardiology and PCP.  Of note he also has history of a LL prior to that he had hairy cell leukemia currently remission.  He had steroid injection to his back 1 month prior to thyroid labs  He denies overt symptoms of hyperthyroidism including weight loss , diarrhea ,anxiety and palpitations    Review of Systems Blood pressure 126/60, height 1.778 m (5' 10\"), weight 89 kg (196 lb 1.6 oz).      Objective   Physical Exam  Constitutional:       Appearance: Normal appearance.   Neck:      Thyroid: Thyromegaly (nodular) present.   Cardiovascular:      Rate and Rhythm: Normal rate. Rhythm irregular.   Pulmonary:      Effort: Pulmonary effort is normal.      Breath sounds: Normal breath sounds.   Neurological:      Mental Status: He is alert.         Assessment/Plan        -New onset hyperthyroidism due to toxic left thyroid nodule in background of MNG  5/16/23 Thyroid US showed MNG with dominant 24 mm nodule on left side. rt sided muliple nodues ranginf from 7 mm " to 12 mm( 12 mm nodule is spongiform).    6/13/23 Thyroid Uptake showed uptake 13 % corresponding to left thyroid nodule , with suppression of the rest of the gland      Doing well on MMI 5 mg daily , due for lab recheck      - longstanding A-fib ,on beta-blockers and Eliquis managed by EP.   - DM2 managed by pcp     RTC in 6 months

## 2024-03-29 PROCEDURE — 1090000002 HH PPS REVENUE DEBIT

## 2024-03-29 PROCEDURE — 1090000001 HH PPS REVENUE CREDIT

## 2024-03-30 PROCEDURE — 1090000002 HH PPS REVENUE DEBIT

## 2024-03-30 PROCEDURE — 1090000001 HH PPS REVENUE CREDIT

## 2024-03-31 PROCEDURE — 1090000002 HH PPS REVENUE DEBIT

## 2024-03-31 PROCEDURE — 1090000001 HH PPS REVENUE CREDIT

## 2024-04-01 ENCOUNTER — LAB (OUTPATIENT)
Dept: LAB | Facility: LAB | Age: 79
End: 2024-04-01
Payer: MEDICARE

## 2024-04-01 DIAGNOSIS — C91.42 HAIRY CELL LEUKEMIA, IN RELAPSE (MULTI): ICD-10-CM

## 2024-04-01 DIAGNOSIS — E05.90 HYPERTHYROIDISM: ICD-10-CM

## 2024-04-01 LAB
ALBUMIN SERPL BCP-MCNC: 4 G/DL (ref 3.4–5)
ALP SERPL-CCNC: 35 U/L (ref 33–136)
ALT SERPL W P-5'-P-CCNC: 12 U/L (ref 10–52)
ANION GAP SERPL CALC-SCNC: 10 MMOL/L (ref 10–20)
AST SERPL W P-5'-P-CCNC: 17 U/L (ref 9–39)
BASOPHILS # BLD AUTO: 0.01 X10*3/UL (ref 0–0.1)
BASOPHILS NFR BLD AUTO: 0.5 %
BILIRUB SERPL-MCNC: 0.6 MG/DL (ref 0–1.2)
BUN SERPL-MCNC: 20 MG/DL (ref 6–23)
CALCIUM SERPL-MCNC: 8.7 MG/DL (ref 8.6–10.3)
CHLORIDE SERPL-SCNC: 108 MMOL/L (ref 98–107)
CO2 SERPL-SCNC: 27 MMOL/L (ref 21–32)
CREAT SERPL-MCNC: 1.15 MG/DL (ref 0.5–1.3)
EGFRCR SERPLBLD CKD-EPI 2021: 64 ML/MIN/1.73M*2
EOSINOPHIL # BLD AUTO: 0.1 X10*3/UL (ref 0–0.4)
EOSINOPHIL NFR BLD AUTO: 5 %
ERYTHROCYTE [DISTWIDTH] IN BLOOD BY AUTOMATED COUNT: 18.7 % (ref 11.5–14.5)
GLUCOSE SERPL-MCNC: 135 MG/DL (ref 74–99)
HCT VFR BLD AUTO: 29.2 % (ref 41–52)
HGB BLD-MCNC: 9 G/DL (ref 13.5–17.5)
IMM GRANULOCYTES # BLD AUTO: 0.02 X10*3/UL (ref 0–0.5)
IMM GRANULOCYTES NFR BLD AUTO: 1 % (ref 0–0.9)
LYMPHOCYTES # BLD AUTO: 0.28 X10*3/UL (ref 0.8–3)
LYMPHOCYTES NFR BLD AUTO: 13.9 %
MCH RBC QN AUTO: 33.2 PG (ref 26–34)
MCHC RBC AUTO-ENTMCNC: 30.8 G/DL (ref 32–36)
MCV RBC AUTO: 108 FL (ref 80–100)
MONOCYTES # BLD AUTO: 0.04 X10*3/UL (ref 0.05–0.8)
MONOCYTES NFR BLD AUTO: 2 %
NEUTROPHILS # BLD AUTO: 1.57 X10*3/UL (ref 1.6–5.5)
NEUTROPHILS NFR BLD AUTO: 77.6 %
NRBC BLD-RTO: 0 /100 WBCS (ref 0–0)
PLATELET # BLD AUTO: 181 X10*3/UL (ref 150–450)
POTASSIUM SERPL-SCNC: 4 MMOL/L (ref 3.5–5.3)
PROT SERPL-MCNC: 5.9 G/DL (ref 6.4–8.2)
RBC # BLD AUTO: 2.71 X10*6/UL (ref 4.5–5.9)
SODIUM SERPL-SCNC: 141 MMOL/L (ref 136–145)
TSH SERPL-ACNC: 3.8 MIU/L (ref 0.44–3.98)
WBC # BLD AUTO: 2 X10*3/UL (ref 4.4–11.3)

## 2024-04-01 PROCEDURE — 36415 COLL VENOUS BLD VENIPUNCTURE: CPT

## 2024-04-01 PROCEDURE — 1090000002 HH PPS REVENUE DEBIT

## 2024-04-01 PROCEDURE — 85025 COMPLETE CBC W/AUTO DIFF WBC: CPT

## 2024-04-01 PROCEDURE — 1090000001 HH PPS REVENUE CREDIT

## 2024-04-01 PROCEDURE — 84443 ASSAY THYROID STIM HORMONE: CPT

## 2024-04-01 PROCEDURE — 80053 COMPREHEN METABOLIC PANEL: CPT

## 2024-04-02 ENCOUNTER — OFFICE VISIT (OUTPATIENT)
Dept: HEMATOLOGY/ONCOLOGY | Facility: CLINIC | Age: 79
End: 2024-04-02
Payer: MEDICARE

## 2024-04-02 VITALS
SYSTOLIC BLOOD PRESSURE: 118 MMHG | RESPIRATION RATE: 16 BRPM | DIASTOLIC BLOOD PRESSURE: 61 MMHG | WEIGHT: 201.06 LBS | TEMPERATURE: 97 F | BODY MASS INDEX: 28.85 KG/M2 | HEART RATE: 71 BPM | OXYGEN SATURATION: 99 %

## 2024-04-02 DIAGNOSIS — C91.42 HAIRY CELL LEUKEMIA, IN RELAPSE (MULTI): ICD-10-CM

## 2024-04-02 DIAGNOSIS — I10 PRIMARY HYPERTENSION: ICD-10-CM

## 2024-04-02 DIAGNOSIS — I48.0 PAROXYSMAL ATRIAL FIBRILLATION (MULTI): ICD-10-CM

## 2024-04-02 DIAGNOSIS — E78.2 MIXED HYPERLIPIDEMIA: ICD-10-CM

## 2024-04-02 DIAGNOSIS — E05.90 HYPERTHYROIDISM: ICD-10-CM

## 2024-04-02 DIAGNOSIS — E11.9 TYPE 2 DIABETES MELLITUS WITHOUT COMPLICATION, WITHOUT LONG-TERM CURRENT USE OF INSULIN (MULTI): ICD-10-CM

## 2024-04-02 DIAGNOSIS — I25.10 CORONARY ARTERY DISEASE INVOLVING NATIVE CORONARY ARTERY OF NATIVE HEART WITHOUT ANGINA PECTORIS: Primary | ICD-10-CM

## 2024-04-02 PROCEDURE — 3078F DIAST BP <80 MM HG: CPT | Performed by: INTERNAL MEDICINE

## 2024-04-02 PROCEDURE — 99214 OFFICE O/P EST MOD 30 MIN: CPT | Performed by: INTERNAL MEDICINE

## 2024-04-02 PROCEDURE — 1126F AMNT PAIN NOTED NONE PRSNT: CPT | Performed by: INTERNAL MEDICINE

## 2024-04-02 PROCEDURE — 1090000002 HH PPS REVENUE DEBIT

## 2024-04-02 PROCEDURE — 1159F MED LIST DOCD IN RCRD: CPT | Performed by: INTERNAL MEDICINE

## 2024-04-02 PROCEDURE — 1111F DSCHRG MED/CURRENT MED MERGE: CPT | Performed by: INTERNAL MEDICINE

## 2024-04-02 PROCEDURE — 1090000001 HH PPS REVENUE CREDIT

## 2024-04-02 PROCEDURE — 3074F SYST BP LT 130 MM HG: CPT | Performed by: INTERNAL MEDICINE

## 2024-04-02 PROCEDURE — 1157F ADVNC CARE PLAN IN RCRD: CPT | Performed by: INTERNAL MEDICINE

## 2024-04-02 ASSESSMENT — PAIN SCALES - GENERAL: PAINLEVEL: 0-NO PAIN

## 2024-04-02 NOTE — PROGRESS NOTES
Patient ID: Galen Villasenor Jr. is a 78 y.o. male.  Referring Physician: Saturnino Kruse MD  [address]  [place] [address]  Primary Care Provider: Bladimir Ackerman MD  Visit Type: Follow Up      Subjective    HPI How are my blood counts?    Review of Systems   Constitutional: Negative.    HENT:  Negative.     Eyes: Negative.    Respiratory: Negative.     Cardiovascular:  Positive for palpitations.   Gastrointestinal: Negative.    Endocrine: Negative.    Genitourinary: Negative.     Musculoskeletal: Negative.    Skin: Negative.    Neurological: Negative.    Hematological: Negative.    Psychiatric/Behavioral: Negative.          Objective   BSA: 2.12 meters squared  /61 (BP Location: Right arm)   Pulse 71   Temp 36.1 °C (97 °F) (Temporal)   Resp 16   Wt 91.2 kg (201 lb 1 oz)   SpO2 99%   BMI 28.85 kg/m²      has a past medical history of Atherosclerotic heart disease of native coronary artery without angina pectoris (04/04/2017), Diabetes mellitus (CMS/[facility]), Personal history of malignant neoplasm of prostate (04/04/2017), Personal history of other diseases of the circulatory system (04/04/2017), Personal history of other diseases of the respiratory system (04/04/2017), Personal history of other endocrine, nutritional and metabolic disease (04/04/2017), Personal history of other endocrine, nutritional and metabolic disease (04/04/2017), and Personal history of other specified conditions (04/04/2017).   has a past surgical history that includes Rotator cuff repair (04/04/2017); Total knee arthroplasty (04/04/2017); Carpal tunnel release (04/04/2017); Coronary angioplasty (04/04/2017); Other surgical history (04/04/2017); Spinal fusion (04/04/2017); Cardiac electrophysiology procedure (N/A, 1/22/2024); and Cardiac electrophysiology procedure (N/A, 3/1/2024).  Family History   Problem Relation Name Age of Onset    Lung cancer Mother      Cancer Father      Alcohol abuse Father      Other  (cardiac disorder) Father      Brain cancer Sister      Cancer Brother       Oncology History   Hairy cell leukemia, in relapse (CMS/HCC)   12/13/2023 Initial Diagnosis    Hairy cell leukemia, in relapse (CMS/HCC)     1/29/2024 - 2/2/2024 Chemotherapy    Cladribine, 5 Day Cycles         Galen Villasenor JrChloe  reports that he quit smoking about 44 years ago. His smoking use included cigarettes and pipe. He started smoking about 67 years ago. He has a 23.00 pack-year smoking history. He has been exposed to tobacco smoke. He has never used smokeless tobacco.  He  reports current alcohol use of about 7.0 standard drinks of alcohol per week.  He  reports that he does not currently use drugs.    Physical Exam  Vitals reviewed.   Constitutional:       Appearance: Normal appearance.   HENT:      Head: Normocephalic.      Mouth/Throat:      Mouth: Mucous membranes are moist.   Eyes:      Extraocular Movements: Extraocular movements intact.      Pupils: Pupils are equal, round, and reactive to light.   Cardiovascular:      Rate and Rhythm: Normal rate and regular rhythm.      Heart sounds: Normal heart sounds.   Pulmonary:      Breath sounds: Normal breath sounds.   Abdominal:      General: Bowel sounds are normal.      Palpations: Abdomen is soft.   Musculoskeletal:         General: Normal range of motion.      Cervical back: Normal range of motion and neck supple.   Skin:     General: Skin is warm.   Neurological:      General: No focal deficit present.      Mental Status: He is alert and oriented to person, place, and time.   Psychiatric:         Mood and Affect: Mood normal.         Behavior: Behavior normal.         WBC   Date/Time Value Ref Range Status   04/01/2024 02:19 PM 2.0 (L) 4.4 - 11.3 x10*3/uL Final   03/18/2024 01:38 PM 1.7 (L) 4.4 - 11.3 x10*3/uL Final   03/11/2024 01:18 PM 2.2 (L) 4.4 - 11.3 x10*3/uL Final     nRBC   Date Value Ref Range Status   04/01/2024 0.0 0.0 - 0.0 /100 WBCs Final   03/18/2024   Final     " Comment:     Not Measured   03/11/2024   Final     Comment:     Not Measured     RBC   Date Value Ref Range Status   04/01/2024 2.71 (L) 4.50 - 5.90 x10*6/uL Final   03/18/2024 2.29 (L) 4.50 - 5.90 x10*6/uL Final   03/11/2024 1.94 (L) 4.50 - 5.90 x10*6/uL Final     Hemoglobin   Date Value Ref Range Status   04/01/2024 9.0 (L) 13.5 - 17.5 g/dL Final   03/18/2024 7.8 (L) 13.5 - 17.5 g/dL Final   03/11/2024 6.8 (L) 13.5 - 17.5 g/dL Final     Hematocrit   Date Value Ref Range Status   04/01/2024 29.2 (L) 41.0 - 52.0 % Final   03/18/2024 24.6 (L) 41.0 - 52.0 % Final   03/11/2024 21.0 (L) 41.0 - 52.0 % Final     MCV   Date/Time Value Ref Range Status   04/01/2024 02:19  (H) 80 - 100 fL Final   03/18/2024 01:38  (H) 80 - 100 fL Final   03/11/2024 01:18  (H) 80 - 100 fL Final     MCH   Date/Time Value Ref Range Status   04/01/2024 02:19 PM 33.2 26.0 - 34.0 pg Final   03/18/2024 01:38 PM 34.1 (H) 26.0 - 34.0 pg Final   03/11/2024 01:18 PM 35.1 (H) 26.0 - 34.0 pg Final     MCHC   Date/Time Value Ref Range Status   04/01/2024 02:19 PM 30.8 (L) 32.0 - 36.0 g/dL Final   03/18/2024 01:38 PM 31.7 (L) 32.0 - 36.0 g/dL Final   03/11/2024 01:18 PM 32.4 32.0 - 36.0 g/dL Final     RDW   Date/Time Value Ref Range Status   04/01/2024 02:19 PM 18.7 (H) 11.5 - 14.5 % Final   03/18/2024 01:38 PM 20.5 (H) 11.5 - 14.5 % Final   03/11/2024 01:18 PM 20.0 (H) 11.5 - 14.5 % Final     Platelets   Date/Time Value Ref Range Status   04/01/2024 02:19  150 - 450 x10*3/uL Final   03/18/2024 01:38  (L) 150 - 450 x10*3/uL Final   03/11/2024 01:18  (L) 150 - 450 x10*3/uL Final     No results found for: \"MPV\"  Neutrophils %   Date/Time Value Ref Range Status   04/01/2024 02:19 PM 77.6 40.0 - 80.0 % Final   03/18/2024 01:38 PM 85.6 40.0 - 80.0 % Final   03/05/2024 12:14 PM 66.5 40.0 - 80.0 % Final     Immature Granulocytes %, Automated   Date/Time Value Ref Range Status   04/01/2024 02:19 PM 1.0 (H) 0.0 - 0.9 % Final     " Comment:     Immature Granulocyte Count (IG) includes promyelocytes, myelocytes and metamyelocytes but does not include bands. Percent differential counts (%) should be interpreted in the context of the absolute cell counts (cells/UL).   03/18/2024 01:38 PM 0.6 0.0 - 0.9 % Final     Comment:     Immature Granulocyte Count (IG) includes promyelocytes, myelocytes and metamyelocytes but does not include bands. Percent differential counts (%) should be interpreted in the context of the absolute cell counts (cells/UL).   03/11/2024 01:18 PM 0.9 0.0 - 0.9 % Final     Comment:     Immature Granulocyte Count (IG) includes promyelocytes, myelocytes and metamyelocytes but does not include bands. Percent differential counts (%) should be interpreted in the context of the absolute cell counts (cells/UL).     Lymphocytes %, Manual   Date/Time Value Ref Range Status   03/11/2024 01:18 PM 8.0 13.0 - 44.0 % Final     Lymphocytes %   Date/Time Value Ref Range Status   04/01/2024 02:19 PM 13.9 13.0 - 44.0 % Final   03/18/2024 01:38 PM 11.5 13.0 - 44.0 % Final   03/05/2024 12:14 PM 28.1 13.0 - 44.0 % Final     Monocytes %, Manual   Date/Time Value Ref Range Status   03/11/2024 01:18 PM 0.0 2.0 - 10.0 % Final     Monocytes %   Date/Time Value Ref Range Status   04/01/2024 02:19 PM 2.0 2.0 - 10.0 % Final   03/18/2024 01:38 PM 0.6 2.0 - 10.0 % Final   03/05/2024 12:14 PM 1.8 2.0 - 10.0 % Final     Eosinophils %, Manual   Date/Time Value Ref Range Status   03/11/2024 01:18 PM 2.0 0.0 - 6.0 % Final     Eosinophils %   Date/Time Value Ref Range Status   04/01/2024 02:19 PM 5.0 0.0 - 6.0 % Final   03/18/2024 01:38 PM 1.1 0.0 - 6.0 % Final   03/05/2024 12:14 PM 1.8 0.0 - 6.0 % Final     Basophils %, Manual   Date/Time Value Ref Range Status   03/11/2024 01:18 PM 1.0 0.0 - 2.0 % Final     Basophils %   Date/Time Value Ref Range Status   04/01/2024 02:19 PM 0.5 0.0 - 2.0 % Final   03/18/2024 01:38 PM 0.6 0.0 - 2.0 % Final   03/05/2024 12:14 PM  1.8 0.0 - 2.0 % Final     Neutrophils Absolute   Date/Time Value Ref Range Status   04/01/2024 02:19 PM 1.57 (L) 1.60 - 5.50 x10*3/uL Final     Comment:     Percent differential counts (%) should be interpreted in the context of the absolute cell counts (cells/uL).   03/18/2024 01:38 PM 1.49 (L) 1.60 - 5.50 x10*3/uL Final     Comment:     Percent differential counts (%) should be interpreted in the context of the absolute cell counts (cells/uL).   03/05/2024 12:14 PM 0.38 (L) 1.60 - 5.50 x10*3/uL Final     Comment:     Percent differential counts (%) should be interpreted in the context of the absolute cell counts (cells/uL).     Immature Granulocytes Absolute, Automated   Date/Time Value Ref Range Status   04/01/2024 02:19 PM 0.02 0.00 - 0.50 x10*3/uL Final   03/18/2024 01:38 PM 0.01 0.00 - 0.50 x10*3/uL Final   03/11/2024 01:18 PM 0.02 0.00 - 0.50 x10*3/uL Final     Lymphocytes Absolute   Date/Time Value Ref Range Status   04/01/2024 02:19 PM 0.28 (L) 0.80 - 3.00 x10*3/uL Final   03/18/2024 01:38 PM 0.20 (L) 0.80 - 3.00 x10*3/uL Final   03/05/2024 12:14 PM 0.16 (L) 0.80 - 3.00 x10*3/uL Final     Monocytes Absolute   Date/Time Value Ref Range Status   04/01/2024 02:19 PM 0.04 (L) 0.05 - 0.80 x10*3/uL Final   03/18/2024 01:38 PM 0.01 (L) 0.05 - 0.80 x10*3/uL Final   03/05/2024 12:14 PM 0.01 (L) 0.05 - 0.80 x10*3/uL Final     Eosinophils Absolute   Date/Time Value Ref Range Status   04/01/2024 02:19 PM 0.10 0.00 - 0.40 x10*3/uL Final   03/18/2024 01:38 PM 0.02 0.00 - 0.40 x10*3/uL Final   03/05/2024 12:14 PM 0.01 0.00 - 0.40 x10*3/uL Final     Eosinophils Absolute, Manual   Date/Time Value Ref Range Status   03/11/2024 01:18 PM 0.04 0.00 - 0.40 x10*3/uL Final     Basophils Absolute   Date/Time Value Ref Range Status   04/01/2024 02:19 PM 0.01 0.00 - 0.10 x10*3/uL Final   03/18/2024 01:38 PM 0.01 0.00 - 0.10 x10*3/uL Final     Comment:     Automated WBC differential has been confirmed by manual smear.   03/05/2024  "12:14 PM 0.01 0.00 - 0.10 x10*3/uL Final     Basophils Absolute, Manual   Date/Time Value Ref Range Status   2024 01:18 PM 0.02 0.00 - 0.10 x10*3/uL Final       No components found for: \"PT\"  aPTT   Date/Time Value Ref Range Status   2024 08:58 AM 44 (H) 27 - 38 seconds Final     Medication Documentation Review Audit       Reviewed by Louann Prather MA (Medical Assistant) on 24 at 1120      Medication Order Taking? Sig Documenting Provider Last Dose Status   acyclovir (Zovirax) 400 mg tablet [id number] Yes Take 1 tablet (400 mg) by mouth 2 times a day. Saturnino Kruse MD Taking Active     Discontinued 24 1012   apixaban (Eliquis) 5 mg tablet [id number] Yes Take 1 tablet (5 mg) by mouth 2 times a day. Historical Provider, MD Taking Active     Discontinued 24 1013   atovaquone (Mepron) 750 mg/5 mL suspension [id number]  Take 10 mL (1,500 mg) by mouth once daily. RAVEN Albarran-LARISA   24 235   atovaquone (Mepron) 750 mg/5 mL suspension [id number]  Take 10 mL (1,500 mg) by mouth once daily. RAVEN Albarran-LARISA   24 2359     Discontinued 24 1013   dofetilide (Tikosyn) 125 mcg capsule [id number] Yes Take 1 capsule (125 mcg) by mouth every 12 hours. RAVEN Albarran-CNP Taking Active   DULoxetine (Cymbalta) 60 mg DR capsule [id number] Yes Take 2 capsules (120 mg) by mouth once daily. Historical Provider, MD Taking Active   empagliflozin (Jardiance) 25 mg [id number] Yes Take 0.5 tablets (12.5 mg) by mouth once daily. Historical Provider, MD Taking Active   fenofibrate (Triglide) 160 mg tablet [id number] Yes Take 1 tablet (160 mg) by mouth once daily.  WITH FOOD Historical Provider, MD Taking Active     Discontinued 24 1015   gabapentin (Neurontin) 600 mg tablet [id number] Yes Take 1 tablet (600 mg) by mouth in the evening. Take 2 tablets (1,200 mg) by mouth at bedtime. Historical Provider, MD Taking Active     Discontinued 24 1015   isosorbide mononitrate ER (Imdur) " 30 mg 24 hr tablet [id number] Yes Take 1 tablet (30 mg) by mouth once daily. Historical Provider, MD Taking Active     Discontinued 24 1046   methIMAzole (Tapazole) 5 mg tablet [id number] Yes Take 1 tablet (5 mg) by mouth once daily. Brisa Skinner MD Taking Active     Discontinued 24 1015     Discontinued 24 1015   omeprazole (PriLOSEC) 20 mg DR capsule [id number]  Take 1 capsule (20 mg) by mouth 2 times a day before meals. For 45 days then resume once daily dosing Eufemia Rodrigez, APRN-CNP   24 2359   rosuvastatin (Crestor) 20 mg tablet [id number] Yes Take 1 tablet (20 mg) by mouth once daily. Historical Provider, MD Taking Active     Discontinued 24 1016   vit A/vit C/vit E/zinc/copper (PRESERVISION AREDS ORAL) [id number] Yes Take 1 tablet by mouth twice a day. Historical Provider, MD Taking Active     Discontinued 24 1016                   Assessment/Plan    1) hairy cell leukemia  -old records from outside hematology group were finally obtained on 2023: on 2018 CBC showed wbc 8.8, hgb 14.7, plt 109,000; bmbx was done to rule out relapse- bmbx was sent to [facility]:  no morphologic or immunophenotypic evidence of residual hairy cell leukemia; expanded population of CD8+ T cells; mildly hypercellular marrow with maturing trilineage hematopoiesis (30-40%), BRAF negative  -diagnosed in , treated with cladribine  -remained mildly thrombocytopenic ever since  -last flow cytometry of peripheral blood done on 2023 was negative  -became more neutropenic than usual over the summer--[facility], however, 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  -had bone marrow bx done on 2023--showed extensive marrow involvement by hairy cell leukemia; no evidence of T cell LGL; positive for BRAF  -discussed treatment options--cladribine vs cladribine + rituximab  -he opted for cladribine alone  -in the future, if he relapses again, he could go on either  ibrutinib (not as desirable given the med's track record for exacerbating atrial fibrillation) or vemurafenib + rituximab  -he completed course of cladribine from January 29 through Feb 2, 2024  -he was cautioned that his ANC and platelet count may dip down further, after treatment, before they improve and that he may have intermittent low grade fevers  --WBC then dipped down to 0.5, and so he was put on bactrim three times per week and acyclovir BID, which he will continue taking until his wbc is at least 2000 or ANC is at least 1000  -he received neulasta x 1 dose  -he was admitted to [facility] for tikosyn load-because of interaction with bactrim, he had to be converted over to mepron suspension, which he says tastes awful  -he finally finished the bottle of mepron  -labs done yesterday included CBC + COMP  -results reviewed--wbc 2.0, hgb 9.0 plt 181,000, ANC 1570, creatinine 1.15, calcium 8.7, AST 17, ALT 12  -as ANC is holding now >1000--he can stop the prophylactic antibiotics  -will continue to follow his blood counts Q2 weeks     2) CAD  -on ASA  -on imdur     3) atrial fibrillation  -on dofetilide  -on eliquis  -his ablation was delayed until January 22, 2024 at [facility]  -since then he has been feeling very weak and lightheaded at times, legs will just give out under him, so that he at times is afraid to get up to walk around  -was in the hospital for loading on tikosyn  -still having palpitations  -had EKG done recently at Dr Alfonso's office that showed he was in sinus rhythm     4) hyperlipidemia  -on fenofibrate  -on rosuvastatin     5) hypertension  -on lisinopril     6) diabetes  -on metformin     7) hyperthyroidism  -on methimazole                 Problem List Items Addressed This Visit             ICD-10-CM    Hairy cell leukemia, in relapse (CMS/HCC) C91.42    Relevant Orders    CBC and Auto Differential    Clinic Appointment Request Follow Up; HILARY ROBERTS; [id number]    CBC and Auto  Differential    Comprehensive metabolic panel            Saturnino Kruse MD

## 2024-04-02 NOTE — PATIENT INSTRUCTIONS
You can stop the antibiotics (or finish off what you have left)    We will recheck your counts in 2 weeks and in 4 weeks

## 2024-04-03 PROCEDURE — 1090000001 HH PPS REVENUE CREDIT

## 2024-04-03 PROCEDURE — 1090000002 HH PPS REVENUE DEBIT

## 2024-04-03 ASSESSMENT — ENCOUNTER SYMPTOMS
PSYCHIATRIC NEGATIVE: 1
EYES NEGATIVE: 1
HEMATOLOGIC/LYMPHATIC NEGATIVE: 1
RESPIRATORY NEGATIVE: 1
NEUROLOGICAL NEGATIVE: 1
PALPITATIONS: 1
GASTROINTESTINAL NEGATIVE: 1
ENDOCRINE NEGATIVE: 1
MUSCULOSKELETAL NEGATIVE: 1
CONSTITUTIONAL NEGATIVE: 1

## 2024-04-04 ENCOUNTER — HOSPITAL ENCOUNTER (OUTPATIENT)
Dept: RADIOLOGY | Facility: HOSPITAL | Age: 79
Discharge: HOME | End: 2024-04-04
Payer: MEDICARE

## 2024-04-04 DIAGNOSIS — R13.12 DYSPHAGIA, OROPHARYNGEAL PHASE: ICD-10-CM

## 2024-04-04 PROCEDURE — 1090000001 HH PPS REVENUE CREDIT

## 2024-04-04 PROCEDURE — 74230 X-RAY XM SWLNG FUNCJ C+: CPT

## 2024-04-04 PROCEDURE — 2500000001 HC RX 250 WO HCPCS SELF ADMINISTERED DRUGS (ALT 637 FOR MEDICARE OP)

## 2024-04-04 PROCEDURE — 1090000002 HH PPS REVENUE DEBIT

## 2024-04-04 PROCEDURE — 74230 X-RAY XM SWLNG FUNCJ C+: CPT | Performed by: STUDENT IN AN ORGANIZED HEALTH CARE EDUCATION/TRAINING PROGRAM

## 2024-04-04 PROCEDURE — 92611 MOTION FLUOROSCOPY/SWALLOW: CPT | Mod: GN | Performed by: STUDENT IN AN ORGANIZED HEALTH CARE EDUCATION/TRAINING PROGRAM

## 2024-04-04 RX ADMIN — BARIUM SULFATE 115 ML: 0.81 POWDER, FOR SUSPENSION ORAL at 08:45

## 2024-04-04 RX ADMIN — BARIUM SULFATE 13 ML: 400 PASTE ORAL at 08:55

## 2024-04-04 RX ADMIN — BARIUM SULFATE 700 MG: 700 TABLET ORAL at 09:00

## 2024-04-04 RX ADMIN — BARIUM SULFATE 25 ML: 400 SUSPENSION ORAL at 08:50

## 2024-04-04 NOTE — PROCEDURES
"Outpatient Modified Barium Swallow Study     Patient Name: Galen Villasenor Jr.  MRN: [id number]  : 1945  Today's Date: 24  Time Calculation  Start Time: 843  Stop Time: 910  Time Calculation (min): 27 min       Recommendations:    DIET: regular/thin  STRATEGIES:   -Sit upright 90 degrees for all PO  -Remain upright 20-30 minutes after meals  -Feed/eat at a slow rate  -Small bite/sip  -Alternate liquids/solids  -Medications 1-2 at a time with water; patient may consider puree carrier  -Diligent and regular oral Care     Assessment/Impression:    Patient presents with mild oropharyngeal dysphagia upon completion of modified barium swallow study this date. Oral phase of the swallow was grossly WFL. Pharyngeal phase deficits were characterized by delayed initiation of the swallow, incomplete laryngeal vestibule closure and reduced base of tongue retraction. Patient demonstrated penetration to the level of the vocal folds with thin liquids. Mild pharyngeal residues were reduced to trace with cued second swallows. In AP view, retention and retrograde flow of contrast was observed.     Per the results of this assessment, the patient is favorable to continue with his baseline diet and swallow strategies above.     Dysphagia Outcome Severity Scale Rating: Within functional limits; Level 6    Full, detailed SLP/Radiologist modified barium swallow study report can be found under \"Chart Review\" tab, \"Imaging tab\" and  titled \"FL modified barium swallow study\".    Patient Reported Outcomes:    Eating Assessment Tool (EAT-10)   0=No problem, 1=Mild problem, 2=Mild to moderate problem, 3=Moderate problem, 4=Severe problem    EAT 10  My swallowing problem has caused me to lose weight.: 0  My swallowing problem interferes with my ability to go out for meals.: 1  Swallowing liquids takes extra effort.: 0  Swallowing solids takes extra effort.: 1  Swallowing pills takes extra effort.: 3  Swallowing is painful: 0  The " pleasure of eating is affected by my swallowing.: 0  When I swallow food sticks in my throat.: 3  I cough when I eat.: 0  Swallowing is stressful: 0  EAT-10 TOTAL SCORE:: 8    A total score of 3 or above may indicate difficulty with swallowing safely and/or efficiently    Plan:  Treatment/Interventions: Pharyngeal exercises, Compensatory strategy training, Patient/family education  SLP Plan: No treatment needs identified at this time     Subjective:  Patient was agreeable to completion of study given explanation of procedure and rationale.    Pain:   Rating 0-10: 0  Location: 0 no pain     Education:   Patient educated on results of MBS study, recommended diet, and recommended safe swallow strategies. Patient verbalized understanding of education provided.

## 2024-04-05 PROCEDURE — 1090000001 HH PPS REVENUE CREDIT

## 2024-04-05 PROCEDURE — 1090000002 HH PPS REVENUE DEBIT

## 2024-04-06 PROCEDURE — 1090000002 HH PPS REVENUE DEBIT

## 2024-04-06 PROCEDURE — 1090000001 HH PPS REVENUE CREDIT

## 2024-04-07 PROCEDURE — 1090000002 HH PPS REVENUE DEBIT

## 2024-04-07 PROCEDURE — 1090000001 HH PPS REVENUE CREDIT

## 2024-04-08 PROCEDURE — 1090000001 HH PPS REVENUE CREDIT

## 2024-04-08 PROCEDURE — 1090000002 HH PPS REVENUE DEBIT

## 2024-04-09 PROCEDURE — 1090000001 HH PPS REVENUE CREDIT

## 2024-04-09 PROCEDURE — 1090000002 HH PPS REVENUE DEBIT

## 2024-04-10 PROCEDURE — 1090000001 HH PPS REVENUE CREDIT

## 2024-04-10 PROCEDURE — 1090000002 HH PPS REVENUE DEBIT

## 2024-04-11 PROCEDURE — 1090000001 HH PPS REVENUE CREDIT

## 2024-04-11 PROCEDURE — 1090000002 HH PPS REVENUE DEBIT

## 2024-04-12 PROCEDURE — 1090000002 HH PPS REVENUE DEBIT

## 2024-04-12 PROCEDURE — 1090000001 HH PPS REVENUE CREDIT

## 2024-04-13 PROCEDURE — 1090000002 HH PPS REVENUE DEBIT

## 2024-04-13 PROCEDURE — 1090000001 HH PPS REVENUE CREDIT

## 2024-04-16 ENCOUNTER — LAB (OUTPATIENT)
Dept: LAB | Facility: LAB | Age: 79
End: 2024-04-16
Payer: MEDICARE

## 2024-04-16 DIAGNOSIS — C91.42 HAIRY CELL LEUKEMIA, IN RELAPSE (MULTI): ICD-10-CM

## 2024-04-16 LAB
BASOPHILS # BLD AUTO: 0.01 X10*3/UL (ref 0–0.1)
BASOPHILS NFR BLD AUTO: 0.2 %
EOSINOPHIL # BLD AUTO: 0.11 X10*3/UL (ref 0–0.4)
EOSINOPHIL NFR BLD AUTO: 2.5 %
ERYTHROCYTE [DISTWIDTH] IN BLOOD BY AUTOMATED COUNT: 17 % (ref 11.5–14.5)
HCT VFR BLD AUTO: 34.6 % (ref 41–52)
HGB BLD-MCNC: 10.7 G/DL (ref 13.5–17.5)
IMM GRANULOCYTES # BLD AUTO: 0.1 X10*3/UL (ref 0–0.5)
IMM GRANULOCYTES NFR BLD AUTO: 2.2 % (ref 0–0.9)
LYMPHOCYTES # BLD AUTO: 0.37 X10*3/UL (ref 0.8–3)
LYMPHOCYTES NFR BLD AUTO: 8.3 %
MCH RBC QN AUTO: 32 PG (ref 26–34)
MCHC RBC AUTO-ENTMCNC: 30.9 G/DL (ref 32–36)
MCV RBC AUTO: 104 FL (ref 80–100)
MONOCYTES # BLD AUTO: 0.18 X10*3/UL (ref 0.05–0.8)
MONOCYTES NFR BLD AUTO: 4 %
NEUTROPHILS # BLD AUTO: 3.7 X10*3/UL (ref 1.6–5.5)
NEUTROPHILS NFR BLD AUTO: 82.8 %
NRBC BLD-RTO: 0 /100 WBCS (ref 0–0)
PLATELET # BLD AUTO: 216 X10*3/UL (ref 150–450)
RBC # BLD AUTO: 3.34 X10*6/UL (ref 4.5–5.9)
WBC # BLD AUTO: 4.5 X10*3/UL (ref 4.4–11.3)

## 2024-04-16 PROCEDURE — 36415 COLL VENOUS BLD VENIPUNCTURE: CPT

## 2024-04-16 PROCEDURE — 85025 COMPLETE CBC W/AUTO DIFF WBC: CPT

## 2024-04-22 ENCOUNTER — ANCILLARY PROCEDURE (OUTPATIENT)
Dept: CARDIOLOGY | Facility: CLINIC | Age: 79
End: 2024-04-22
Payer: MEDICARE

## 2024-04-22 DIAGNOSIS — I48.0 PAROXYSMAL ATRIAL FIBRILLATION (MULTI): ICD-10-CM

## 2024-04-22 PROCEDURE — 93248 EXT ECG>7D<15D REV&INTERPJ: CPT | Performed by: INTERNAL MEDICINE

## 2024-04-22 PROCEDURE — 93246 EXT ECG>7D<15D RECORDING: CPT | Performed by: INTERNAL MEDICINE

## 2024-04-29 ENCOUNTER — LAB (OUTPATIENT)
Dept: LAB | Facility: LAB | Age: 79
End: 2024-04-29
Payer: MEDICARE

## 2024-04-29 DIAGNOSIS — C91.42 HAIRY CELL LEUKEMIA, IN RELAPSE (MULTI): ICD-10-CM

## 2024-04-29 LAB
ALBUMIN SERPL BCP-MCNC: 4.4 G/DL (ref 3.4–5)
ALP SERPL-CCNC: 35 U/L (ref 33–136)
ALT SERPL W P-5'-P-CCNC: 20 U/L (ref 10–52)
ANION GAP SERPL CALC-SCNC: 10 MMOL/L (ref 10–20)
AST SERPL W P-5'-P-CCNC: 16 U/L (ref 9–39)
BASOPHILS # BLD AUTO: 0.02 X10*3/UL (ref 0–0.1)
BASOPHILS NFR BLD AUTO: 0.3 %
BILIRUB SERPL-MCNC: 0.7 MG/DL (ref 0–1.2)
BUN SERPL-MCNC: 25 MG/DL (ref 6–23)
CALCIUM SERPL-MCNC: 9.2 MG/DL (ref 8.6–10.3)
CHLORIDE SERPL-SCNC: 106 MMOL/L (ref 98–107)
CO2 SERPL-SCNC: 30 MMOL/L (ref 21–32)
CREAT SERPL-MCNC: 1.17 MG/DL (ref 0.5–1.3)
EGFRCR SERPLBLD CKD-EPI 2021: 63 ML/MIN/1.73M*2
EOSINOPHIL # BLD AUTO: 0.02 X10*3/UL (ref 0–0.4)
EOSINOPHIL NFR BLD AUTO: 0.3 %
ERYTHROCYTE [DISTWIDTH] IN BLOOD BY AUTOMATED COUNT: 16.4 % (ref 11.5–14.5)
GLUCOSE SERPL-MCNC: 122 MG/DL (ref 74–99)
HCT VFR BLD AUTO: 39 % (ref 41–52)
HGB BLD-MCNC: 12.3 G/DL (ref 13.5–17.5)
IMM GRANULOCYTES # BLD AUTO: 0.1 X10*3/UL (ref 0–0.5)
IMM GRANULOCYTES NFR BLD AUTO: 1.7 % (ref 0–0.9)
LYMPHOCYTES # BLD AUTO: 0.48 X10*3/UL (ref 0.8–3)
LYMPHOCYTES NFR BLD AUTO: 8 %
MCH RBC QN AUTO: 31.7 PG (ref 26–34)
MCHC RBC AUTO-ENTMCNC: 31.5 G/DL (ref 32–36)
MCV RBC AUTO: 101 FL (ref 80–100)
MONOCYTES # BLD AUTO: 0.34 X10*3/UL (ref 0.05–0.8)
MONOCYTES NFR BLD AUTO: 5.7 %
NEUTROPHILS # BLD AUTO: 5.03 X10*3/UL (ref 1.6–5.5)
NEUTROPHILS NFR BLD AUTO: 84 %
NRBC BLD-RTO: 0 /100 WBCS (ref 0–0)
PLATELET # BLD AUTO: 174 X10*3/UL (ref 150–450)
POTASSIUM SERPL-SCNC: 4 MMOL/L (ref 3.5–5.3)
PROT SERPL-MCNC: 6.3 G/DL (ref 6.4–8.2)
RBC # BLD AUTO: 3.88 X10*6/UL (ref 4.5–5.9)
SODIUM SERPL-SCNC: 142 MMOL/L (ref 136–145)
WBC # BLD AUTO: 6 X10*3/UL (ref 4.4–11.3)

## 2024-04-29 PROCEDURE — 80053 COMPREHEN METABOLIC PANEL: CPT

## 2024-04-29 PROCEDURE — 36415 COLL VENOUS BLD VENIPUNCTURE: CPT

## 2024-04-29 PROCEDURE — 85025 COMPLETE CBC W/AUTO DIFF WBC: CPT

## 2024-05-01 ENCOUNTER — OFFICE VISIT (OUTPATIENT)
Dept: HEMATOLOGY/ONCOLOGY | Facility: CLINIC | Age: 79
End: 2024-05-01
Payer: MEDICARE

## 2024-05-01 VITALS
TEMPERATURE: 96.8 F | WEIGHT: 192.68 LBS | OXYGEN SATURATION: 99 % | DIASTOLIC BLOOD PRESSURE: 55 MMHG | RESPIRATION RATE: 18 BRPM | SYSTOLIC BLOOD PRESSURE: 117 MMHG | HEART RATE: 61 BPM | BODY MASS INDEX: 27.65 KG/M2

## 2024-05-01 DIAGNOSIS — I10 PRIMARY HYPERTENSION: ICD-10-CM

## 2024-05-01 DIAGNOSIS — C91.42 HAIRY CELL LEUKEMIA, IN RELAPSE (MULTI): ICD-10-CM

## 2024-05-01 DIAGNOSIS — I48.0 PAROXYSMAL ATRIAL FIBRILLATION (MULTI): ICD-10-CM

## 2024-05-01 DIAGNOSIS — E78.2 MIXED HYPERLIPIDEMIA: ICD-10-CM

## 2024-05-01 DIAGNOSIS — I25.10 CORONARY ARTERY DISEASE INVOLVING NATIVE CORONARY ARTERY OF NATIVE HEART WITHOUT ANGINA PECTORIS: Primary | ICD-10-CM

## 2024-05-01 DIAGNOSIS — E11.9 TYPE 2 DIABETES MELLITUS WITHOUT COMPLICATION, WITHOUT LONG-TERM CURRENT USE OF INSULIN (MULTI): ICD-10-CM

## 2024-05-01 DIAGNOSIS — E05.90 HYPERTHYROIDISM: ICD-10-CM

## 2024-05-01 PROCEDURE — 3078F DIAST BP <80 MM HG: CPT | Performed by: INTERNAL MEDICINE

## 2024-05-01 PROCEDURE — 99214 OFFICE O/P EST MOD 30 MIN: CPT | Performed by: INTERNAL MEDICINE

## 2024-05-01 PROCEDURE — 1159F MED LIST DOCD IN RCRD: CPT | Performed by: INTERNAL MEDICINE

## 2024-05-01 PROCEDURE — 1126F AMNT PAIN NOTED NONE PRSNT: CPT | Performed by: INTERNAL MEDICINE

## 2024-05-01 PROCEDURE — 3074F SYST BP LT 130 MM HG: CPT | Performed by: INTERNAL MEDICINE

## 2024-05-01 PROCEDURE — 1157F ADVNC CARE PLAN IN RCRD: CPT | Performed by: INTERNAL MEDICINE

## 2024-05-01 RX ORDER — PREDNISONE 10 MG/1
TABLET ORAL
COMMUNITY

## 2024-05-01 ASSESSMENT — PAIN SCALES - GENERAL: PAINLEVEL: 0-NO PAIN

## 2024-05-04 ASSESSMENT — ENCOUNTER SYMPTOMS
CARDIOVASCULAR NEGATIVE: 1
HEMATOLOGIC/LYMPHATIC NEGATIVE: 1
PSYCHIATRIC NEGATIVE: 1
NEUROLOGICAL NEGATIVE: 1
MUSCULOSKELETAL NEGATIVE: 1
RESPIRATORY NEGATIVE: 1
EYES NEGATIVE: 1
GASTROINTESTINAL NEGATIVE: 1
ENDOCRINE NEGATIVE: 1
CONSTITUTIONAL NEGATIVE: 1

## 2024-05-21 ENCOUNTER — OFFICE VISIT (OUTPATIENT)
Dept: CARDIOLOGY | Facility: CLINIC | Age: 79
End: 2024-05-21
Payer: MEDICARE

## 2024-05-21 VITALS
HEART RATE: 64 BPM | TEMPERATURE: 96.8 F | BODY MASS INDEX: 27.86 KG/M2 | DIASTOLIC BLOOD PRESSURE: 62 MMHG | WEIGHT: 199 LBS | HEIGHT: 71 IN | OXYGEN SATURATION: 98 % | SYSTOLIC BLOOD PRESSURE: 124 MMHG

## 2024-05-21 DIAGNOSIS — I48.4 ATYPICAL ATRIAL FLUTTER (MULTI): Primary | ICD-10-CM

## 2024-05-21 PROCEDURE — 99214 OFFICE O/P EST MOD 30 MIN: CPT | Performed by: INTERNAL MEDICINE

## 2024-05-21 PROCEDURE — 1159F MED LIST DOCD IN RCRD: CPT | Performed by: INTERNAL MEDICINE

## 2024-05-21 PROCEDURE — 93010 ELECTROCARDIOGRAM REPORT: CPT | Performed by: INTERNAL MEDICINE

## 2024-05-21 PROCEDURE — 3074F SYST BP LT 130 MM HG: CPT | Performed by: INTERNAL MEDICINE

## 2024-05-21 PROCEDURE — 1157F ADVNC CARE PLAN IN RCRD: CPT | Performed by: INTERNAL MEDICINE

## 2024-05-21 PROCEDURE — 1036F TOBACCO NON-USER: CPT | Performed by: INTERNAL MEDICINE

## 2024-05-21 PROCEDURE — 3078F DIAST BP <80 MM HG: CPT | Performed by: INTERNAL MEDICINE

## 2024-05-21 PROCEDURE — 93005 ELECTROCARDIOGRAM TRACING: CPT | Performed by: INTERNAL MEDICINE

## 2024-05-21 NOTE — PROGRESS NOTES
Referring Provider: Darrick Alfonso MD  Reason for Consult: Atrial fibrillation    History of Present Illness:      Galen Villasenor Jr. is a 78 y.o. year old male patient with a history significant for persistent atrial fibrillation, chronic ischemic heart disease, hairy cell leukemia, hyperlipidemia,  who was referred by Dr. Alfonso for management of atrial fibrillation. He is here today for follow-up after recent ablation at the end of January.     Mr. Villasenor was initially diagnosed with paroxysmal atrial fibrillation in 2018 when he had a period of illness that was prolonged and associated with fatigue.  He was found to have atrial fibrillation.  He has had multiple cardioversions 18, and was ultimately started on amiodarone.  He did well over the intervening years, with improvement of his symptoms while in sinus rhythm.  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  He was started on dofetilide in November 2022, which has worked fairly well until recently, where he has begun to have paroxysms of atrial arrhythmias being on dofetilide.  He has a 4% A-fib burden on monitoring.  Since the summer, he has complained of worsening exertional fatigue and dyspnea.  Some of this may be due to recurrence of his hairy cell leukemia, and his hemoglobin has been trending down.  However, he does attribute some of the symptoms to his atrial fibrillation as well.  He is planning to start his chemotherapy for his leukemia soon.    He had a successful AF ablation on 1/22/2024 with PVI, LA PWI, and CTI line. No complications after the procedure. An EKG done on 1/30 showed atypical atrial flutter, but this was not brought to my attention. Since the ablation, he has started chemotherapy, and has significant weakness and fatigue since then. He then had dofetilide loading and repeat cardioversion on 3/1/2024 back to normal sinus rhythm.    Since the cardioversion, he finished his chemotherapy. He has felt much, much better, though  he still has some exertional dyspnea. He had a monitor which showed a 70% burden of atrial flutter. He is in atrial flutter today.     Focused Cardiovascular Problem List:  Persistent atrial fibrillation: [id number] score 5, hypertension, CAD, type 2 diabetes, age.  On Eliquis and dofetilide.  Previously on amiodarone, but caused thyroiditis.  Has had 5 prior cardioversions and developed breakthrough on dofetilide. He underwent successful AF ablation on 1/22/2024 with PVI, LA PWI, and CTI line. His dofetilide was stopped after the procedure. On 1/30/2024, he had an EKG which showed atypical atrial flutter. He underwent repeat dofetilide loading and cardioversion in March.   Chronic ischemic heart disease: Remote PCI, preserved ejection fraction  Hypertension  Hairy-cell leukemia: Extensive marrow involvement; on cladribine      Past Medical and Surgical History:  Mr. Villasenor  has a past medical history of Atherosclerotic heart disease of native coronary artery without angina pectoris (04/04/2017), Diabetes mellitus (Multi), Personal history of malignant neoplasm of prostate (04/04/2017), Personal history of other diseases of the circulatory system (04/04/2017), Personal history of other diseases of the respiratory system (04/04/2017), Personal history of other endocrine, nutritional and metabolic disease (04/04/2017), Personal history of other endocrine, nutritional and metabolic disease (04/04/2017), and Personal history of other specified conditions (04/04/2017).    has a past surgical history that includes Rotator cuff repair (04/04/2017); Total knee arthroplasty (04/04/2017); Carpal tunnel release (04/04/2017); Coronary angioplasty (04/04/2017); Other surgical history (04/04/2017); Spinal fusion (04/04/2017); Cardiac electrophysiology procedure (N/A, 1/22/2024); and Cardiac electrophysiology procedure (N/A, 3/1/2024).    Social History:  Social History     Tobacco Use    Smoking status: Former      Current packs/day: 0.00     Average packs/day: 1 pack/day for 23.0 years (23.0 ttl pk-yrs)     Types: Cigarettes, Pipe     Start date: 1957     Quit date: 1980     Years since quittin.4     Passive exposure: Past    Smokeless tobacco: Never   Substance Use Topics    Alcohol use: Yes     Alcohol/week: 7.0 standard drinks of alcohol     Types: 7 Glasses of wine per week     Comment: once a night      Tobacco: Quit /0, prior 4 packs/day for 23 years  Alcohol:  1 glass of wine / day  Drug use:  Denies    Relevant Family History:   Family History   Problem Relation Name Age of Onset    Lung cancer Mother      Cancer Father      Alcohol abuse Father      Other (cardiac disorder) Father      Brain cancer Sister      Cancer Brother          Allergies:  Allergies   Allergen Reactions    Anesthetics - Renetta Type- Parabens Seizure    Procaine Seizure    Quinolones Unknown and Other     drop foot        Medications:  Current Outpatient Medications   Medication Instructions    apixaban (ELIQUIS) 5 mg, oral, 2 times daily    dofetilide (TIKOSYN) 125 mcg, oral, Every 12 hours scheduled    DULoxetine (CYMBALTA) 120 mg, oral, Daily    empagliflozin (JARDIANCE) 12.5 mg, oral, Daily    fenofibrate (Triglide) 160 mg tablet 1 tablet, oral, Daily,  WITH FOOD    gabapentin (Neurontin) 600 mg tablet Take 1 tablet (600 mg) by mouth in the evening. Take 2 tablets (1,200 mg) by mouth at bedtime.    isosorbide mononitrate ER (Imdur) 30 mg 24 hr tablet 1 tablet, oral, Daily    methIMAzole (TAPAZOLE) 5 mg, oral, Daily    omeprazole (PRILOSEC) 20 mg, oral, 2 times daily before meals, For 45 days then resume once daily dosing    predniSONE (Deltasone) 10 mg tablet TAKE 6 TABLETS BY MOUTH ONCE DAILY FOR 4 DAYS THEN 4 ONCE DAILY FOR 4 DAYS THEN 2 ONCE DAILY FOR 4 DAYS THEN 1/2 (ONE-HALF) ONCE DAILY FOR 4 DAYS    rosuvastatin (CRESTOR) 20 mg, oral, Daily    vit A/vit C/vit E/zinc/copper (PRESERVISION AREDS ORAL) 1 tablet, oral, 2  "times daily         Objective   Physical Exam:  Last Recorded Vitals:      3/12/2024     3:20 PM 3/12/2024     3:35 PM 3/18/2024     1:47 PM 3/28/2024    10:11 AM 4/2/2024    11:13 AM 5/1/2024    10:38 AM 5/21/2024     9:00 AM   Vitals   Systolic 144 141 159 126 118 117 124   Diastolic 74 69 79 60 61 55 62   Heart Rate 67 72 68  71 61 64   Temp 36.8 °C (98.2 °F) 36.9 °C (98.4 °F) 36.4 °C (97.5 °F)  36.1 °C (97 °F) 36 °C (96.8 °F) 36 °C (96.8 °F)   Resp 16 16 16  16 18    Height (in)    1.778 m (5' 10\")   1.803 m (5' 11\")   Weight (lb)   196.43 196.1 201.06 192.68 199   BMI   27.4 kg/m2 28.14 kg/m2 28.85 kg/m2 27.65 kg/m2 27.75 kg/m2   BSA (m2)   2.11 m2 2.1 m2 2.12 m2 2.08 m2 2.13 m2   Visit Report    Report Report Report Report    Visit Vitals  /62   Pulse 64   Temp 36 °C (96.8 °F)   Ht 1.803 m (5' 11\")   Wt 90.3 kg (199 lb)   SpO2 98%   BMI 27.75 kg/m²   Smoking Status Former   BSA 2.13 m²      Gen: NAD, sitting comfortably  HEENT: NC/AT  Card: RRR, no m/r/g  Pulm: Clear to auscultation bilaterally  Ext: No LE edema  Neuro: No focal deficits    Diagnostic Results      My Interpretation of Reviewed Study(s):  Prior ECGs (reviewed and my interpretation):   5/21/2024: Atypical Atrial flutter, frequent PVCs, HR 74bpm  2/20/2024: Atypical atrial flutter, variable AV block, HR 74 bpm  1/30/2024: Atypical atrial flutter with variable AV block, HR 70bpm  1/9/2024: Sinus rhythm, heart rate 78 bpm, ventricular bigeminy, Para-hisian PVCs    Echocardiography:  11/14/2023  CONCLUSIONS:   - Exam indication: Chest Pain   - The left ventricle is normal in size. There is mild left ventricular   hypertrophy. Left ventricular systolic function is normal. EF = 60 ± 5% (2D   biplane) Grade I left ventricular diastolic dysfunction.   - The right ventricle is normal in size. Right ventricular systolic function is   normal.   - The left atrial cavity is mildly dilated.   - Exam was compared with the prior  echocardiographic exam " performed on   05/02/2019. No significant change     Stress Test:   6/12/2023  CONCLUSIONS:    1. SPECT Perfusion Study: Normal.    2. There is no scintigraphic evidence for inducible ischemia.    3. No evidence of scarred myocardium.    4. Left ventricle is normal in size. The left ventricle systolic   function is normal.    5. This is a low risk scan.       Relevant Labs:  Lab Results   Component Value Date    CREATININE 1.17 04/29/2024    CREATININE 1.15 04/01/2024    CREATININE 1.32 (H) 03/11/2024    K 4.0 04/29/2024    K 4.0 04/01/2024    K 4.0 03/11/2024    HGB 12.3 (L) 04/29/2024    HGB 10.7 (L) 04/16/2024    HGB 9.0 (L) 04/01/2024    INR 1.7 (H) 01/22/2024    AST 16 04/29/2024    AST 17 04/01/2024    AST 15 03/11/2024    ALT 20 04/29/2024    ALT 12 04/01/2024    ALT 13 03/11/2024       Assessment/Plan   Assessment and Plan:  Galen Villasenor Jr. is a 78 y.o. year old male patient who underwent successful AF ablation on 1/22/2024 with PVI, LA PWI, and CTI line. No complications after the procedure. His dofetilide was stopped after the procedure. An EKG done on 1/30 showed atypical atrial flutter. He then had reloading with dofetilide and cardioversion on 3/1/36256. He is back in atypical atrial flutter today.     We discussed possible treatment options for atypical atrial flutter.  We discussed that we could come off the dofetilide as it was not effective.  Currently, if he was feeling up to it, a repeat ablation would be reasonable.  We would plan to touchup the pulmonary veins and posterior wall if they have reconnected during this procedure.  If he has mitral isthmus flutter, then we could consider creating a lateral mitral line.  The risks of the procedure were discussed in detail, including but not limited to infection, blood vessel damage, heart injury or perforation necessitating emergency cardiac surgery, heart attack or stroke, atrial-esophageal fistula, phrenic nerve injury, even death.    He is  interested in having a repeat ablation. I have ordered this. We will plan on performing it at [facility] [place].     Name: Galen Villasenor Jr.  MRN: [id number]  Attending: Evelyne Ambrose MD  Procedure: Redo AF ablation  Date desired: 6/12/2024  Diagnosis: Atypical atrial flutter  Vendor if applicable: [employer]  Expected anesthesia: General  Isolation/precautions?: None  Other comments/med instructions: Hold all medications on the AM of the procedure, except Eliquis      Return to Clinic:  Return after ablation    Thank you very much for allowing me to participate in the care of this patient. Please do not hesitate to contact me with any further questions or concerns.    Evelyne Ambrose MD  Clinical Cardiac Electrophysiologist  Director of Atrial Fibrillation Ablation, [facility]  Director of Ventricular Arrhythmias Research, [facility]  Office Phone Number: [phone]

## 2024-05-21 NOTE — H&P (VIEW-ONLY)
Referring Provider: Darrick Alfonso MD  Reason for Consult: Atrial fibrillation    History of Present Illness:      Galen Villasenor Jr. is a 78 y.o. year old male patient with a history significant for persistent atrial fibrillation, chronic ischemic heart disease, hairy cell leukemia, hyperlipidemia,  who was referred by Dr. Alfonso for management of atrial fibrillation. He is here today for follow-up after recent ablation at the end of January.     Mr. Villasenor was initially diagnosed with paroxysmal atrial fibrillation in 2018 when he had a period of illness that was prolonged and associated with fatigue.  He was found to have atrial fibrillation.  He has had multiple cardioversions 18, and was ultimately started on amiodarone.  He did well over the intervening years, with improvement of his symptoms while in sinus rhythm.  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  He was started on dofetilide in November 2022, which has worked fairly well until recently, where he has begun to have paroxysms of atrial arrhythmias being on dofetilide.  He has a 4% A-fib burden on monitoring.  Since the summer, he has complained of worsening exertional fatigue and dyspnea.  Some of this may be due to recurrence of his hairy cell leukemia, and his hemoglobin has been trending down.  However, he does attribute some of the symptoms to his atrial fibrillation as well.  He is planning to start his chemotherapy for his leukemia soon.    He had a successful AF ablation on 1/22/2024 with PVI, LA PWI, and CTI line. No complications after the procedure. An EKG done on 1/30 showed atypical atrial flutter, but this was not brought to my attention. Since the ablation, he has started chemotherapy, and has significant weakness and fatigue since then. He then had dofetilide loading and repeat cardioversion on 3/1/2024 back to normal sinus rhythm.    Since the cardioversion, he finished his chemotherapy. He has felt much, much better, though  he still has some exertional dyspnea. He had a monitor which showed a 70% burden of atrial flutter. He is in atrial flutter today.     Focused Cardiovascular Problem List:  Persistent atrial fibrillation: [id number] score 5, hypertension, CAD, type 2 diabetes, age.  On Eliquis and dofetilide.  Previously on amiodarone, but caused thyroiditis.  Has had 5 prior cardioversions and developed breakthrough on dofetilide. He underwent successful AF ablation on 1/22/2024 with PVI, LA PWI, and CTI line. His dofetilide was stopped after the procedure. On 1/30/2024, he had an EKG which showed atypical atrial flutter. He underwent repeat dofetilide loading and cardioversion in March.   Chronic ischemic heart disease: Remote PCI, preserved ejection fraction  Hypertension  Hairy-cell leukemia: Extensive marrow involvement; on cladribine      Past Medical and Surgical History:  Mr. Villasenor  has a past medical history of Atherosclerotic heart disease of native coronary artery without angina pectoris (04/04/2017), Diabetes mellitus (Multi), Personal history of malignant neoplasm of prostate (04/04/2017), Personal history of other diseases of the circulatory system (04/04/2017), Personal history of other diseases of the respiratory system (04/04/2017), Personal history of other endocrine, nutritional and metabolic disease (04/04/2017), Personal history of other endocrine, nutritional and metabolic disease (04/04/2017), and Personal history of other specified conditions (04/04/2017).    has a past surgical history that includes Rotator cuff repair (04/04/2017); Total knee arthroplasty (04/04/2017); Carpal tunnel release (04/04/2017); Coronary angioplasty (04/04/2017); Other surgical history (04/04/2017); Spinal fusion (04/04/2017); Cardiac electrophysiology procedure (N/A, 1/22/2024); and Cardiac electrophysiology procedure (N/A, 3/1/2024).    Social History:  Social History     Tobacco Use    Smoking status: Former      Current packs/day: 0.00     Average packs/day: 1 pack/day for 23.0 years (23.0 ttl pk-yrs)     Types: Cigarettes, Pipe     Start date: 1957     Quit date: 1980     Years since quittin.4     Passive exposure: Past    Smokeless tobacco: Never   Substance Use Topics    Alcohol use: Yes     Alcohol/week: 7.0 standard drinks of alcohol     Types: 7 Glasses of wine per week     Comment: once a night      Tobacco: Quit /0, prior 4 packs/day for 23 years  Alcohol:  1 glass of wine / day  Drug use:  Denies    Relevant Family History:   Family History   Problem Relation Name Age of Onset    Lung cancer Mother      Cancer Father      Alcohol abuse Father      Other (cardiac disorder) Father      Brain cancer Sister      Cancer Brother          Allergies:  Allergies   Allergen Reactions    Anesthetics - Renetta Type- Parabens Seizure    Procaine Seizure    Quinolones Unknown and Other     drop foot        Medications:  Current Outpatient Medications   Medication Instructions    apixaban (ELIQUIS) 5 mg, oral, 2 times daily    dofetilide (TIKOSYN) 125 mcg, oral, Every 12 hours scheduled    DULoxetine (CYMBALTA) 120 mg, oral, Daily    empagliflozin (JARDIANCE) 12.5 mg, oral, Daily    fenofibrate (Triglide) 160 mg tablet 1 tablet, oral, Daily,  WITH FOOD    gabapentin (Neurontin) 600 mg tablet Take 1 tablet (600 mg) by mouth in the evening. Take 2 tablets (1,200 mg) by mouth at bedtime.    isosorbide mononitrate ER (Imdur) 30 mg 24 hr tablet 1 tablet, oral, Daily    methIMAzole (TAPAZOLE) 5 mg, oral, Daily    omeprazole (PRILOSEC) 20 mg, oral, 2 times daily before meals, For 45 days then resume once daily dosing    predniSONE (Deltasone) 10 mg tablet TAKE 6 TABLETS BY MOUTH ONCE DAILY FOR 4 DAYS THEN 4 ONCE DAILY FOR 4 DAYS THEN 2 ONCE DAILY FOR 4 DAYS THEN 1/2 (ONE-HALF) ONCE DAILY FOR 4 DAYS    rosuvastatin (CRESTOR) 20 mg, oral, Daily    vit A/vit C/vit E/zinc/copper (PRESERVISION AREDS ORAL) 1 tablet, oral, 2  "times daily         Objective   Physical Exam:  Last Recorded Vitals:      3/12/2024     3:20 PM 3/12/2024     3:35 PM 3/18/2024     1:47 PM 3/28/2024    10:11 AM 4/2/2024    11:13 AM 5/1/2024    10:38 AM 5/21/2024     9:00 AM   Vitals   Systolic 144 141 159 126 118 117 124   Diastolic 74 69 79 60 61 55 62   Heart Rate 67 72 68  71 61 64   Temp 36.8 °C (98.2 °F) 36.9 °C (98.4 °F) 36.4 °C (97.5 °F)  36.1 °C (97 °F) 36 °C (96.8 °F) 36 °C (96.8 °F)   Resp 16 16 16  16 18    Height (in)    1.778 m (5' 10\")   1.803 m (5' 11\")   Weight (lb)   196.43 196.1 201.06 192.68 199   BMI   27.4 kg/m2 28.14 kg/m2 28.85 kg/m2 27.65 kg/m2 27.75 kg/m2   BSA (m2)   2.11 m2 2.1 m2 2.12 m2 2.08 m2 2.13 m2   Visit Report    Report Report Report Report    Visit Vitals  /62   Pulse 64   Temp 36 °C (96.8 °F)   Ht 1.803 m (5' 11\")   Wt 90.3 kg (199 lb)   SpO2 98%   BMI 27.75 kg/m²   Smoking Status Former   BSA 2.13 m²      Gen: NAD, sitting comfortably  HEENT: NC/AT  Card: RRR, no m/r/g  Pulm: Clear to auscultation bilaterally  Ext: No LE edema  Neuro: No focal deficits    Diagnostic Results      My Interpretation of Reviewed Study(s):  Prior ECGs (reviewed and my interpretation):   5/21/2024: Atypical Atrial flutter, frequent PVCs, HR 74bpm  2/20/2024: Atypical atrial flutter, variable AV block, HR 74 bpm  1/30/2024: Atypical atrial flutter with variable AV block, HR 70bpm  1/9/2024: Sinus rhythm, heart rate 78 bpm, ventricular bigeminy, Para-hisian PVCs    Echocardiography:  11/14/2023  CONCLUSIONS:   - Exam indication: Chest Pain   - The left ventricle is normal in size. There is mild left ventricular   hypertrophy. Left ventricular systolic function is normal. EF = 60 ± 5% (2D   biplane) Grade I left ventricular diastolic dysfunction.   - The right ventricle is normal in size. Right ventricular systolic function is   normal.   - The left atrial cavity is mildly dilated.   - Exam was compared with the prior  echocardiographic exam " performed on   05/02/2019. No significant change     Stress Test:   6/12/2023  CONCLUSIONS:    1. SPECT Perfusion Study: Normal.    2. There is no scintigraphic evidence for inducible ischemia.    3. No evidence of scarred myocardium.    4. Left ventricle is normal in size. The left ventricle systolic   function is normal.    5. This is a low risk scan.       Relevant Labs:  Lab Results   Component Value Date    CREATININE 1.17 04/29/2024    CREATININE 1.15 04/01/2024    CREATININE 1.32 (H) 03/11/2024    K 4.0 04/29/2024    K 4.0 04/01/2024    K 4.0 03/11/2024    HGB 12.3 (L) 04/29/2024    HGB 10.7 (L) 04/16/2024    HGB 9.0 (L) 04/01/2024    INR 1.7 (H) 01/22/2024    AST 16 04/29/2024    AST 17 04/01/2024    AST 15 03/11/2024    ALT 20 04/29/2024    ALT 12 04/01/2024    ALT 13 03/11/2024       Assessment/Plan   Assessment and Plan:  Galen Villasenor Jr. is a 78 y.o. year old male patient who underwent successful AF ablation on 1/22/2024 with PVI, LA PWI, and CTI line. No complications after the procedure. His dofetilide was stopped after the procedure. An EKG done on 1/30 showed atypical atrial flutter. He then had reloading with dofetilide and cardioversion on 3/1/84159. He is back in atypical atrial flutter today.     We discussed possible treatment options for atypical atrial flutter.  We discussed that we could come off the dofetilide as it was not effective.  Currently, if he was feeling up to it, a repeat ablation would be reasonable.  We would plan to touchup the pulmonary veins and posterior wall if they have reconnected during this procedure.  If he has mitral isthmus flutter, then we could consider creating a lateral mitral line.  The risks of the procedure were discussed in detail, including but not limited to infection, blood vessel damage, heart injury or perforation necessitating emergency cardiac surgery, heart attack or stroke, atrial-esophageal fistula, phrenic nerve injury, even death.    He is  interested in having a repeat ablation. I have ordered this. We will plan on performing it at [facility] [place].     Name: Galen Villasenor Jr.  MRN: [id number]  Attending: Evelyne Ambrose MD  Procedure: Redo AF ablation  Date desired: 6/12/2024  Diagnosis: Atypical atrial flutter  Vendor if applicable: [employer]  Expected anesthesia: General  Isolation/precautions?: None  Other comments/med instructions: Hold all medications on the AM of the procedure, except Eliquis      Return to Clinic:  Return after ablation    Thank you very much for allowing me to participate in the care of this patient. Please do not hesitate to contact me with any further questions or concerns.    Evelyne Ambrose MD  Clinical Cardiac Electrophysiologist  Director of Atrial Fibrillation Ablation, [facility]  Director of Ventricular Arrhythmias Research, [facility]  Office Phone Number: [phone]

## 2024-06-12 ENCOUNTER — HOSPITAL ENCOUNTER (OUTPATIENT)
Facility: HOSPITAL | Age: 79
Setting detail: OUTPATIENT SURGERY
Discharge: HOME | End: 2024-06-12
Attending: INTERNAL MEDICINE | Admitting: INTERNAL MEDICINE
Payer: MEDICARE

## 2024-06-12 ENCOUNTER — APPOINTMENT (OUTPATIENT)
Dept: CARDIOLOGY | Facility: CLINIC | Age: 79
End: 2024-06-12
Payer: MEDICARE

## 2024-06-12 ENCOUNTER — ANESTHESIA EVENT (OUTPATIENT)
Dept: CARDIOLOGY | Facility: HOSPITAL | Age: 79
End: 2024-06-12
Payer: MEDICARE

## 2024-06-12 ENCOUNTER — ANESTHESIA (OUTPATIENT)
Dept: CARDIOLOGY | Facility: HOSPITAL | Age: 79
End: 2024-06-12
Payer: MEDICARE

## 2024-06-12 VITALS — WEIGHT: 199.08 LBS | HEIGHT: 71 IN | BODY MASS INDEX: 27.87 KG/M2

## 2024-06-12 DIAGNOSIS — I48.4 ATYPICAL ATRIAL FLUTTER (MULTI): ICD-10-CM

## 2024-06-12 LAB — GLUCOSE BLD MANUAL STRIP-MCNC: 110 MG/DL (ref 74–99)

## 2024-06-12 PROCEDURE — 93653 COMPRE EP EVAL TX SVT: CPT | Performed by: INTERNAL MEDICINE

## 2024-06-12 PROCEDURE — 93662 INTRACARDIAC ECG (ICE): CPT | Performed by: INTERNAL MEDICINE

## 2024-06-12 PROCEDURE — C1759 CATH, INTRA ECHOCARDIOGRAPHY: HCPCS | Performed by: INTERNAL MEDICINE

## 2024-06-12 PROCEDURE — 85347 COAGULATION TIME ACTIVATED: CPT | Performed by: INTERNAL MEDICINE

## 2024-06-12 PROCEDURE — 93462 L HRT CATH TRNSPTL PUNCTURE: CPT | Performed by: INTERNAL MEDICINE

## 2024-06-12 PROCEDURE — 93600 BUNDLE OF HIS RECORDING: CPT | Performed by: INTERNAL MEDICINE

## 2024-06-12 PROCEDURE — 93655 ICAR CATH ABLTJ DSCRT ARRHYT: CPT | Performed by: INTERNAL MEDICINE

## 2024-06-12 PROCEDURE — C1731 CATH, EP, 20 OR MORE ELEC: HCPCS | Performed by: INTERNAL MEDICINE

## 2024-06-12 PROCEDURE — 76937 US GUIDE VASCULAR ACCESS: CPT | Performed by: INTERNAL MEDICINE

## 2024-06-12 PROCEDURE — 3700000001 HC GENERAL ANESTHESIA TIME - INITIAL BASE CHARGE: Performed by: INTERNAL MEDICINE

## 2024-06-12 PROCEDURE — C1730 CATH, EP, 19 OR FEW ELECT: HCPCS | Performed by: INTERNAL MEDICINE

## 2024-06-12 PROCEDURE — 93657 TX L/R ATRIAL FIB ADDL: CPT | Performed by: INTERNAL MEDICINE

## 2024-06-12 PROCEDURE — C1766 INTRO/SHEATH,STRBLE,NON-PEEL: HCPCS | Performed by: INTERNAL MEDICINE

## 2024-06-12 PROCEDURE — 7100000009 HC PHASE TWO TIME - INITIAL BASE CHARGE: Performed by: INTERNAL MEDICINE

## 2024-06-12 PROCEDURE — 2780000003 HC OR 278 NO HCPCS: Performed by: INTERNAL MEDICINE

## 2024-06-12 PROCEDURE — 3700000002 HC GENERAL ANESTHESIA TIME - EACH INCREMENTAL 1 MINUTE: Performed by: INTERNAL MEDICINE

## 2024-06-12 PROCEDURE — G0269 OCCLUSIVE DEVICE IN VEIN ART: HCPCS | Mod: TC,59 | Performed by: INTERNAL MEDICINE

## 2024-06-12 PROCEDURE — 82947 ASSAY GLUCOSE BLOOD QUANT: CPT

## 2024-06-12 PROCEDURE — 2500000004 HC RX 250 GENERAL PHARMACY W/ HCPCS (ALT 636 FOR OP/ED): Performed by: ANESTHESIOLOGIST ASSISTANT

## 2024-06-12 PROCEDURE — C1760 CLOSURE DEV, VASC: HCPCS | Performed by: INTERNAL MEDICINE

## 2024-06-12 PROCEDURE — 2500000005 HC RX 250 GENERAL PHARMACY W/O HCPCS: Performed by: ANESTHESIOLOGIST ASSISTANT

## 2024-06-12 PROCEDURE — 7100000010 HC PHASE TWO TIME - EACH INCREMENTAL 1 MINUTE: Performed by: INTERNAL MEDICINE

## 2024-06-12 PROCEDURE — 2500000004 HC RX 250 GENERAL PHARMACY W/ HCPCS (ALT 636 FOR OP/ED): Performed by: INTERNAL MEDICINE

## 2024-06-12 PROCEDURE — 2720000007 HC OR 272 NO HCPCS: Performed by: INTERNAL MEDICINE

## 2024-06-12 RX ORDER — LIDOCAINE HYDROCHLORIDE 20 MG/ML
INJECTION, SOLUTION INFILTRATION; PERINEURAL AS NEEDED
Status: DISCONTINUED | OUTPATIENT
Start: 2024-06-12 | End: 2024-06-12

## 2024-06-12 RX ORDER — PROPOFOL 10 MG/ML
INJECTION, EMULSION INTRAVENOUS AS NEEDED
Status: DISCONTINUED | OUTPATIENT
Start: 2024-06-12 | End: 2024-06-12

## 2024-06-12 RX ORDER — ROCURONIUM BROMIDE 10 MG/ML
INJECTION, SOLUTION INTRAVENOUS AS NEEDED
Status: DISCONTINUED | OUTPATIENT
Start: 2024-06-12 | End: 2024-06-12

## 2024-06-12 RX ORDER — PANTOPRAZOLE SODIUM 40 MG/1
40 TABLET, DELAYED RELEASE ORAL 2 TIMES DAILY
Qty: 60 TABLET | Refills: 0 | Status: SHIPPED | OUTPATIENT
Start: 2024-06-12 | End: 2024-07-12

## 2024-06-12 RX ORDER — ONDANSETRON HYDROCHLORIDE 2 MG/ML
INJECTION, SOLUTION INTRAVENOUS AS NEEDED
Status: DISCONTINUED | OUTPATIENT
Start: 2024-06-12 | End: 2024-06-12

## 2024-06-12 RX ORDER — OXCARBAZEPINE 300 MG/1
300 TABLET, FILM COATED ORAL 2 TIMES DAILY
COMMUNITY
Start: 2024-06-06 | End: 2025-06-06

## 2024-06-12 RX ORDER — PHENYLEPHRINE HCL IN 0.9% NACL 0.4MG/10ML
SYRINGE (ML) INTRAVENOUS AS NEEDED
Status: DISCONTINUED | OUTPATIENT
Start: 2024-06-12 | End: 2024-06-12

## 2024-06-12 RX ORDER — PROTAMINE SULFATE 10 MG/ML
INJECTION, SOLUTION INTRAVENOUS AS NEEDED
Status: DISCONTINUED | OUTPATIENT
Start: 2024-06-12 | End: 2024-06-12

## 2024-06-12 RX ORDER — PHENYLEPHRINE 10 MG/250 ML(40 MCG/ML)IN 0.9 % SOD.CHLORIDE INTRAVENOUS
CONTINUOUS PRN
Status: DISCONTINUED | OUTPATIENT
Start: 2024-06-12 | End: 2024-06-12

## 2024-06-12 RX ORDER — SODIUM CHLORIDE, SODIUM LACTATE, POTASSIUM CHLORIDE, CALCIUM CHLORIDE 600; 310; 30; 20 MG/100ML; MG/100ML; MG/100ML; MG/100ML
INJECTION, SOLUTION INTRAVENOUS CONTINUOUS PRN
Status: DISCONTINUED | OUTPATIENT
Start: 2024-06-12 | End: 2024-06-12

## 2024-06-12 RX ORDER — GLYCOPYRROLATE 0.2 MG/ML
INJECTION INTRAMUSCULAR; INTRAVENOUS AS NEEDED
Status: DISCONTINUED | OUTPATIENT
Start: 2024-06-12 | End: 2024-06-12

## 2024-06-12 RX ORDER — LISINOPRIL 40 MG/1
40 TABLET ORAL DAILY
COMMUNITY
Start: 2024-04-16

## 2024-06-12 RX ORDER — HEPARIN SODIUM 1000 [USP'U]/ML
INJECTION, SOLUTION INTRAVENOUS; SUBCUTANEOUS AS NEEDED
Status: DISCONTINUED | OUTPATIENT
Start: 2024-06-12 | End: 2024-06-12 | Stop reason: HOSPADM

## 2024-06-12 RX ORDER — FENTANYL CITRATE 50 UG/ML
INJECTION, SOLUTION INTRAMUSCULAR; INTRAVENOUS AS NEEDED
Status: DISCONTINUED | OUTPATIENT
Start: 2024-06-12 | End: 2024-06-12

## 2024-06-12 ASSESSMENT — PAIN - FUNCTIONAL ASSESSMENT
PAIN_FUNCTIONAL_ASSESSMENT: 0-10

## 2024-06-12 ASSESSMENT — PAIN SCALES - GENERAL
PAINLEVEL_OUTOF10: 0 - NO PAIN
PAINLEVEL_OUTOF10: 0 - NO PAIN
PAIN_LEVEL: 4
PAINLEVEL_OUTOF10: 0 - NO PAIN

## 2024-06-12 ASSESSMENT — ENCOUNTER SYMPTOMS
DEPRESSION: 0
OCCASIONAL FEELINGS OF UNSTEADINESS: 0
LOSS OF SENSATION IN FEET: 0

## 2024-06-12 NOTE — DISCHARGE INSTRUCTIONS
INSTRUCTIONS AFTER ABLATION PROCEDURE:    * You will need to continue blood thinner (Eliquis every 12 hours) until instructed otherwise. It is important not to interrupt blood thinner for any reason (other than an emergency) during the first 30 days after ablation.    * In the first week after ablation you should take it easy. No heavy lifting or heavy exertion, no treadmill.  You can use the stairs if needed but go slowly and minimize the number of times up and down.    * Some minor bruising is common at each groin access site with minor soreness as if you had banged the area. Bruising may occasionally be seen to extend down the leg. This is normal as is an occasional small quarter sized bump in the area. If larger swelling or more significant pain occurs at the area, please contact the office or go the nearest Emergency Room.    * All medications will generally remain the same unless you are told otherwise.  Resume taking your home medications today as listed on the discharge instructions.    *Continue to follow up with your primary cardiologist, primary care physician, and any other specialists you normally see.    *No driving for 2 days post procedure (IF you were driving prior to procedure).    *Diet: Heart healthy    Call Provider If:  Breathing faster than normal.     Fever of 100.4 F (38 C) or higher.     Chills.     Any new concerning symptoms.     Passing out.     Patient Instructions, Next 24 hours:  DO NOT drive a car, operate machinery or power tools.  It is recommended that a responsible adult be with you for the first 24 hours.     DO NOT drink any alcoholic drinks or take any non-prescriptive medications that contain alcohol for the first 24 hours.     DO NOT make any important decisions for the first 24 hours.    Activity:  You are advised to go directly home from the hospital.     DO NOT lift anything heavier than 10 pounds for one week, this allows for proper healing of the groin.     No excessive  exercise or treadmill use for one week. You may walk and do stairs, slowly.     No sexual activities for 24 hours after you arrive home.    Wound Care:  If slight bleeding should occur at site, lie down and have someone apply firm pressure just above the puncture site for 5 minutes.  If it continues or is profuse, call 911. Always notify your doctor if bleeding occurs.     Keep site clean and dry. Let air dry or you may use a simple bandaid.     Gently cleanse the puncture site in your groin with soap and water only.     You may experience some tenderness, bruising or minimal inflammation.  If you have any concerns, you may contact the Cath Lab or if any of these symptoms become excessive, contact your cardiologist or go to the emergency room.     No tub baths, soaking, or swimming for one week.     May shower the next day after your procedure.    If you have any questions about the effects of the sedative drugs or groin care, call the physician who performed your procedure.    FOLLOW UP:   1) Dr Evelyne Ambrose ( Electrophysiology) 1 month after ablation   will notify you of appointment. If you haven't heard in 1 week, please call [phone]

## 2024-06-12 NOTE — ANESTHESIA PROCEDURE NOTES
Peripheral IV  Date/Time: 6/12/2024 2:41 PM      Placement  Needle size: 18 G  Laterality: right  Location: forearm  Site prep: alcohol  Technique: anatomical landmarks  Attempts: 1

## 2024-06-12 NOTE — ANESTHESIA PROCEDURE NOTES
Arterial Line:    Date/Time: 6/12/2024 2:51 PM    Staffing  Performed: attending   Authorized by: Susan Haque MD    Performed by: SUN Jain    An arterial line was placed. Procedure performed using surface landmarks.in the OR for the following indication(s): continuous blood pressure monitoring and blood sampling needed.    A 20 gauge (size), 1 and 3/4 inch (length), Angiocath (type) catheter was placed into the Right radial artery, secured by Tegaderm,   Seldinger technique used.  Events:  patient tolerated procedure well with no complications.

## 2024-06-12 NOTE — ANESTHESIA PREPROCEDURE EVALUATION
Patient: Galen Villasenor Jr.    Procedure Information       Date/Time: 06/12/24 1330    Procedure: Ablation SVT Atypical Atrial Flutter - Hold all medications on the AM of the procedure, except Eliquis  carto    Location: [facility] STEREO / Virtual [facility] MAT 3529 Cardiac Cath Lab    Providers: Evelyne Ambrose MD            Relevant Problems   Anesthesia (within normal limits)      Cardiac   (+) Atrial flutter (Multi)   (+) Atypical atrial flutter (Multi)   (+) Coronary artery disease involving native coronary artery of native heart without angina pectoris   (+) Mixed hyperlipidemia   (+) Paroxysmal atrial fibrillation (Multi)   (+) Primary hypertension      Pulmonary   (+) Chronic obstructive pulmonary disease (Multi)      Neuro (within normal limits)      GI   (+) Esophageal dysphagia      /Renal (within normal limits)      Liver (within normal limits)      Endocrine   (+) Hyperthyroidism   (+) Multinodular goiter   (+) Toxic thyroid nodule   (+) Type 2 diabetes mellitus without complication, without long-term current use of insulin (Multi)      Hematology   (+) Anemia   (+) Hairy cell leukemia, in relapse (Multi)   (+) Pancytopenia (Multi)      Musculoskeletal (within normal limits)      HEENT (within normal limits)      ID (within normal limits)      Skin (within normal limits)      GYN (within normal limits)       Clinical information reviewed:   Tobacco  Allergies  Meds   Med Hx  Surg Hx   Fam Hx          NPO Detail:  NPO/Void Status  Date of Last Liquid: 06/11/24  Date of Last Solid: 06/11/24         Physical Exam    Airway  Mallampati: II  TM distance: >3 FB  Neck ROM: limited  Comments: Can bite upper lip; decreased neck motion   Cardiovascular   Rhythm: regular  Rate: normal     Dental    Pulmonary - normal exam     Abdominal - normal exam           Anesthesia Plan    History of general anesthesia?: yes  History of complications of general anesthesia?: no    ASA 3     general     intravenous induction    Postoperative administration of opioids is intended.  Anesthetic plan and risks discussed with patient.  Use of blood products discussed with patient who consented to blood products.    Plan discussed with CAA and attending.

## 2024-06-12 NOTE — Clinical Note
Sheath was exchanged in the right femoral vein with SHEATH, GUIDING, JOHN, 8.5F WITH CURVE VIZ  MDC.

## 2024-06-12 NOTE — POST-PROCEDURE NOTE
Physician Transition of Care Summary  Invasive Cardiovascular Lab    Procedure Date: 6/12/2024  Attending:    * Evelyne Ambrose - Primary  Resident/Fellow/Other Assistant: Surgeons and Role:     * Antonio Valle MD - Fellow    Indications:   Pre-op Diagnosis     * Atypical atrial flutter (Multi) [I48.4]    Post-procedure diagnosis:   Post-op Diagnosis     * Atypical atrial flutter (Multi) [I48.4]    Procedure(s):   Ablation SVT Atypical Atrial Flutter  24862 - TN COMPRE EP EVAL ABLTJ 3D MAPG TX VT        Procedure Findings:   Reconnection of right pulmonary veins  Reconnection of CTI line    Description of the Procedure:   Transseptal access  Right pulmonary vein isolation   CTI ablation    Complications:   none    Stents/Implants:   Implants       No implant documentation for this case.            Anticoagulation/Antiplatelet Plan:   Continue anticoagulation    Estimated Blood Loss:   20 mL    Anesthesia: General Anesthesia Staff: Anesthesiologist: Susan Haque MD; Riley Doss MD  C-AA: SUN Jain    Any Specimen(s) Removed:   No specimens collected during this procedure.    Disposition:   Back to recovery area      Electronically signed by: Evelyne Ambrose MD, 6/12/2024 5:33 PM

## 2024-06-12 NOTE — PROGRESS NOTES
Pharmacy Medication History Review    Galen Villasenor Jr. is a 78 y.o. male admitted for Atypical atrial flutter (Multi). Pharmacy reviewed the patient's [id number] medications and allergies for accuracy.    The list below reflects the updated PTA list. Comments regarding how patient may be taking medications differently can be found in the Admit Orders Activity  Prior to Admission Medications   Prescriptions Last Dose Informant   DULoxetine (Cymbalta) 60 mg DR capsule 6/11/2024 Self   Sig: Take 2 capsules (120 mg) by mouth once daily.   OXcarbazepine (Trileptal) 300 mg tablet 6/11/2024 Self   Sig: Take 1 tablet (300 mg) by mouth twice a day. For 1 week, then increase to 2 tabs (600mg) 2 times daily   apixaban (Eliquis) 5 mg tablet 6/12/2024 Self   Sig: Take 1 tablet (5 mg) by mouth 2 times a day.   dofetilide (Tikosyn) 125 mcg capsule 6/11/2024 Self   Sig: Take 1 capsule (125 mcg) by mouth every 12 hours.   empagliflozin (Jardiance) 25 mg 6/11/2024 Self   Sig: Take 0.5 tablets (12.5 mg) by mouth once daily.   fenofibrate (Triglide) 160 mg tablet 6/11/2024 Self   Sig: Take 1 tablet (160 mg) by mouth once daily.  WITH FOOD   gabapentin (Neurontin) 600 mg tablet 6/11/2024 Self   Sig: Take 1 tablet (600 mg) by mouth in the evening. Take 2 tablets (1,200 mg) by mouth at bedtime.   isosorbide mononitrate ER (Imdur) 30 mg 24 hr tablet 6/11/2024 Self   Sig: Take 1 tablet (30 mg) by mouth once daily.   lisinopril 40 mg tablet 6/11/2024 Self   Sig: Take 1 tablet (40 mg) by mouth once daily.   lubricating eye drops ophthalmic solution 6/12/2024 Self   Sig: Administer 1 drop into both eyes once daily in the morning.   methIMAzole (Tapazole) 5 mg tablet 6/11/2024 Self   Sig: Take 1 tablet (5 mg) by mouth once daily.   omeprazole (PriLOSEC) 20 mg DR capsule 6/11/2024 Self   Sig: Take 1 capsule (20 mg) by mouth 2 times a day before meals.    rosuvastatin (Crestor) 20 mg tablet 6/11/2024 Self   Sig: Take 1 tablet (20 mg)  by mouth once daily.   vit A/vit C/vit E/zinc/copper (PRESERVISION AREDS ORAL) 6/11/2024 Self   Sig: Take 1 tablet by mouth twice a day.      Facility-Administered Medications: None        The list below reflects the updated allergy list. Please review each documented allergy for additional clarification and justification.  Allergies  Reviewed by Bhumika Elias PharmD on 6/12/2024        Severity Reactions Comments    Anesthetics - Renetta Type- Parabens Not Specified Seizure     Procaine Not Specified Seizure     Quinolones Not Specified Unknown, Other drop foot            Patient was unable to be assessed for M2B at discharge. Pharmacy has been updated to [facility]. M2B service not offered prior to surgery, please reassess prior to patient discharge if Meds to Beds is desired.     Sources used to complete the med history include: Patient interview - good historian of medications/ Pharmacy - [facility], [facility]/ Chart review - Cardiology visit 5/21/24/ XU Elias PharmD  Transitions of Care Pharmacist  [facility]  Please reach out via Secure Chat for questions, or if no response call [employer] or [facility]

## 2024-06-12 NOTE — ANESTHESIA PROCEDURE NOTES
Airway  Date/Time: 6/12/2024 2:52 PM  Urgency: elective      Staffing  Performed: SUN   Authorized by: Susan Haque MD    Performed by: SUN Jain  Patient location during procedure: OR    Indications and Patient Condition  Indications for airway management: anesthesia  Spontaneous ventilation: present  Sedation level: deep  Preoxygenated: yes  Patient position: sniffing  Mask difficulty assessment: 2 - vent by mask + OA or adjuvant +/- NMBA    Final Airway Details  Final airway type: endotracheal airway      Successful airway: ETT     Successful intubation technique: direct laryngoscopy  Blade: Sanna  Blade size: #4  ETT size (mm): 7.5  Cormack-Lehane Classification: grade I - full view of glottis  Placement verified by: chest auscultation   Measured from: lips  ETT to lips (cm): 23  Number of attempts at approach: 1

## 2024-06-12 NOTE — ANESTHESIA POSTPROCEDURE EVALUATION
Patient: Galen Villasenor Jr.    Procedure Summary       Date: 06/12/24 Room / Location: [facility] / [facility] MAT 3529 Cardiac Cath Lab    Anesthesia Start: 1419 Anesthesia Stop: 1739    Procedure: Ablation SVT Atypical Atrial Flutter Diagnosis:       Atypical atrial flutter (Multi)      (Atypical atrial flutter (Multi) [I48.4])    Providers: Evelyne Ambrose MD Responsible Provider: SUN Jain    Anesthesia Type: general ASA Status: 3            Anesthesia Type: general    Vitals Value Taken Time   BP  06/12/24 1739   Temp  06/12/24 1739   Pulse  06/12/24 1739   Resp  06/12/24 1739   SpO2  06/12/24 1739       Anesthesia Post Evaluation    Patient location during evaluation: PACU  Patient participation: complete - patient participated  Level of consciousness: awake and alert  Pain score: 4  Pain management: adequate  Airway patency: patent  Cardiovascular status: acceptable  Respiratory status: acceptable  Hydration status: acceptable  Postoperative Nausea and Vomiting: none      No notable events documented.

## 2024-06-19 ENCOUNTER — HOSPITAL ENCOUNTER (OUTPATIENT)
Dept: RADIOLOGY | Facility: CLINIC | Age: 79
Discharge: HOME | End: 2024-06-19
Payer: MEDICARE

## 2024-06-19 ENCOUNTER — LAB (OUTPATIENT)
Dept: LAB | Facility: LAB | Age: 79
End: 2024-06-19
Payer: MEDICARE

## 2024-06-19 VITALS — BODY MASS INDEX: 26.36 KG/M2 | HEIGHT: 71 IN

## 2024-06-19 DIAGNOSIS — G50.0 TRIGEMINAL NEURALGIA: ICD-10-CM

## 2024-06-19 DIAGNOSIS — C61 MALIGNANT NEOPLASM OF PROSTATE (MULTI): Primary | ICD-10-CM

## 2024-06-19 LAB — PSA SERPL-MCNC: 0.48 NG/ML

## 2024-06-19 PROCEDURE — 70553 MRI BRAIN STEM W/O & W/DYE: CPT

## 2024-06-19 PROCEDURE — 84153 ASSAY OF PSA TOTAL: CPT

## 2024-06-19 PROCEDURE — 36415 COLL VENOUS BLD VENIPUNCTURE: CPT

## 2024-06-19 PROCEDURE — 70553 MRI BRAIN STEM W/O & W/DYE: CPT | Performed by: RADIOLOGY

## 2024-06-19 PROCEDURE — A9575 INJ GADOTERATE MEGLUMI 0.1ML: HCPCS

## 2024-06-19 PROCEDURE — 2500000004 HC RX 250 GENERAL PHARMACY W/ HCPCS (ALT 636 FOR OP/ED)

## 2024-06-19 RX ORDER — GADOTERATE MEGLUMINE 376.9 MG/ML
18 INJECTION INTRAVENOUS
Status: COMPLETED | OUTPATIENT
Start: 2024-06-19 | End: 2024-06-19

## 2024-07-05 ENCOUNTER — APPOINTMENT (OUTPATIENT)
Dept: CARDIOLOGY | Facility: CLINIC | Age: 79
End: 2024-07-05
Payer: MEDICARE

## 2024-07-05 VITALS
BODY MASS INDEX: 28.67 KG/M2 | SYSTOLIC BLOOD PRESSURE: 158 MMHG | HEIGHT: 71 IN | DIASTOLIC BLOOD PRESSURE: 80 MMHG | WEIGHT: 204.8 LBS | HEART RATE: 83 BPM | TEMPERATURE: 97.2 F

## 2024-07-05 DIAGNOSIS — I48.0 PAROXYSMAL ATRIAL FIBRILLATION (MULTI): Primary | ICD-10-CM

## 2024-07-05 PROCEDURE — 3079F DIAST BP 80-89 MM HG: CPT | Performed by: INTERNAL MEDICINE

## 2024-07-05 PROCEDURE — 1157F ADVNC CARE PLAN IN RCRD: CPT | Performed by: INTERNAL MEDICINE

## 2024-07-05 PROCEDURE — 1036F TOBACCO NON-USER: CPT | Performed by: INTERNAL MEDICINE

## 2024-07-05 PROCEDURE — 99213 OFFICE O/P EST LOW 20 MIN: CPT | Performed by: INTERNAL MEDICINE

## 2024-07-05 PROCEDURE — 3077F SYST BP >= 140 MM HG: CPT | Performed by: INTERNAL MEDICINE

## 2024-07-05 PROCEDURE — 1159F MED LIST DOCD IN RCRD: CPT | Performed by: INTERNAL MEDICINE

## 2024-07-05 RX ORDER — PREGABALIN 75 MG/1
2 CAPSULE ORAL NIGHTLY
COMMUNITY

## 2024-07-05 NOTE — PROGRESS NOTES
Subjective:  The patient is an 78-year-old white male, followed primarily by Dr. Bladimir Ackerman, who presents for follow-up because of atrial arrhythmias.  He has a history of hairy cell leukemia, followed by Dr. Saturnino Kruse, and felt to be in good remission.  He has had paroxysmal atrial fibrillation and flutter, and underwent PVI by Dr. Ambrose on 1/22/2024 but had recurrences of a probable left atrial flutter within the next few months.  He was hospitalized at the end of February 2024 and started on dofetilide therapy, tolerating a dose of just 125 mcg p.o. twice daily.  By event monitoring in April 2024, he had a 70% burden of atrial arrhythmias.  Dr. Ambrose performed a redo pulmonary vein isolation on him on 6/12/2024, as his right-sided veins had reconnected; some additional lesion sets were placed.  The patient felt weak and tired after the procedure but is now beginning to feel better, with less exertional dyspnea and no palpitations.    The patient's history is also remarkable for pancytopenia related to prior chemotherapy, orthostatic hypotension (requiring discontinuation of nitrates and lisinopril earlier this year), hyperthyroidism, and chronic kidney disease.    Current Outpatient Medications   Medication Sig    apixaban (Eliquis) 5 mg tablet Take 1 tablet (5 mg) by mouth 2 times a day.    dofetilide (Tikosyn) 125 mcg capsule Take 1 capsule (125 mcg) by mouth every 12 hours.    DULoxetine (Cymbalta) 60 mg DR capsule Take 2 capsules (120 mg) by mouth once daily.    empagliflozin (Jardiance) 25 mg Take 0.5 tablets (12.5 mg) by mouth once daily.    fenofibrate (Triglide) 160 mg tablet Take 1 tablet (160 mg) by mouth once daily.  WITH FOOD    gabapentin (Neurontin) 600 mg tablet Take 1 tablet (600 mg) by mouth in the morning. And Take 2 tablets (1,200 mg) by mouth at bedtime.    isosorbide mononitrate ER (Imdur) 30 mg 24 hr tablet Take 1 tablet (30 mg) by mouth once daily.    methIMAzole (Tapazole) 5 mg  tablet Take 1 tablet (5 mg) by mouth once daily.    pantoprazole (ProtoNix) 40 mg EC tablet Take 1 tablet (40 mg) by mouth 2 times a day. Do not crush, chew, or split.    pregabalin (Lyrica) 75 mg capsule Take 2 capsules (150 mg) by mouth once daily at bedtime.    rosuvastatin (Crestor) 20 mg tablet Take 1 tablet (20 mg) by mouth once daily.     Allergies:  Anesthetics - terrie type- parabens, Procaine, and Quinolones     Patient Active Problem List   Diagnosis    Acquired hammertoe of right foot    Esophageal dysphagia    Hallux limitus of left foot    Heartburn    Paroxysmal atrial fibrillation (Multi)    Tubular adenoma of colon    Hyperthyroidism    Itching of ear    Multinodular goiter    Otalgia of right ear    Toxic thyroid nodule    Hairy cell leukemia, in relapse (Multi)    Pancytopenia (Multi)    Coronary artery disease involving native coronary artery of native heart without angina pectoris    Primary hypertension    Mixed hyperlipidemia    Chronic obstructive pulmonary disease (Multi)    Type 2 diabetes mellitus without complication, without long-term current use of insulin (Multi)    Atypical atrial flutter (Multi)    Atrial flutter (Multi)    Chemotherapy-induced neutropenia (CMS-HCC)    Visit for monitoring Tikosyn therapy    Anemia     Past Surgical History:   Procedure Laterality Date    CARDIAC ELECTROPHYSIOLOGY PROCEDURE N/A 1/22/2024    Procedure: Ablation A-Fib;  Surgeon: Evelyne Ambrose MD;  Location: [facility] Cardiac Cath Lab;  Service: Electrophysiology;  Laterality: N/A;    CARDIAC ELECTROPHYSIOLOGY PROCEDURE N/A 3/1/2024    Procedure: Cardioversion;  Surgeon: Darrick Alfonso MD;  [facility]: [facility] Cardiac Cath Lab;  Service: Electrophysiology;  Laterality: N/A;    CARDIAC ELECTROPHYSIOLOGY PROCEDURE N/A 6/12/2024    Procedure: Ablation SVT Atypical Atrial Flutter;  Surgeon: Evelyne Ambrose MD;  Location: [place] [address] Cardiac Cath Lab;  Service: Electrophysiology;  Laterality: N/A;  Hold all  "medications on the AM of the procedure, except Eliquis  carto    CARPAL TUNNEL RELEASE  04/04/2017    Neuroplasty Decompression Median Nerve At Carpal Tunnel    CORONARY ANGIOPLASTY  04/04/2017    PTCA    OTHER SURGICAL HISTORY  04/04/2017    Cystoscopy With Insertion Of Permanent Urethral Stent    ROTATOR CUFF REPAIR  04/04/2017    Rotator Cuff Repair    SPINAL FUSION  04/04/2017    Spinal Arthrodesis    TOTAL KNEE ARTHROPLASTY  04/04/2017    Knee Replacement     Objective:  Vitals:    07/05/24 0847   BP: 158/80   Pulse: 83   Temp: 36.2 °C (97.2 °F)   Height:     1.803 m (5' 11\")  Weight: 92.9 kg (204 lb 12.8 oz)     Exam:  Gen: Bearded gentleman in no distress, appearing 10 years younger than his stated age.  HEENT: No scleral icterus.  Neck: No jugular venous distention or thyromegaly.  Lungs: Clear to auscultation, with no wheezes or rales.  Heart: Regular rhythm without extrasystoles, murmurs or gallops.  Abdomen: Benign, with no organomegaly or masses.  Extremities: Intact distal pulses; no edema.  Neuro: No focal neurologic abnormalities.  Skin: No cutaneous lesions.    Impressions:  1.  Hairy cell leukemia, status post chemotherapy per Dr. Saturnino Kruse, felt to be in good remission.  2.  Paroxysmal atrial fibrillation and flutter of uncertain etiology, status post left-sided ablations 1/22/2024 and 6/12/2024.  He continues on a \"rhythm control strategy\" with low-dose dofetilide.  He has a [id number] score of 3 (hypertension, 2 points for age over 75), and his been anticoagulated with Eliquis.  3.  Hypertension by history, but taken off lisinopril due to orthostatic hypotension.  4.  Hyperthyroidism, on methimazole therapy.  5.  Prior pancytopenia due to chemotherapy, apparently improved now.  6.  Chronic kidney disease, but with only marginally elevated BUN (25) and creatinine (1.17) by check in late April 2024.    Recommendations:  1.  The patient will continue his current medical regimen.  2.  He states " that he will see Dr. Ambrose in 2 weeks as scheduled.  I presume that Dr. Ambrose will want another event monitor in the fall of 2024.  If the patient's atrial arrhythmia burden is minuscule, he could potentially be taken off dofetilide at the end of the calendar year.  3.  He will follow-up with me in 6 months.      Darrick Alfonso MD

## 2024-07-16 ENCOUNTER — OFFICE VISIT (OUTPATIENT)
Dept: CARDIOLOGY | Facility: CLINIC | Age: 79
End: 2024-07-16
Payer: MEDICARE

## 2024-07-16 VITALS
HEIGHT: 71 IN | RESPIRATION RATE: 14 BRPM | BODY MASS INDEX: 28.42 KG/M2 | WEIGHT: 203 LBS | OXYGEN SATURATION: 97 % | HEART RATE: 74 BPM | DIASTOLIC BLOOD PRESSURE: 72 MMHG | SYSTOLIC BLOOD PRESSURE: 148 MMHG | TEMPERATURE: 97.1 F

## 2024-07-16 DIAGNOSIS — I48.0 PAROXYSMAL ATRIAL FIBRILLATION (MULTI): Primary | ICD-10-CM

## 2024-07-16 PROCEDURE — 99213 OFFICE O/P EST LOW 20 MIN: CPT | Performed by: INTERNAL MEDICINE

## 2024-07-16 PROCEDURE — 3077F SYST BP >= 140 MM HG: CPT | Performed by: INTERNAL MEDICINE

## 2024-07-16 PROCEDURE — 1157F ADVNC CARE PLAN IN RCRD: CPT | Performed by: INTERNAL MEDICINE

## 2024-07-16 PROCEDURE — 3078F DIAST BP <80 MM HG: CPT | Performed by: INTERNAL MEDICINE

## 2024-07-16 PROCEDURE — 1159F MED LIST DOCD IN RCRD: CPT | Performed by: INTERNAL MEDICINE

## 2024-07-16 PROCEDURE — 1036F TOBACCO NON-USER: CPT | Performed by: INTERNAL MEDICINE

## 2024-07-16 PROCEDURE — 93005 ELECTROCARDIOGRAM TRACING: CPT | Performed by: INTERNAL MEDICINE

## 2024-07-16 PROCEDURE — G2211 COMPLEX E/M VISIT ADD ON: HCPCS | Performed by: INTERNAL MEDICINE

## 2024-07-16 PROCEDURE — 93010 ELECTROCARDIOGRAM REPORT: CPT | Performed by: INTERNAL MEDICINE

## 2024-07-16 ASSESSMENT — PATIENT HEALTH QUESTIONNAIRE - PHQ9
2. FEELING DOWN, DEPRESSED OR HOPELESS: NOT AT ALL
1. LITTLE INTEREST OR PLEASURE IN DOING THINGS: NOT AT ALL
SUM OF ALL RESPONSES TO PHQ9 QUESTIONS 1 AND 2: 0

## 2024-07-16 NOTE — PROGRESS NOTES
Referring Provider: Darrick Alfonso MD  Reason for Consult: Atrial fibrillation    History of Present Illness:      Galen Villasenor Jr. is a 78 y.o. year old male patient with a history significant for persistent atrial fibrillation, chronic ischemic heart disease, hairy cell leukemia, hyperlipidemia,  who was referred by Dr. Alfonso for management of atrial fibrillation. He is here today for follow-up after recent ablation at the end of January.     Mr. Villasenor was initially diagnosed with paroxysmal atrial fibrillation in 2018 when he had a period of illness that was prolonged and associated with fatigue.  He was found to have atrial fibrillation.  He has had multiple cardioversions 18, and was ultimately started on amiodarone.  He did well over the intervening years, with improvement of his symptoms while in sinus rhythm.  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  He was started on dofetilide in November 2022, which has worked fairly well until recently, where he has begun to have paroxysms of atrial arrhythmias being on dofetilide.  He has a 4% A-fib burden on monitoring.  Since the summer, he has complained of worsening exertional fatigue and dyspnea.  Some of this may be due to recurrence of his hairy cell leukemia, and his hemoglobin has been trending down.  However, he does attribute some of the symptoms to his atrial fibrillation as well.  He is planning to start his chemotherapy for his leukemia soon.    He had a successful AF ablation on 1/22/2024 with PVI, LA PWI, and CTI line. No complications after the procedure. An EKG done on 1/30 showed atypical atrial flutter, but this was not brought to my attention. Since the ablation, he has started chemotherapy, and has significant weakness and fatigue since then. He then had dofetilide loading and repeat cardioversion on 3/1/2024 back to normal sinus rhythm, but unfortunately had recurrence of atypical flutter.    After his last visit, we decided to  move forward with repeat ablation given recurrence of atrial arrhythmias.  He underwent a repeat ablation on 6/12/2024.  He had reconnection of his right pulmonary veins, posterior wall, as well as prior CTI.  These were already isolated.  He also had a septal flutter that was ablated in the high right atrial septum.  His dofetilide was continued after the procedure.  He has been doing well since the procedure with no recurrence of atrial arrhythmias.  He feels much better since the ablation.  He no longer feels short of breath with exertion, though he is working on improving his conditioning.    Focused Cardiovascular Problem List:  Persistent atrial fibrillation: [id number] score 5, hypertension, CAD, type 2 diabetes, age.  On Eliquis and dofetilide.  Previously on amiodarone, but caused thyroiditis.  Has had 5 prior cardioversions and developed breakthrough on dofetilide. He underwent successful AF ablation on 1/22/2024 with PVI, LA PWI, and CTI line. His dofetilide was stopped after the procedure. On 1/30/2024, he had an EKG which showed atypical atrial flutter. He underwent repeat dofetilide loading and cardioversion in March, but unfortunately had recurrence of atypical flutter despite this. He underwent a repeat ablation on 6/12/2024.  He had reconnection of his right pulmonary veins, posterior wall, as well as prior CTI.  These were already isolated.  He also had a septal flutter that was ablated in the high right atrial septum.  His dofetilide was continued after the procedure.  Chronic ischemic heart disease: Remote PCI, preserved ejection fraction  Hypertension  Hairy-cell leukemia: Extensive marrow involvement; on cladribine      Past Medical and Surgical History:  Mr. Villasenor  has a past medical history of Atherosclerotic heart disease of native coronary artery without angina pectoris (04/04/2017), Diabetes mellitus (Multi), Personal history of malignant neoplasm of prostate (04/04/2017), Personal  history of other diseases of the circulatory system (2017), Personal history of other diseases of the respiratory system (2017), Personal history of other endocrine, nutritional and metabolic disease (2017), Personal history of other endocrine, nutritional and metabolic disease (2017), and Personal history of other specified conditions (2017).    has a past surgical history that includes Rotator cuff repair (2017); Total knee arthroplasty (2017); Carpal tunnel release (2017); Coronary angioplasty (2017); Other surgical history (2017); Spinal fusion (2017); Cardiac electrophysiology procedure (N/A, 2024); Cardiac electrophysiology procedure (N/A, 3/1/2024); and Cardiac electrophysiology procedure (N/A, 2024).    Social History:  Social History     Tobacco Use    Smoking status: Former     Current packs/day: 0.00     Average packs/day: 1 pack/day for 23.0 years (23.0 ttl pk-yrs)     Types: Cigarettes, Pipe     Start date: 1957     Quit date: 1980     Years since quittin.5     Passive exposure: Past    Smokeless tobacco: Never   Substance Use Topics    Alcohol use: Yes     Alcohol/week: 7.0 standard drinks of alcohol     Types: 7 Glasses of wine per week     Comment: once a night      Tobacco: Quit /0, prior 4 packs/day for 23 years  Alcohol:  1 glass of wine / day  Drug use:  Denies    Relevant Family History:   Family History   Problem Relation Name Age of Onset    Lung cancer Mother      Cancer Father      Alcohol abuse Father      Other (cardiac disorder) Father      Brain cancer Sister      Cancer Brother          Allergies:  Allergies   Allergen Reactions    Anesthetics - Renetta Type- Parabens Seizure    Procaine Seizure    Quinolones Unknown and Other     drop foot        Medications:  Current Outpatient Medications   Medication Instructions    apixaban (ELIQUIS) 5 mg, oral, 2 times daily    dofetilide  "(TIKOSYN) 125 mcg, oral, Every 12 hours scheduled    DULoxetine (CYMBALTA) 120 mg, oral, Daily    empagliflozin (JARDIANCE) 12.5 mg, oral, Daily    fenofibrate (Triglide) 160 mg tablet 1 tablet, oral, Daily,  WITH FOOD    gabapentin (Neurontin) 600 mg tablet Take 1 tablet (600 mg) by mouth in the morning. And Take 2 tablets (1,200 mg) by mouth at bedtime.    isosorbide mononitrate ER (Imdur) 30 mg 24 hr tablet 1 tablet, oral, Daily    methIMAzole (TAPAZOLE) 5 mg, oral, Daily    pantoprazole (PROTONIX) 40 mg, oral, 2 times daily, Do not crush, chew, or split.    pregabalin (Lyrica) 75 mg capsule 2 capsules, oral, Nightly    rosuvastatin (CRESTOR) 20 mg, oral, Daily         Objective   Physical Exam:  Last Recorded Vitals:      5/1/2024    10:38 AM 5/21/2024     9:00 AM 6/12/2024     3:26 PM 6/19/2024     3:02 PM 6/19/2024     3:05 PM 7/5/2024     8:47 AM 7/16/2024    10:52 AM   Vitals   Systolic 117 124    158 148   Diastolic 55 62    80 72   Heart Rate 61 64    83 74   Temp 36 °C (96.8 °F) 36 °C (96.8 °F)    36.2 °C (97.2 °F) 36.2 °C (97.1 °F)   Resp 18      14   Height (in)  1.803 m (5' 11\") 1.803 m (5' 10.98\")  1.803 m (5' 11\") 1.803 m (5' 11\") 1.803 m (5' 11\")   Weight (lb) 192.68 199 199.08 189  204.8 203   BMI 27.65 kg/m2 27.75 kg/m2 27.78 kg/m2 26.37 kg/m2 26.36 kg/m2 28.56 kg/m2 28.31 kg/m2   BSA (m2) 2.08 m2 2.13 m2 2.13 m2 2.07 m2 2.07 m2 2.16 m2 2.15 m2   Visit Report Report Report    Report Report    Visit Vitals  /72   Pulse 74   Temp 36.2 °C (97.1 °F)   Resp 14   Ht 1.803 m (5' 11\")   Wt 92.1 kg (203 lb)   SpO2 97%   BMI 28.31 kg/m²   Smoking Status Former   BSA 2.15 m²      Gen: NAD, sitting comfortably  HEENT: NC/AT  Card: RRR, no m/r/g  Pulm: Clear to auscultation bilaterally  Ext: No LE edema  Neuro: No focal deficits    Diagnostic Results      My Interpretation of Reviewed Study(s):  Prior ECGs (reviewed and my interpretation):  7/16/2024: Sinus rhythm with 1st degree AV block, low voltage " QRS   5/21/2024: Atypical Atrial flutter, frequent PVCs, HR 74bpm  2/20/2024: Atypical atrial flutter, variable AV block, HR 74 bpm  1/30/2024: Atypical atrial flutter with variable AV block, HR 70bpm  1/9/2024: Sinus rhythm, heart rate 78 bpm, ventricular bigeminy, Para-hisian PVCs    Echocardiography:  11/14/2023  CONCLUSIONS:   - Exam indication: Chest Pain   - The left ventricle is normal in size. There is mild left ventricular   hypertrophy. Left ventricular systolic function is normal. EF = 60 ± 5% (2D   biplane) Grade I left ventricular diastolic dysfunction.   - The right ventricle is normal in size. Right ventricular systolic function is   normal.   - The left atrial cavity is mildly dilated.   - Exam was compared with the prior  echocardiographic exam performed on   05/02/2019. No significant change     Stress Test:   6/12/2023  CONCLUSIONS:    1. SPECT Perfusion Study: Normal.    2. There is no scintigraphic evidence for inducible ischemia.    3. No evidence of scarred myocardium.    4. Left ventricle is normal in size. The left ventricle systolic   function is normal.    5. This is a low risk scan.       Relevant Labs:  Lab Results   Component Value Date    CREATININE 1.17 04/29/2024    CREATININE 1.15 04/01/2024    CREATININE 1.32 (H) 03/11/2024    K 4.0 04/29/2024    K 4.0 04/01/2024    K 4.0 03/11/2024    HGB 12.3 (L) 04/29/2024    HGB 10.7 (L) 04/16/2024    HGB 9.0 (L) 04/01/2024    INR 1.7 (H) 01/22/2024    AST 16 04/29/2024    AST 17 04/01/2024    AST 15 03/11/2024    ALT 20 04/29/2024    ALT 12 04/01/2024    ALT 13 03/11/2024       Assessment/Plan   Assessment and Plan:  Galen SALINAS Jacklyn John is a 78 y.o. year old male patient who underwent successful AF ablation on 1/22/2024 with PVI, LA PWI, and CTI line, and then underwent redo ablation on 6/12/2024 with reisolation of his right pulmonary veins, posterior wall, and CTI line.  He is doing well today on low-dose dofetilide without recurrence of  any atrial arrhythmia.  He is only 1 month out from his ablation at this point.    We will order a 3-month monitor to assess for any recurrence of atrial arrhythmia after the second ablation.  If he continues to be atrial fibrillation free, his dofetilide could be stopped after the monitor.  In the meantime, he will continue his low-dose dofetilide 125 mcg every 12 hours as well as Eliquis.        Return to Clinic:  Return as needed depending on results of repeat monitor    Thank you very much for allowing me to participate in the care of this patient. Please do not hesitate to contact me with any further questions or concerns.    Evelyne Ambrose MD  Clinical Cardiac Electrophysiologist  Director of Atrial Fibrillation Ablation, [facility]  Director of Ventricular Arrhythmias Research, [facility]  Office Phone Number: [phone]

## 2024-07-29 ENCOUNTER — LAB (OUTPATIENT)
Dept: LAB | Facility: LAB | Age: 79
End: 2024-07-29
Payer: MEDICARE

## 2024-07-29 DIAGNOSIS — C91.42 HAIRY CELL LEUKEMIA, IN RELAPSE (MULTI): ICD-10-CM

## 2024-07-29 LAB
ALBUMIN SERPL BCP-MCNC: 4.4 G/DL (ref 3.4–5)
ALP SERPL-CCNC: 57 U/L (ref 33–136)
ALT SERPL W P-5'-P-CCNC: 21 U/L (ref 10–52)
ANION GAP SERPL CALC-SCNC: 10 MMOL/L (ref 10–20)
AST SERPL W P-5'-P-CCNC: 20 U/L (ref 9–39)
BASOPHILS # BLD AUTO: 0.02 X10*3/UL (ref 0–0.1)
BASOPHILS NFR BLD AUTO: 0.5 %
BILIRUB SERPL-MCNC: 0.5 MG/DL (ref 0–1.2)
BUN SERPL-MCNC: 17 MG/DL (ref 6–23)
CALCIUM SERPL-MCNC: 9.3 MG/DL (ref 8.6–10.3)
CHLORIDE SERPL-SCNC: 105 MMOL/L (ref 98–107)
CO2 SERPL-SCNC: 29 MMOL/L (ref 21–32)
CREAT SERPL-MCNC: 1.2 MG/DL (ref 0.5–1.3)
EGFRCR SERPLBLD CKD-EPI 2021: 61 ML/MIN/1.73M*2
EOSINOPHIL # BLD AUTO: 0.1 X10*3/UL (ref 0–0.4)
EOSINOPHIL NFR BLD AUTO: 2.3 %
ERYTHROCYTE [DISTWIDTH] IN BLOOD BY AUTOMATED COUNT: 15 % (ref 11.5–14.5)
GLUCOSE SERPL-MCNC: 166 MG/DL (ref 74–99)
HCT VFR BLD AUTO: 44.6 % (ref 41–52)
HGB BLD-MCNC: 14.2 G/DL (ref 13.5–17.5)
IMM GRANULOCYTES # BLD AUTO: 0.03 X10*3/UL (ref 0–0.5)
IMM GRANULOCYTES NFR BLD AUTO: 0.7 % (ref 0–0.9)
LYMPHOCYTES # BLD AUTO: 0.46 X10*3/UL (ref 0.8–3)
LYMPHOCYTES NFR BLD AUTO: 10.5 %
MCH RBC QN AUTO: 29.2 PG (ref 26–34)
MCHC RBC AUTO-ENTMCNC: 31.8 G/DL (ref 32–36)
MCV RBC AUTO: 92 FL (ref 80–100)
MONOCYTES # BLD AUTO: 0.48 X10*3/UL (ref 0.05–0.8)
MONOCYTES NFR BLD AUTO: 11 %
NEUTROPHILS # BLD AUTO: 3.29 X10*3/UL (ref 1.6–5.5)
NEUTROPHILS NFR BLD AUTO: 75 %
NRBC BLD-RTO: 0 /100 WBCS (ref 0–0)
PLATELET # BLD AUTO: 141 X10*3/UL (ref 150–450)
POTASSIUM SERPL-SCNC: 4.2 MMOL/L (ref 3.5–5.3)
PROT SERPL-MCNC: 6.1 G/DL (ref 6.4–8.2)
RBC # BLD AUTO: 4.86 X10*6/UL (ref 4.5–5.9)
SODIUM SERPL-SCNC: 140 MMOL/L (ref 136–145)
WBC # BLD AUTO: 4.4 X10*3/UL (ref 4.4–11.3)

## 2024-07-29 PROCEDURE — 85025 COMPLETE CBC W/AUTO DIFF WBC: CPT

## 2024-07-29 PROCEDURE — 80053 COMPREHEN METABOLIC PANEL: CPT

## 2024-07-29 PROCEDURE — 36415 COLL VENOUS BLD VENIPUNCTURE: CPT

## 2024-07-31 ENCOUNTER — OFFICE VISIT (OUTPATIENT)
Dept: HEMATOLOGY/ONCOLOGY | Facility: CLINIC | Age: 79
End: 2024-07-31
Payer: MEDICARE

## 2024-07-31 VITALS
OXYGEN SATURATION: 95 % | BODY MASS INDEX: 28.41 KG/M2 | SYSTOLIC BLOOD PRESSURE: 127 MMHG | TEMPERATURE: 98.4 F | WEIGHT: 203.71 LBS | DIASTOLIC BLOOD PRESSURE: 65 MMHG | HEART RATE: 75 BPM | RESPIRATION RATE: 16 BRPM

## 2024-07-31 DIAGNOSIS — I25.10 CORONARY ARTERY DISEASE INVOLVING NATIVE CORONARY ARTERY OF NATIVE HEART WITHOUT ANGINA PECTORIS: Primary | ICD-10-CM

## 2024-07-31 DIAGNOSIS — E78.2 MIXED HYPERLIPIDEMIA: ICD-10-CM

## 2024-07-31 DIAGNOSIS — I48.0 PAROXYSMAL ATRIAL FIBRILLATION (MULTI): ICD-10-CM

## 2024-07-31 DIAGNOSIS — E11.9 TYPE 2 DIABETES MELLITUS WITHOUT COMPLICATION, WITHOUT LONG-TERM CURRENT USE OF INSULIN (MULTI): ICD-10-CM

## 2024-07-31 DIAGNOSIS — E05.90 HYPERTHYROIDISM: ICD-10-CM

## 2024-07-31 DIAGNOSIS — I10 PRIMARY HYPERTENSION: ICD-10-CM

## 2024-07-31 DIAGNOSIS — C91.42 HAIRY CELL LEUKEMIA, IN RELAPSE (MULTI): ICD-10-CM

## 2024-07-31 PROCEDURE — 3074F SYST BP LT 130 MM HG: CPT | Performed by: INTERNAL MEDICINE

## 2024-07-31 PROCEDURE — 1126F AMNT PAIN NOTED NONE PRSNT: CPT | Performed by: INTERNAL MEDICINE

## 2024-07-31 PROCEDURE — 1157F ADVNC CARE PLAN IN RCRD: CPT | Performed by: INTERNAL MEDICINE

## 2024-07-31 PROCEDURE — 99214 OFFICE O/P EST MOD 30 MIN: CPT | Performed by: INTERNAL MEDICINE

## 2024-07-31 PROCEDURE — 1159F MED LIST DOCD IN RCRD: CPT | Performed by: INTERNAL MEDICINE

## 2024-07-31 PROCEDURE — 3078F DIAST BP <80 MM HG: CPT | Performed by: INTERNAL MEDICINE

## 2024-07-31 RX ORDER — OMEPRAZOLE 20 MG/1
20 CAPSULE, DELAYED RELEASE ORAL
COMMUNITY

## 2024-07-31 ASSESSMENT — PAIN SCALES - GENERAL: PAINLEVEL: 0-NO PAIN

## 2024-07-31 NOTE — PATIENT INSTRUCTIONS
See you again in 3 months   Quality 110: Preventive Care And Screening: Influenza Immunization: Influenza Immunization Administered during Influenza season Detail Level: Detailed Quality 111:Pneumonia Vaccination Status For Older Adults: Patient received any pneumococcal conjugate or polysaccharide vaccine on or after their 60th birthday and before the end of the measurement period Quality 226: Preventive Care And Screening: Tobacco Use: Screening And Cessation Intervention: Patient screened for tobacco use and is an ex/non-smoker

## 2024-08-02 ASSESSMENT — ENCOUNTER SYMPTOMS
NEUROLOGICAL NEGATIVE: 1
RESPIRATORY NEGATIVE: 1
EYES NEGATIVE: 1
PSYCHIATRIC NEGATIVE: 1
MUSCULOSKELETAL NEGATIVE: 1
CONSTITUTIONAL NEGATIVE: 1
ENDOCRINE NEGATIVE: 1
GASTROINTESTINAL NEGATIVE: 1
HEMATOLOGIC/LYMPHATIC NEGATIVE: 1
CARDIOVASCULAR NEGATIVE: 1

## 2024-08-02 NOTE — PROGRESS NOTES
Patient ID: Galen Villasenor Jr. is a 78 y.o. male.  Referring Physician: Saturnino Kruse MD  [address]  [place] [address]  Primary Care Provider: Bladimir Ackerman MD  Visit Type: Follow Up      Subjective    HPI How are my blood counts?  I recently needed another cardiac ablation    Review of Systems   Constitutional: Negative.    HENT:  Negative.     Eyes: Negative.    Respiratory: Negative.     Cardiovascular: Negative.    Gastrointestinal: Negative.    Endocrine: Negative.    Genitourinary: Negative.     Musculoskeletal: Negative.    Skin: Negative.    Neurological: Negative.    Hematological: Negative.    Psychiatric/Behavioral: Negative.          Objective   BSA: 2.15 meters squared  /65 (BP Location: Right arm, Patient Position: Sitting, BP Cuff Size: Adult)   Pulse 75   Temp 36.9 °C (98.4 °F) (Temporal)   Resp 16   Wt 92.4 kg (203 lb 11.3 oz)   SpO2 95%   BMI 28.41 kg/m²      has a past medical history of Atherosclerotic heart disease of native coronary artery without angina pectoris (04/04/2017), Diabetes mellitus (Multi), Personal history of malignant neoplasm of prostate (04/04/2017), Personal history of other diseases of the circulatory system (04/04/2017), Personal history of other diseases of the respiratory system (04/04/2017), Personal history of other endocrine, nutritional and metabolic disease (04/04/2017), Personal history of other endocrine, nutritional and metabolic disease (04/04/2017), and Personal history of other specified conditions (04/04/2017).   has a past surgical history that includes Rotator cuff repair (04/04/2017); Total knee arthroplasty (04/04/2017); Carpal tunnel release (04/04/2017); Coronary angioplasty (04/04/2017); Other surgical history (04/04/2017); Spinal fusion (04/04/2017); Cardiac electrophysiology procedure (N/A, 1/22/2024); Cardiac electrophysiology procedure (N/A, 3/1/2024); and Cardiac electrophysiology procedure (N/A,  6/12/2024).  Family History   Problem Relation Name Age of Onset    Lung cancer Mother      Cancer Father      Alcohol abuse Father      Other (cardiac disorder) Father      Brain cancer Sister      Cancer Brother       Oncology History   Hairy cell leukemia, in relapse (Multi)   12/13/2023 Initial Diagnosis    Hairy cell leukemia, in relapse (CMS/HCC)     1/29/2024 - 2/2/2024 Chemotherapy    Cladribine, 5 Day Cycles         Galen SALINAS Jacklyn John  reports that he quit smoking about 44 years ago. His smoking use included cigarettes and pipe. He started smoking about 67 years ago. He has a 23 pack-year smoking history. He has been exposed to tobacco smoke. He has never used smokeless tobacco.  He  reports current alcohol use of about 7.0 standard drinks of alcohol per week.  He  reports that he does not currently use drugs.    Physical Exam  Vitals reviewed.   Constitutional:       Appearance: Normal appearance.   HENT:      Head: Normocephalic.      Mouth/Throat:      Mouth: Mucous membranes are moist.   Eyes:      Extraocular Movements: Extraocular movements intact.      Pupils: Pupils are equal, round, and reactive to light.   Cardiovascular:      Rate and Rhythm: Normal rate and regular rhythm.      Pulses: Normal pulses.      Heart sounds: Normal heart sounds.   Pulmonary:      Breath sounds: Normal breath sounds.   Abdominal:      General: Bowel sounds are normal.      Palpations: Abdomen is soft.   Musculoskeletal:         General: Normal range of motion.      Cervical back: Normal range of motion and neck supple.   Skin:     General: Skin is warm.   Neurological:      General: No focal deficit present.      Mental Status: He is alert and oriented to person, place, and time.   Psychiatric:         Mood and Affect: Mood normal.         Behavior: Behavior normal.         WBC   Date/Time Value Ref Range Status   07/29/2024 09:52 AM 4.4 4.4 - 11.3 x10*3/uL Final   04/29/2024 11:22 AM 6.0 4.4 - 11.3 x10*3/uL Final  "  04/16/2024 10:30 AM 4.5 4.4 - 11.3 x10*3/uL Final     nRBC   Date Value Ref Range Status   07/29/2024 0.0 0.0 - 0.0 /100 WBCs Final   04/29/2024 0.0 0.0 - 0.0 /100 WBCs Final   04/16/2024 0.0 0.0 - 0.0 /100 WBCs Final     RBC   Date Value Ref Range Status   07/29/2024 4.86 4.50 - 5.90 x10*6/uL Final   04/29/2024 3.88 (L) 4.50 - 5.90 x10*6/uL Final   04/16/2024 3.34 (L) 4.50 - 5.90 x10*6/uL Final     Hemoglobin   Date Value Ref Range Status   07/29/2024 14.2 13.5 - 17.5 g/dL Final   04/29/2024 12.3 (L) 13.5 - 17.5 g/dL Final   04/16/2024 10.7 (L) 13.5 - 17.5 g/dL Final     Hematocrit   Date Value Ref Range Status   07/29/2024 44.6 41.0 - 52.0 % Final   04/29/2024 39.0 (L) 41.0 - 52.0 % Final   04/16/2024 34.6 (L) 41.0 - 52.0 % Final     MCV   Date/Time Value Ref Range Status   07/29/2024 09:52 AM 92 80 - 100 fL Final   04/29/2024 11:22  (H) 80 - 100 fL Final   04/16/2024 10:30  (H) 80 - 100 fL Final     MCH   Date/Time Value Ref Range Status   07/29/2024 09:52 AM 29.2 26.0 - 34.0 pg Final   04/29/2024 11:22 AM 31.7 26.0 - 34.0 pg Final   04/16/2024 10:30 AM 32.0 26.0 - 34.0 pg Final     MCHC   Date/Time Value Ref Range Status   07/29/2024 09:52 AM 31.8 (L) 32.0 - 36.0 g/dL Final   04/29/2024 11:22 AM 31.5 (L) 32.0 - 36.0 g/dL Final   04/16/2024 10:30 AM 30.9 (L) 32.0 - 36.0 g/dL Final     RDW   Date/Time Value Ref Range Status   07/29/2024 09:52 AM 15.0 (H) 11.5 - 14.5 % Final   04/29/2024 11:22 AM 16.4 (H) 11.5 - 14.5 % Final   04/16/2024 10:30 AM 17.0 (H) 11.5 - 14.5 % Final     Platelets   Date/Time Value Ref Range Status   07/29/2024 09:52  (L) 150 - 450 x10*3/uL Final   04/29/2024 11:22  150 - 450 x10*3/uL Final   04/16/2024 10:30  150 - 450 x10*3/uL Final     No results found for: \"MPV\"  Neutrophils %   Date/Time Value Ref Range Status   07/29/2024 09:52 AM 75.0 40.0 - 80.0 % Final   04/29/2024 11:22 AM 84.0 40.0 - 80.0 % Final   04/16/2024 10:30 AM 82.8 40.0 - 80.0 % Final "     Immature Granulocytes %, Automated   Date/Time Value Ref Range Status   07/29/2024 09:52 AM 0.7 0.0 - 0.9 % Final     Comment:     Immature Granulocyte Count (IG) includes promyelocytes, myelocytes and metamyelocytes but does not include bands. Percent differential counts (%) should be interpreted in the context of the absolute cell counts (cells/UL).   04/29/2024 11:22 AM 1.7 (H) 0.0 - 0.9 % Final     Comment:     Immature Granulocyte Count (IG) includes promyelocytes, myelocytes and metamyelocytes but does not include bands. Percent differential counts (%) should be interpreted in the context of the absolute cell counts (cells/UL).   04/16/2024 10:30 AM 2.2 (H) 0.0 - 0.9 % Final     Comment:     Immature Granulocyte Count (IG) includes promyelocytes, myelocytes and metamyelocytes but does not include bands. Percent differential counts (%) should be interpreted in the context of the absolute cell counts (cells/UL).     Lymphocytes %, Manual   Date/Time Value Ref Range Status   03/11/2024 01:18 PM 8.0 13.0 - 44.0 % Final     Lymphocytes %   Date/Time Value Ref Range Status   07/29/2024 09:52 AM 10.5 13.0 - 44.0 % Final   04/29/2024 11:22 AM 8.0 13.0 - 44.0 % Final   04/16/2024 10:30 AM 8.3 13.0 - 44.0 % Final     Monocytes %, Manual   Date/Time Value Ref Range Status   03/11/2024 01:18 PM 0.0 2.0 - 10.0 % Final     Monocytes %   Date/Time Value Ref Range Status   07/29/2024 09:52 AM 11.0 2.0 - 10.0 % Final   04/29/2024 11:22 AM 5.7 2.0 - 10.0 % Final   04/16/2024 10:30 AM 4.0 2.0 - 10.0 % Final     Eosinophils %, Manual   Date/Time Value Ref Range Status   03/11/2024 01:18 PM 2.0 0.0 - 6.0 % Final     Eosinophils %   Date/Time Value Ref Range Status   07/29/2024 09:52 AM 2.3 0.0 - 6.0 % Final   04/29/2024 11:22 AM 0.3 0.0 - 6.0 % Final   04/16/2024 10:30 AM 2.5 0.0 - 6.0 % Final     Basophils %, Manual   Date/Time Value Ref Range Status   03/11/2024 01:18 PM 1.0 0.0 - 2.0 % Final     Basophils %   Date/Time  Value Ref Range Status   07/29/2024 09:52 AM 0.5 0.0 - 2.0 % Final   04/29/2024 11:22 AM 0.3 0.0 - 2.0 % Final   04/16/2024 10:30 AM 0.2 0.0 - 2.0 % Final     Neutrophils Absolute   Date/Time Value Ref Range Status   07/29/2024 09:52 AM 3.29 1.60 - 5.50 x10*3/uL Final     Comment:     Percent differential counts (%) should be interpreted in the context of the absolute cell counts (cells/uL).   04/29/2024 11:22 AM 5.03 1.60 - 5.50 x10*3/uL Final     Comment:     Percent differential counts (%) should be interpreted in the context of the absolute cell counts (cells/uL).   04/16/2024 10:30 AM 3.70 1.60 - 5.50 x10*3/uL Final     Comment:     Percent differential counts (%) should be interpreted in the context of the absolute cell counts (cells/uL).     Immature Granulocytes Absolute, Automated   Date/Time Value Ref Range Status   07/29/2024 09:52 AM 0.03 0.00 - 0.50 x10*3/uL Final   04/29/2024 11:22 AM 0.10 0.00 - 0.50 x10*3/uL Final   04/16/2024 10:30 AM 0.10 0.00 - 0.50 x10*3/uL Final     Lymphocytes Absolute   Date/Time Value Ref Range Status   07/29/2024 09:52 AM 0.46 (L) 0.80 - 3.00 x10*3/uL Final   04/29/2024 11:22 AM 0.48 (L) 0.80 - 3.00 x10*3/uL Final   04/16/2024 10:30 AM 0.37 (L) 0.80 - 3.00 x10*3/uL Final     Monocytes Absolute   Date/Time Value Ref Range Status   07/29/2024 09:52 AM 0.48 0.05 - 0.80 x10*3/uL Final   04/29/2024 11:22 AM 0.34 0.05 - 0.80 x10*3/uL Final   04/16/2024 10:30 AM 0.18 0.05 - 0.80 x10*3/uL Final     Eosinophils Absolute   Date/Time Value Ref Range Status   07/29/2024 09:52 AM 0.10 0.00 - 0.40 x10*3/uL Final   04/29/2024 11:22 AM 0.02 0.00 - 0.40 x10*3/uL Final   04/16/2024 10:30 AM 0.11 0.00 - 0.40 x10*3/uL Final     Eosinophils Absolute, Manual   Date/Time Value Ref Range Status   03/11/2024 01:18 PM 0.04 0.00 - 0.40 x10*3/uL Final     Basophils Absolute   Date/Time Value Ref Range Status   07/29/2024 09:52 AM 0.02 0.00 - 0.10 x10*3/uL Final   04/29/2024 11:22 AM 0.02 0.00 - 0.10  "x10*3/uL Final   2024 10:30 AM 0.01 0.00 - 0.10 x10*3/uL Final     Basophils Absolute, Manual   Date/Time Value Ref Range Status   2024 01:18 PM 0.02 0.00 - 0.10 x10*3/uL Final       No components found for: \"PT\"  aPTT   Date/Time Value Ref Range Status   2024 08:58 AM 44 (H) 27 - 38 seconds Final     Medication Documentation Review Audit       Reviewed by Juliet Ramos MA (Medical Assistant) on 24 at 1038      Medication Order Taking? Sig Documenting Provider Last Dose Status   apixaban (Eliquis) 5 mg tablet [id number] Yes Take 1 tablet (5 mg) by mouth 2 times a day. Historical MD Luz Taking Active   dofetilide (Tikosyn) 125 mcg capsule [id number]  Take 1 capsule (125 mcg) by mouth every 12 hours. Coty Walter, APRN-CNP   24 [id number]   DULoxetine (Cymbalta) 60 mg DR capsule [id number] Yes Take 2 capsules (120 mg) by mouth once daily. Historical Provider, MD Taking Active   empagliflozin (Jardiance) 25 mg [id number] Yes Take 0.5 tablets (12.5 mg) by mouth once daily. Historical Provider, MD Taking Active   fenofibrate (Triglide) 160 mg tablet [id number] Yes Take 1 tablet (160 mg) by mouth once daily.  WITH FOOD Historical Provider, MD Taking Active   gabapentin (Neurontin) 600 mg tablet [id number] Yes Take 1 tablet (600 mg) by mouth in the morning. And Take 2 tablets (1,200 mg) by mouth at bedtime. Historical Provider, MD Taking Active   isosorbide mononitrate ER (Imdur) 30 mg 24 hr tablet [id number] Yes Take 1 tablet (30 mg) by mouth once daily. Historical Provider, MD Taking Active   methIMAzole (Tapazole) 5 mg tablet [id number] Yes Take 1 tablet (5 mg) by mouth once daily. Brisa Skinner MD Taking Active   omeprazole (PriLOSEC) 20 mg DR capsule [id number] Yes Take 1 capsule (20 mg) by mouth. Do not crush or chew. Historical Provider, MD Taking Active   pantoprazole (ProtoNix) 40 mg EC tablet [id number]  Take 1 tablet (40 mg) by mouth 2 times a day. Do not crush, chew, or split. Geovanny HIDALGO" MD Yuval   24 [id number]   pregabalin (Lyrica) 75 mg capsule [id number]  Take 2 capsules (150 mg) by mouth once daily at bedtime. Historical Provider, MD  Active   rosuvastatin (Crestor) 20 mg tablet [id number] Yes Take 1 tablet (20 mg) by mouth once daily. Historical Provider, MD Taking Active                     Assessment/Plan    1) hairy cell leukemia  -old records from outside hematology group were finally obtained on 2023: on 2018 CBC showed wbc 8.8, hgb 14.7, plt 109,000; bmbx was done to rule out relapse- bmbx was sent to [facility]:  no morphologic or immunophenotypic evidence of residual hairy cell leukemia; expanded population of CD8+ T cells; mildly hypercellular marrow with maturing trilineage hematopoiesis (30-40%), BRAF negative  -diagnosed in , treated with cladribine  -remained mildly thrombocytopenic ever since  -last flow cytometry of peripheral blood done on 2023 was negative  -became more neutropenic than usual over the summer--[facility], however, 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  -had bone marrow bx done on 2023--showed extensive marrow involvement by hairy cell leukemia; no evidence of T cell LGL; positive for BRAF  -discussed treatment options--cladribine vs cladribine + rituximab  -he opted for cladribine alone  -in the future, if he relapses again, he could go on either ibrutinib (not as desirable given the med's track record for exacerbating atrial fibrillation) or vemurafenib + rituximab  -he completed course of cladribine from  through 2024  -he received neulasta x 1 dose  -labs done on 2024 included CBC + COMP  --results reviewed--wbc 4.4, hgb 14.2 plt 141,000, ANC 3290, creatinine 1.20, calcium 9.3, AST 20, ALT 21  -will see him again in 3 months     2) CAD  -on ASA  -on imdur     3) atrial fibrillation  -on dofetilide  -on eliquis  -his ablation was delayed until 2024 at [facility]  -since then  he has been feeling very weak and lightheaded at times, legs will just give out under him, so that he at times is afraid to get up to walk around  -was in the hospital for loading on tikosyn  -s/p repeat ablation on 6/12/2024        4) hyperlipidemia  -on fenofibrate  -on rosuvastatin     5) hypertension  -on lisinopril     6) diabetes  -on metformin     7) hyperthyroidism  -on methimazole                 Problem List Items Addressed This Visit             ICD-10-CM    Hairy cell leukemia, in relapse (Multi) C91.42    Relevant Orders    Clinic Appointment Request Follow Up; SATURNINO KRUSE; [facility]    CBC and Auto Differential    Comprehensive metabolic panel            Saturnino Kruse MD

## 2024-09-26 ENCOUNTER — LAB (OUTPATIENT)
Dept: LAB | Facility: LAB | Age: 79
End: 2024-09-26
Payer: MEDICARE

## 2024-09-26 ENCOUNTER — APPOINTMENT (OUTPATIENT)
Dept: ENDOCRINOLOGY | Facility: CLINIC | Age: 79
End: 2024-09-26
Payer: MEDICARE

## 2024-09-26 VITALS
BODY MASS INDEX: 28.7 KG/M2 | HEIGHT: 71 IN | DIASTOLIC BLOOD PRESSURE: 64 MMHG | WEIGHT: 205 LBS | SYSTOLIC BLOOD PRESSURE: 136 MMHG

## 2024-09-26 DIAGNOSIS — E04.2 MULTINODULAR GOITER: ICD-10-CM

## 2024-09-26 DIAGNOSIS — E05.90 HYPERTHYROIDISM: Primary | ICD-10-CM

## 2024-09-26 DIAGNOSIS — E05.90 HYPERTHYROIDISM: ICD-10-CM

## 2024-09-26 DIAGNOSIS — E05.10 TOXIC THYROID NODULE: ICD-10-CM

## 2024-09-26 LAB
ALBUMIN SERPL BCP-MCNC: 4.3 G/DL (ref 3.4–5)
ALP SERPL-CCNC: 73 U/L (ref 33–136)
ALT SERPL W P-5'-P-CCNC: 23 U/L (ref 10–52)
ANION GAP SERPL CALC-SCNC: 11 MMOL/L (ref 10–20)
AST SERPL W P-5'-P-CCNC: 22 U/L (ref 9–39)
BILIRUB SERPL-MCNC: 0.6 MG/DL (ref 0–1.2)
BUN SERPL-MCNC: 20 MG/DL (ref 6–23)
CALCIUM SERPL-MCNC: 9.2 MG/DL (ref 8.6–10.3)
CHLORIDE SERPL-SCNC: 105 MMOL/L (ref 98–107)
CO2 SERPL-SCNC: 29 MMOL/L (ref 21–32)
CREAT SERPL-MCNC: 1.25 MG/DL (ref 0.5–1.3)
EGFRCR SERPLBLD CKD-EPI 2021: 58 ML/MIN/1.73M*2
GLUCOSE SERPL-MCNC: 167 MG/DL (ref 74–99)
POTASSIUM SERPL-SCNC: 4.1 MMOL/L (ref 3.5–5.3)
PROT SERPL-MCNC: 6.1 G/DL (ref 6.4–8.2)
SODIUM SERPL-SCNC: 141 MMOL/L (ref 136–145)
T3FREE SERPL-MCNC: 3 PG/ML (ref 2.3–4.2)
T4 FREE SERPL-MCNC: 0.56 NG/DL (ref 0.61–1.12)
TSH SERPL-ACNC: 3.91 MIU/L (ref 0.44–3.98)

## 2024-09-26 PROCEDURE — 36415 COLL VENOUS BLD VENIPUNCTURE: CPT

## 2024-09-26 PROCEDURE — 1159F MED LIST DOCD IN RCRD: CPT | Performed by: STUDENT IN AN ORGANIZED HEALTH CARE EDUCATION/TRAINING PROGRAM

## 2024-09-26 PROCEDURE — 80053 COMPREHEN METABOLIC PANEL: CPT

## 2024-09-26 PROCEDURE — 84481 FREE ASSAY (FT-3): CPT

## 2024-09-26 PROCEDURE — 3075F SYST BP GE 130 - 139MM HG: CPT | Performed by: STUDENT IN AN ORGANIZED HEALTH CARE EDUCATION/TRAINING PROGRAM

## 2024-09-26 PROCEDURE — 84439 ASSAY OF FREE THYROXINE: CPT

## 2024-09-26 PROCEDURE — 99214 OFFICE O/P EST MOD 30 MIN: CPT | Performed by: STUDENT IN AN ORGANIZED HEALTH CARE EDUCATION/TRAINING PROGRAM

## 2024-09-26 PROCEDURE — 84443 ASSAY THYROID STIM HORMONE: CPT

## 2024-09-26 PROCEDURE — 1157F ADVNC CARE PLAN IN RCRD: CPT | Performed by: STUDENT IN AN ORGANIZED HEALTH CARE EDUCATION/TRAINING PROGRAM

## 2024-09-26 PROCEDURE — 3078F DIAST BP <80 MM HG: CPT | Performed by: STUDENT IN AN ORGANIZED HEALTH CARE EDUCATION/TRAINING PROGRAM

## 2024-09-26 NOTE — PROGRESS NOTES
"Subjective   Patient ID: Galen Villasenor Jr. is a 79 y.o. male who presents for Hyperthyroidism (Galen Villasenor Jr. is a 79 years old(Pt takes methimazole 5mg every day, has not missed any doses   No current labs done)   Lab Results   Component Value Date    TSH 3.80 04/01/2024      HPI    The patient is a 78-year-old male with history of A-fib new diagnosed hyperthyroidism presents to follow up.  He is doing well overall , no new concerns  He is now in remission from hairy Leukemia   Afib reolved after ablation procedure        History :  5/16/23 Thyroid US showed MNG with dominant 24 mm nodule on left side.   6/13/23 Thyroid Uptake showed uptake 13 % corresponding to left thyroid nodule , with suppression of the rest of the gland      He reports not being aware of any thyroid issues prior to April lans   4/17/2023 routine work-up TSH was 0.15 , free T4 not available.  Recently he had issues with shortness of breath and chest tightness for he which has been evaluated by cardiology and PCP.  Of note he also has history of a LL prior to that he had hairy cell leukemia currently remission.  He had steroid injection to his back 1 month prior to thyroid labs  He denies overt symptoms of hyperthyroidism including weight loss , diarrhea ,anxiety and palpitations  Review of Systems    Objective   Physical Exam  Constitutional:       Appearance: Normal appearance.   Neck:      Thyroid: Thyromegaly (nodular) present.   Cardiovascular:      Rate and Rhythm: Normal rate and regular rhythm.   Pulmonary:      Effort: Pulmonary effort is normal.      Breath sounds: Normal breath sounds.   Neurological:      Mental Status: He is alert.      Visit Vitals  /64   Ht 1.803 m (5' 11\")   Wt 93 kg (205 lb)   BMI 28.59 kg/m²   Smoking Status Former   BSA 2.16 m²        Assessment/Plan         -New onset hyperthyroidism due to toxic left thyroid nodule in background of MNG  5/16/23 Thyroid US showed MNG with dominant 24 mm nodule on " left side. rt sided muliple nodues ranginf from 7 mm to 12 mm( 12 mm nodule is spongiform).     6/13/23 Thyroid Uptake showed uptake 13 % corresponding to left thyroid nodule , with suppression of the rest of the gland      Doing well on MMI 5 mg daily , due for lab recheck      - longstanding A-fib ,on beta-blockers and Eliquis managed by EP , no reolved after ablation   - DM2 managed by pcp      RTC in 6 months

## 2024-09-30 ENCOUNTER — TELEPHONE (OUTPATIENT)
Dept: ENDOCRINOLOGY | Facility: CLINIC | Age: 79
End: 2024-09-30
Payer: MEDICARE

## 2024-09-30 DIAGNOSIS — E05.90 HYPERTHYROIDISM: ICD-10-CM

## 2024-09-30 RX ORDER — METHIMAZOLE 5 MG/1
TABLET ORAL
Qty: 90 TABLET | Refills: 1 | Status: SHIPPED | OUTPATIENT
Start: 2024-09-30

## 2024-10-01 RX ORDER — METHIMAZOLE 5 MG/1
5 TABLET ORAL DAILY
Qty: 90 TABLET | Refills: 1 | Status: SHIPPED | OUTPATIENT
Start: 2024-10-01

## 2024-10-30 ENCOUNTER — APPOINTMENT (OUTPATIENT)
Dept: HEMATOLOGY/ONCOLOGY | Facility: CLINIC | Age: 79
End: 2024-10-30
Payer: MEDICARE

## 2024-10-31 ENCOUNTER — LAB (OUTPATIENT)
Dept: LAB | Facility: LAB | Age: 79
End: 2024-10-31
Payer: MEDICARE

## 2024-10-31 DIAGNOSIS — C91.42 HAIRY CELL LEUKEMIA, IN RELAPSE (MULTI): ICD-10-CM

## 2024-10-31 LAB
ALBUMIN SERPL BCP-MCNC: 4.6 G/DL (ref 3.4–5)
ALP SERPL-CCNC: 83 U/L (ref 33–136)
ALT SERPL W P-5'-P-CCNC: 28 U/L (ref 10–52)
ANION GAP SERPL CALC-SCNC: 11 MMOL/L (ref 10–20)
AST SERPL W P-5'-P-CCNC: 25 U/L (ref 9–39)
BASOPHILS # BLD AUTO: 0.02 X10*3/UL (ref 0–0.1)
BASOPHILS NFR BLD AUTO: 0.4 %
BILIRUB SERPL-MCNC: 0.5 MG/DL (ref 0–1.2)
BUN SERPL-MCNC: 23 MG/DL (ref 6–23)
CALCIUM SERPL-MCNC: 9.2 MG/DL (ref 8.6–10.3)
CHLORIDE SERPL-SCNC: 104 MMOL/L (ref 98–107)
CO2 SERPL-SCNC: 27 MMOL/L (ref 21–32)
CREAT SERPL-MCNC: 1.27 MG/DL (ref 0.5–1.3)
EGFRCR SERPLBLD CKD-EPI 2021: 57 ML/MIN/1.73M*2
EOSINOPHIL # BLD AUTO: 0.12 X10*3/UL (ref 0–0.4)
EOSINOPHIL NFR BLD AUTO: 2.4 %
ERYTHROCYTE [DISTWIDTH] IN BLOOD BY AUTOMATED COUNT: 15.3 % (ref 11.5–14.5)
GLUCOSE SERPL-MCNC: 182 MG/DL (ref 74–99)
HCT VFR BLD AUTO: 46.2 % (ref 41–52)
HGB BLD-MCNC: 15.2 G/DL (ref 13.5–17.5)
IMM GRANULOCYTES # BLD AUTO: 0.05 X10*3/UL (ref 0–0.5)
IMM GRANULOCYTES NFR BLD AUTO: 1 % (ref 0–0.9)
LYMPHOCYTES # BLD AUTO: 0.48 X10*3/UL (ref 0.8–3)
LYMPHOCYTES NFR BLD AUTO: 9.6 %
MCH RBC QN AUTO: 29.7 PG (ref 26–34)
MCHC RBC AUTO-ENTMCNC: 32.9 G/DL (ref 32–36)
MCV RBC AUTO: 90 FL (ref 80–100)
MONOCYTES # BLD AUTO: 0.43 X10*3/UL (ref 0.05–0.8)
MONOCYTES NFR BLD AUTO: 8.6 %
NEUTROPHILS # BLD AUTO: 3.9 X10*3/UL (ref 1.6–5.5)
NEUTROPHILS NFR BLD AUTO: 78 %
NRBC BLD-RTO: 0 /100 WBCS (ref 0–0)
PLATELET # BLD AUTO: 123 X10*3/UL (ref 150–450)
POTASSIUM SERPL-SCNC: 4.3 MMOL/L (ref 3.5–5.3)
PROT SERPL-MCNC: 6.6 G/DL (ref 6.4–8.2)
RBC # BLD AUTO: 5.11 X10*6/UL (ref 4.5–5.9)
SODIUM SERPL-SCNC: 138 MMOL/L (ref 136–145)
WBC # BLD AUTO: 5 X10*3/UL (ref 4.4–11.3)

## 2024-10-31 PROCEDURE — 85025 COMPLETE CBC W/AUTO DIFF WBC: CPT

## 2024-10-31 PROCEDURE — 36415 COLL VENOUS BLD VENIPUNCTURE: CPT

## 2024-10-31 PROCEDURE — 80053 COMPREHEN METABOLIC PANEL: CPT

## 2024-11-04 ENCOUNTER — OFFICE VISIT (OUTPATIENT)
Dept: HEMATOLOGY/ONCOLOGY | Facility: CLINIC | Age: 79
End: 2024-11-04
Payer: MEDICARE

## 2024-11-04 VITALS
SYSTOLIC BLOOD PRESSURE: 119 MMHG | BODY MASS INDEX: 28.78 KG/M2 | HEART RATE: 68 BPM | TEMPERATURE: 96.8 F | WEIGHT: 206.35 LBS | RESPIRATION RATE: 18 BRPM | OXYGEN SATURATION: 95 % | DIASTOLIC BLOOD PRESSURE: 74 MMHG

## 2024-11-04 DIAGNOSIS — C91.42 HAIRY CELL LEUKEMIA, IN RELAPSE (MULTI): Primary | ICD-10-CM

## 2024-11-04 DIAGNOSIS — I48.0 PAROXYSMAL ATRIAL FIBRILLATION (MULTI): ICD-10-CM

## 2024-11-04 DIAGNOSIS — E04.2 MULTINODULAR GOITER: ICD-10-CM

## 2024-11-04 DIAGNOSIS — I10 PRIMARY HYPERTENSION: ICD-10-CM

## 2024-11-04 DIAGNOSIS — E78.2 MIXED HYPERLIPIDEMIA: ICD-10-CM

## 2024-11-04 DIAGNOSIS — E11.9 TYPE 2 DIABETES MELLITUS WITHOUT COMPLICATION, WITHOUT LONG-TERM CURRENT USE OF INSULIN (MULTI): ICD-10-CM

## 2024-11-04 DIAGNOSIS — I25.10 CORONARY ARTERY DISEASE INVOLVING NATIVE CORONARY ARTERY OF NATIVE HEART WITHOUT ANGINA PECTORIS: ICD-10-CM

## 2024-11-04 PROCEDURE — 1126F AMNT PAIN NOTED NONE PRSNT: CPT | Performed by: INTERNAL MEDICINE

## 2024-11-04 PROCEDURE — 3078F DIAST BP <80 MM HG: CPT | Performed by: INTERNAL MEDICINE

## 2024-11-04 PROCEDURE — 1159F MED LIST DOCD IN RCRD: CPT | Performed by: INTERNAL MEDICINE

## 2024-11-04 PROCEDURE — 1157F ADVNC CARE PLAN IN RCRD: CPT | Performed by: INTERNAL MEDICINE

## 2024-11-04 PROCEDURE — 99214 OFFICE O/P EST MOD 30 MIN: CPT | Performed by: INTERNAL MEDICINE

## 2024-11-04 PROCEDURE — G2211 COMPLEX E/M VISIT ADD ON: HCPCS | Performed by: INTERNAL MEDICINE

## 2024-11-04 PROCEDURE — 3074F SYST BP LT 130 MM HG: CPT | Performed by: INTERNAL MEDICINE

## 2024-11-04 ASSESSMENT — ENCOUNTER SYMPTOMS
PSYCHIATRIC NEGATIVE: 1
GASTROINTESTINAL NEGATIVE: 1
HEMATOLOGIC/LYMPHATIC NEGATIVE: 1
MUSCULOSKELETAL NEGATIVE: 1
EYES NEGATIVE: 1
CARDIOVASCULAR NEGATIVE: 1
RESPIRATORY NEGATIVE: 1
CONSTITUTIONAL NEGATIVE: 1
NEUROLOGICAL NEGATIVE: 1
ENDOCRINE NEGATIVE: 1

## 2024-11-04 ASSESSMENT — PAIN SCALES - GENERAL: PAINLEVEL_OUTOF10: 0-NO PAIN

## 2024-11-04 NOTE — PROGRESS NOTES
Patient ID: Galen Villasenor Jr. is a 79 y.o. male.  Referring Physician: Saturnino Kruse MD  [address]  [place] [address]  Primary Care Provider: Bladimir Ackerman MD  Visit Type: Follow Up      Subjective    HPI I am doing okay, I can't lift as much as I used to though    Review of Systems   Constitutional: Negative.    HENT:  Negative.     Eyes: Negative.    Respiratory: Negative.     Cardiovascular: Negative.    Gastrointestinal: Negative.    Endocrine: Negative.    Genitourinary: Negative.     Musculoskeletal: Negative.    Skin: Negative.    Neurological: Negative.    Hematological: Negative.    Psychiatric/Behavioral: Negative.          Objective   BSA: 2.17 meters squared  /74 (BP Location: Right arm)   Pulse 68   Temp 36 °C (96.8 °F) (Temporal)   Resp 18   Wt 93.6 kg (206 lb 5.6 oz)   SpO2 95%   BMI 28.78 kg/m²      has a past medical history of Atherosclerotic heart disease of native coronary artery without angina pectoris (04/04/2017), Diabetes mellitus (Multi), Personal history of malignant neoplasm of prostate (04/04/2017), Personal history of other diseases of the circulatory system (04/04/2017), Personal history of other diseases of the respiratory system (04/04/2017), Personal history of other endocrine, nutritional and metabolic disease (04/04/2017), Personal history of other endocrine, nutritional and metabolic disease (04/04/2017), and Personal history of other specified conditions (04/04/2017).   has a past surgical history that includes Rotator cuff repair (04/04/2017); Total knee arthroplasty (04/04/2017); Carpal tunnel release (04/04/2017); Coronary angioplasty (04/04/2017); Other surgical history (04/04/2017); Spinal fusion (04/04/2017); Cardiac electrophysiology procedure (N/A, 1/22/2024); Cardiac electrophysiology procedure (N/A, 3/1/2024); and Cardiac electrophysiology procedure (N/A, 6/12/2024).  Family History   Problem Relation Name Age of Onset    Lung  cancer Mother      Cancer Father      Alcohol abuse Father      Other (cardiac disorder) Father      Brain cancer Sister      Cancer Brother       Oncology History   Hairy cell leukemia, in relapse (Multi)   12/13/2023 Initial Diagnosis    Hairy cell leukemia, in relapse (CMS/HCC)     1/29/2024 - 2/2/2024 Chemotherapy    Cladribine, 5 Day Cycles         Galen SALINAS Jacklyn John  reports that he quit smoking about 44 years ago. His smoking use included cigarettes and pipe. He started smoking about 67 years ago. He has a 23 pack-year smoking history. He has been exposed to tobacco smoke. He has never used smokeless tobacco.  He  reports current alcohol use of about 7.0 standard drinks of alcohol per week.  He  reports that he does not currently use drugs.    Physical Exam  Vitals reviewed.   Constitutional:       Appearance: Normal appearance.   HENT:      Head: Normocephalic and atraumatic.      Mouth/Throat:      Mouth: Mucous membranes are moist.   Eyes:      Extraocular Movements: Extraocular movements intact.      Pupils: Pupils are equal, round, and reactive to light.   Cardiovascular:      Rate and Rhythm: Normal rate and regular rhythm.      Pulses: Normal pulses.      Heart sounds: Normal heart sounds.   Pulmonary:      Effort: Pulmonary effort is normal.      Breath sounds: Normal breath sounds.   Abdominal:      General: Bowel sounds are normal.      Palpations: Abdomen is soft.   Musculoskeletal:         General: Normal range of motion.      Cervical back: Normal range of motion and neck supple.   Skin:     General: Skin is warm.   Neurological:      General: No focal deficit present.      Mental Status: He is alert and oriented to person, place, and time.   Psychiatric:         Mood and Affect: Mood normal.         Behavior: Behavior normal.         WBC   Date/Time Value Ref Range Status   10/31/2024 10:21 AM 5.0 4.4 - 11.3 x10*3/uL Final   07/29/2024 09:52 AM 4.4 4.4 - 11.3 x10*3/uL Final   04/29/2024 11:22  "AM 6.0 4.4 - 11.3 x10*3/uL Final     nRBC   Date Value Ref Range Status   10/31/2024 0.0 0.0 - 0.0 /100 WBCs Final   07/29/2024 0.0 0.0 - 0.0 /100 WBCs Final   04/29/2024 0.0 0.0 - 0.0 /100 WBCs Final     RBC   Date Value Ref Range Status   10/31/2024 5.11 4.50 - 5.90 x10*6/uL Final   07/29/2024 4.86 4.50 - 5.90 x10*6/uL Final   04/29/2024 3.88 (L) 4.50 - 5.90 x10*6/uL Final     Hemoglobin   Date Value Ref Range Status   10/31/2024 15.2 13.5 - 17.5 g/dL Final   07/29/2024 14.2 13.5 - 17.5 g/dL Final   04/29/2024 12.3 (L) 13.5 - 17.5 g/dL Final     Hematocrit   Date Value Ref Range Status   10/31/2024 46.2 41.0 - 52.0 % Final   07/29/2024 44.6 41.0 - 52.0 % Final   04/29/2024 39.0 (L) 41.0 - 52.0 % Final     MCV   Date/Time Value Ref Range Status   10/31/2024 10:21 AM 90 80 - 100 fL Final   07/29/2024 09:52 AM 92 80 - 100 fL Final   04/29/2024 11:22  (H) 80 - 100 fL Final     MCH   Date/Time Value Ref Range Status   10/31/2024 10:21 AM 29.7 26.0 - 34.0 pg Final   07/29/2024 09:52 AM 29.2 26.0 - 34.0 pg Final   04/29/2024 11:22 AM 31.7 26.0 - 34.0 pg Final     MCHC   Date/Time Value Ref Range Status   10/31/2024 10:21 AM 32.9 32.0 - 36.0 g/dL Final   07/29/2024 09:52 AM 31.8 (L) 32.0 - 36.0 g/dL Final   04/29/2024 11:22 AM 31.5 (L) 32.0 - 36.0 g/dL Final     RDW   Date/Time Value Ref Range Status   10/31/2024 10:21 AM 15.3 (H) 11.5 - 14.5 % Final   07/29/2024 09:52 AM 15.0 (H) 11.5 - 14.5 % Final   04/29/2024 11:22 AM 16.4 (H) 11.5 - 14.5 % Final     Platelets   Date/Time Value Ref Range Status   10/31/2024 10:21  (L) 150 - 450 x10*3/uL Final   07/29/2024 09:52  (L) 150 - 450 x10*3/uL Final   04/29/2024 11:22  150 - 450 x10*3/uL Final     No results found for: \"MPV\"  Neutrophils %   Date/Time Value Ref Range Status   10/31/2024 10:21 AM 78.0 40.0 - 80.0 % Final   07/29/2024 09:52 AM 75.0 40.0 - 80.0 % Final   04/29/2024 11:22 AM 84.0 40.0 - 80.0 % Final     Immature Granulocytes %, Automated "   Date/Time Value Ref Range Status   10/31/2024 10:21 AM 1.0 (H) 0.0 - 0.9 % Final     Comment:     Immature Granulocyte Count (IG) includes promyelocytes, myelocytes and metamyelocytes but does not include bands. Percent differential counts (%) should be interpreted in the context of the absolute cell counts (cells/UL).   07/29/2024 09:52 AM 0.7 0.0 - 0.9 % Final     Comment:     Immature Granulocyte Count (IG) includes promyelocytes, myelocytes and metamyelocytes but does not include bands. Percent differential counts (%) should be interpreted in the context of the absolute cell counts (cells/UL).   04/29/2024 11:22 AM 1.7 (H) 0.0 - 0.9 % Final     Comment:     Immature Granulocyte Count (IG) includes promyelocytes, myelocytes and metamyelocytes but does not include bands. Percent differential counts (%) should be interpreted in the context of the absolute cell counts (cells/UL).     Lymphocytes %, Manual   Date/Time Value Ref Range Status   03/11/2024 01:18 PM 8.0 13.0 - 44.0 % Final     Lymphocytes %   Date/Time Value Ref Range Status   10/31/2024 10:21 AM 9.6 13.0 - 44.0 % Final   07/29/2024 09:52 AM 10.5 13.0 - 44.0 % Final   04/29/2024 11:22 AM 8.0 13.0 - 44.0 % Final     Monocytes %, Manual   Date/Time Value Ref Range Status   03/11/2024 01:18 PM 0.0 2.0 - 10.0 % Final     Monocytes %   Date/Time Value Ref Range Status   10/31/2024 10:21 AM 8.6 2.0 - 10.0 % Final   07/29/2024 09:52 AM 11.0 2.0 - 10.0 % Final   04/29/2024 11:22 AM 5.7 2.0 - 10.0 % Final     Eosinophils %, Manual   Date/Time Value Ref Range Status   03/11/2024 01:18 PM 2.0 0.0 - 6.0 % Final     Eosinophils %   Date/Time Value Ref Range Status   10/31/2024 10:21 AM 2.4 0.0 - 6.0 % Final   07/29/2024 09:52 AM 2.3 0.0 - 6.0 % Final   04/29/2024 11:22 AM 0.3 0.0 - 6.0 % Final     Basophils %, Manual   Date/Time Value Ref Range Status   03/11/2024 01:18 PM 1.0 0.0 - 2.0 % Final     Basophils %   Date/Time Value Ref Range Status   10/31/2024 10:21  AM 0.4 0.0 - 2.0 % Final   07/29/2024 09:52 AM 0.5 0.0 - 2.0 % Final   04/29/2024 11:22 AM 0.3 0.0 - 2.0 % Final     Neutrophils Absolute   Date/Time Value Ref Range Status   10/31/2024 10:21 AM 3.90 1.60 - 5.50 x10*3/uL Final     Comment:     Percent differential counts (%) should be interpreted in the context of the absolute cell counts (cells/uL).   07/29/2024 09:52 AM 3.29 1.60 - 5.50 x10*3/uL Final     Comment:     Percent differential counts (%) should be interpreted in the context of the absolute cell counts (cells/uL).   04/29/2024 11:22 AM 5.03 1.60 - 5.50 x10*3/uL Final     Comment:     Percent differential counts (%) should be interpreted in the context of the absolute cell counts (cells/uL).     Immature Granulocytes Absolute, Automated   Date/Time Value Ref Range Status   10/31/2024 10:21 AM 0.05 0.00 - 0.50 x10*3/uL Final   07/29/2024 09:52 AM 0.03 0.00 - 0.50 x10*3/uL Final   04/29/2024 11:22 AM 0.10 0.00 - 0.50 x10*3/uL Final     Lymphocytes Absolute   Date/Time Value Ref Range Status   10/31/2024 10:21 AM 0.48 (L) 0.80 - 3.00 x10*3/uL Final   07/29/2024 09:52 AM 0.46 (L) 0.80 - 3.00 x10*3/uL Final   04/29/2024 11:22 AM 0.48 (L) 0.80 - 3.00 x10*3/uL Final     Monocytes Absolute   Date/Time Value Ref Range Status   10/31/2024 10:21 AM 0.43 0.05 - 0.80 x10*3/uL Final   07/29/2024 09:52 AM 0.48 0.05 - 0.80 x10*3/uL Final   04/29/2024 11:22 AM 0.34 0.05 - 0.80 x10*3/uL Final     Eosinophils Absolute   Date/Time Value Ref Range Status   10/31/2024 10:21 AM 0.12 0.00 - 0.40 x10*3/uL Final   07/29/2024 09:52 AM 0.10 0.00 - 0.40 x10*3/uL Final   04/29/2024 11:22 AM 0.02 0.00 - 0.40 x10*3/uL Final     Eosinophils Absolute, Manual   Date/Time Value Ref Range Status   03/11/2024 01:18 PM 0.04 0.00 - 0.40 x10*3/uL Final     Basophils Absolute   Date/Time Value Ref Range Status   10/31/2024 10:21 AM 0.02 0.00 - 0.10 x10*3/uL Final   07/29/2024 09:52 AM 0.02 0.00 - 0.10 x10*3/uL Final   04/29/2024 11:22 AM 0.02  "0.00 - 0.10 x10*3/uL Final     Basophils Absolute, Manual   Date/Time Value Ref Range Status   2024 01:18 PM 0.02 0.00 - 0.10 x10*3/uL Final       No components found for: \"PT\"  aPTT   Date/Time Value Ref Range Status   2024 08:58 AM 44 (H) 27 - 38 seconds Final     Medication Documentation Review Audit       Reviewed by Louann Prather MA (Medical Assistant) on 24 at 1204      Medication Order Taking? Sig Documenting Provider Last Dose Status   apixaban (Eliquis) 5 mg tablet [id number] Yes Take 1 tablet (5 mg) by mouth 2 times a day. Historical Provider, MD Taking Active   dofetilide (Tikosyn) 125 mcg capsule [id number] No Take 1 capsule (125 mcg) by mouth every 12 hours. Coty Walter APRN-CNP Taking  24 2359   DULoxetine (Cymbalta) 60 mg DR capsule [id number] Yes Take 2 capsules (120 mg) by mouth once daily. Historical Provider, MD Taking Active   empagliflozin (Jardiance) 25 mg [id number] Yes Take 0.5 tablets (12.5 mg) by mouth once daily. Historical Provider, MD Taking Active   fenofibrate (Triglide) 160 mg tablet [id number] Yes Take 1 tablet (160 mg) by mouth once daily.  WITH FOOD Historical Provider, MD Taking Active   gabapentin (Neurontin) 600 mg tablet [id number] Yes Take 1 tablet (600 mg) by mouth in the morning. And Take 2 tablets (1,200 mg) by mouth at bedtime. Historical Provider, MD Taking Active   isosorbide mononitrate ER (Imdur) 30 mg 24 hr tablet [id number] Yes Take 1 tablet (30 mg) by mouth once daily. Historical Provider, MD Taking Active   methIMAzole (Tapazole) 5 mg tablet [id number] Yes Take 1 tablet (5 mg) by mouth once daily. Except NO tablet on  Brisa Skinner MD  Active   methIMAzole (Tapazole) 5 mg tablet [id number]  Take one tablet daily EXCEPT none on Sundays   Patient not taking: Reported on 2024    Brisa Skinner MD  Active   omeprazole (PriLOSEC) 20 mg DR capsule [id number] Yes Take 1 capsule (20 mg) by mouth. Do not crush or chew. Historical " Provider, MD Taking Active   rosuvastatin (Crestor) 20 mg tablet [id number] Yes Take 1 tablet (20 mg) by mouth once daily. Historical Provider, MD Taking Active                   Assessment/Plan    1) hairy cell leukemia  -old records from outside hematology group were finally obtained on 12/7/2023: on 7/17/2018 CBC showed wbc 8.8, hgb 14.7, plt 109,000; bmbx was done to rule out relapse- bmbx was sent to [facility]:  no morphologic or immunophenotypic evidence of residual hairy cell leukemia; expanded population of CD8+ T cells; mildly hypercellular marrow with maturing trilineage hematopoiesis (30-40%), BRAF negative  -diagnosed in 2002, treated with cladribine  -remained mildly thrombocytopenic ever since  -last flow cytometry of peripheral blood done on 7/21/2023 was negative  -became more neutropenic than usual over the summer--[facility], [facility], 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  -had bone marrow bx done on 11/28/2023--showed extensive marrow involvement by hairy cell leukemia; no evidence of T cell LGL; positive for BRAF  -discussed treatment options--cladribine vs cladribine + rituximab  -he opted for cladribine alone  -in the future, if he relapses again, he could go on either ibrutinib (not as desirable given the med's track record for exacerbating atrial fibrillation) or vemurafenib + rituximab  -he completed course of cladribine from January 29 through Feb 2, 2024  -he received neulasta x 1 dose  -feeling well, exercise tolerance has returned--he says he has to get up on his roof to clean his chimney, but he just can't lift as much as he used to  -he received a letter from Dr Alfonso's office saying that he will be retiring at the end of this year, he wants to make sure he gets in to see him as tikosyn was supposed to be discontinued at some point  -labs done on 10/31/2024 included CBC + COMP  --results reviewed--wbc 5.0, hgb 15.2 plt 123,000, ANC 3900, creatinine 1.27,  calcium 9.2, AST 25, ALT 28  -will see him again in 4 months     2) CAD  -on ASA  -on imdur     3) atrial fibrillation  -on dofetilide  -on eliquis  -his ablation was delayed until January 22, 2024 at [facility]  -since then he has been feeling very weak and lightheaded at times, legs will just give out under him, so that he at times is afraid to get up to walk around  -was in the hospital for loading on tikosyn  -s/p repeat ablation on 6/12/2024        4) hyperlipidemia  -on fenofibrate  -on rosuvastatin     5) hypertension  -on lisinopril     6) diabetes  -on metformin     7) hyperthyroidism  -on methimazole              Problem List Items Addressed This Visit             ICD-10-CM    Hairy cell leukemia, in relapse (Multi) C91.42    Relevant Orders    Clinic Appointment Request Follow Up; SATURNINO KRUSE; [facility] [id number]    CBC and Auto Differential    Comprehensive metabolic panel            Saturnino Kruse MD

## 2024-11-13 ENCOUNTER — APPOINTMENT (OUTPATIENT)
Dept: CARDIOLOGY | Facility: CLINIC | Age: 79
End: 2024-11-13
Payer: MEDICARE

## 2024-11-13 ENCOUNTER — TELEPHONE (OUTPATIENT)
Dept: CARDIOLOGY | Facility: CLINIC | Age: 79
End: 2024-11-13

## 2024-11-13 VITALS
HEART RATE: 80 BPM | SYSTOLIC BLOOD PRESSURE: 117 MMHG | HEIGHT: 71 IN | BODY MASS INDEX: 29.37 KG/M2 | WEIGHT: 209.8 LBS | DIASTOLIC BLOOD PRESSURE: 68 MMHG

## 2024-11-13 DIAGNOSIS — I48.0 PAROXYSMAL ATRIAL FIBRILLATION (MULTI): Primary | ICD-10-CM

## 2024-11-13 PROCEDURE — 1036F TOBACCO NON-USER: CPT | Performed by: INTERNAL MEDICINE

## 2024-11-13 PROCEDURE — 1157F ADVNC CARE PLAN IN RCRD: CPT | Performed by: INTERNAL MEDICINE

## 2024-11-13 PROCEDURE — 1159F MED LIST DOCD IN RCRD: CPT | Performed by: INTERNAL MEDICINE

## 2024-11-13 PROCEDURE — 3078F DIAST BP <80 MM HG: CPT | Performed by: INTERNAL MEDICINE

## 2024-11-13 PROCEDURE — 99213 OFFICE O/P EST LOW 20 MIN: CPT | Performed by: INTERNAL MEDICINE

## 2024-11-13 PROCEDURE — 3074F SYST BP LT 130 MM HG: CPT | Performed by: INTERNAL MEDICINE

## 2024-11-13 RX ORDER — LISINOPRIL 20 MG/1
1 TABLET ORAL DAILY
COMMUNITY
Start: 2024-11-08

## 2024-11-13 NOTE — PROGRESS NOTES
"  Subjective:  The patient is an 79-year-old white male, followed primarily by Dr. Bladimir Ackerman, who presents for follow-up of atrial arrhythmias.  He has a history of hairy cell leukemia, diagnosed 21 years ago, and followed by Dr. Saturnino Kruse.  The patient is felt to be in remission.  He has had paroxysmal atrial fibrillation and flutter, and underwent PVI by Dr. Ambrose in January 2024, with a redo in June 2024.  The second procedure involved ablating reconnection's with the right sided pulmonary veins, and also ablating a left atrial flutter.  The patient has been doing well and has not been aware of palpitations.  He continues on a \"rhythm control strategy\" with dofetilide, along with Eliquis anticoagulation.    The patient's history is also remarkable for hyperthyroidism, orthostatic hypotension, pancytopenia from prior chemotherapy, and chronic kidney disease.  He also has some intermittent right temporal headaches, and is scheduled to be seen by neurology.  He apparently had a negative MRI of the head.    Current Outpatient Medications   Medication Sig    apixaban (Eliquis) 5 mg tablet Take 1 tablet (5 mg) by mouth 2 times a day.    dofetilide (Tikosyn) 125 mcg capsule Take 1 capsule (125 mcg) by mouth every 12 hours.    DULoxetine (Cymbalta) 60 mg DR capsule Take 2 capsules (120 mg) by mouth once daily.    empagliflozin (Jardiance) 25 mg Take 0.5 tablets (12.5 mg) by mouth once daily.    fenofibrate (Triglide) 160 mg tablet Take 1 tablet (160 mg) by mouth once daily.  WITH FOOD    gabapentin (Neurontin) 600 mg tablet Take 1 tablet (600 mg) by mouth in the morning. And Take 2 tablets (1,200 mg) by mouth at bedtime.    isosorbide mononitrate ER (Imdur) 30 mg 24 hr tablet Take 1 tablet (30 mg) by mouth once daily.    lisinopril 20 mg tablet Take 1 tablet (20 mg) by mouth once daily.    methIMAzole (Tapazole) 5 mg tablet Take 1 tablet (5 mg) by mouth once daily. Except NO tablet on Sunday    omeprazole " (PriLOSEC) 20 mg DR capsule Take 1 capsule (20 mg) by mouth. Do not crush or chew.    rosuvastatin (Crestor) 20 mg tablet Take 1 tablet (20 mg) by mouth once daily.     Allergies:  Anesthetics - terrie type- parabens, Procaine, and Quinolones     Patient Active Problem List   Diagnosis    Acquired hammertoe of right foot    Esophageal dysphagia    Hallux limitus of left foot    Heartburn    Paroxysmal atrial fibrillation (Multi)    Tubular adenoma of colon    Hyperthyroidism    Itching of ear    Multinodular goiter    Otalgia of right ear    Toxic thyroid nodule    Hairy cell leukemia, in relapse (Multi)    Pancytopenia    Coronary artery disease involving native coronary artery of native heart without angina pectoris    Primary hypertension    Mixed hyperlipidemia    Chronic obstructive pulmonary disease (Multi)    Type 2 diabetes mellitus without complication, without long-term current use of insulin (Multi)    Atypical atrial flutter (Multi)    Atrial flutter (Multi)    Chemotherapy-induced neutropenia (CMS-HCC)    Visit for monitoring Tikosyn therapy    Anemia     Past Surgical History:   Procedure Laterality Date    CARDIAC ELECTROPHYSIOLOGY PROCEDURE N/A 1/22/2024    Procedure: Ablation A-Fib;  Surgeon: Evelyne Ambrose MD;  Location: [facility];  Service: Electrophysiology;  Laterality: N/A;    CARDIAC ELECTROPHYSIOLOGY PROCEDURE N/A 3/1/2024    Procedure: Cardioversion;  Surgeon: Darrick Alfonso MD;  Location: [facility];  Service: Electrophysiology;  Laterality: N/A;    CARDIAC ELECTROPHYSIOLOGY PROCEDURE N/A 6/12/2024    Procedure: Ablation SVT Atypical Atrial Flutter;  Surgeon: Evelyne Ambrose MD;  Location: [place] [address] [facility] Lab;  Service: Electrophysiology;  Laterality: N/A;  Hold all medications on the AM of the procedure, except Eliquis  carto    CARPAL TUNNEL RELEASE  04/04/2017    Neuroplasty Decompression Median Nerve At Carpal Tunnel    CORONARY ANGIOPLASTY  04/04/2017  "   PTCA    OTHER SURGICAL HISTORY  04/04/2017    Cystoscopy With Insertion Of Permanent Urethral Stent    ROTATOR CUFF REPAIR  04/04/2017    Rotator Cuff Repair    SPINAL FUSION  04/04/2017    Spinal Arthrodesis    TOTAL KNEE ARTHROPLASTY  04/04/2017    Knee Replacement     Objective:  Vitals:    11/13/24 0848   BP: 117/68   Pulse: 80   Height:     1.803 m (5' 11\")  Weight: 95.2 kg (209 lb 12.8 oz)     Exam:  Gen: Very pleasant gentleman in no distress; alert and oriented.  HEENT: No scleral icterus; gray beard.  Neck: No jugular venous distention or thyromegaly.  Lungs: Clear to auscultation, with no wheezes or rales.  Heart: Regular rhythm but frequent extrasystoles, typically every 5 beats or so, without murmurs.  Abdomen: Benign, with no organomegaly or masses.  Extremities: Intact distal pulses; no edema.  Neuro: No focal neurologic abnormalities.  Skin: No cutaneous lesions.    Impressions:  1.  Hairy cell leukemia, status post several rounds of prior chemotherapy, but currently in remission.  He is followed by Dr. Saturnino Kruse.  2.  Paroxysmal atrial fibrillation and flutter, status post ablation procedures in January 2024 and June 2024.  He continues on dofetilide for rhythm control and is anticoagulated with Eliquis, given his [id number] score of at least 3 (hypertension, 2 points for age over 75).  I believe that he is currently in sinus rhythm with frequent ectopy, though we did not obtain an EKG today.  A tracing from July 2024 showed sinus rhythm with a first-degree AV block and normal corrected QT.  3.  Hyperthyroidism, on methimazole therapy.  4.  Hypertension, controlled on lisinopril, though this drug had to be stopped in the past due to orthostasis.    Recommendations:  1.  The patient will undergo a 1 week Zio patch to document his rhythm over that timeframe.  2.  He will call the office to discuss results.  If he is simply having frequent ectopy but no fibrillation or flutter, his dofetilide " may be discontinued.  If he is having paroxysmal atrial fibrillation or flutter, however, he will be continued on dofetilide.  3.  He will follow-up in 4 to 6 months with Dr. Evelyne Ambrose to discuss further management, such as additional ablation procedures (if indicated).  He will likely remain on anticoagulation indefinitely, in the absence of clinically significant bleeding.      Darrick Alfonso MD

## 2024-11-19 ENCOUNTER — APPOINTMENT (OUTPATIENT)
Dept: CARDIOLOGY | Facility: CLINIC | Age: 79
End: 2024-11-19
Payer: MEDICARE

## 2024-11-19 DIAGNOSIS — I48.0 PAROXYSMAL ATRIAL FIBRILLATION (MULTI): ICD-10-CM

## 2024-12-06 PROCEDURE — 93244 EXT ECG>48HR<7D REV&INTERPJ: CPT | Performed by: INTERNAL MEDICINE

## 2024-12-16 ENCOUNTER — TELEPHONE (OUTPATIENT)
Dept: PRIMARY CARE | Facility: CLINIC | Age: 79
End: 2024-12-16
Payer: MEDICARE

## 2024-12-16 NOTE — TELEPHONE ENCOUNTER
"Pt called in to office to get results of Zio patch.  Would also like to see if he will be able to get taken off \"one\" of his medications that was discussed at last office visit.  Please advise.   "

## 2024-12-17 ENCOUNTER — APPOINTMENT (OUTPATIENT)
Dept: NEUROLOGY | Facility: CLINIC | Age: 79
End: 2024-12-17
Payer: MEDICARE

## 2024-12-17 VITALS — TEMPERATURE: 97.8 F | DIASTOLIC BLOOD PRESSURE: 48 MMHG | HEART RATE: 68 BPM | SYSTOLIC BLOOD PRESSURE: 94 MMHG

## 2024-12-17 DIAGNOSIS — R51.9 TEMPORAL HEADACHE: Primary | ICD-10-CM

## 2024-12-17 PROCEDURE — 99204 OFFICE O/P NEW MOD 45 MIN: CPT | Performed by: NURSE PRACTITIONER

## 2024-12-17 PROCEDURE — 3078F DIAST BP <80 MM HG: CPT | Performed by: NURSE PRACTITIONER

## 2024-12-17 PROCEDURE — 1126F AMNT PAIN NOTED NONE PRSNT: CPT | Performed by: NURSE PRACTITIONER

## 2024-12-17 PROCEDURE — 1157F ADVNC CARE PLAN IN RCRD: CPT | Performed by: NURSE PRACTITIONER

## 2024-12-17 PROCEDURE — 3074F SYST BP LT 130 MM HG: CPT | Performed by: NURSE PRACTITIONER

## 2024-12-17 PROCEDURE — 1036F TOBACCO NON-USER: CPT | Performed by: NURSE PRACTITIONER

## 2024-12-17 PROCEDURE — 1159F MED LIST DOCD IN RCRD: CPT | Performed by: NURSE PRACTITIONER

## 2024-12-17 ASSESSMENT — PAIN SCALES - GENERAL: PAINLEVEL_OUTOF10: 0-NO PAIN

## 2024-12-17 NOTE — PROGRESS NOTES
Chief Complaint   Patient presents with    New Patient Visit     Shooting pain in side of head and shoots into his eye as well. Started about 3 months ago. Always on the right side. Happens randomly and last only for a few seconds.        Subjective   HPI  Galen Villasenor Jr. is a 79 y.o. year old male who presents with chief complaint Temporal headache [R51.9]    Galen started having 'shooting' pain to R side of his head that radiates to his R eye that started approximately 3-4 months ago.  He states that these episodes were occurring 3-4 times a day (lasting seconds) and have lessened to periodically.  He had one episode today but has not had one for a 'couple of days'.  He states that he has a history of recent ablation for A-fib but states that he now has Wenckebach, V tach and PVCs.  He also states that he has thyroid nodule and recent Leukemia relapse.  Patient also had recent exam with Ophthalmology.     He denies visual disturbance with these episodes.  Denies phonophobia, photophobia, nausea, vomiting, and auras.      Current Outpatient Medications:     apixaban (Eliquis) 5 mg tablet, Take 1 tablet (5 mg) by mouth 2 times a day., Disp: , Rfl:     DULoxetine (Cymbalta) 60 mg DR capsule, Take 2 capsules (120 mg) by mouth once daily., Disp: , Rfl:     empagliflozin (Jardiance) 25 mg, Take 0.5 tablets (12.5 mg) by mouth once daily., Disp: , Rfl:     fenofibrate (Triglide) 160 mg tablet, Take 1 tablet (160 mg) by mouth once daily.  WITH FOOD, Disp: , Rfl:     gabapentin (Neurontin) 600 mg tablet, Take 1 tablet (600 mg) by mouth in the morning. And Take 2 tablets (1,200 mg) by mouth at bedtime., Disp: , Rfl:     isosorbide mononitrate ER (Imdur) 30 mg 24 hr tablet, Take 1 tablet (30 mg) by mouth once daily., Disp: , Rfl:     lisinopril 20 mg tablet, Take 1 tablet (20 mg) by mouth once daily., Disp: , Rfl:     methIMAzole (Tapazole) 5 mg tablet, Take 1 tablet (5 mg) by mouth once daily. Except NO tablet on Sunday,  Disp: 90 tablet, Rfl: 1    omeprazole (PriLOSEC) 20 mg DR capsule, Take 1 capsule (20 mg) by mouth. Do not crush or chew., Disp: , Rfl:     rosuvastatin (Crestor) 20 mg tablet, Take 1 tablet (20 mg) by mouth once daily., Disp: , Rfl:     dofetilide (Tikosyn) 125 mcg capsule, Take 1 capsule (125 mcg) by mouth every 12 hours., Disp: 60 capsule, Rfl: 1    Allergies   Allergen Reactions    Anesthetics - Renetta Type- Parabens Seizure    Procaine Seizure    Quinolones Unknown and Other     drop foot       Past Medical History:   Diagnosis Date    ADHD (attention deficit hyperactivity disorder)     Anxiety     Atherosclerotic heart disease of native coronary artery without angina pectoris 04/04/2017    Coronary artery arteriosclerosis    Cancer (Multi) 02    CTS (carpal tunnel syndrome) 03    Depression     Diabetes mellitus (Multi)     Difficulty walking     Dysphagia     Head injury 6/7 Yrs.old    HL (hearing loss)     Hypertension     Memory loss     Personal history of malignant neoplasm of prostate 04/04/2017    History of malignant neoplasm of prostate    Personal history of other diseases of the circulatory system 04/04/2017    History of hypertension    Personal history of other diseases of the respiratory system 04/04/2017    History of chronic obstructive lung disease    Personal history of other endocrine, nutritional and metabolic disease 04/04/2017    History of diabetes mellitus    Personal history of other endocrine, nutritional and metabolic disease 04/04/2017    History of hyperlipidemia    Personal history of other specified conditions 04/04/2017    History of urinary incontinence    Restless leg syndrome     Seizures (Multi)     Shingles     Syncope     TIA (transient ischemic attack)     Vision loss      Past Surgical History:   Procedure Laterality Date    CARDIAC ELECTROPHYSIOLOGY PROCEDURE N/A 01/22/2024    Procedure: Ablation A-Fib;  Surgeon: Evelyne Ambrose MD;  Location: [facility];   Service: Electrophysiology;  Laterality: N/A;    CARDIAC ELECTROPHYSIOLOGY PROCEDURE N/A 2024    Procedure: Cardioversion;  Surgeon: Darrick Alfonso MD;  Location: [facility];  Service: Electrophysiology;  Laterality: N/A;    CARDIAC ELECTROPHYSIOLOGY PROCEDURE N/A 2024    Procedure: Ablation SVT Atypical Atrial Flutter;  Surgeon: Evelyne Ambrose MD;  Location: [place] [address] [facility];  Service: Electrophysiology;  Laterality: N/A;  Hold all medications on the AM of the procedure, except Eliquis  carto    CAROTID STENT      CARPAL TUNNEL RELEASE  2017    Neuroplasty Decompression Median Nerve At Carpal Tunnel    CERVICAL DISCECTOMY      CERVICAL FUSION      CERVICAL LAMINECTOMY      CORONARY ANGIOPLASTY  2017    PTCA    LUMBAR LAMINECTOMY      OTHER SURGICAL HISTORY  2017    Cystoscopy With Insertion Of Permanent Urethral Stent    ROTATOR CUFF REPAIR  2017    Rotator Cuff Repair    SPINAL FUSION  2017    Spinal Arthrodesis    TOTAL KNEE ARTHROPLASTY  2017    Knee Replacement     Family History   Problem Relation Name Age of Onset    Lung cancer Mother mom     Migraines Mother mom     Cancer Father      Alcohol abuse Father      Other (cardiac disorder) Father      Brain cancer Sister      Cancer Brother      Migraines Sister Hien      Social History     Tobacco Use    Smoking status: Former     Current packs/day: 0.00     Average packs/day: 1 pack/day for 23.0 years (23.0 ttl pk-yrs)     Types: Cigarettes, Pipe     Start date: 1957     Quit date: 1980     Years since quittin.9     Passive exposure: Past    Smokeless tobacco: Never   Substance Use Topics    Alcohol use: Yes     Alcohol/week: 7.0 standard drinks of alcohol     Types: 7 Glasses of wine per week     Comment: once a night     Social History     Substance and Sexual Activity   Drug Use Never        ROS  As noted in HPI, otherwise all other systems have been reviewed are  negative for complaint.     Objective   General Appearance: Galen is well-developed, well-nourished, 79 y.o. year old male, in no acute distress. Makes good eye contact, is alert, interactive, and cooperative. Demonstrates recent & remote memory recall. Subjective information consistent with objective assessment.     Vitals:    12/17/24 1046   BP: (!) 94/48   Pulse: 68   Temp: 36.6 °C (97.8 °F)   PainSc: 0-No pain       Lab Results   Component Value Date    WBC 5.0 10/31/2024    RBC 5.11 10/31/2024    HGB 15.2 10/31/2024    HCT 46.2 10/31/2024     (L) 10/31/2024     10/31/2024    K 4.3 10/31/2024     10/31/2024    BUN 23 10/31/2024    CREATININE 1.27 10/31/2024    EGFR 57 (L) 10/31/2024    CALCIUM 9.2 10/31/2024    ALKPHOS 83 10/31/2024    AST 25 10/31/2024    ALT 28 10/31/2024    MG 2.07 03/03/2024    TSH 3.91 09/26/2024        MENTAL STATUS:      No data recorded       COORDINATION:   In both upper extremities, finger-nose-finger intact without dysmetria or overshoot.     GAIT:   Station stable with a normal base. Gait stable with a normal arm swing and speed. No ataxia, shuffling, steppage or waddling present. No circumduction was present.     RECORDS REVIEW:  Previous records (provider notes, laboratory reports, and imaging studies were reviewed and summarized).      Assessment & Plan  Temporal headache    Orders:    Sedimentation rate, automated; Future    C-Reactive Protein; Future         ASSESSMENT/PLAN:  Obtain ordered labs to r/o temporal arteritis         RUPALI DIAZ

## 2024-12-18 ENCOUNTER — LAB (OUTPATIENT)
Dept: LAB | Facility: LAB | Age: 79
End: 2024-12-18
Payer: MEDICARE

## 2024-12-18 DIAGNOSIS — R06.02 SHORTNESS OF BREATH: ICD-10-CM

## 2024-12-18 DIAGNOSIS — C61 MALIGNANT NEOPLASM OF PROSTATE (MULTI): ICD-10-CM

## 2024-12-18 DIAGNOSIS — I48.91 UNSPECIFIED ATRIAL FIBRILLATION (MULTI): Primary | ICD-10-CM

## 2024-12-18 DIAGNOSIS — R51.9 TEMPORAL HEADACHE: ICD-10-CM

## 2024-12-18 LAB
ALBUMIN SERPL BCP-MCNC: 4.5 G/DL (ref 3.4–5)
ALP SERPL-CCNC: 75 U/L (ref 33–136)
ALT SERPL W P-5'-P-CCNC: 24 U/L (ref 10–52)
ANION GAP SERPL CALC-SCNC: 10 MMOL/L (ref 10–20)
AST SERPL W P-5'-P-CCNC: 20 U/L (ref 9–39)
BASOPHILS # BLD AUTO: 0.01 X10*3/UL (ref 0–0.1)
BASOPHILS NFR BLD AUTO: 0.2 %
BILIRUB SERPL-MCNC: 0.5 MG/DL (ref 0–1.2)
BNP SERPL-MCNC: 86 PG/ML (ref 0–99)
BUN SERPL-MCNC: 26 MG/DL (ref 6–23)
CALCIUM SERPL-MCNC: 9.1 MG/DL (ref 8.6–10.3)
CHLORIDE SERPL-SCNC: 105 MMOL/L (ref 98–107)
CO2 SERPL-SCNC: 28 MMOL/L (ref 21–32)
CREAT SERPL-MCNC: 1.38 MG/DL (ref 0.5–1.3)
CRP SERPL-MCNC: 0.79 MG/DL
EGFRCR SERPLBLD CKD-EPI 2021: 51 ML/MIN/1.73M*2
EOSINOPHIL # BLD AUTO: 0.11 X10*3/UL (ref 0–0.4)
EOSINOPHIL NFR BLD AUTO: 2.2 %
ERYTHROCYTE [DISTWIDTH] IN BLOOD BY AUTOMATED COUNT: 14.7 % (ref 11.5–14.5)
ERYTHROCYTE [SEDIMENTATION RATE] IN BLOOD BY WESTERGREN METHOD: 2 MM/H (ref 0–20)
GLUCOSE SERPL-MCNC: 136 MG/DL (ref 74–99)
HCT VFR BLD AUTO: 44.2 % (ref 41–52)
HGB BLD-MCNC: 14.4 G/DL (ref 13.5–17.5)
IMM GRANULOCYTES # BLD AUTO: 0.05 X10*3/UL (ref 0–0.5)
IMM GRANULOCYTES NFR BLD AUTO: 1 % (ref 0–0.9)
LYMPHOCYTES # BLD AUTO: 0.53 X10*3/UL (ref 0.8–3)
LYMPHOCYTES NFR BLD AUTO: 10.4 %
MCH RBC QN AUTO: 29.8 PG (ref 26–34)
MCHC RBC AUTO-ENTMCNC: 32.6 G/DL (ref 32–36)
MCV RBC AUTO: 92 FL (ref 80–100)
MONOCYTES # BLD AUTO: 0.43 X10*3/UL (ref 0.05–0.8)
MONOCYTES NFR BLD AUTO: 8.4 %
NEUTROPHILS # BLD AUTO: 3.96 X10*3/UL (ref 1.6–5.5)
NEUTROPHILS NFR BLD AUTO: 77.8 %
NRBC BLD-RTO: 0 /100 WBCS (ref 0–0)
PLATELET # BLD AUTO: 127 X10*3/UL (ref 150–450)
POTASSIUM SERPL-SCNC: 4.3 MMOL/L (ref 3.5–5.3)
PROT SERPL-MCNC: 6.4 G/DL (ref 6.4–8.2)
PSA SERPL-MCNC: 0.74 NG/ML
RBC # BLD AUTO: 4.83 X10*6/UL (ref 4.5–5.9)
SODIUM SERPL-SCNC: 139 MMOL/L (ref 136–145)
WBC # BLD AUTO: 5.1 X10*3/UL (ref 4.4–11.3)

## 2024-12-18 PROCEDURE — 84153 ASSAY OF PSA TOTAL: CPT

## 2024-12-18 PROCEDURE — 36415 COLL VENOUS BLD VENIPUNCTURE: CPT

## 2024-12-18 PROCEDURE — 85652 RBC SED RATE AUTOMATED: CPT

## 2024-12-18 PROCEDURE — 86140 C-REACTIVE PROTEIN: CPT

## 2024-12-18 PROCEDURE — 83880 ASSAY OF NATRIURETIC PEPTIDE: CPT

## 2024-12-18 PROCEDURE — 80053 COMPREHEN METABOLIC PANEL: CPT

## 2024-12-18 PROCEDURE — 85025 COMPLETE CBC W/AUTO DIFF WBC: CPT

## 2024-12-19 ENCOUNTER — HOSPITAL ENCOUNTER (OUTPATIENT)
Dept: RADIOLOGY | Facility: CLINIC | Age: 79
Discharge: HOME | End: 2024-12-19
Payer: MEDICARE

## 2024-12-19 DIAGNOSIS — R06.02 SHORTNESS OF BREATH: ICD-10-CM

## 2024-12-19 PROCEDURE — 71046 X-RAY EXAM CHEST 2 VIEWS: CPT | Performed by: RADIOLOGY

## 2024-12-19 PROCEDURE — 71046 X-RAY EXAM CHEST 2 VIEWS: CPT

## 2024-12-31 ENCOUNTER — LAB (OUTPATIENT)
Dept: LAB | Facility: LAB | Age: 79
End: 2024-12-31
Payer: MEDICARE

## 2024-12-31 ENCOUNTER — OFFICE VISIT (OUTPATIENT)
Dept: CARDIOLOGY | Facility: CLINIC | Age: 79
End: 2024-12-31
Payer: MEDICARE

## 2024-12-31 VITALS
HEIGHT: 71 IN | WEIGHT: 210.6 LBS | OXYGEN SATURATION: 96 % | HEART RATE: 73 BPM | BODY MASS INDEX: 29.48 KG/M2 | DIASTOLIC BLOOD PRESSURE: 70 MMHG | SYSTOLIC BLOOD PRESSURE: 146 MMHG | TEMPERATURE: 97.6 F

## 2024-12-31 DIAGNOSIS — I48.0 PAROXYSMAL ATRIAL FIBRILLATION (MULTI): ICD-10-CM

## 2024-12-31 DIAGNOSIS — I48.0 PAROXYSMAL ATRIAL FIBRILLATION (MULTI): Primary | ICD-10-CM

## 2024-12-31 LAB — TSH SERPL-ACNC: 3.52 MIU/L (ref 0.44–3.98)

## 2024-12-31 PROCEDURE — 99214 OFFICE O/P EST MOD 30 MIN: CPT | Performed by: INTERNAL MEDICINE

## 2024-12-31 PROCEDURE — 99214 OFFICE O/P EST MOD 30 MIN: CPT | Mod: 25 | Performed by: INTERNAL MEDICINE

## 2024-12-31 PROCEDURE — 93010 ELECTROCARDIOGRAM REPORT: CPT | Performed by: INTERNAL MEDICINE

## 2024-12-31 PROCEDURE — 93005 ELECTROCARDIOGRAM TRACING: CPT | Performed by: INTERNAL MEDICINE

## 2024-12-31 PROCEDURE — 1126F AMNT PAIN NOTED NONE PRSNT: CPT | Performed by: INTERNAL MEDICINE

## 2024-12-31 PROCEDURE — G2211 COMPLEX E/M VISIT ADD ON: HCPCS | Performed by: INTERNAL MEDICINE

## 2024-12-31 PROCEDURE — 84443 ASSAY THYROID STIM HORMONE: CPT

## 2024-12-31 PROCEDURE — 3077F SYST BP >= 140 MM HG: CPT | Performed by: INTERNAL MEDICINE

## 2024-12-31 PROCEDURE — 1157F ADVNC CARE PLAN IN RCRD: CPT | Performed by: INTERNAL MEDICINE

## 2024-12-31 PROCEDURE — 1159F MED LIST DOCD IN RCRD: CPT | Performed by: INTERNAL MEDICINE

## 2024-12-31 PROCEDURE — 3078F DIAST BP <80 MM HG: CPT | Performed by: INTERNAL MEDICINE

## 2024-12-31 ASSESSMENT — PAIN SCALES - GENERAL: PAINLEVEL_OUTOF10: 0-NO PAIN

## 2024-12-31 NOTE — PROGRESS NOTES
Referring Provider: Darrick Alfonso MD  Reason for Consult: Atrial fibrillation    History of Present Illness:      Galen Villasenor Jr. is a 79 y.o. year old male patient with a history significant for persistent atrial fibrillation, chronic ischemic heart disease, hairy cell leukemia, hyperlipidemia,  who was referred by Dr. Alfonso for management of atrial fibrillation. He is here today for follow-up after recent ablation at the end of January.     Mr. Villasenor was initially diagnosed with paroxysmal atrial fibrillation in 2018 when he had a period of illness that was prolonged and associated with fatigue.  He was found to have atrial fibrillation.  He has had multiple cardioversions 18, and was ultimately started on amiodarone.  He did well over the intervening years, with improvement of his symptoms while in sinus rhythm.  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  He was started on dofetilide in November 2022, which has worked fairly well until recently, where he has begun to have paroxysms of atrial arrhythmias being on dofetilide.  He has a 4% A-fib burden on monitoring.  Since the summer, he has complained of worsening exertional fatigue and dyspnea.  Some of this may be due to recurrence of his hairy cell leukemia, and his hemoglobin has been trending down.  However, he does attribute some of the symptoms to his atrial fibrillation as well.  He is planning to start his chemotherapy for his leukemia soon.    He had a successful AF ablation on 1/22/2024 with PVI, LA PWI, and CTI line. No complications after the procedure. An EKG done on 1/30 showed atypical atrial flutter, but this was not brought to my attention. Since the ablation, he has started chemotherapy, and has significant weakness and fatigue since then. He then had dofetilide loading and repeat cardioversion on 3/1/2024 back to normal sinus rhythm, but unfortunately had recurrence of atypical flutter. We decided to move forward with repeat  ablation given recurrence of atrial arrhythmias.  He underwent a repeat ablation on 6/12/2024.  He had reconnection of his right pulmonary veins, posterior wall, as well as prior CTI.  These were already isolated.  He also had a septal flutter that was ablated in the high right atrial septum.  His dofetilide was continued after the procedure.  He has been doing well since the procedure with no recurrence of atrial arrhythmias.      After his last visit, he did quite well after ablation.  He had a 7-day patch monitor which showed no recurrent atrial arrhythmias.  His dofetilide has been stopped.  However, over the past couple of weeks, he has again felt shortness of breath with exertion and decreased energy.  He had blood work done by his primary care doctor which did not show any abnormalities.        Focused Cardiovascular Problem List:  Persistent atrial fibrillation: [id number] score 5, hypertension, CAD, type 2 diabetes, age.  On Eliquis and dofetilide.  Previously on amiodarone, but caused thyroiditis.  Has had 5 prior cardioversions and developed breakthrough on dofetilide. He underwent successful AF ablation on 1/22/2024 with PVI, LA PWI, and CTI line. His dofetilide was stopped after the procedure. On 1/30/2024, he had an EKG which showed atypical atrial flutter. He underwent repeat dofetilide loading and cardioversion in March, but unfortunately had recurrence of atypical flutter despite this. He underwent a repeat ablation on 6/12/2024.  He had reconnection of his right pulmonary veins, posterior wall, as well as prior CTI.  These were already isolated.  He also had a septal flutter that was ablated in the high right atrial septum.  His dofetilide was continued after the procedure.  Chronic ischemic heart disease: Remote PCI, preserved ejection fraction  Hypertension  Hairy-cell leukemia: Extensive marrow involvement; on cladribine      Past Medical and Surgical History:  Mr. Villasenor  has a past medical  history of ADHD (attention deficit hyperactivity disorder), Anxiety, Atherosclerotic heart disease of native coronary artery without angina pectoris (2017), Cancer (Multi) (02), CTS (carpal tunnel syndrome) (03), Depression, Diabetes mellitus (Multi), Difficulty walking, Dysphagia, Head injury (6/7 Yrs.old), HL (hearing loss), Hypertension, Memory loss, Personal history of malignant neoplasm of prostate (2017), Personal history of other diseases of the circulatory system (2017), Personal history of other diseases of the respiratory system (2017), Personal history of other endocrine, nutritional and metabolic disease (2017), Personal history of other endocrine, nutritional and metabolic disease (2017), Personal history of other specified conditions (2017), Restless leg syndrome, Seizures (Multi), Shingles, Syncope, TIA (transient ischemic attack), and Vision loss.    has a past surgical history that includes Rotator cuff repair (2017); Total knee arthroplasty (2017); Carpal tunnel release (2017); Coronary angioplasty (2017); Other surgical history (2017); Spinal fusion (2017); Cardiac electrophysiology procedure (N/A, 2024); Cardiac electrophysiology procedure (N/A, 2024); Cardiac electrophysiology procedure (N/A, 2024); Carotid stent; Cervical laminectomy; Cervical discectomy (); Cervical fusion; and Lumbar laminectomy.    Social History:  Social History     Tobacco Use    Smoking status: Former     Current packs/day: 0.00     Average packs/day: 1 pack/day for 23.0 years (23.0 ttl pk-yrs)     Types: Cigarettes, Pipe     Start date: 1957     Quit date: 1980     Years since quittin.0     Passive exposure: Past    Smokeless tobacco: Never   Substance Use Topics    Alcohol use: Yes     Alcohol/week: 7.0 standard drinks of alcohol     Types: 7 Glasses of wine per week     Comment: once a night     "  Tobacco: Quit 1/1/198/0, prior 4 packs/day for 23 years  Alcohol:  1 glass of wine / day  Drug use:  Denies    Relevant Family History:   Family History   Problem Relation Name Age of Onset    Lung cancer Mother mom     Migraines Mother mom     Cancer Father      Alcohol abuse Father      Other (cardiac disorder) Father      Brain cancer Sister      Cancer Brother      Migraines Sister Hien         Allergies:  Allergies   Allergen Reactions    Anesthetics - Renetta Type- Parabens Seizure    Procaine Seizure    Quinolones Unknown and Other     drop foot        Medications:  Current Outpatient Medications   Medication Instructions    apixaban (ELIQUIS) 5 mg, 2 times daily    dofetilide (TIKOSYN) 125 mcg, oral, Every 12 hours scheduled    DULoxetine (CYMBALTA) 120 mg, Daily    empagliflozin (JARDIANCE) 12.5 mg, Daily    fenofibrate (Triglide) 160 mg tablet 1 tablet, Daily    gabapentin (Neurontin) 600 mg tablet Take 1 tablet (600 mg) by mouth in the morning. And Take 2 tablets (1,200 mg) by mouth at bedtime.    isosorbide mononitrate ER (Imdur) 30 mg 24 hr tablet 1 tablet, Daily    lisinopril 20 mg tablet 1 tablet, Daily    methIMAzole (TAPAZOLE) 5 mg, oral, Daily, Except NO tablet on Sunday    omeprazole (PRILOSEC) 20 mg    rosuvastatin (CRESTOR) 20 mg, Daily         Objective   Physical Exam:  Last Recorded Vitals:      7/16/2024    10:52 AM 7/31/2024    10:36 AM 9/26/2024     9:51 AM 11/4/2024    11:58 AM 11/13/2024     8:48 AM 12/17/2024    10:46 AM 12/31/2024    11:54 AM   Vitals   Systolic 148 127 136 119 117 94 146   Diastolic 72 65 64 74 68 48 70   BP Location  Right arm  Right arm Left arm     Heart Rate 74 75  68 80 68 73   Temp 36.2 °C (97.1 °F) 36.9 °C (98.4 °F)  36 °C (96.8 °F)  36.6 °C (97.8 °F) 36.4 °C (97.6 °F)   Resp 14 16  18      Height 1.803 m (5' 11\")  1.803 m (5' 11\")  1.803 m (5' 11\")  1.803 m (5' 11\")   Weight (lb) 203 203.71 205 206.35 209.8  210.6   BMI 28.31 kg/m2 28.41 kg/m2 28.59 kg/m2 " "28.78 kg/m2 29.26 kg/m2  29.37 kg/m2   BSA (m2) 2.15 m2 2.15 m2 2.16 m2 2.17 m2 2.18 m2  2.19 m2   Visit Report Report Report Report Report Report Report Report    Visit Vitals  /70 (Patient Position: Sitting)   Pulse 73   Temp 36.4 °C (97.6 °F) (Temporal)   Ht 1.803 m (5' 11\")   Wt 95.5 kg (210 lb 9.6 oz)   SpO2 96%   BMI 29.37 kg/m²   Smoking Status Former   BSA 2.19 m²      Gen: NAD, sitting comfortably  HEENT: NC/AT  Card: RRR, no m/r/g  Pulm: Clear to auscultation bilaterally  Ext: No LE edema  Neuro: No focal deficits    Diagnostic Results      My Interpretation of Reviewed Study(s):  Prior ECGs (reviewed and my interpretation):  12/31/2024: Sinus rhythm with first-degree AV block, heart rate 60 bpm  7/16/2024: Sinus rhythm with 1st degree AV block, low voltage QRS   5/21/2024: Atypical Atrial flutter, frequent PVCs, HR 74bpm  2/20/2024: Atypical atrial flutter, variable AV block, HR 74 bpm  1/30/2024: Atypical atrial flutter with variable AV block, HR 70bpm  1/9/2024: Sinus rhythm, heart rate 78 bpm, ventricular bigeminy, Para-hisian PVCs      Cardiac monitors:  11/2024: 7-day monitor showing no sustained arrhythmias.  Some asymptomatic Wenke Bach.  NSVT up to 9 beats.      Echocardiography:  11/14/2023  CONCLUSIONS:   - Exam indication: Chest Pain   - The left ventricle is normal in size. There is mild left ventricular   hypertrophy. Left ventricular systolic function is normal. EF = 60 ± 5% (2D   biplane) Grade I left ventricular diastolic dysfunction.   - The right ventricle is normal in size. Right ventricular systolic function is   normal.   - The left atrial cavity is mildly dilated.   - Exam was compared with the prior  echocardiographic exam performed on   05/02/2019. No significant change     Stress Test:   6/12/2023  CONCLUSIONS:    1. SPECT Perfusion Study: Normal.    2. There is no scintigraphic evidence for inducible ischemia.    3. No evidence of scarred myocardium.    4. Left ventricle " is normal in size. The left ventricle systolic   function is normal.    5. This is a low risk scan.       Relevant Labs:  Lab Results   Component Value Date    CREATININE 1.38 (H) 12/18/2024    CREATININE 1.27 10/31/2024    CREATININE 1.25 09/26/2024    K 4.3 12/18/2024    K 4.3 10/31/2024    K 4.1 09/26/2024    HGB 14.4 12/18/2024    HGB 15.2 10/31/2024    HGB 14.2 07/29/2024    INR 1.7 (H) 01/22/2024    AST 20 12/18/2024    AST 25 10/31/2024    AST 22 09/26/2024    ALT 24 12/18/2024    ALT 28 10/31/2024    ALT 23 09/26/2024       Assessment/Plan   Assessment and Plan:  Galen Villasenor Jr. is a 79 y.o. year old male patient who underwent successful AF ablation on 1/22/2024 with PVI, LA PWI, and CTI line, and then underwent redo ablation on 6/12/2024 with reisolation of his right pulmonary veins, posterior wall, and CTI line.  He has done well after ablation without any recurrent arrhythmia.  He is now off all antiarrhythmics, he continues to maintain normal sinus rhythm.    He is again complaining of symptoms of exertional shortness of breath.  Chest x-ray did not show any pathology or elevated hemidiaphragm.  He is in normal sinus rhythm so it is not related to his heart rhythm.  I will order a TSH to see if this is related to his thyroid.  Will also order an echocardiogram.    Recommendations:  #Persistent atrial fibrillation status post ablation x 2  - Continue apixaban indefinitely due to elevated [id number] score  - Shortness of breath does not seem to be related to his heart rhythm  - TTE  - Repeat TSH    Return to Clinic:  Follow-up in 6 weeks    Thank you very much for allowing me to participate in the care of this patient. Please do not hesitate to contact me with any further questions or concerns.    Evelyne Ambrose MD  Clinical Cardiac Electrophysiologist  Director of Atrial Fibrillation Ablation, [facility]  Director of Ventricular Arrhythmias Research, [facility]  Office Phone  Number: [phone]

## 2025-01-07 ENCOUNTER — HOSPITAL ENCOUNTER (OUTPATIENT)
Dept: CARDIOLOGY | Facility: HOSPITAL | Age: 80
Discharge: HOME | End: 2025-01-07
Payer: MEDICARE

## 2025-01-07 DIAGNOSIS — R06.02 SHORTNESS OF BREATH: ICD-10-CM

## 2025-01-07 DIAGNOSIS — I48.91 UNSPECIFIED ATRIAL FIBRILLATION (MULTI): ICD-10-CM

## 2025-01-07 DIAGNOSIS — I48.0 PAROXYSMAL ATRIAL FIBRILLATION (MULTI): ICD-10-CM

## 2025-01-07 LAB
AORTIC VALVE MEAN GRADIENT: 5 MMHG
AORTIC VALVE PEAK VELOCITY: 1.51 M/S
AV PEAK GRADIENT: 9 MMHG
AVA (PEAK VEL): 2.7 CM2
AVA (VTI): 2.61 CM2
EJECTION FRACTION APICAL 4 CHAMBER: 57.4
EJECTION FRACTION: 58 %
LEFT ATRIUM VOLUME AREA LENGTH INDEX BSA: 21.6 ML/M2
LEFT VENTRICLE INTERNAL DIMENSION DIASTOLE: 4.37 CM (ref 3.5–6)
LEFT VENTRICULAR OUTFLOW TRACT DIAMETER: 2 CM
LV EJECTION FRACTION BIPLANE: 57 %
MITRAL VALVE E/A RATIO: 1.37
RIGHT VENTRICLE FREE WALL PEAK S': 14.5 CM/S
RIGHT VENTRICLE PEAK SYSTOLIC PRESSURE: 22.4 MMHG
TRICUSPID ANNULAR PLANE SYSTOLIC EXCURSION: 2.4 CM

## 2025-01-07 PROCEDURE — 93306 TTE W/DOPPLER COMPLETE: CPT | Performed by: INTERNAL MEDICINE

## 2025-01-07 PROCEDURE — 93306 TTE W/DOPPLER COMPLETE: CPT

## 2025-01-08 ENCOUNTER — APPOINTMENT (OUTPATIENT)
Dept: CARDIOLOGY | Facility: CLINIC | Age: 80
End: 2025-01-08
Payer: MEDICARE

## 2025-01-17 ENCOUNTER — HOSPITAL ENCOUNTER (OUTPATIENT)
Dept: RADIOLOGY | Facility: CLINIC | Age: 80
Discharge: HOME | End: 2025-01-17
Payer: MEDICARE

## 2025-01-17 DIAGNOSIS — R06.02 SHORTNESS OF BREATH: ICD-10-CM

## 2025-01-17 PROCEDURE — 71250 CT THORAX DX C-: CPT

## 2025-01-21 ENCOUNTER — TELEPHONE (OUTPATIENT)
Dept: ENDOCRINOLOGY | Facility: CLINIC | Age: 80
End: 2025-01-21
Payer: MEDICARE

## 2025-01-21 DIAGNOSIS — E05.90 HYPERTHYROIDISM: ICD-10-CM

## 2025-01-22 RX ORDER — METHIMAZOLE 5 MG/1
5 TABLET ORAL DAILY
Qty: 90 TABLET | Refills: 1 | Status: SHIPPED | OUTPATIENT
Start: 2025-01-22

## 2025-02-05 ENCOUNTER — OFFICE VISIT (OUTPATIENT)
Dept: CARDIOLOGY | Facility: CLINIC | Age: 80
End: 2025-02-05
Payer: MEDICARE

## 2025-02-05 VITALS
HEIGHT: 71 IN | WEIGHT: 208 LBS | HEART RATE: 62 BPM | SYSTOLIC BLOOD PRESSURE: 124 MMHG | OXYGEN SATURATION: 96 % | BODY MASS INDEX: 29.12 KG/M2 | DIASTOLIC BLOOD PRESSURE: 65 MMHG | RESPIRATION RATE: 16 BRPM | TEMPERATURE: 96.1 F

## 2025-02-05 DIAGNOSIS — I48.0 PAROXYSMAL ATRIAL FIBRILLATION (MULTI): Primary | ICD-10-CM

## 2025-02-05 DIAGNOSIS — R94.31 ABNORMAL ELECTROCARDIOGRAM (ECG) (EKG): ICD-10-CM

## 2025-02-05 PROCEDURE — 3078F DIAST BP <80 MM HG: CPT | Performed by: INTERNAL MEDICINE

## 2025-02-05 PROCEDURE — 93010 ELECTROCARDIOGRAM REPORT: CPT | Performed by: INTERNAL MEDICINE

## 2025-02-05 PROCEDURE — 99214 OFFICE O/P EST MOD 30 MIN: CPT | Mod: 25 | Performed by: INTERNAL MEDICINE

## 2025-02-05 PROCEDURE — G2211 COMPLEX E/M VISIT ADD ON: HCPCS | Performed by: INTERNAL MEDICINE

## 2025-02-05 PROCEDURE — 1159F MED LIST DOCD IN RCRD: CPT | Performed by: INTERNAL MEDICINE

## 2025-02-05 PROCEDURE — 1036F TOBACCO NON-USER: CPT | Performed by: INTERNAL MEDICINE

## 2025-02-05 PROCEDURE — 3074F SYST BP LT 130 MM HG: CPT | Performed by: INTERNAL MEDICINE

## 2025-02-05 PROCEDURE — 93005 ELECTROCARDIOGRAM TRACING: CPT | Performed by: INTERNAL MEDICINE

## 2025-02-05 PROCEDURE — 99214 OFFICE O/P EST MOD 30 MIN: CPT | Performed by: INTERNAL MEDICINE

## 2025-02-05 PROCEDURE — 1157F ADVNC CARE PLAN IN RCRD: CPT | Performed by: INTERNAL MEDICINE

## 2025-02-05 RX ORDER — AMINOPHYLLINE 25 MG/ML
125 INJECTION, SOLUTION INTRAVENOUS ONCE AS NEEDED
OUTPATIENT
Start: 2025-02-05

## 2025-02-05 RX ORDER — REGADENOSON 0.08 MG/ML
0.4 INJECTION, SOLUTION INTRAVENOUS
OUTPATIENT
Start: 2025-02-05

## 2025-02-05 ASSESSMENT — PATIENT HEALTH QUESTIONNAIRE - PHQ9
SUM OF ALL RESPONSES TO PHQ9 QUESTIONS 1 AND 2: 0
1. LITTLE INTEREST OR PLEASURE IN DOING THINGS: NOT AT ALL
2. FEELING DOWN, DEPRESSED OR HOPELESS: NOT AT ALL

## 2025-02-05 NOTE — PROGRESS NOTES
Referring Provider: Darrick Alfonso MD  Reason for Consult: Atrial fibrillation    History of Present Illness:      Galen Villasenor Jr. is a 79 y.o. year old male patient with a history significant for persistent atrial fibrillation, chronic ischemic heart disease, hairy cell leukemia, hyperlipidemia,  who was referred by Dr. Alfonso for management of atrial fibrillation. He is here today for follow-up after recent ablation at the end of January.     Mr. Villasenor was initially diagnosed with paroxysmal atrial fibrillation in 2018 when he had a period of illness that was prolonged and associated with fatigue.  He was found to have atrial fibrillation.  He has had multiple cardioversions 18, and was ultimately started on amiodarone.  He did well over the intervening years, with improvement of his symptoms while in sinus rhythm.  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  He was started on dofetilide in November 2022, which has worked fairly well until recently, where he has begun to have paroxysms of atrial arrhythmias being on dofetilide.  He has a 4% A-fib burden on monitoring.  Since the summer, he has complained of worsening exertional fatigue and dyspnea.  Some of this may be due to recurrence of his hairy cell leukemia, and his hemoglobin has been trending down.  However, he does attribute some of the symptoms to his atrial fibrillation as well.  He is planning to start his chemotherapy for his leukemia soon.    He had a successful AF ablation on 1/22/2024 with PVI, LA PWI, and CTI line. No complications after the procedure. An EKG done on 1/30 showed atypical atrial flutter, but this was not brought to my attention. Since the ablation, he has started chemotherapy, and has significant weakness and fatigue since then. He then had dofetilide loading and repeat cardioversion on 3/1/2024 back to normal sinus rhythm, but unfortunately had recurrence of atypical flutter. We decided to move forward with repeat  ablation given recurrence of atrial arrhythmias.  He underwent a repeat ablation on 6/12/2024.  He had reconnection of his right pulmonary veins, posterior wall, as well as prior CTI.  These were already isolated.  He also had a septal flutter that was ablated in the high right atrial septum.  His dofetilide was continued after the procedure.  He has been doing well since the procedure with no recurrence of atrial arrhythmias.      He did quite well after ablation.  He had a 7-day patch monitor which showed no recurrent atrial arrhythmias.  His dofetilide has been stopped.  However, over the past couple of weeks, he has again felt shortness of breath with exertion and decreased energy.  He had blood work done by his primary care doctor which did not show any abnormalities.  After the last visit, we ordered an echocardiogram.  However this did not show any abnormalities.  He continues to complain of shortness of breath with exertion.  Sometimes, he has no symptoms, but on other occasions he does feel short of breath with exertion.  No palpitations, no lightheadedness, no dizziness, no other symptoms.  He saw his pulmonologist and there was no gross abnormalities on recent CT chest either.        Focused Cardiovascular Problem List:  Persistent atrial fibrillation status post catheter ablation x 2 (PVI, left atrial posterior wall isolation, CTI line, ablation of septal flutter): [id number] score 5, hypertension, CAD, type 2 diabetes, age.  On Eliquis and dofetilide.  Previously on amiodarone, but caused thyroiditis.  Has had 5 prior cardioversions and developed breakthrough on dofetilide. He underwent successful AF ablation on 1/22/2024 with PVI, LA PWI, and CTI line. His dofetilide was stopped after the procedure. On 1/30/2024, he had an EKG which showed atypical atrial flutter. He underwent repeat dofetilide loading and cardioversion in March, but unfortunately had recurrence of atypical flutter despite this. He  underwent a repeat ablation on 6/12/2024.  He had reconnection of his right pulmonary veins, posterior wall, as well as prior CTI.  These were already isolated.  He also had a septal flutter that was ablated in the high right atrial septum.  His dofetilide was continued after the procedure.  Chronic ischemic heart disease: Remote PCI, preserved ejection fraction  Hypertension  Hairy-cell leukemia: Extensive marrow involvement; on cladribine      Past Medical and Surgical History:  Mr. Villasenor  has a past medical history of ADHD (attention deficit hyperactivity disorder), Anxiety, Atherosclerotic heart disease of native coronary artery without angina pectoris (04/04/2017), Cancer (Multi) (02), CTS (carpal tunnel syndrome) (03), Depression, Diabetes mellitus (Multi), Difficulty walking, Dysphagia, Head injury (6/7 Yrs.old), HL (hearing loss), Hypertension, Memory loss, Personal history of malignant neoplasm of prostate (04/04/2017), Personal history of other diseases of the circulatory system (04/04/2017), Personal history of other diseases of the respiratory system (04/04/2017), Personal history of other endocrine, nutritional and metabolic disease (04/04/2017), Personal history of other endocrine, nutritional and metabolic disease (04/04/2017), Personal history of other specified conditions (04/04/2017), Restless leg syndrome, Seizures (Multi), Shingles, Syncope, TIA (transient ischemic attack), and Vision loss.    has a past surgical history that includes Rotator cuff repair (04/04/2017); Total knee arthroplasty (04/04/2017); Carpal tunnel release (04/04/2017); Coronary angioplasty (04/04/2017); Other surgical history (04/04/2017); Spinal fusion (04/04/2017); Cardiac electrophysiology procedure (N/A, 01/22/2024); Cardiac electrophysiology procedure (N/A, 03/01/2024); Cardiac electrophysiology procedure (N/A, 06/12/2024); Carotid stent; Cervical laminectomy; Cervical discectomy (1/14); Cervical fusion; and  Lumbar laminectomy.    Social History:  Social History     Tobacco Use    Smoking status: Former     Current packs/day: 0.00     Average packs/day: 1 pack/day for 23.0 years (23.0 ttl pk-yrs)     Types: Cigarettes, Pipe     Start date: 1957     Quit date: 1980     Years since quittin.1     Passive exposure: Past    Smokeless tobacco: Never   Substance Use Topics    Alcohol use: Yes     Alcohol/week: 7.0 standard drinks of alcohol     Types: 7 Glasses of wine per week     Comment: once a night      Tobacco: Quit /0, prior 4 packs/day for 23 years  Alcohol:  1 glass of wine / day  Drug use:  Denies    Relevant Family History:   Family History   Problem Relation Name Age of Onset    Lung cancer Mother mom     Migraines Mother mom     Cancer Father      Alcohol abuse Father      Other (cardiac disorder) Father      Brain cancer Sister      Cancer Brother      Migraines Sister Hien         Allergies:  Allergies   Allergen Reactions    Anesthetics - Renetta Type- Parabens Seizure    Procaine Seizure    Quinolones Unknown and Other     drop foot        Medications:  Current Outpatient Medications   Medication Instructions    apixaban (ELIQUIS) 5 mg, 2 times daily    DULoxetine (CYMBALTA) 120 mg, Daily    empagliflozin (JARDIANCE) 12.5 mg, Daily    fenofibrate (Triglide) 160 mg tablet 1 tablet, Daily    gabapentin (Neurontin) 600 mg tablet Take 1 tablet (600 mg) by mouth in the morning. And Take 2 tablets (1,200 mg) by mouth at bedtime.    isosorbide mononitrate ER (Imdur) 30 mg 24 hr tablet 1 tablet, Daily    lisinopril 20 mg tablet 1 tablet, Daily    methIMAzole (TAPAZOLE) 5 mg, oral, Daily, Except NO tablet on     omeprazole (PRILOSEC) 20 mg    rosuvastatin (CRESTOR) 20 mg, Daily         Objective   Physical Exam:  Last Recorded Vitals:      2024    10:36 AM 2024     9:51 AM 2024    11:58 AM 2024     8:48 AM 2024    10:46 AM 2024    11:54 AM 2025     9:22 AM  "  Vitals   Systolic 127 136 119 117 94 146 124   Diastolic 65 64 74 68 48 70 65   BP Location Right arm  Right arm Left arm      Heart Rate 75  68 80 68 73 62   Temp 36.9 °C (98.4 °F)  36 °C (96.8 °F)  36.6 °C (97.8 °F) 36.4 °C (97.6 °F) 35.6 °C (96.1 °F)   Resp 16  18    16   Height  1.803 m (5' 11\")  1.803 m (5' 11\")  1.803 m (5' 11\") 1.803 m (5' 11\")   Weight (lb) 203.71 205 206.35 209.8  210.6 208   BMI 28.41 kg/m2 28.59 kg/m2 28.78 kg/m2 29.26 kg/m2  29.37 kg/m2 29.01 kg/m2   BSA (m2) 2.15 m2 2.16 m2 2.17 m2 2.18 m2  2.19 m2 2.17 m2   Visit Report Report Report Report Report Report Report Report    Visit Vitals  /65   Pulse 62   Temp 35.6 °C (96.1 °F)   Resp 16   Ht 1.803 m (5' 11\")   Wt 94.3 kg (208 lb)   SpO2 96%   BMI 29.01 kg/m²   Smoking Status Former   BSA 2.17 m²      Gen: NAD, sitting comfortably  HEENT: NC/AT  Card: RRR, no m/r/g  Pulm: Clear to auscultation bilaterally  Ext: No LE edema  Neuro: No focal deficits    Diagnostic Results      My Interpretation of Reviewed Study(s):  Prior ECGs (reviewed and my interpretation):  2/5/2025: Sinus rhythm with paroxysmal ectopic atrial rhythm, junctional escape beat  12/31/2024: Sinus rhythm with first-degree AV block, heart rate 60 bpm  7/16/2024: Sinus rhythm with 1st degree AV block, low voltage QRS   5/21/2024: Atypical Atrial flutter, frequent PVCs, HR 74bpm  2/20/2024: Atypical atrial flutter, variable AV block, HR 74 bpm  1/30/2024: Atypical atrial flutter with variable AV block, HR 70bpm  1/9/2024: Sinus rhythm, heart rate 78 bpm, ventricular bigeminy, Para-hisian PVCs      Cardiac monitors:  11/2024: 7-day monitor showing no sustained arrhythmias.  Some asymptomatic Wenke Bach.  NSVT up to 9 beats.      Echocardiography:  1/7/2025  CONCLUSIONS:   1. The left ventricular systolic function is normal, with a visually estimated ejection fraction of 55-60%.   2. No regional wall motion abnormalities.   3. There is normal right ventricular global " systolic function.   4. Trivial to 1+ mitral regurgitation.   5. Trivial to 1+ tricuspid regurgitation with estimated RVSP 22 mmHg.    11/14/2023  CONCLUSIONS:   - Exam indication: Chest Pain   - The left ventricle is normal in size. There is mild left ventricular   hypertrophy. Left ventricular systolic function is normal. EF = 60 ± 5% (2D   biplane) Grade I left ventricular diastolic dysfunction.   - The right ventricle is normal in size. Right ventricular systolic function is   normal.   - The left atrial cavity is mildly dilated.   - Exam was compared with the prior  echocardiographic exam performed on   05/02/2019. No significant change     Stress Test:   6/12/2023  CONCLUSIONS:    1. SPECT Perfusion Study: Normal.    2. There is no scintigraphic evidence for inducible ischemia.    3. No evidence of scarred myocardium.    4. Left ventricle is normal in size. The left ventricle systolic   function is normal.    5. This is a low risk scan.       Relevant Labs:  Lab Results   Component Value Date    CREATININE 1.38 (H) 12/18/2024    CREATININE 1.27 10/31/2024    CREATININE 1.25 09/26/2024    K 4.3 12/18/2024    K 4.3 10/31/2024    K 4.1 09/26/2024    HGB 14.4 12/18/2024    HGB 15.2 10/31/2024    HGB 14.2 07/29/2024    INR 1.7 (H) 01/22/2024    AST 20 12/18/2024    AST 25 10/31/2024    AST 22 09/26/2024    ALT 24 12/18/2024    ALT 28 10/31/2024    ALT 23 09/26/2024       Assessment/Plan   Assessment and Plan:  Galen Villasenor  is a 79 y.o. year old male patient who underwent successful AF ablation on 1/22/2024 with PVI, LA PWI, and CTI line, and then underwent redo ablation on 6/12/2024 with reisolation of his right pulmonary veins, posterior wall, and CTI line.  He has done well after the second ablation ablation from the arrhythmia standpoint without any recurrent arrhythmia.  He is now off all antiarrhythmics, he continues to maintain normal sinus rhythm.  However, he continues to complain of some  paroxysmal exertional shortness of breath.    EKG today showed sinus rhythm with a short burst of an ectopic atrial rhythm which was quite slow and some junctional rhythm afterwards.  His most recent monitor did not show any sustained arrhythmias that would explain his shortness of breath.  From the cardiac standpoint, possible explanations of his exertional dyspnea include ischemia, paroxysmal arrhythmias that we have not caught, or chronotropic incompetence.  Will obtain another 2-week monitor to see if he has any more arrhythmias now or chronotropic blunting.  Additionally, we will order a nuclear stress test to see if there has been any interval development of ischemia.    Recommendations:  #Persistent atrial fibrillation status post ablation x 2  - Continue apixaban indefinitely due to elevated [id number] score  - Repeat 2-week patch monitor to assess for recurrent atrial arrhythmias or chronotropic blunting  - Nuclear stress test for exertional dyspnea  - Continue off antiarrhythmics and AV libby agents    Return to Clinic:  Follow-up in 8 weeks    Thank you very much for allowing me to participate in the care of this patient. Please do not hesitate to contact me with any further questions or concerns.    Evelyne Ambrose MD  Clinical Cardiac Electrophysiologist  Director of Atrial Fibrillation Ablation, [facility]  Director of Ventricular Arrhythmias Research, [facility]  Office Phone Number: [phone]

## 2025-02-25 ENCOUNTER — HOSPITAL ENCOUNTER (OUTPATIENT)
Dept: RADIOLOGY | Facility: HOSPITAL | Age: 80
Discharge: HOME | End: 2025-02-25
Payer: MEDICARE

## 2025-02-25 ENCOUNTER — HOSPITAL ENCOUNTER (OUTPATIENT)
Dept: CARDIOLOGY | Facility: HOSPITAL | Age: 80
Discharge: HOME | End: 2025-02-25
Payer: MEDICARE

## 2025-02-25 DIAGNOSIS — R94.31 ABNORMAL ELECTROCARDIOGRAM (ECG) (EKG): ICD-10-CM

## 2025-02-25 DIAGNOSIS — I48.0 PAROXYSMAL ATRIAL FIBRILLATION (MULTI): ICD-10-CM

## 2025-02-25 PROCEDURE — 78452 HT MUSCLE IMAGE SPECT MULT: CPT

## 2025-02-25 PROCEDURE — 3430000001 HC RX 343 DIAGNOSTIC RADIOPHARMACEUTICALS: Performed by: INTERNAL MEDICINE

## 2025-02-25 PROCEDURE — 93017 CV STRESS TEST TRACING ONLY: CPT

## 2025-02-25 PROCEDURE — 78452 HT MUSCLE IMAGE SPECT MULT: CPT | Performed by: INTERNAL MEDICINE

## 2025-02-25 PROCEDURE — 93016 CV STRESS TEST SUPVJ ONLY: CPT | Performed by: INTERNAL MEDICINE

## 2025-02-25 PROCEDURE — A9502 TC99M TETROFOSMIN: HCPCS | Performed by: INTERNAL MEDICINE

## 2025-02-25 PROCEDURE — 93018 CV STRESS TEST I&R ONLY: CPT | Performed by: INTERNAL MEDICINE

## 2025-02-25 PROCEDURE — 2500000004 HC RX 250 GENERAL PHARMACY W/ HCPCS (ALT 636 FOR OP/ED): Performed by: INTERNAL MEDICINE

## 2025-02-25 RX ORDER — REGADENOSON 0.08 MG/ML
0.4 INJECTION, SOLUTION INTRAVENOUS
Status: COMPLETED | OUTPATIENT
Start: 2025-02-25 | End: 2025-02-25

## 2025-02-25 RX ADMIN — REGADENOSON 0.4 MG: 0.08 INJECTION, SOLUTION INTRAVENOUS at 09:56

## 2025-02-25 RX ADMIN — TETROFOSMIN 34.9 MILLICURIE: 0.23 INJECTION, POWDER, LYOPHILIZED, FOR SOLUTION INTRAVENOUS at 09:56

## 2025-02-25 RX ADMIN — TETROFOSMIN 11.6 MILLICURIE: 0.23 INJECTION, POWDER, LYOPHILIZED, FOR SOLUTION INTRAVENOUS at 09:00

## 2025-02-28 ENCOUNTER — LAB (OUTPATIENT)
Dept: LAB | Facility: HOSPITAL | Age: 80
End: 2025-02-28
Payer: MEDICARE

## 2025-02-28 ENCOUNTER — HOSPITAL ENCOUNTER (OUTPATIENT)
Dept: RADIOLOGY | Facility: CLINIC | Age: 80
Discharge: HOME | End: 2025-02-28
Payer: MEDICARE

## 2025-02-28 DIAGNOSIS — C91.42 HAIRY CELL LEUKEMIA, IN RELAPSE (MULTI): Primary | ICD-10-CM

## 2025-02-28 DIAGNOSIS — M85.872 OTHER SPECIFIED DISORDERS OF BONE DENSITY AND STRUCTURE, LEFT ANKLE AND FOOT: ICD-10-CM

## 2025-02-28 LAB
ALBUMIN SERPL BCP-MCNC: 4.5 G/DL (ref 3.4–5)
ALP SERPL-CCNC: 75 U/L (ref 33–136)
ALT SERPL W P-5'-P-CCNC: 24 U/L (ref 10–52)
ANION GAP SERPL CALC-SCNC: 14 MMOL/L (ref 10–20)
AST SERPL W P-5'-P-CCNC: 20 U/L (ref 9–39)
BASOPHILS # BLD AUTO: 0.02 X10*3/UL (ref 0–0.1)
BASOPHILS NFR BLD AUTO: 0.5 %
BILIRUB SERPL-MCNC: 0.6 MG/DL (ref 0–1.2)
BUN SERPL-MCNC: 24 MG/DL (ref 6–23)
CALCIUM SERPL-MCNC: 9.5 MG/DL (ref 8.6–10.6)
CHLORIDE SERPL-SCNC: 103 MMOL/L (ref 98–107)
CO2 SERPL-SCNC: 27 MMOL/L (ref 21–32)
CREAT SERPL-MCNC: 1.25 MG/DL (ref 0.5–1.3)
EGFRCR SERPLBLD CKD-EPI 2021: 58 ML/MIN/1.73M*2
EOSINOPHIL # BLD AUTO: 0.11 X10*3/UL (ref 0–0.4)
EOSINOPHIL NFR BLD AUTO: 2.5 %
ERYTHROCYTE [DISTWIDTH] IN BLOOD BY AUTOMATED COUNT: 14.6 % (ref 11.5–14.5)
GLUCOSE SERPL-MCNC: 183 MG/DL (ref 74–99)
HCT VFR BLD AUTO: 45.8 % (ref 41–52)
HGB BLD-MCNC: 14.9 G/DL (ref 13.5–17.5)
IMM GRANULOCYTES # BLD AUTO: 0.03 X10*3/UL (ref 0–0.5)
IMM GRANULOCYTES NFR BLD AUTO: 0.7 % (ref 0–0.9)
LYMPHOCYTES # BLD AUTO: 0.49 X10*3/UL (ref 0.8–3)
LYMPHOCYTES NFR BLD AUTO: 11 %
MCH RBC QN AUTO: 29.1 PG (ref 26–34)
MCHC RBC AUTO-ENTMCNC: 32.5 G/DL (ref 32–36)
MCV RBC AUTO: 90 FL (ref 80–100)
MONOCYTES # BLD AUTO: 0.24 X10*3/UL (ref 0.05–0.8)
MONOCYTES NFR BLD AUTO: 5.4 %
NEUTROPHILS # BLD AUTO: 3.55 X10*3/UL (ref 1.6–5.5)
NEUTROPHILS NFR BLD AUTO: 79.9 %
NRBC BLD-RTO: 0 /100 WBCS (ref 0–0)
PLATELET # BLD AUTO: 121 X10*3/UL (ref 150–450)
POTASSIUM SERPL-SCNC: 4.9 MMOL/L (ref 3.5–5.3)
PROT SERPL-MCNC: 6.7 G/DL (ref 6.4–8.2)
RBC # BLD AUTO: 5.12 X10*6/UL (ref 4.5–5.9)
SODIUM SERPL-SCNC: 139 MMOL/L (ref 136–145)
WBC # BLD AUTO: 4.4 X10*3/UL (ref 4.4–11.3)

## 2025-02-28 PROCEDURE — 80053 COMPREHEN METABOLIC PANEL: CPT

## 2025-02-28 PROCEDURE — 73700 CT LOWER EXTREMITY W/O DYE: CPT | Mod: LT

## 2025-02-28 PROCEDURE — 85025 COMPLETE CBC W/AUTO DIFF WBC: CPT

## 2025-03-03 ENCOUNTER — OFFICE VISIT (OUTPATIENT)
Dept: HEMATOLOGY/ONCOLOGY | Facility: CLINIC | Age: 80
End: 2025-03-03
Payer: MEDICARE

## 2025-03-03 VITALS
WEIGHT: 205.69 LBS | SYSTOLIC BLOOD PRESSURE: 120 MMHG | DIASTOLIC BLOOD PRESSURE: 63 MMHG | HEART RATE: 76 BPM | BODY MASS INDEX: 28.69 KG/M2 | RESPIRATION RATE: 16 BRPM | OXYGEN SATURATION: 97 % | TEMPERATURE: 95.9 F

## 2025-03-03 DIAGNOSIS — E11.9 TYPE 2 DIABETES MELLITUS WITHOUT COMPLICATION, WITHOUT LONG-TERM CURRENT USE OF INSULIN (MULTI): ICD-10-CM

## 2025-03-03 DIAGNOSIS — I48.0 PAROXYSMAL ATRIAL FIBRILLATION (MULTI): ICD-10-CM

## 2025-03-03 DIAGNOSIS — I10 PRIMARY HYPERTENSION: ICD-10-CM

## 2025-03-03 DIAGNOSIS — I25.10 CORONARY ARTERY DISEASE INVOLVING NATIVE CORONARY ARTERY OF NATIVE HEART WITHOUT ANGINA PECTORIS: ICD-10-CM

## 2025-03-03 DIAGNOSIS — E05.90 HYPERTHYROIDISM: ICD-10-CM

## 2025-03-03 DIAGNOSIS — E78.2 MIXED HYPERLIPIDEMIA: ICD-10-CM

## 2025-03-03 DIAGNOSIS — C91.42 HAIRY CELL LEUKEMIA, IN RELAPSE (MULTI): Primary | ICD-10-CM

## 2025-03-03 PROCEDURE — 3078F DIAST BP <80 MM HG: CPT | Performed by: INTERNAL MEDICINE

## 2025-03-03 PROCEDURE — 1157F ADVNC CARE PLAN IN RCRD: CPT | Performed by: INTERNAL MEDICINE

## 2025-03-03 PROCEDURE — 99214 OFFICE O/P EST MOD 30 MIN: CPT | Performed by: INTERNAL MEDICINE

## 2025-03-03 PROCEDURE — 3074F SYST BP LT 130 MM HG: CPT | Performed by: INTERNAL MEDICINE

## 2025-03-03 PROCEDURE — 1160F RVW MEDS BY RX/DR IN RCRD: CPT | Performed by: INTERNAL MEDICINE

## 2025-03-03 PROCEDURE — G2211 COMPLEX E/M VISIT ADD ON: HCPCS | Performed by: INTERNAL MEDICINE

## 2025-03-03 PROCEDURE — 1159F MED LIST DOCD IN RCRD: CPT | Performed by: INTERNAL MEDICINE

## 2025-03-03 PROCEDURE — 1126F AMNT PAIN NOTED NONE PRSNT: CPT | Performed by: INTERNAL MEDICINE

## 2025-03-03 ASSESSMENT — ENCOUNTER SYMPTOMS
CARDIOVASCULAR NEGATIVE: 1
EYES NEGATIVE: 1
SHORTNESS OF BREATH: 1
MUSCULOSKELETAL NEGATIVE: 1
NEUROLOGICAL NEGATIVE: 1
PSYCHIATRIC NEGATIVE: 1
FATIGUE: 1
HEMATOLOGIC/LYMPHATIC NEGATIVE: 1
GASTROINTESTINAL NEGATIVE: 1
ENDOCRINE NEGATIVE: 1

## 2025-03-03 ASSESSMENT — PAIN SCALES - GENERAL: PAINLEVEL_OUTOF10: 0-NO PAIN

## 2025-03-03 NOTE — PROGRESS NOTES
Patient ID: Galen Villasenor Jr. is a 79 y.o. male.  Referring Physician: Saturnino Kruse MD  [id number] [facility] [address]  [place] [address]  Primary Care Provider: Bladimir Ackerman MD  Visit Type: Follow Up      Subjective    HPI How were my blood counts?    Review of Systems   Constitutional:  Positive for fatigue.   HENT:  Negative.     Eyes: Negative.    Respiratory:  Positive for shortness of breath.    Cardiovascular: Negative.    Gastrointestinal: Negative.    Endocrine: Negative.    Genitourinary: Negative.     Musculoskeletal: Negative.    Skin: Negative.    Neurological: Negative.    Hematological: Negative.    Psychiatric/Behavioral: Negative.          Objective   BSA: 2.16 meters squared  /63 (BP Location: Right arm, Patient Position: Sitting, BP Cuff Size: Adult)   Pulse 76   Temp 35.5 °C (95.9 °F) (Temporal)   Resp 16   Wt 93.3 kg (205 lb 11 oz)   SpO2 97%   BMI 28.69 kg/m²      has a past medical history of ADHD (attention deficit hyperactivity disorder), Anxiety, Atherosclerotic heart disease of native coronary artery without angina pectoris (04/04/2017), Cancer (Multi) (02), CTS (carpal tunnel syndrome) (03), Depression, Diabetes mellitus (Multi), Difficulty walking, Dysphagia, Head injury (6/7 Yrs.old), HL (hearing loss), Hypertension, Memory loss, Personal history of malignant neoplasm of prostate (04/04/2017), Personal history of other diseases of the circulatory system (04/04/2017), Personal history of other diseases of the respiratory system (04/04/2017), Personal history of other endocrine, nutritional and metabolic disease (04/04/2017), Personal history of other endocrine, nutritional and metabolic disease (04/04/2017), Personal history of other specified conditions (04/04/2017), Restless leg syndrome, Seizures (Multi), Shingles, Syncope, TIA (transient ischemic attack), and Vision loss.   has a past surgical history that includes Rotator cuff repair (04/04/2017); Total knee  arthroplasty (04/04/2017); Carpal tunnel release (04/04/2017); Coronary angioplasty (04/04/2017); Other surgical history (04/04/2017); Spinal fusion (04/04/2017); Cardiac electrophysiology procedure (N/A, 01/22/2024); Cardiac electrophysiology procedure (N/A, 03/01/2024); Cardiac electrophysiology procedure (N/A, 06/12/2024); Carotid stent; Cervical laminectomy; Cervical discectomy (1/14); Cervical fusion; and Lumbar laminectomy.  Family History   Problem Relation Name Age of Onset    Lung cancer Mother mom     Migraines Mother mom     Cancer Father      Alcohol abuse Father      Other (cardiac disorder) Father      Brain cancer Sister      Cancer Brother      Migraines Sister Hien      Oncology History   Hairy cell leukemia, in relapse (Multi)   12/13/2023 Initial Diagnosis    Hairy cell leukemia, in relapse (CMS/HCC)     1/29/2024 - 2/2/2024 Chemotherapy    Cladribine, 5 Day Cycles         Galen Villasenor Jr.  reports that he quit smoking about 45 years ago. His smoking use included cigarettes and pipe. He started smoking about 68 years ago. He has a 23 pack-year smoking history. He has been exposed to tobacco smoke. He has never used smokeless tobacco.  He  reports current alcohol use of about 7.0 standard drinks of alcohol per week.  He  reports no history of drug use.    Physical Exam  Vitals reviewed.   Constitutional:       Appearance: Normal appearance.   HENT:      Head: Normocephalic.      Mouth/Throat:      Mouth: Mucous membranes are moist.   Eyes:      Extraocular Movements: Extraocular movements intact.      Pupils: Pupils are equal, round, and reactive to light.   Cardiovascular:      Rate and Rhythm: Normal rate and regular rhythm.      Pulses: Normal pulses.      Heart sounds: Normal heart sounds.   Pulmonary:      Effort: Pulmonary effort is normal.      Breath sounds: Normal breath sounds.   Abdominal:      General: Bowel sounds are normal.      Palpations: Abdomen is soft.   Musculoskeletal:          General: Normal range of motion.      Cervical back: Normal range of motion and neck supple.   Skin:     General: Skin is warm.   Neurological:      General: No focal deficit present.      Mental Status: He is alert and oriented to person, place, and time.   Psychiatric:         Mood and Affect: Mood normal.         Behavior: Behavior normal.         WBC   Date/Time Value Ref Range Status   02/28/2025 10:09 AM 4.4 4.4 - 11.3 x10*3/uL Final   12/18/2024 01:30 PM 5.1 4.4 - 11.3 x10*3/uL Final   10/31/2024 10:21 AM 5.0 4.4 - 11.3 x10*3/uL Final     nRBC   Date Value Ref Range Status   02/28/2025 0.0 0.0 - 0.0 /100 WBCs Final   12/18/2024 0.0 0.0 - 0.0 /100 WBCs Final   10/31/2024 0.0 0.0 - 0.0 /100 WBCs Final     RBC   Date Value Ref Range Status   02/28/2025 5.12 4.50 - 5.90 x10*6/uL Final   12/18/2024 4.83 4.50 - 5.90 x10*6/uL Final   10/31/2024 5.11 4.50 - 5.90 x10*6/uL Final     Hemoglobin   Date Value Ref Range Status   02/28/2025 14.9 13.5 - 17.5 g/dL Final   12/18/2024 14.4 13.5 - 17.5 g/dL Final   10/31/2024 15.2 13.5 - 17.5 g/dL Final     Hematocrit   Date Value Ref Range Status   02/28/2025 45.8 41.0 - 52.0 % Final   12/18/2024 44.2 41.0 - 52.0 % Final   10/31/2024 46.2 41.0 - 52.0 % Final     MCV   Date/Time Value Ref Range Status   02/28/2025 10:09 AM 90 80 - 100 fL Final   12/18/2024 01:30 PM 92 80 - 100 fL Final   10/31/2024 10:21 AM 90 80 - 100 fL Final     MCH   Date/Time Value Ref Range Status   02/28/2025 10:09 AM 29.1 26.0 - 34.0 pg Final   12/18/2024 01:30 PM 29.8 26.0 - 34.0 pg Final   10/31/2024 10:21 AM 29.7 26.0 - 34.0 pg Final     MCHC   Date/Time Value Ref Range Status   02/28/2025 10:09 AM 32.5 32.0 - 36.0 g/dL Final   12/18/2024 01:30 PM 32.6 32.0 - 36.0 g/dL Final   10/31/2024 10:21 AM 32.9 32.0 - 36.0 g/dL Final     RDW   Date/Time Value Ref Range Status   02/28/2025 10:09 AM 14.6 (H) 11.5 - 14.5 % Final   12/18/2024 01:30 PM 14.7 (H) 11.5 - 14.5 % Final   10/31/2024 10:21 AM 15.3 (H)  "11.5 - 14.5 % Final     Platelets   Date/Time Value Ref Range Status   02/28/2025 10:09  (L) 150 - 450 x10*3/uL Final   12/18/2024 01:30  (L) 150 - 450 x10*3/uL Final   10/31/2024 10:21  (L) 150 - 450 x10*3/uL Final     No results found for: \"MPV\"  Neutrophils %   Date/Time Value Ref Range Status   02/28/2025 10:09 AM 79.9 40.0 - 80.0 % Final   12/18/2024 01:30 PM 77.8 40.0 - 80.0 % Final   10/31/2024 10:21 AM 78.0 40.0 - 80.0 % Final     Immature Granulocytes %, Automated   Date/Time Value Ref Range Status   02/28/2025 10:09 AM 0.7 0.0 - 0.9 % Final     Comment:     Immature Granulocyte Count (IG) includes promyelocytes, myelocytes and metamyelocytes but does not include bands. Percent differential counts (%) should be interpreted in the context of the absolute cell counts (cells/UL).   12/18/2024 01:30 PM 1.0 (H) 0.0 - 0.9 % Final     Comment:     Immature Granulocyte Count (IG) includes promyelocytes, myelocytes and metamyelocytes but does not include bands. Percent differential counts (%) should be interpreted in the context of the absolute cell counts (cells/UL).   10/31/2024 10:21 AM 1.0 (H) 0.0 - 0.9 % Final     Comment:     Immature Granulocyte Count (IG) includes promyelocytes, myelocytes and metamyelocytes but does not include bands. Percent differential counts (%) should be interpreted in the context of the absolute cell counts (cells/UL).     Lymphocytes %, Manual   Date/Time Value Ref Range Status   03/11/2024 01:18 PM 8.0 13.0 - 44.0 % Final     Lymphocytes %   Date/Time Value Ref Range Status   02/28/2025 10:09 AM 11.0 13.0 - 44.0 % Final   12/18/2024 01:30 PM 10.4 13.0 - 44.0 % Final   10/31/2024 10:21 AM 9.6 13.0 - 44.0 % Final     Monocytes %, Manual   Date/Time Value Ref Range Status   03/11/2024 01:18 PM 0.0 2.0 - 10.0 % Final     Monocytes %   Date/Time Value Ref Range Status   02/28/2025 10:09 AM 5.4 2.0 - 10.0 % Final   12/18/2024 01:30 PM 8.4 2.0 - 10.0 % Final   10/31/2024 " 10:21 AM 8.6 2.0 - 10.0 % Final     Eosinophils %, Manual   Date/Time Value Ref Range Status   03/11/2024 01:18 PM 2.0 0.0 - 6.0 % Final     Eosinophils %   Date/Time Value Ref Range Status   02/28/2025 10:09 AM 2.5 0.0 - 6.0 % Final   12/18/2024 01:30 PM 2.2 0.0 - 6.0 % Final   10/31/2024 10:21 AM 2.4 0.0 - 6.0 % Final     Basophils %, Manual   Date/Time Value Ref Range Status   03/11/2024 01:18 PM 1.0 0.0 - 2.0 % Final     Basophils %   Date/Time Value Ref Range Status   02/28/2025 10:09 AM 0.5 0.0 - 2.0 % Final   12/18/2024 01:30 PM 0.2 0.0 - 2.0 % Final   10/31/2024 10:21 AM 0.4 0.0 - 2.0 % Final     Neutrophils Absolute   Date/Time Value Ref Range Status   02/28/2025 10:09 AM 3.55 1.60 - 5.50 x10*3/uL Final     Comment:     Percent differential counts (%) should be interpreted in the context of the absolute cell counts (cells/uL).   12/18/2024 01:30 PM 3.96 1.60 - 5.50 x10*3/uL Final     Comment:     Percent differential counts (%) should be interpreted in the context of the absolute cell counts (cells/uL).   10/31/2024 10:21 AM 3.90 1.60 - 5.50 x10*3/uL Final     Comment:     Percent differential counts (%) should be interpreted in the context of the absolute cell counts (cells/uL).     Immature Granulocytes Absolute, Automated   Date/Time Value Ref Range Status   02/28/2025 10:09 AM 0.03 0.00 - 0.50 x10*3/uL Final   12/18/2024 01:30 PM 0.05 0.00 - 0.50 x10*3/uL Final   10/31/2024 10:21 AM 0.05 0.00 - 0.50 x10*3/uL Final     Lymphocytes Absolute   Date/Time Value Ref Range Status   02/28/2025 10:09 AM 0.49 (L) 0.80 - 3.00 x10*3/uL Final   12/18/2024 01:30 PM 0.53 (L) 0.80 - 3.00 x10*3/uL Final   10/31/2024 10:21 AM 0.48 (L) 0.80 - 3.00 x10*3/uL Final     Monocytes Absolute   Date/Time Value Ref Range Status   02/28/2025 10:09 AM 0.24 0.05 - 0.80 x10*3/uL Final   12/18/2024 01:30 PM 0.43 0.05 - 0.80 x10*3/uL Final   10/31/2024 10:21 AM 0.43 0.05 - 0.80 x10*3/uL Final     Eosinophils Absolute   Date/Time Value  "Ref Range Status   02/28/2025 10:09 AM 0.11 0.00 - 0.40 x10*3/uL Final   12/18/2024 01:30 PM 0.11 0.00 - 0.40 x10*3/uL Final   10/31/2024 10:21 AM 0.12 0.00 - 0.40 x10*3/uL Final     Eosinophils Absolute, Manual   Date/Time Value Ref Range Status   03/11/2024 01:18 PM 0.04 0.00 - 0.40 x10*3/uL Final     Basophils Absolute   Date/Time Value Ref Range Status   02/28/2025 10:09 AM 0.02 0.00 - 0.10 x10*3/uL Final   12/18/2024 01:30 PM 0.01 0.00 - 0.10 x10*3/uL Final   10/31/2024 10:21 AM 0.02 0.00 - 0.10 x10*3/uL Final     Basophils Absolute, Manual   Date/Time Value Ref Range Status   03/11/2024 01:18 PM 0.02 0.00 - 0.10 x10*3/uL Final       No components found for: \"PT\"  aPTT   Date/Time Value Ref Range Status   01/22/2024 08:58 AM 44 (H) 27 - 38 seconds Final     Medication Documentation Review Audit       Reviewed by Juliet Ramos MA (Medical Assistant) on 03/03/25 at 1107      Medication Order Taking? Sig Documenting Provider Last Dose Status   apixaban (Eliquis) 5 mg tablet [id number] Yes Take 1 tablet (5 mg) by mouth 2 times a day. Historical Provider, MD Taking Active   DULoxetine (Cymbalta) 60 mg DR capsule [id number] Yes Take 2 capsules (120 mg) by mouth once daily. Historical Provider, MD Taking Active   empagliflozin (Jardiance) 25 mg [id number] Yes Take 0.5 tablets (12.5 mg) by mouth once daily. Historical Provider, MD Taking Active   fenofibrate (Triglide) 160 mg tablet [id number] Yes Take 1 tablet (160 mg) by mouth once daily.  WITH FOOD Historical Provider, MD Taking Active   gabapentin (Neurontin) 600 mg tablet [id number] Yes Take 1 tablet (600 mg) by mouth in the morning. And Take 2 tablets (1,200 mg) by mouth at bedtime. Historical Provider, MD Taking Active   isosorbide mononitrate ER (Imdur) 30 mg 24 hr tablet [id number] Yes Take 1 tablet (30 mg) by mouth once daily. Historical Provider, MD Taking Active   lisinopril 20 mg tablet [id number] Yes Take 1 tablet (20 mg) by mouth once daily. Historical " Provider, MD  Active   methIMAzole (Tapazole) 5 mg tablet [id number] Yes Take 1 tablet (5 mg) by mouth once daily. Except NO tablet on Sunday Brisa Skinner MD  Active   omeprazole (PriLOSEC) 20 mg DR capsule [id number] Yes Take 1 capsule (20 mg) by mouth. Do not crush or chew. Historical Provider, MD Taking Active   rosuvastatin (Crestor) 20 mg tablet [id number] Yes Take 1 tablet (20 mg) by mouth once daily. Historical Provider, MD Taking Active                   Assessment/Plan    1) hairy cell leukemia  -old records from outside hematology group were finally obtained on 12/7/2023: on 7/17/2018 CBC showed wbc 8.8, hgb 14.7, plt 109,000; bmbx was done to rule out relapse- bmbx was sent to [facility]:  no morphologic or immunophenotypic evidence of residual hairy cell leukemia; expanded population of CD8+ T cells; mildly hypercellular marrow with maturing trilineage hematopoiesis (30-40%), BRAF negative  -diagnosed in 2002, treated with cladribine  -remained mildly thrombocytopenic ever since  -last flow cytometry of peripheral blood done on 7/21/2023 was negative  -became more neutropenic than usual over the summer--[facility], [facility], 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  -had bone marrow bx done on 11/28/2023--showed extensive marrow involvement by hairy cell leukemia; no evidence of T cell LGL; positive for BRAF  -discussed treatment options--cladribine vs cladribine + rituximab  -he opted for cladribine alone  -in the future, if he relapses again, he could go on either ibrutinib (not as desirable given the med's track record for exacerbating atrial fibrillation) or vemurafenib + rituximab  -he completed course of cladribine from January 29 through Feb 2, 2024  -he received neulasta x 1 dose  -feeling well, exercise tolerance has returned--he says he has to get up on his roof to clean his chimney, but he just can't lift as much as he used to  -he received a letter from Dr Alfonso's  office saying that he will be retiring at the end of this year, he wants to make sure he gets in to see him as tikosyn was supposed to be discontinued at some point--now seeing Dr Ambrose  -here for interval followup  -reports not being able to do as much yardwork or housework as he could before--gets more short of breath and tires easily  -labs done on 2/28/2025 included CBC + COMP  --results reviewed--wbc 4.4, hgb 14.9 plt 121,000, ANC 3550, creatinine 1.25, calcium 9.5, AST 20, ALT 24  -will see him again in 6 months     2) CAD  -on ASA  -on imdur     3) atrial fibrillation  -on dofetilide  -on eliquis  -his ablation was delayed until January 22, 2024 at [facility]  -since then he has been feeling very weak and lightheaded at times, legs will just give out under him, so that he at times is afraid to get up to walk around  -was in the hospital for loading on tikosyn  -s/p repeat ablation on 6/12/2024        4) hyperlipidemia  -on fenofibrate  -on rosuvastatin     5) hypertension  -on lisinopril     6) diabetes  -on metformin     7) hyperthyroidism  -on methimazole              Problem List Items Addressed This Visit             ICD-10-CM    Hairy cell leukemia, in relapse (Multi) C91.42    Relevant Orders    Clinic Appointment Request Follow Up; SATURNINO KRUSE; [facility] MEDONC[facility]    CBC and Auto Differential    Comprehensive metabolic panel            Saturnino Kruse MD

## 2025-03-07 ENCOUNTER — APPOINTMENT (OUTPATIENT)
Dept: CARDIOLOGY | Facility: CLINIC | Age: 80
End: 2025-03-07
Payer: MEDICARE

## 2025-03-12 NOTE — H&P (VIEW-ONLY)
Galen Villasenor is a 79 y.o. male  presents with chief complaint of Pre-op Exam (Lt foot sx on 3/25/25 at [facility]. )     SUBJECTIVE  History of Present Illness  The patient is an 79-year-old male who presents for a wellness visit in preparation for an upcoming surgery.    He has been experiencing respiratory issues, including a cough with minimal green or yellow sputum production, which he reports is improving. He continues to use Symbicort inhaler for his respiratory symptoms, which are most severe in the morning. He experiences postnasal drip or chest congestion, leading to coughing episodes.    He has a history of seizures following anesthesia administration but has not experienced any such episodes recently. He is uncertain if his current gabapentin therapy is contributing to this stability. He underwent a stress test 2 weeks ago, which yielded normal results. He has discontinued Tikosyn and is scheduled for surgery on 03/25/2025. He has been advised to stop Eliquis 2 days prior to the procedure and is seeking guidance on when to resume it postoperatively. He also inquires about the management of his other medications, including Jardiance, isosorbide, lisinopril, and duloxetine, on the day of surgery.    [facility] due to [facility]. The implant was subsequently removed due to septic arthritis. He was in a boot from 08/2024 to 12/2024. He followed up with Dr. Houston, a foot and ankle specialist with [facility], who took an x-ray of his foot and initially suggested that major surgery would be required. However, after further review, Dr. Houston indicated that a less invasive procedure involving plates and screws might be possible. A CT scan was performed at the end of 02/2025, revealing an abnormal first MTP joint, likely due to previous partial arthroplasty. There were erosive changes in both the proximal phalanx and first MTP joint,  suggestive of arthritis rather than infection.    He reports persistent shortness of breath, which he attributes to psychological factors. He recalls an incident where he had to rest while carrying a battery due to breathlessness. He also experiences difficulty breathing when performing tasks such as taking out the garbage cans.    Supplemental Information  His thyroid levels have remained stable since 2024. His previous endocrinologist has retired, and he is now under the care of Dr. Murray.    MEDICATIONS  Current: gabapentin, Symbicort, Jardiance, isosorbide, lisinopril, duloxetine  Discontinued: Tikosyn  There are no preventive care reminders to display for this patient.    -  Preventative health screenings (aka screening schedule for beneficiary testing)  Colon Cancer Screenin2022  AAA:not completed  PSA: 2024  Bone Density Exam: not completed   --   Immunizations-  Immunization History   Administered Date(s) Administered   • DTaP 10/09/2012   • Influenza, High Dose Seasonal, Preservative Free 10/24/2015, 10/27/2016, 10/30/2017, 10/22/2018, 2019, 2020, 2020   • Influenza, High-dose Seasonal, Quadrivalent, Preservative Free 10/24/2015, 10/27/2016, 10/30/2017, 10/22/2018, 10/17/2023   • Influenza, Recombinant, injectable, preservative free 2020   • Influenza, Seasonal, Quadrivalent, Adjuvanted 10/27/2022   • Influenza, Unspecified 10/06/2009, 2019, 2020, 10/01/2021   • Influenza, injectable, MDCK, preservative free, quadrivalent 2021   • Influenza, injectable, quadrivalent, preservative free 2023   • Influenza, seasonal, injectable 10/01/2014, 2016, 2017, 2021, 10/26/2021, 2022, 2024   • Influenza, seasonal, injectable, preservative free 2019   • Pfizer Purple Cap SARS-CoV-2 Vaccination 2021, 2021, 2021   • Pneumococcal Conjugate PCV 13 08/10/2016   • Pneumococcal Polysaccharide PPSV23  09/28/2009, 10/06/2015, 04/13/2016, 11/19/2020   • Tdap 10/09/2012, 05/24/2022   • Zoster, Recombinant 12/21/2021, 03/21/2022   • Zoster, live 02/11/2010, 12/15/2010     ----  Specialists- (aka list of providers and suppliers)  -  Medicare flowsheet completed to include functional ability and safety screening, cognitive assessment, depression screening, advanced care planning, pain assessment- YES   Medicare Wellness     Over the past 2 weeks, how often have you been bothered by any of the following problems?  Little interest or pleasure in doing things: Not at all  Feeling down, depressed, or hopeless: Not at all  Patient Health Questionnaire-2 Score: 0                          -  Any additional concerns today-      PROBLEM LIST SURGICAL/SOCIAL ALLERGIES:   Patient Active Problem List   Diagnosis   • Carpal tunnel syndrome   • Foot-drop   • Arthritis of great toe at metatarsophalangeal joint   • Mixed hyperlipidemia (CMS/HCC)   • Malignant neoplasm of prostate (CMS/HCC)   • Thrombocytopenic disorder (CMS/HCC)   • Atherosclerotic heart disease of native coronary artery without angina pectoris (CMS/HCC)   • Polyneuropathy   • PLMD (periodic limb movement disorder)   • Hyperthyroidism (CMS/HCC)   • A-fib (CMS/HCC)   • Dysplastic colon polyp   • Hypertension (CMS/HCC)   • Mild intermittent asthma without complication (CMS/HCC)   • Osteoarthritis of right knee   • SOB (shortness of breath)   • Acquired hallux rigidus of left foot   • Acquired hammertoe of right foot   • Brachial neuritis   • Cervical spondylosis without myelopathy   • Chronic obstructive lung disease (CMS/HCC)   • Enthesopathy of hip region   • Hallux limitus of left foot   • Long term (current) use of anticoagulants   • Mechanical loosening of prosthetic joint (CMS/HCC)   • Pinguecula, bilateral   • Sensorineural hearing loss, bilateral   • Spinal stenosis of lumbar region   • Spinal stenosis in cervical region   • Toxic thyroid nodule (CMS/HCC)   •  Tubular adenoma of colon   • Type 2 diabetes mellitus with hyperglycemia (CMS/HCC)   • Encounter for dietary counseling and surveillance   • Other fatigue   • Hairy cell leukemia, in relapse (CMS/HCC)   • Atypical atrial flutter (CMS/HCC)   • Cellulitis, unspecified   • Chemotherapy-induced neutropenia (CMS/HCC)   • Difficulty in walking, not elsewhere classified   • Anemia   • Oropharyngeal dysphagia   • Exposure to potentially hazardous substance   • 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   • CARPAL TUNNEL RELEASE Right 07/10/2015   • CARPAL TUNNEL RELEASE Left 2003    carpal tunnel - left; also ulnar release   • CATARACT EXTRACTION, BILATERAL     • CORONARY STENT PLACEMENT      cardiac stents (5 stents total) , ,    • KNEE SURGERY Left     scope ,    • OTHER SURGICAL HISTORY      Urinary continence   • OTHER SURGICAL HISTORY      Lumbar spinal stenosis   • PROSTATECTOMY      Radical Prostatectomy   • SHOULDER ARTHROSCOPY Left     shoulder scope   • TONSILLECTOMY     • TOTAL KNEE ARTHROPLASTY Left    • TOTAL KNEE ARTHROPLASTY Right 2017    Bridger   • TRANSURETHRAL RESECTION OF PROSTATE  2006    TURP   • ULNAR NERVE TRANSPOSITION Right 2019    RELOCATED ULNAR NERVE R ARM   • URINARY SPHINCTER IMPLANT  2016    urinary implant        Social History     Tobacco Use   • Smoking status: Former     Current packs/day: 0.00     Average packs/day: 1.5 packs/day for 20.0 years (30.0 ttl pk-yrs)     Types: Cigarettes, Pipe, Cigars     Start date:      Quit date:      Years since quittin.2   • Smokeless tobacco: Former     Types: Chew     Quit date:    • Tobacco comments:     Tried once  around age 13 yr.  No longer smoking in any form.   Substance Use Topics   • Alcohol use: Yes     Alcohol/week: 7.0 standard drinks of alcohol     Types: 7 Glasses of wine per week     Comment: with dinners during the week   • Drug use: Never        Allergies   Allergen Reactions   • Lidocaine Unknown     Other Reaction(s): grand mal seizure (1960)    seizure this occurred following a procedure in the 1960's   Per patient he can receive mepivacaine   • Other    • Procaine Other and Unknown     seizure following a local procedure in the 1960's    Other Reaction(s): Other: See Comments   • Quinolones Unknown     Other Reaction(s): drop foot    Drop foot    Other Reaction(s): Other: See Comments         MEDICATIONS FAMILY HISTORY DEPRESSION SCREEN   Outpatient Medications Prior to Visit   Medication Sig Dispense Refill   • apixaban (Eliquis) 5 MG tablet every 12 (twelve) hours.     • DULoxetine (Cymbalta) 60 MG DR capsule TAKE 2 CAPSULES ONE TIME DAILY 180 capsule 0   • empagliflozin (Jardiance) 25 MG Take 0.5 tablets by mouth in the morning.     • fenofibrate (Triglide) 160 MG tablet Take 1 tablet (160 mg) by mouth Daily 90 tablet 3   • gabapentin (Neurontin) 600 MG tablet TAKE 1 TABLET EVERY MORNING AND TAKE 2 TABLETS AT BEDTIME 270 tablet 3   • isosorbide mononitrate ER (Imdur) 30 MG 24 hr tablet Take 30 mg by mouth in the morning.     • lisinopril 20 MG tablet Take 1 tablet (20 mg) by mouth Daily 90 tablet 3   • methIMAzole (Tapazole) 5 MG tablet Take 1 tablet (5 mg) by mouth Daily Except Sundays 90 tablet 0   • omeprazole (PriLOSEC) 20 MG DR capsule Take 1 capsule (20 mg) by mouth in the morning and 1 capsule (20 mg) before bedtime. Per patient from cardiologist. 180 capsule 0   • rosuvastatin (Crestor) 20 MG tablet TAKE 1 TABLET EVERY DAY 90 tablet 0   • tiotropium-olodaterol (Stiolto Respimat) 2.5-2.5 MCG/ACT aerosol solution inhaler Inhale 2 Inhalation  [facility]     • ipratropium (Atrovent) 0.03 %  "nasal spray ADMINISTER 1 SPRAY INTO EACH NOSTRIL IN THE MORNING AND 1 SPRAY BEFORE BEDTIME. 30 mL 3   • dofetilide (Tikosyn) 125 MCG capsule Take 125 mcg by mouth in the morning and 125 mcg before bedtime. (Patient not taking: Reported on 3/17/2025)       No facility-administered medications prior to visit.     Family History   Problem Relation Name Age of Onset   • Lung cancer Mother     • Heart disease Father             Depression: Not at risk (3/17/2025)    PHQ-2    • PHQ-2 Score: 0           ROS  Unless noted in HPI, all other systems have been reviewed and are negative for complaint.  Review of Systems        OBJECTIVE  Visit Vitals  /80   Pulse 81   Temp 97.6 °F   Resp 18   Ht 5' 11\"   Wt 211 lb 12.8 oz   SpO2 98%   BMI 29.54 kg/m²   Smoking Status Former   BSA 2.19 m²      BP Readings from Last 8 Encounters:   03/17/25 118/80   12/18/24 126/62   09/17/24 158/88   08/28/24 154/88   06/06/24 162/88   04/08/24 138/66   03/13/24 112/60   11/15/23 108/62      Wt Readings from Last 8 Encounters:   03/17/25 211 lb 12.8 oz   12/18/24 208 lb   09/17/24 204 lb 12.8 oz   08/28/24 204 lb 3.2 oz   06/06/24 199 lb   04/08/24 199 lb   03/13/24 200 lb   11/15/23 215 lb      Body mass index is 29.54 kg/m².  Results  Laboratory Studies  A1c is 7.1. GFR is unchanged in the 50s. Platelets are slightly low. White count tends to be slightly low. Hemoglobin is normal.    Imaging  CT scan of foot shows an abnormal first MTP due to previous partial arthroplasty with erosive changes of both the proximal phalanx and first MTP, likely arthritic rather than septic. CAT scan in January showed mild emphysema, interstitial lung disease, and scattered benign calcified granulomas.    Testing  Stress test (Lexiscan) was normal with no evidence of ischemia or infarction and ejection fraction about 70%.     Physical Exam  Vitals and nursing note reviewed.   Constitutional:       Appearance: Normal appearance.   Cardiovascular:      Rate " and Rhythm: Rhythm irregular.   Pulmonary:      Breath sounds: Normal breath sounds.   Abdominal:      General: Abdomen is flat. Bowel sounds are normal.      Palpations: Abdomen is soft.   Musculoskeletal:         General: Normal range of motion.   Skin:     General: Skin is warm and dry.   Neurological:      Mental Status: He is alert.        Physical Exam      ASSESSMENT AND PLAN    Assessment & Plan  1. Preoperative evaluation.  He has a history of postoperative seizures, potentially linked to anesthesia. His shortness of breath could be cardiac-related, but most cardiac issues have been addressed. A CT scan in January 2025 revealed mild emphysema, interstitial lung disease, and benign calcified granulomas, likely from past histoplasmosis. Despite severe coronary artery calcifications, a recent stress test was reassuring. His TSH levels have been stable since December 2024. His A1c is stable at 7.1. His GFR remains unchanged in the 50s. He has a slightly low platelet count and white blood cell count, but his hemoglobin is normal. He will discontinue Eliquis 2 days before surgery and resume it postoperatively. He will take any scheduled medications after surgery, avoiding all medications after midnight. He will not take lisinopril on the day of surgery. He will take duloxetine and Jardiance after surgery.    2. Second-degree heart block, Mobitz type I.  He has a second-degree heart block, Mobitz type I, which generally requires no treatment if asymptomatic.    3. Septic arthritis.  He had a previous partial arthroplasty with grafting material at the base of the first proximal phalanx and erosive changes of both the proximal phalanx and first MTP. It was septic for many weeks to months after the initial surgery. He will follow up with the orthopedic specialist for further management, potentially involving plates and screws.    4. Respiratory issues.  He reports a cough with green and yellow sputum, which is  improving. He will continue to monitor symptoms and avoid antibiotics to reduce the risk of C. difficile infection before surgery.    PROCEDURE  [facility] due to [facility]. The implant was subsequently removed due to septic arthritis.     Orders Placed This Encounter   Procedures   • ECG 12 lead        ICD-10-CM    1. Pre-op exam  Z01.818 ECG 12 lead      2. Mobitz type 1 second degree atrioventricular block  I44.1       3. Staphylococcal arthritis, septic arthritis of unspecified location (CMS/[facility])  M00.00          Pt is cleared for surgery.        I have reviewed and reconciled the history and medication list with the patient today.         NEXT SCHEDULED  APPT:  Visit date not found

## 2025-03-13 ENCOUNTER — APPOINTMENT (OUTPATIENT)
Dept: ENDOCRINOLOGY | Facility: CLINIC | Age: 80
End: 2025-03-13
Payer: MEDICARE

## 2025-03-25 ENCOUNTER — APPOINTMENT (OUTPATIENT)
Dept: RADIOLOGY | Facility: HOSPITAL | Age: 80
End: 2025-03-25
Payer: MEDICARE

## 2025-03-25 ENCOUNTER — ANESTHESIA EVENT (OUTPATIENT)
Dept: OPERATING ROOM | Facility: HOSPITAL | Age: 80
End: 2025-03-25
Payer: MEDICARE

## 2025-03-25 ENCOUNTER — ANESTHESIA (OUTPATIENT)
Dept: OPERATING ROOM | Facility: HOSPITAL | Age: 80
End: 2025-03-25
Payer: MEDICARE

## 2025-03-25 ENCOUNTER — HOSPITAL ENCOUNTER (OUTPATIENT)
Facility: HOSPITAL | Age: 80
Setting detail: OUTPATIENT SURGERY
Discharge: HOME | End: 2025-03-25
Attending: ORTHOPAEDIC SURGERY | Admitting: ORTHOPAEDIC SURGERY
Payer: MEDICARE

## 2025-03-25 ENCOUNTER — PHARMACY VISIT (OUTPATIENT)
Dept: PHARMACY | Facility: CLINIC | Age: 80
End: 2025-03-25
Payer: COMMERCIAL

## 2025-03-25 VITALS
HEIGHT: 71 IN | OXYGEN SATURATION: 95 % | BODY MASS INDEX: 29.61 KG/M2 | HEART RATE: 70 BPM | RESPIRATION RATE: 16 BRPM | SYSTOLIC BLOOD PRESSURE: 149 MMHG | WEIGHT: 211.5 LBS | DIASTOLIC BLOOD PRESSURE: 60 MMHG | TEMPERATURE: 97.5 F

## 2025-03-25 DIAGNOSIS — M20.22 HALLUX RIGIDUS, LEFT FOOT: Primary | ICD-10-CM

## 2025-03-25 LAB — GLUCOSE BLD MANUAL STRIP-MCNC: 111 MG/DL (ref 74–99)

## 2025-03-25 PROCEDURE — 2500000004 HC RX 250 GENERAL PHARMACY W/ HCPCS (ALT 636 FOR OP/ED): Performed by: ANESTHESIOLOGIST ASSISTANT

## 2025-03-25 PROCEDURE — 3700000002 HC GENERAL ANESTHESIA TIME - EACH INCREMENTAL 1 MINUTE: Performed by: ORTHOPAEDIC SURGERY

## 2025-03-25 PROCEDURE — C1713 ANCHOR/SCREW BN/BN,TIS/BN: HCPCS | Performed by: ORTHOPAEDIC SURGERY

## 2025-03-25 PROCEDURE — C1769 GUIDE WIRE: HCPCS | Performed by: ORTHOPAEDIC SURGERY

## 2025-03-25 PROCEDURE — 2500000004 HC RX 250 GENERAL PHARMACY W/ HCPCS (ALT 636 FOR OP/ED): Performed by: ORTHOPAEDIC SURGERY

## 2025-03-25 PROCEDURE — 7100000001 HC RECOVERY ROOM TIME - INITIAL BASE CHARGE: Performed by: ORTHOPAEDIC SURGERY

## 2025-03-25 PROCEDURE — 7100000002 HC RECOVERY ROOM TIME - EACH INCREMENTAL 1 MINUTE: Performed by: ORTHOPAEDIC SURGERY

## 2025-03-25 PROCEDURE — 82947 ASSAY GLUCOSE BLOOD QUANT: CPT

## 2025-03-25 PROCEDURE — 7100000009 HC PHASE TWO TIME - INITIAL BASE CHARGE: Performed by: ORTHOPAEDIC SURGERY

## 2025-03-25 PROCEDURE — 3600000009 HC OR TIME - EACH INCREMENTAL 1 MINUTE - PROCEDURE LEVEL FOUR: Performed by: ORTHOPAEDIC SURGERY

## 2025-03-25 PROCEDURE — 2720000007 HC OR 272 NO HCPCS: Performed by: ORTHOPAEDIC SURGERY

## 2025-03-25 PROCEDURE — 3700000001 HC GENERAL ANESTHESIA TIME - INITIAL BASE CHARGE: Performed by: ORTHOPAEDIC SURGERY

## 2025-03-25 PROCEDURE — RXMED WILLOW AMBULATORY MEDICATION CHARGE

## 2025-03-25 PROCEDURE — 2780000003 HC OR 278 NO HCPCS: Performed by: ORTHOPAEDIC SURGERY

## 2025-03-25 PROCEDURE — 2500000005 HC RX 250 GENERAL PHARMACY W/O HCPCS: Performed by: ANESTHESIOLOGIST ASSISTANT

## 2025-03-25 PROCEDURE — 3600000004 HC OR TIME - INITIAL BASE CHARGE - PROCEDURE LEVEL FOUR: Performed by: ORTHOPAEDIC SURGERY

## 2025-03-25 PROCEDURE — 7100000010 HC PHASE TWO TIME - EACH INCREMENTAL 1 MINUTE: Performed by: ORTHOPAEDIC SURGERY

## 2025-03-25 DEVICE — G-WIRE W/TRCR TIP .078"X5.91"(2MMX150MM)
Type: IMPLANTABLE DEVICE | Site: FOOT | Status: FUNCTIONAL
Brand: ARTHREX®

## 2025-03-25 DEVICE — IMPLANTABLE DEVICE: Type: IMPLANTABLE DEVICE | Site: FOOT | Status: FUNCTIONAL

## 2025-03-25 RX ORDER — FENTANYL CITRATE 50 UG/ML
INJECTION, SOLUTION INTRAMUSCULAR; INTRAVENOUS AS NEEDED
Status: DISCONTINUED | OUTPATIENT
Start: 2025-03-25 | End: 2025-03-25

## 2025-03-25 RX ORDER — OXYCODONE HYDROCHLORIDE 5 MG/1
5 TABLET ORAL EVERY 6 HOURS PRN
Qty: 28 TABLET | Refills: 0 | Status: SHIPPED | OUTPATIENT
Start: 2025-03-25

## 2025-03-25 RX ORDER — SULFAMETHOXAZOLE AND TRIMETHOPRIM 800; 160 MG/1; MG/1
1 TABLET ORAL 2 TIMES DAILY
Qty: 28 TABLET | Refills: 0 | Status: SHIPPED | OUTPATIENT
Start: 2025-03-25 | End: 2025-04-08

## 2025-03-25 RX ORDER — SODIUM CHLORIDE, SODIUM LACTATE, POTASSIUM CHLORIDE, CALCIUM CHLORIDE 600; 310; 30; 20 MG/100ML; MG/100ML; MG/100ML; MG/100ML
100 INJECTION, SOLUTION INTRAVENOUS CONTINUOUS
Status: ACTIVE | OUTPATIENT
Start: 2025-03-25 | End: 2025-03-25

## 2025-03-25 RX ORDER — VANCOMYCIN HYDROCHLORIDE 1 G/20ML
INJECTION, POWDER, LYOPHILIZED, FOR SOLUTION INTRAVENOUS AS NEEDED
Status: DISCONTINUED | OUTPATIENT
Start: 2025-03-25 | End: 2025-03-25 | Stop reason: HOSPADM

## 2025-03-25 RX ORDER — FENTANYL CITRATE 50 UG/ML
25 INJECTION, SOLUTION INTRAMUSCULAR; INTRAVENOUS EVERY 5 MIN PRN
Status: DISCONTINUED | OUTPATIENT
Start: 2025-03-25 | End: 2025-03-25 | Stop reason: HOSPADM

## 2025-03-25 RX ORDER — CEFAZOLIN SODIUM 2 G/100ML
2 INJECTION, SOLUTION INTRAVENOUS ONCE
Status: CANCELLED | OUTPATIENT
Start: 2025-03-25 | End: 2025-03-25

## 2025-03-25 RX ORDER — ONDANSETRON HYDROCHLORIDE 2 MG/ML
4 INJECTION, SOLUTION INTRAVENOUS ONCE AS NEEDED
Status: DISCONTINUED | OUTPATIENT
Start: 2025-03-25 | End: 2025-03-25 | Stop reason: HOSPADM

## 2025-03-25 RX ORDER — METOCLOPRAMIDE HYDROCHLORIDE 5 MG/ML
10 INJECTION INTRAMUSCULAR; INTRAVENOUS ONCE AS NEEDED
Status: DISCONTINUED | OUTPATIENT
Start: 2025-03-25 | End: 2025-03-25 | Stop reason: HOSPADM

## 2025-03-25 RX ORDER — PROPOFOL 10 MG/ML
INJECTION, EMULSION INTRAVENOUS AS NEEDED
Status: DISCONTINUED | OUTPATIENT
Start: 2025-03-25 | End: 2025-03-25

## 2025-03-25 RX ORDER — CEFAZOLIN SODIUM 2 G/100ML
INJECTION, SOLUTION INTRAVENOUS AS NEEDED
Status: DISCONTINUED | OUTPATIENT
Start: 2025-03-25 | End: 2025-03-25

## 2025-03-25 RX ORDER — MIDAZOLAM HYDROCHLORIDE 1 MG/ML
1 INJECTION, SOLUTION INTRAMUSCULAR; INTRAVENOUS ONCE AS NEEDED
Status: DISCONTINUED | OUTPATIENT
Start: 2025-03-25 | End: 2025-03-25 | Stop reason: HOSPADM

## 2025-03-25 RX ORDER — HYDROMORPHONE HYDROCHLORIDE 1 MG/ML
INJECTION, SOLUTION INTRAMUSCULAR; INTRAVENOUS; SUBCUTANEOUS AS NEEDED
Status: DISCONTINUED | OUTPATIENT
Start: 2025-03-25 | End: 2025-03-25

## 2025-03-25 RX ORDER — BUPIVACAINE HYDROCHLORIDE 5 MG/ML
INJECTION, SOLUTION PERINEURAL AS NEEDED
Status: DISCONTINUED | OUTPATIENT
Start: 2025-03-25 | End: 2025-03-25 | Stop reason: HOSPADM

## 2025-03-25 RX ORDER — OXYCODONE HYDROCHLORIDE 5 MG/1
5 TABLET ORAL EVERY 4 HOURS PRN
Status: DISCONTINUED | OUTPATIENT
Start: 2025-03-25 | End: 2025-03-25 | Stop reason: HOSPADM

## 2025-03-25 RX ORDER — LABETALOL HYDROCHLORIDE 5 MG/ML
5 INJECTION, SOLUTION INTRAVENOUS ONCE AS NEEDED
Status: DISCONTINUED | OUTPATIENT
Start: 2025-03-25 | End: 2025-03-25 | Stop reason: HOSPADM

## 2025-03-25 RX ORDER — LIDOCAINE HYDROCHLORIDE 10 MG/ML
0.1 INJECTION, SOLUTION INFILTRATION; PERINEURAL ONCE
Status: DISCONTINUED | OUTPATIENT
Start: 2025-03-25 | End: 2025-03-25 | Stop reason: HOSPADM

## 2025-03-25 RX ORDER — ONDANSETRON 4 MG/1
4 TABLET, ORALLY DISINTEGRATING ORAL EVERY 8 HOURS PRN
Qty: 20 TABLET | Refills: 0 | Status: SHIPPED | OUTPATIENT
Start: 2025-03-25

## 2025-03-25 RX ORDER — MEPERIDINE HYDROCHLORIDE 50 MG/ML
12.5 INJECTION INTRAMUSCULAR; INTRAVENOUS; SUBCUTANEOUS EVERY 10 MIN PRN
Status: DISCONTINUED | OUTPATIENT
Start: 2025-03-25 | End: 2025-03-25 | Stop reason: HOSPADM

## 2025-03-25 RX ORDER — OMEPRAZOLE 20 MG/1
20 TABLET, DELAYED RELEASE ORAL
COMMUNITY

## 2025-03-25 RX ORDER — ONDANSETRON HYDROCHLORIDE 2 MG/ML
INJECTION, SOLUTION INTRAVENOUS AS NEEDED
Status: DISCONTINUED | OUTPATIENT
Start: 2025-03-25 | End: 2025-03-25

## 2025-03-25 RX ORDER — NORETHINDRONE AND ETHINYL ESTRADIOL 0.5-0.035
KIT ORAL AS NEEDED
Status: DISCONTINUED | OUTPATIENT
Start: 2025-03-25 | End: 2025-03-25

## 2025-03-25 RX ORDER — ALBUTEROL SULFATE 0.83 MG/ML
2.5 SOLUTION RESPIRATORY (INHALATION) ONCE AS NEEDED
Status: DISCONTINUED | OUTPATIENT
Start: 2025-03-25 | End: 2025-03-25 | Stop reason: HOSPADM

## 2025-03-25 RX ADMIN — PROPOFOL 140 MG: 10 INJECTION, EMULSION INTRAVENOUS at 12:49

## 2025-03-25 RX ADMIN — HYDROMORPHONE HYDROCHLORIDE 0.3 MG: 1 INJECTION, SOLUTION INTRAMUSCULAR; INTRAVENOUS; SUBCUTANEOUS at 13:30

## 2025-03-25 RX ADMIN — EPHEDRINE SULFATE 10 MG: 50 INJECTION, SOLUTION INTRAVENOUS at 15:21

## 2025-03-25 RX ADMIN — EPHEDRINE SULFATE 10 MG: 50 INJECTION, SOLUTION INTRAVENOUS at 13:28

## 2025-03-25 RX ADMIN — FENTANYL CITRATE 25 MCG: 50 INJECTION, SOLUTION INTRAMUSCULAR; INTRAVENOUS at 13:01

## 2025-03-25 RX ADMIN — EPHEDRINE SULFATE 5 MG: 50 INJECTION, SOLUTION INTRAVENOUS at 13:56

## 2025-03-25 RX ADMIN — SODIUM CHLORIDE, POTASSIUM CHLORIDE, SODIUM LACTATE AND CALCIUM CHLORIDE: 600; 310; 30; 20 INJECTION, SOLUTION INTRAVENOUS at 12:40

## 2025-03-25 RX ADMIN — SODIUM CHLORIDE, POTASSIUM CHLORIDE, SODIUM LACTATE AND CALCIUM CHLORIDE: 600; 310; 30; 20 INJECTION, SOLUTION INTRAVENOUS at 15:59

## 2025-03-25 RX ADMIN — HYDROMORPHONE HYDROCHLORIDE 0.25 MG: 1 INJECTION, SOLUTION INTRAMUSCULAR; INTRAVENOUS; SUBCUTANEOUS at 15:15

## 2025-03-25 RX ADMIN — EPHEDRINE SULFATE 5 MG: 50 INJECTION, SOLUTION INTRAVENOUS at 13:31

## 2025-03-25 RX ADMIN — HYDROMORPHONE HYDROCHLORIDE 0.2 MG: 1 INJECTION, SOLUTION INTRAMUSCULAR; INTRAVENOUS; SUBCUTANEOUS at 13:47

## 2025-03-25 RX ADMIN — EPHEDRINE SULFATE 10 MG: 50 INJECTION, SOLUTION INTRAVENOUS at 13:09

## 2025-03-25 RX ADMIN — FENTANYL CITRATE 75 MCG: 50 INJECTION, SOLUTION INTRAMUSCULAR; INTRAVENOUS at 12:46

## 2025-03-25 RX ADMIN — EPHEDRINE SULFATE 10 MG: 50 INJECTION, SOLUTION INTRAVENOUS at 13:05

## 2025-03-25 RX ADMIN — PROPOFOL 20 MG: 10 INJECTION, EMULSION INTRAVENOUS at 15:30

## 2025-03-25 RX ADMIN — PROPOFOL 40 MG: 10 INJECTION, EMULSION INTRAVENOUS at 13:01

## 2025-03-25 RX ADMIN — ONDANSETRON 4 MG: 2 INJECTION, SOLUTION INTRAMUSCULAR; INTRAVENOUS at 15:26

## 2025-03-25 RX ADMIN — CEFAZOLIN SODIUM 2 G: 2 INJECTION, SOLUTION INTRAVENOUS at 12:54

## 2025-03-25 SDOH — HEALTH STABILITY: MENTAL HEALTH: CURRENT SMOKER: 0

## 2025-03-25 ASSESSMENT — PAIN SCALES - GENERAL
PAINLEVEL_OUTOF10: 0 - NO PAIN
PAINLEVEL_OUTOF10: 1
PAINLEVEL_OUTOF10: 0 - NO PAIN
PAINLEVEL_OUTOF10: 0 - NO PAIN
PAIN_LEVEL: 0

## 2025-03-25 ASSESSMENT — PAIN - FUNCTIONAL ASSESSMENT: PAIN_FUNCTIONAL_ASSESSMENT: 0-10

## 2025-03-25 ASSESSMENT — COLUMBIA-SUICIDE SEVERITY RATING SCALE - C-SSRS
2. HAVE YOU ACTUALLY HAD ANY THOUGHTS OF KILLING YOURSELF?: NO
6. HAVE YOU EVER DONE ANYTHING, STARTED TO DO ANYTHING, OR PREPARED TO DO ANYTHING TO END YOUR LIFE?: NO
1. IN THE PAST MONTH, HAVE YOU WISHED YOU WERE DEAD OR WISHED YOU COULD GO TO SLEEP AND NOT WAKE UP?: NO

## 2025-03-25 NOTE — ANESTHESIA PREPROCEDURE EVALUATION
Patient: Galen Villasenor Jr.    Procedure Information       Anesthesia Start Date/Time: 03/25/25 1237    Procedures:       FUSION, JOINT, TOE (Left: Foot)      REPAIR, TENDON, FOOT OR ANKLE (Left: Foot)    Location: [facility] OR 04 / Virtual [facility] OR    Surgeons: Antonio Houston MD            Relevant Problems   Cardiac   (+) Atrial flutter (Multi)   (+) Atypical atrial flutter (Multi)   (+) Coronary artery disease involving native coronary artery of native heart without angina pectoris   (+) Mixed hyperlipidemia   (+) Paroxysmal atrial fibrillation (Multi)   (+) Primary hypertension      Pulmonary   (+) Chronic obstructive pulmonary disease (Multi)      GI   (+) Esophageal dysphagia      Endocrine   (+) Hyperthyroidism   (+) Multinodular goiter   (+) Toxic thyroid nodule   (+) Type 2 diabetes mellitus without complication, without long-term current use of insulin (Multi)      Hematology   (+) Anemia   (+) Hairy cell leukemia, in relapse (Multi)   (+) Pancytopenia       Clinical information reviewed:   Tobacco  Allergies  Meds   Med Hx  Surg Hx   Fam Hx  Soc Hx        NPO Detail:  NPO/Void Status  Carbohydrate Drink Given Prior to Surgery? : N  Date of Last Liquid: 03/24/25  Time of Last Liquid: 2355  Date of Last Solid: 03/24/25  Time of Last Solid: 0830  Last Intake Type: Clear fluids  Time of Last Void: 0930         PHYSICAL EXAM    Anesthesia Plan    History of general anesthesia?: yes  History of complications of general anesthesia?: no    ASA 3     general     The patient is not a current smoker.  Patient was previously instructed to abstain from smoking on day of procedure.  Patient did not smoke on day of procedure.  Education provided regarding risk of obstructive sleep apnea.  intravenous induction   Postoperative administration of opioids is intended.  Anesthetic plan and risks discussed with patient.  Use of blood products discussed with patient who.

## 2025-03-25 NOTE — ANESTHESIA POSTPROCEDURE EVALUATION
Patient: Galen Villasenor Jr.    Procedure Summary       Date: 03/25/25 Room / Location: [facility] OR 04 / [facility]    Anesthesia Start: 1237 Anesthesia Stop:     Procedures:       FUSION, JOINT, TOE (Left: Foot)      REPAIR, TENDON, FOOT OR ANKLE (Left: Foot) Diagnosis: (M20.22 - Hallux rigidus, left foot, M20.5x2 - Other deformities of toe(s) (acquired), left foot)    Surgeons: Antonio Houston MD Responsible Provider: Hector Avila DO    Anesthesia Type: general ASA Status: 3            Anesthesia Type: general    Vitals Value Taken Time   /58 03/25/25 1620   Temp 36.8 03/25/25 1620   Pulse 63 03/25/25 1620   Resp 12 03/25/25 1620   SpO2 93 03/25/25 1620       Anesthesia Post Evaluation    Patient location during evaluation: PACU  Patient participation: complete - patient participated  Level of consciousness: sleepy but conscious  Pain score: 0  Pain management: adequate  Airway patency: patent  Cardiovascular status: acceptable  Respiratory status: acceptable, nonlabored ventilation, room air, spontaneous ventilation and unassisted  Hydration status: acceptable  Postoperative Nausea and Vomiting: none        No notable events documented.

## 2025-03-25 NOTE — DISCHARGE INSTRUCTIONS
Dr. Houston's Post-op Foot and Ankle Instructions    - Please keep your dressings in place until follow-up.  Do not get them wet.  If your dressings become saturated with drainage please cover it with an additional Ace wrap.  Do not remove any ace wraps.  If your dressings continue to saturate, please contact the office as they will need to be changed prior to your scheduled follow-up.  -You may weight bear in your post-op shoe.  Try to place the majority of your weight through your heel.    -Please take the pain medication prescribed as directed.  You can also use ibuprofen unless your primary doctor has directed you not to use this.  Please do not use ibuprofen if it irritates your stomach or if you have kidney issues or on blood thinners.  Do not take more than 800 mg of ibuprofen every 8 hours. You can also alternate ibuprofen with Tylenol (acetaminophen) so you are taking a dose of either Ibuprofen or Tylenol every 4 hours.  Do not take more than 650 mg of Tylenol every 8 hours.     -Please keep the foot elevated as much as possible, particularly for the first 2 weeks after surgery.  You should plan to keep your foot elevated at least 45 mins of every hour.  Try to keep the foot elevated above the level of your heart.  You can use a stack of pillows or blankets to achieve this height.    -Please resume your Eliquis tomorrow for prevention of blood clots.  This has not been prescribed to you and you will need to obtain this over the counter from your pharmacy.  -Narcotic pain medications can cause constipation.  You can either take Senna or use metamucil to help with regular bowel movements should you develop constipation.  These can be obtained over the counter.  Please remember to stay hydrated and drink plenty of water as this helps to prevent constipation too.  -You have also been prescribed Ondansetron (Zofran) to help with nausea.  This can be taken as needed.  -Please call the office with any questions or  concerns.  [phone] (main line) or [phone] (Sandy Houston's ).

## 2025-03-25 NOTE — ANESTHESIA PROCEDURE NOTES
Airway  Date/Time: 3/25/2025 12:51 PM  Urgency: elective    Airway not difficult    Staffing  Performed: SUN   Authorized by: Hector Avila DO    Performed by: SUN Wills  Patient location during procedure: OR    Indications and Patient Condition  Indications for airway management: anesthesia and airway protection  Spontaneous Ventilation: absent  Sedation level: deep  Preoxygenated: yes  Patient position: sniffing  Mask difficulty assessment: 0 - not attempted  Planned trial extubation    Final Airway Details  Final airway type: supraglottic airway      Successful airway: Size 5     Number of attempts at approach: 1  Ventilation between attempts: none  Number of other approaches attempted: 0

## 2025-03-26 NOTE — OP NOTE
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]     Procedures  FUSION, JOINT, TOE  94407 - MA ARTHRODESIS GREAT TOE METATARSOPHALANGEAL JOINT    REPAIR, TENDON, FOOT OR ANKLE  99438 - MA REPAIR TENDON EXTENSOR FOOT 1/2 EACH TENDON    MA ARTHRODESIS GREAT TOE INTERPHALANGEAL JOINT [33098]    89164 - Tenotomy, open, tendon flexor; toe, single tendon  Surgeons      * Antonio Houston - Primary    Resident/Fellow/Other Assistant:  Surgeons and Role:  * No surgeons found with a matching role *    Staff:   Nayeliulator: Galen Sinclair Person: Elle  Surgical Assistant: Lala    Anesthesia Staff: Anesthesiologist: Hector Avila DO  C-AA: SUN Wills    Procedure Summary  Anesthesia: General  ASA: III  Estimated Blood Loss: 5 mL  Intra-op Medications:   Administrations occurring from 1250 to 1630 on 25:   Medication Name Total Dose   BUPivacaine HCl (Marcaine) 0.5 % (5 mg/mL) injection 20 mL   vancomycin (Vancocin) vial for injection 2 g   ceFAZolin (Ancef) IV 2 g in 100 mL dextrose (iso) - premix 2 g   ePHEDrine injection 50 mg   fentaNYL (Sublimaze) injection 50 mcg/mL 25 mcg   HYDROmorphone (Dilaudid) injection 1 mg/mL 0.75 mg   LR bolus Cannot be calculated   ondansetron (Zofran) 2 mg/mL injection 4 mg   propofol (Diprivan) injection 10 mg/mL 60 mg              Anesthesia Record               Intraprocedure I/O Totals          Intake    LR bolus 1000.00 mL    ceFAZolin 2 gram/100 mL 100.00 mL    Total Intake 1100 mL       Output    Est. Blood Loss 5 mL    Total Output 5 mL       Net    Net Volume 1095 mL          Specimen: No specimens  collected     Drains and/or Catheters: * None in log *    Tourniquet Times:     Total Tourniquet Time Documented:  Thigh (Left) - 125 minutes  Total: Thigh (Left) - 125 minutes      Implants:  Implants       Type Name Action Serial No.      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 is an 79 y.o. male who is having surgery for M20.22 - Hallux rigidus, left foot, M20.5x2 - Other deformities of toe(s) (acquired), left foot. Patient had presented to my clinic with complaints of a painful contracture of his left great toe.  He had a long history with prior surgical intervention on this toe.  In August 2022 he had had an implant arthroplasty performed in the first MTP joint by an outside surgeon.  He subsequently had significant ongoing drainage following this procedure and found out later that there was a longstanding infection which required subsequent removal of the implant arthroplasty later that year.  He was on an extended course of antibiotics.  He subsequently cleared the infection but was told that no further surgical intervention could be done on the toe.  Over time he developed a  progressive contracture of the toe.  There was pain related to the contracture as well as pain related to significant degenerative changes of the first MTP joint.  He came to my office to discuss potential options for this.  We had obtained a CT scan which demonstrated fairly limited bone stock in the proximal phalanx.  I discussed with him that it would be reasonable to consider a first MTP arthrodesis done in conjunction with potential release of the hallux IP joint and extensor tendon lengthening with possible flexor tenotomy.  We did also discussed the possibility of a interphalangeal arthrodesis depending on our intraoperative findings.  We reviewed risk benefits and alternatives to surgery as well as expected postoperative course.  We discussed risks including infection, wound healing complications, pain, stiffness, nonunion, malunion, need for additional procedures, damage to surrounding nerves or blood vessels, potential loss of the toe, bleeding, risk of blood clots and risk of anesthetic.  He expressed understanding of these risks and wished to proceed forward with surgical intervention.    The patient was seen in the preoperative area. The risks, benefits, complications, treatment options, non-operative alternatives, expected recovery and outcomes were discussed with the patient. The possibilities of reaction to medication, pulmonary aspiration, injury to surrounding structures, bleeding, recurrent infection, the need for additional procedures, failure to diagnose a condition, and creating a complication requiring transfusion or operation were discussed with the patient. The patient concurred with the proposed plan, giving informed consent.  The site of surgery was properly marked. The patient has been actively warmed in preoperative area. Preoperative antibiotics have been ordered and given within 1 hours of incision. Venous thrombosis prophylaxis have been ordered including unilateral sequential  compression device    Procedure Details:   After giving informed consent and being marked in the preoperative holding area the patient was brought back to the operating room.  He was transferred to the operative table and placed in the supine position.  All bony prominences were well-padded.  General anesthesia was administered and preoperative antibiotics were given.  A nonsterile pneumatic thigh tourniquet was then applied to the left lower extremity.  This was inflated at the beginning of the case and let down prior to wound closure to ensure appropriate hemostasis been achieved.  The patient was then prepped and draped in the usual sterile fashion such that the left lower extremity was exposed.  A timeout was then performed to confirm the correct patient, procedure, laterality and site of surgery.  We confirmed that preoperative antibiotics had been given and that all implants were available in the room.  The surgical team was in agreement.    We began the procedure by exsanguinating the left lower extremity utilizing an Esmarch bandage.  The thigh tourniquet was then inflated to 250 mmHg.  We began the procedure by creating a skin incision over the first MTP joint utilizing the previous skin incision from the previous implant arthroplasty.  This was extended both proximally and distally.  The dissection was carried through the skin and subcutaneous tissues.  The EHL tendon was identified and was mobilized from the adjacent scar tissue.  The extensor tendon was then Z lengthened.  Once this was done we continued our dissection down to the first MTP joint capsule and the periosteum of the distal first metatarsal.  This was sharply elevated medially laterally and dorsally.  The previous first MTP joint was identified and was significantly scarred in and contracted.  We then carried our dissection further distally to the level of the interphalangeal joint.  There was a claw hallux contracture and the distal aspect  of the proximal phalanx was noted to be overriding the proximal aspect of the distal phalanx.  The interphalangeal joint was completely immobile due to this contracture.  There was no obvious flexion or extension of the interphalangeal joint present at all.  We initially attempted to mobilize the joint utilizing a Pillow elevator and sequentially releasing the soft tissues both medially and laterally to the proximal phalanx but the joint was still extremely contracted and immobile.  I then decided to turn my attention proximally to the MTP joint.  We began to flex the toe down to mobilize the MTP joint.  At that point we were able to identify that there was significant bone loss in the base of the proximal phalanx and in the central portion of the remaining proximal phalanx that was left.  Utilizing a rongeur and curettes the first metatarsal head and base of the proximal phalanx were debrided in preparation for arthrodesis.  At this point we then turned our attention back to the interphalangeal joint once we had begun to mobilize the MTP joint.  There was still significant contracture present and therefore we proceeded to again release more of the medial and lateral soft tissues around the proximal phalanx.  I then created an additional counterincision on the plantar aspect of the toe in the flexion crease to perform a flexor tenotomy of the hallux.  Utilizing a 15 blade knife dissection was carried down to the level of the flexor tendons.  The flexor tendons were identified and cut taking care to protect the neurovascular bundles both medially and laterally.  We also released a portion of the joint capsule of the IP joint plantarly.  At this point the joint still was not moving significantly.  Therefore we turned our attention back dorsally and planned to remove some bone from the distal aspect of the proximal phalanx and the proximal aspect of the distal phalanx to mobilize the interphalangeal joint in preparation  for an arthrodesis.  A small sagittal saw blade was used to accomplish this.  We sequentially removed bone from both sides of the joint until we were able to release the joint enough to extend the IP joint and reduce the contracture at the IP joint.  Once we were able to accomplish this we turned our attention back proximally to the level of the MTP joint.  There was a long ridge of bone on the plantar aspect of the proximal phalanx which we had identified on the preoperative CT scan.  Utilizing that same sagittal saw blade working through the dorsal aspect of the MTP joint this ridge of bone was cut and a rongeur was used to remove excess plantar bone from the base of the proximal phalanx.  Once this was done we were able to get adequate compression across the MTP joint.  At this point the wounds were then copiously irrigated with sterile normal saline.  Both sides of the MTP joint as well as the IP joint were drilled with a small 1.3 mm drill bit in preparation for arthrodesis.  Once this was done we then placed 2-1/2 cc of Arthrocell graft between both the MTP joint and IP joint in preparation for arthrodesis.  The MTP joint was then provisionally pinned in place with a K wire.  The position of the toe was judged with the use of a sterile floor and found to be acceptable in both the coronal and sagittal planes.  We additionally stabilized across the IP joint with an additional K wire.  Fluoroscopy was brought in and the position of the toe was again evaluated with AP and lateral fluoroscopy and judged to be acceptable.  At this point we then proceeded forward with our first MTP arthrodesis.  A 2.4 mm mini frag plate was selected and provisionally pinned in place with BB tacks.  I then proceeded to drill for and place 1 cortical screw followed by 2 locking screws in the proximal phalanx base.  Once this was done we then turned our attention proximally in the plate and eccentrically drilled for and placed 1  cortical screw in the first metatarsal which provided excellent compression across the first MTP joint.  I then subsequently placed 3 additional locking screws in the shaft of the first metatarsal.  Once this was done we then confirmed again acceptable position of the arthrodesis of the first MTP joint both clinically and radiographically utilizing fluoroscopic AP and lateral images.  At this point we then proceeded to stabilize our interphalangeal joint fusion.  We originally plan to place a 3.5 mm cannulated compression screw.  We placed a guidewire for this and attempted to place a compression screw across the interphalangeal joint.  Unfortunately this placed the toe into the more varus at the interphalangeal joint and as a result of this we elected not to leave the screw in place.  The screw was subsequently removed and we then proceeded to place a 2.0 mm K wire and 1.6 mm K wire to stabilize the interphalangeal arthrodesis.  We were pleased with the position of the toe clinically and radiographically and the K wires provided good stabilization of the IP joint.  At this point final AP oblique and lateral fluoroscopic images of the foot were obtained demonstrating acceptable position of the toe and acceptable position of all hardware.  At this point the K wires were bent and cut.  The incision was then copiously irrigated with sterile normal saline and the tourniquet was let down.  Hemostasis was subsequently achieved utilizing electrocautery.  1 g of vancomycin was then placed into the surgical incisions.  On the plantar aspect of the foot the incisions were closed with 3-0 Monocryl to close the deep dermis followed by 3-0 nylon suture in a horizontal mattress fashion to close the skin.  We then turned our attention dorsally.  3-0 Monocryl was used in the skin in a horizontal mattress fashion to close our incision at the distal aspect of the incision where it extended into the nail fold.  Proximally we then  repaired the EHL tendon that we had previously Z lengthened utilizing 2-0 Vicryl suture in a running fashion.  There was no significant capsule to close.  The surgical incision was then closed in a layered fashion utilizing 2-0 Vicryl suture to close the deep subcutaneous tissues followed by 3-0 Monocryl to close the deep dermis and 3-0 nylon in a horizontal mattress fashion to close the skin.  At this point we then proceeded to inject 0.5% plain Marcaine around the surgical site for local anesthetic.  Sterile dressings were then applied and the patient was subsequently placed in a postop shoe.  The patient was then awoken from anesthesia and transferred to the PACU in stable condition having tolerated the procedure well.  All counts were correct at the end of the procedure.    Postoperatively the patient will be discharged home.  He will restart his Eliquis tomorrow for DVT prophylaxis.  He will heel weight-bear in a postop shoe for at least 6 weeks.  We will plan to hopefully retain the pins across the IP joint for at least the next 4 to 6 weeks.  He will return to clinic in 1 week for a wound check.  Complications:  None; patient tolerated the procedure well.    Disposition: PACU - hemodynamically stable.  Condition: stable           Additional Details: None    Attending Attestation: I was present and scrubbed for the entire procedure.    Antonio Houston  Phone Number: [phone]

## 2025-04-02 ENCOUNTER — APPOINTMENT (OUTPATIENT)
Dept: CARDIOLOGY | Facility: CLINIC | Age: 80
End: 2025-04-02
Payer: MEDICARE

## 2025-04-15 ENCOUNTER — APPOINTMENT (OUTPATIENT)
Dept: CARDIOLOGY | Facility: CLINIC | Age: 80
End: 2025-04-15
Payer: MEDICARE

## 2025-04-15 VITALS
DIASTOLIC BLOOD PRESSURE: 70 MMHG | WEIGHT: 200.6 LBS | HEIGHT: 71 IN | BODY MASS INDEX: 28.08 KG/M2 | SYSTOLIC BLOOD PRESSURE: 116 MMHG | HEART RATE: 60 BPM

## 2025-04-15 DIAGNOSIS — I48.0 PAROXYSMAL ATRIAL FIBRILLATION (MULTI): Primary | ICD-10-CM

## 2025-04-15 PROCEDURE — G2211 COMPLEX E/M VISIT ADD ON: HCPCS | Performed by: INTERNAL MEDICINE

## 2025-04-15 PROCEDURE — 3078F DIAST BP <80 MM HG: CPT | Performed by: INTERNAL MEDICINE

## 2025-04-15 PROCEDURE — 1157F ADVNC CARE PLAN IN RCRD: CPT | Performed by: INTERNAL MEDICINE

## 2025-04-15 PROCEDURE — 3074F SYST BP LT 130 MM HG: CPT | Performed by: INTERNAL MEDICINE

## 2025-04-15 PROCEDURE — 99214 OFFICE O/P EST MOD 30 MIN: CPT | Performed by: INTERNAL MEDICINE

## 2025-04-15 PROCEDURE — 93000 ELECTROCARDIOGRAM COMPLETE: CPT | Performed by: INTERNAL MEDICINE

## 2025-04-15 PROCEDURE — 1159F MED LIST DOCD IN RCRD: CPT | Performed by: INTERNAL MEDICINE

## 2025-04-15 RX ORDER — CEPHALEXIN 250 MG/1
CAPSULE ORAL
COMMUNITY
Start: 2025-04-09 | End: 2025-04-15 | Stop reason: WASHOUT

## 2025-04-15 RX ORDER — IPRATROPIUM BROMIDE 21 UG/1
SPRAY, METERED NASAL
COMMUNITY
Start: 2025-04-06

## 2025-04-15 RX ORDER — DORZOLAMIDE HYDROCHLORIDE AND TIMOLOL MALEATE 20; 5 MG/ML; MG/ML
SOLUTION/ DROPS OPHTHALMIC
COMMUNITY
Start: 2024-04-29 | End: 2025-04-15 | Stop reason: WASHOUT

## 2025-04-15 RX ORDER — CLARITHROMYCIN 500 MG/1
TABLET, FILM COATED ORAL
COMMUNITY
Start: 2024-04-18

## 2025-04-15 NOTE — PROGRESS NOTES
Referring Provider: Darrick Alfonso MD  Reason for Consult: Atrial fibrillation    History of Present Illness:      Galen Villasenor Jr. is a 79 y.o. year old male patient with a history significant for persistent atrial fibrillation, chronic ischemic heart disease, hairy cell leukemia, hyperlipidemia,  who was referred by Dr. Alfonso for management of atrial fibrillation. He is here today for follow-up after recent ablation at the end of January.     Mr. Villasenor was initially diagnosed with paroxysmal atrial fibrillation in 2018 when he had a period of illness that was prolonged and associated with fatigue.  He was found to have atrial fibrillation.  He has had multiple cardioversions 18, and was ultimately started on amiodarone.  He did well over the intervening years, with improvement of his symptoms while in sinus rhythm.  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  He was started on dofetilide in November 2022, which has worked fairly well until recently, where he has begun to have paroxysms of atrial arrhythmias being on dofetilide.  He has a 4% A-fib burden on monitoring.  Since the summer, he has complained of worsening exertional fatigue and dyspnea.  Some of this may be due to recurrence of his hairy cell leukemia, and his hemoglobin has been trending down.  However, he does attribute some of the symptoms to his atrial fibrillation as well.  He is planning to start his chemotherapy for his leukemia soon.    He had a successful AF ablation on 1/22/2024 with PVI, LA PWI, and CTI line. No complications after the procedure. An EKG done on 1/30 showed atypical atrial flutter, but this was not brought to my attention. Since the ablation, he has started chemotherapy, and has significant weakness and fatigue since then. He then had dofetilide loading and repeat cardioversion on 3/1/2024 back to normal sinus rhythm, but unfortunately had recurrence of atypical flutter. We decided to move forward with repeat  ablation given recurrence of atrial arrhythmias.  He underwent a repeat ablation on 6/12/2024.  He had reconnection of his right pulmonary veins, posterior wall, as well as prior CTI.  These were already isolated.  He also had a septal flutter that was ablated in the high right atrial septum.  His dofetilide was continued after the procedure.  He has been doing well since the procedure with no recurrence of atrial arrhythmias.      He did quite well after ablation.  He had a 7-day patch monitor which showed no recurrent atrial arrhythmias.  His dofetilide has been stopped.      After last visit, we ordered a nuclear stress test.  This did not show any significant abnormalities.  Patch monitor was also ordered but was not completed.  He continues to have some exertional shortness of breath.      Focused Cardiovascular Problem List:  Persistent atrial fibrillation status post catheter ablation x 2 (PVI, left atrial posterior wall isolation, CTI line, ablation of septal flutter): [id number] score 5, hypertension, CAD, type 2 diabetes, age.  On Eliquis and dofetilide.  Previously on amiodarone, but caused thyroiditis.  Has had 5 prior cardioversions and developed breakthrough on dofetilide. He underwent successful AF ablation on 1/22/2024 with PVI, LA PWI, and CTI line. His dofetilide was stopped after the procedure. On 1/30/2024, he had an EKG which showed atypical atrial flutter. He underwent repeat dofetilide loading and cardioversion in March, but unfortunately had recurrence of atypical flutter despite this. He underwent a repeat ablation on 6/12/2024.  He had reconnection of his right pulmonary veins, posterior wall, as well as prior CTI.  These were already isolated.  He also had a septal flutter that was ablated in the high right atrial septum.  Now off dofetilide  Chronic ischemic heart disease: Remote PCI, preserved ejection fraction  Hypertension  Hairy-cell leukemia: Extensive marrow involvement; on  cleveribine      Past Medical and Surgical History:  Mr. Villasenor  has a past medical history of ADHD (attention deficit hyperactivity disorder), Anxiety, Atherosclerotic heart disease of native coronary artery without angina pectoris (2017), Cancer (Multi) (02), CTS (carpal tunnel syndrome) (03), Depression, Diabetes mellitus (Multi), Difficulty walking, Dysphagia, Head injury (6/7 Yrs.old), HL (hearing loss), Hypertension, Memory loss, Personal history of malignant neoplasm of prostate (2017), Personal history of other diseases of the circulatory system (2017), Personal history of other diseases of the respiratory system (2017), Personal history of other endocrine, nutritional and metabolic disease (2017), Personal history of other endocrine, nutritional and metabolic disease (2017), Personal history of other specified conditions (2017), Restless leg syndrome, Seizures (Multi), Shingles, Syncope, TIA (transient ischemic attack), and Vision loss.    has a past surgical history that includes Rotator cuff repair (2017); Total knee arthroplasty (2017); Carpal tunnel release (2017); Coronary angioplasty (2017); Other surgical history (2017); Spinal fusion (2017); Cardiac electrophysiology procedure (N/A, 2024); Cardiac electrophysiology procedure (N/A, 2024); Cardiac electrophysiology procedure (N/A, 2024); Carotid stent; Cervical laminectomy; Cervical discectomy (); Cervical fusion; and Lumbar laminectomy.    Social History:  Social History     Tobacco Use    Smoking status: Former     Current packs/day: 0.00     Average packs/day: 1 pack/day for 23.0 years (23.0 ttl pk-yrs)     Types: Cigarettes, Pipe     Start date: 1957     Quit date: 1980     Years since quittin.3     Passive exposure: Past    Smokeless tobacco: Never   Substance Use Topics    Alcohol use: Yes     Alcohol/week: 7.0 standard  drinks of alcohol     Types: 7 Glasses of wine per week     Comment: once a night      Tobacco: Quit 1/1/198/0, prior 4 packs/day for 23 years  Alcohol:  1 glass of wine / day  Drug use:  Denies    Relevant Family History:   Family History   Problem Relation Name Age of Onset    Lung cancer Mother mom     Migraines Mother mom     Cancer Father      Alcohol abuse Father      Other (cardiac disorder) Father      Brain cancer Sister      Cancer Brother      Migraines Sister Hien         Allergies:  Allergies   Allergen Reactions    Anesthetics - Renetta Type- Parabens Seizure    Procaine Seizure    Quinolones Unknown and Other     drop foot        Medications:  Current Outpatient Medications   Medication Instructions    apixaban (ELIQUIS) 5 mg, 2 times daily    DULoxetine (CYMBALTA) 120 mg, Daily    empagliflozin (JARDIANCE) 12.5 mg, Daily    fenofibrate (Triglide) 160 mg tablet 1 tablet, Daily    gabapentin (Neurontin) 600 mg tablet Take 1 tablet (600 mg) by mouth in the morning. And Take 2 tablets (1,200 mg) by mouth at bedtime.    isosorbide mononitrate ER (Imdur) 30 mg 24 hr tablet 1 tablet, Daily    lisinopril 20 mg tablet 1 tablet, Daily    methIMAzole (TAPAZOLE) 5 mg, oral, Daily, Except NO tablet on Sunday    omeprazole OTC (PRILOSEC OTC) 20 mg, 2 times daily before meals    ondansetron ODT (ZOFRAN-ODT) 4 mg, oral, Every 8 hours PRN    oxyCODONE (ROXICODONE) 5 mg, oral, Every 6 hours PRN    rosuvastatin (CRESTOR) 20 mg, Daily    vit C/E/Zn/coppr/lutein/zeaxan (PRESERVISION AREDS-2 ORAL) 1 tablet, 2 times daily         Objective   Physical Exam:  Last Recorded Vitals:      3/25/2025    11:00 AM 3/25/2025     4:20 PM 3/25/2025     4:30 PM 3/25/2025     4:45 PM 3/25/2025     4:55 PM 3/25/2025     5:15 PM 3/25/2025     5:30 PM   Vitals   Systolic 155 154 150 142 157 147 149   Diastolic 71 58 48 62 71 61 60   BP Location  Left arm Left arm Left arm      Heart Rate 70 115 76 70  71 70   Temp 36.8 °C (98.2 °F) 36.7 °C  "(98.1 °F)   36.4 °C (97.5 °F)     Resp 18 11 17 12 16 16 16   Height 1.803 m (5' 11\")         Weight (lb) 211.5         BMI 29.5 kg/m2         BSA (m2) 2.19 m2          Visit Vitals  Smoking Status Former      Gen: NAD, sitting comfortably  HEENT: NC/AT  Card: RRR, no m/r/g  Pulm: Clear to auscultation bilaterally  Ext: No LE edema  Neuro: No focal deficits    Diagnostic Results      My Interpretation of Reviewed Study(s):  Prior ECGs (reviewed and my interpretation):  4/15/2025: Sinus rhythm with frequent PACs and occassional junctional beats, 1st degree AV block  2/5/2025: Sinus rhythm with paroxysmal ectopic atrial rhythm, junctional escape beat  12/31/2024: Sinus rhythm with first-degree AV block, heart rate 60 bpm  7/16/2024: Sinus rhythm with 1st degree AV block, low voltage QRS   5/21/2024: Atypical Atrial flutter, frequent PVCs, HR 74bpm  2/20/2024: Atypical atrial flutter, variable AV block, HR 74 bpm  1/30/2024: Atypical atrial flutter with variable AV block, HR 70bpm  1/9/2024: Sinus rhythm, heart rate 78 bpm, ventricular bigeminy, Para-hisian PVCs      Cardiac monitors:  11/2024: 7-day monitor showing no sustained arrhythmias.  Some asymptomatic Wenke Bach.  NSVT up to 9 beats.      Echocardiography:  1/7/2025  CONCLUSIONS:   1. The left ventricular systolic function is normal, with a visually estimated ejection fraction of 55-60%.   2. No regional wall motion abnormalities.   3. There is normal right ventricular global systolic function.   4. Trivial to 1+ mitral regurgitation.   5. Trivial to 1+ tricuspid regurgitation with estimated RVSP 22 mmHg.    11/14/2023  CONCLUSIONS:   - Exam indication: Chest Pain   - The left ventricle is normal in size. There is mild left ventricular   hypertrophy. Left ventricular systolic function is normal. EF = 60 ± 5% (2D   biplane) Grade I left ventricular diastolic dysfunction.   - The right ventricle is normal in size. Right ventricular systolic function is   normal. "   - The left atrial cavity is mildly dilated.   - Exam was compared with the prior  echocardiographic exam performed on   05/02/2019. No significant change     Stress Test:   2/2025: Normal nuclear stress test, no inducible ischemia    6/12/2023  CONCLUSIONS:    1. SPECT Perfusion Study: Normal.    2. There is no scintigraphic evidence for inducible ischemia.    3. No evidence of scarred myocardium.    4. Left ventricle is normal in size. The left ventricle systolic   function is normal.    5. This is a low risk scan.       Relevant Labs:  Lab Results   Component Value Date    CREATININE 1.25 02/28/2025    CREATININE 1.38 (H) 12/18/2024    CREATININE 1.27 10/31/2024    K 4.9 02/28/2025    K 4.3 12/18/2024    K 4.3 10/31/2024    HGB 14.9 02/28/2025    HGB 14.4 12/18/2024    HGB 15.2 10/31/2024    INR 1.7 (H) 01/22/2024    AST 20 02/28/2025    AST 20 12/18/2024    AST 25 10/31/2024    ALT 24 02/28/2025    ALT 24 12/18/2024    ALT 28 10/31/2024       Assessment/Plan   Assessment and Plan:  Galen Villasenor  is a 79 y.o. year old male patient who underwent successful AF ablation on 1/22/2024 with PVI, LA PWI, and CTI line, and then underwent redo ablation on 6/12/2024 with reisolation of his right pulmonary veins, posterior wall, and CTI line.  He has done well after the second ablation ablation from the arrhythmia standpoint without any recurrent arrhythmia.  He is now off all antiarrhythmics, he continues to maintain normal sinus rhythm.  However, he continues to complain of some paroxysmal exertional shortness of breath.    He continues to have some exertional shortness of breath.  He did not receive the 2-week patch monitor that he was was to get after the last visit.  Will ensure that he gets it today.  Otherwise, nuclear stress test did not show any ischemia that would explain his symptoms.  EKG continues to show some PACs and occasional junctional beats, but doubt this would be causing any significant  dyspnea.    Recommendations:  #Persistent atrial fibrillation status post ablation x 2  - Continue apixaban indefinitely due to elevated [id number] score  - Repeat 2-week patch monitor to assess for recurrent atrial arrhythmias or chronotropic blunting  - Continue off antiarrhythmics and AV libby agents      Thank you very much for allowing me to participate in the care of this patient. Please do not hesitate to contact me with any further questions or concerns.    Evelyne Ambrose MD  Clinical Cardiac Electrophysiologist  Director of Atrial Fibrillation Ablation, [facility]  Director of Ventricular Arrhythmias Research, [facility]  Office Phone Number: [phone]

## 2025-07-02 NOTE — H&P (VIEW-ONLY)
Galen Villasenor is a 79 y.o. male  presents with chief complaint of PAT    SUBJECTIVE  HPI   Left great toe: [facility] (had an implant placed, then had infection - needed second surgery for implant removal). Hammer toe pains continued s/p surgery. [facility]: Dr. Houston. Xray done with ortho - thought it could be improved with repeat surgery. surgery about 3 months ago: (plates/screws/pins). Pins removed: started draining: concern for infection - needs to take hardware removed (here for PAT: Planning for 4th toe surgery to remove hardware: 7/8/25. Currently Toe is Painful, red, swollen and tender. Drainage from previous drain hole from [facility] years ago. He is not having fevers. Finishing Keflex today, as it relates to the same.     Has not done holter monitor yet (With Dr Ambrose) - 2/25/25: Normal Lexiscan Myoview cardiac perfusion stress test. No evidence of ischemia or myocardial infarction by perfusion imaging. Normal left ventricular systolic function, ejection fraction 70%.   Noninvasive risk stratification-low risk.     Afib: [id number] score 5, hypertension, CAD, type 2 diabetes, age. On Eliquis and dofetilide. Previously on amiodarone, but caused thyroiditis. Has had 5 prior cardioversions and developed breakthrough on dofetilide. He underwent successful AF ablation on 1/22/2024 with PVI, LA PWI, and CTI line. His dofetilide was stopped after the procedure. On 1/30/2024, he had an EKG which showed atypical atrial flutter. He underwent repeat dofetilide loading and cardioversion in March, but unfortunately had recurrence of atypical flutter despite this. He underwent a repeat ablation on 6/12/2024. He had reconnection of his right pulmonary veins, posterior wall, as well as prior CTI. These were already isolated. He also had a septal flutter that was ablated in the high right atrial septum. Now off dofetilide -- Continues on Eliquis    Has had remote issues  with Anesthesia in the past- has had seizure like activity-- taking Gabapentin - past several surgeries has had no issues with anesthesia (no seizure like activity).     Dr Quinn: Scrotal implant for urination     Health Maintenance Due   Topic Date Due   • Diabetes: Retinopathy Screening  06/06/2025   • Influenza Vaccine (1) 09/01/2025     PROBLEM LIST  SURGICAL/SOCIAL ALLERGIES:   Patient Active Problem List   Diagnosis   • Carpal tunnel syndrome   • Foot-drop   • Arthritis of great toe at metatarsophalangeal joint   • Mixed hyperlipidemia    • Malignant neoplasm of prostate ([facility])   • Thrombocytopenic disorder   • Atherosclerotic heart disease of native coronary artery without angina pectoris    • Polyneuropathy   • PLMD (periodic limb movement disorder)   • Hyperthyroidism    • A-fib ([facility])   • Dysplastic colon polyp   • Hypertension    • Mild intermittent asthma without complication ([facility])   • Osteoarthritis of right knee   • SOB (shortness of breath)   • Acquired hallux rigidus of left foot   • Acquired hammertoe of right foot   • Brachial neuritis   • Cervical spondylosis without myelopathy   • Chronic obstructive lung disease ([facility])   • Enthesopathy of hip region   • Hallux limitus of left foot   • Long term (current) use of anticoagulants   • Mechanical loosening of prosthetic joint   • Pinguecula, bilateral   • Sensorineural hearing loss, bilateral   • Spinal stenosis of lumbar region   • Spinal stenosis in cervical region   • Toxic uninodular goiter    • Tubular adenoma of colon   • Type 2 diabetes mellitus with hyperglycemia ([facility])   • Encounter for dietary counseling and surveillance   • Other fatigue   • Hairy cell leukemia, in relapse ([facility])   • Atypical atrial flutter ([facility])   • Cellulitis, unspecified   • Chemotherapy-induced neutropenia   • Difficulty in walking, not elsewhere classified   • Anemia   • Oropharyngeal dysphagia   • Exposure to potentially hazardous substance   • 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   • CARPAL TUNNEL RELEASE Right 07/10/2015   • CARPAL TUNNEL RELEASE Left 2003    carpal tunnel - left; also ulnar release   • CATARACT EXTRACTION, BILATERAL     • CORONARY STENT PLACEMENT      cardiac stents (5 stents total) , ,    • KNEE SURGERY Left     scope ,    • OTHER SURGICAL HISTORY      Urinary continence   • OTHER SURGICAL HISTORY      Lumbar spinal stenosis   • PROSTATECTOMY      Radical Prostatectomy   • SHOULDER ARTHROSCOPY Left     shoulder scope   • TONSILLECTOMY     • TOTAL KNEE ARTHROPLASTY Left    • TOTAL KNEE ARTHROPLASTY Right 2017    [facility]   • TRANSURETHRAL RESECTION OF PROSTATE      TURP   • ULNAR NERVE TRANSPOSITION Right 2019    RELOCATED ULNAR NERVE R ARM   • URINARY SPHINCTER IMPLANT  2016    urinary implant        Social History     Tobacco Use   • Smoking status: Former     Current packs/day: 0.00     Average packs/day: 1.5 packs/day for 20.0 years (30.0 ttl pk-yrs)     Types: Cigarettes, Pipe, Cigars     Start date:      Quit date:      Years since quittin.5   • Smokeless tobacco: Former     Types: Chew     Quit date:    • Tobacco comments:     Tried once around age 13 yr.  No longer smoking in any form.   Substance Use Topics   • Alcohol use: Yes     Alcohol/week: 7.0 standard drinks of alcohol     Types: 7 Glasses of wine per week     Comment: with dinners during the week   • Drug use: Never       Depression: Not at risk (2025)    PHQ-2    • PHQ-2 Score: 0        Allergies   Allergen Reactions   • Lidocaine Unknown     Other Reaction(s): grand mal seizure ()    seizure this occurred following a procedure in the    Per  patient he can receive mepivacaine   • Other    • Procaine Other and Unknown     seizure following a local procedure in the 1960's    Other Reaction(s): Other: See Comments   • Quinolones Unknown     Other Reaction(s): drop foot    Drop foot    Other Reaction(s): Other: See Comments   • Bactrim Ds [Sulfamethoxazole-Trimethoprim] Rash     6/18/25 reported by patient         MEDICATIONS FAMILY HISTORY DEPRESSION SCREEN   Outpatient Medications Prior to Visit   Medication Sig Dispense Refill   • apixaban (Eliquis) 5 MG tablet every 12 (twelve) hours.     • cephalexin (Keflex) 250 MG capsule Take 250 mg by mouth in the morning and 250 mg at noon and 250 mg in the evening and 250 mg before bedtime.     • DULoxetine (Cymbalta) 60 MG DR capsule TAKE 2 CAPSULES ONE TIME DAILY 180 capsule 1   • empagliflozin (Jardiance) 25 MG Take 0.5 tablets by mouth in the morning.     • fenofibrate (Triglide) 160 MG tablet TAKE 1 TABLET EVERY DAY 90 tablet 1   • gabapentin (Neurontin) 600 MG tablet TAKE 1 TABLET EVERY MORNING AND TAKE 2 TABLETS AT BEDTIME 270 tablet 3   • ipratropium (Atrovent) 0.03 % nasal spray ADMINISTER 1 SPRAY INTO EACH NOSTRIL IN THE MORNING AND 1 SPRAY BEFORE BEDTIME. 30 mL 3   • isosorbide mononitrate ER (Imdur) 30 MG 24 hr tablet Take 30 mg by mouth in the morning.     • lisinopril 20 MG tablet Take 1 tablet (20 mg) by mouth Daily 90 tablet 3   • methIMAzole (Tapazole) 5 MG tablet Take 5 mg by mouth 1 (one) time per week 4/7/25 per patient     • Multiple Vitamins-Minerals (PRESERVISION AREDS PO) Take 1 tablet by mouth in the morning and 1 tablet in the evening.     • omeprazole (PriLOSEC) 20 MG DR capsule Take 1 capsule (20 mg) by mouth in the morning and 1 capsule (20 mg) before bedtime. Per patient from cardiologist. 180 capsule 0   • rosuvastatin (Crestor) 20 MG tablet TAKE 1 TABLET EVERY DAY 90 tablet 1   • tiotropium-olodaterol (Stiolto Respimat) 2.5-2.5 MCG/ACT aerosol solution inhaler Inhale 2 Inhalation   "[facility]       No facility-administered medications prior to visit.    Family History   Problem Relation Name Age of Onset   • Lung cancer Mother     • Heart disease Father             Depression: Not at risk (7/2/2025)    PHQ-2    • PHQ-2 Score: 0         ROS  Unless noted in the HPI all other systems have been reviewed and are negative for complaint  Review of Systems   Constitutional:  Positive for fatigue. Negative for appetite change, chills and fever.   HENT:  Positive for trouble swallowing (Staying the same: 4/2024: Barrium swallow: globus sensation. Frequency is several times a week. The  patient reports drinking water does not help. Recent EGD was  negative. Respiratory status: WFL Previous diet: regular/thin: Mild oropharyngeal dysphagia). Negative for ear pain and sore throat.         Left anterior cervical lymphadenopathy x 2 days. No sore throat.    Eyes:  Negative for pain and discharge.        Planning to see eye doc next week   Respiratory:  Positive for cough (dry intermittent) and shortness of breath (mostly with exertion (seeing pulmonary)).    Cardiovascular:  Positive for leg swelling (right leg intermittently - compression stocking PRN). Negative for chest pain and palpitations.   Gastrointestinal:  Positive for constipation (intermittently). Negative for diarrhea, nausea and vomiting.   Genitourinary:  Negative for difficulty urinating, frequency and urgency.        Scrotal implant    Musculoskeletal:  Negative for arthralgias.   Neurological:  Positive for light-headedness (with quick position change, neck extension). Negative for dizziness and headaches.   Psychiatric/Behavioral:  Negative for sleep disturbance and suicidal ideas.    Endocrine: Negative for cold intolerance and heat intolerance.       OBJECTIVE  Visit Vitals  /60   Pulse 69   Temp 97.6 °F   Resp 16   Ht 5' 11\"   Wt 205 lb   SpO2 98%   BMI 28.59 kg/m²   Smoking Status Former   BSA 2.16 m²      BP Readings " from Last 8 Encounters:   07/02/25 114/60   03/17/25 118/80   12/18/24 126/62   09/17/24 158/88   08/28/24 154/88   06/06/24 162/88   04/08/24 138/66   03/13/24 112/60      Wt Readings from Last 8 Encounters:   07/02/25 205 lb   03/17/25 211 lb 12.8 oz   12/18/24 208 lb   09/17/24 204 lb 12.8 oz   08/28/24 204 lb 3.2 oz   06/06/24 199 lb   04/08/24 199 lb   03/13/24 200 lb      Physical Exam  Vitals reviewed.   Constitutional:       General: He is not in acute distress.     Appearance: Normal appearance. He is normal weight.   HENT:      Head: Normocephalic and atraumatic.      Right Ear: Tympanic membrane, ear canal and external ear normal.      Left Ear: Tympanic membrane, ear canal and external ear normal.      Nose: Nose normal.      Mouth/Throat:      Mouth: Mucous membranes are moist.      Pharynx: Oropharynx is clear.   Eyes:      Extraocular Movements: Extraocular movements intact.      Conjunctiva/sclera: Conjunctivae normal.      Pupils: Pupils are equal, round, and reactive to light.   Neck:      Vascular: No carotid bruit.   Cardiovascular:      Rate and Rhythm: Normal rate and regular rhythm.      Pulses: Normal pulses.      Heart sounds: Normal heart sounds.   Pulmonary:      Effort: Pulmonary effort is normal. No respiratory distress.      Breath sounds: Normal breath sounds. No wheezing, rhonchi or rales.   Abdominal:      General: Bowel sounds are normal. There is no distension.      Palpations: Abdomen is soft.      Tenderness: There is no abdominal tenderness. There is no guarding or rebound.   Musculoskeletal:      Cervical back: No tenderness.      Right lower leg: Edema present.      Left lower leg: Edema present.   Lymphadenopathy:      Cervical: Cervical adenopathy (left > right (mildly tender)) present.   Skin:     General: Skin is warm and dry.      Findings: Rash (dorsal aspect of left foot) present.      Comments: Left foot: erythema (dorsal aspect). Left great toe: erythematous, warm,  tender, edematous, and draining serous fluid from previous surgical sites.   Neurological:      General: No focal deficit present.      Mental Status: He is alert and oriented to person, place, and time. Mental status is at baseline.      Cranial Nerves: No cranial nerve deficit.      Motor: No weakness.      Gait: Gait normal.   Psychiatric:         Mood and Affect: Mood normal.         Behavior: Behavior normal.         Thought Content: Thought content normal.         Judgment: Judgment normal.       ASSESSMENT AND PLAN    Assessment/Plan  Problem List Items Addressed This Visit       Mixed hyperlipidemia     Lipids well controlled 3/2025 - on Crestor         Relevant Orders    ECG 12 lead (Completed)    Comprehensive metabolic panel (Completed)    CBC and differential (Completed)    Hemoglobin A1c (Completed)    Thrombocytopenic disorder    Has been thrombocytopenic since 2002 when dx with hairy cell leukemia (treatment cladribine)         Atherosclerotic heart disease of native coronary artery without angina pectoris     Relevant Orders    ECG 12 lead (Completed)    Comprehensive metabolic panel (Completed)    CBC and differential (Completed)    Hemoglobin A1c (Completed)    A-fib (HCC)    Chronic Afib (ablated) - Will stop Eliquis 3 days prior to surgery          Relevant Orders    ECG 12 lead (Completed)    Comprehensive metabolic panel (Completed)    CBC and differential (Completed)    Hemoglobin A1c (Completed)    Hypertension     Well controlled on lisinopril/Imdur.          Relevant Orders    ECG 12 lead (Completed)    Comprehensive metabolic panel (Completed)    CBC and differential (Completed)    Hemoglobin A1c (Completed)    SOB (shortness of breath)    Relevant Orders    ECG 12 lead (Completed)    Comprehensive metabolic panel (Completed)    CBC and differential (Completed)    Hemoglobin A1c (Completed)    Chronic obstructive lung disease (HCC)    COPD: continues to have dyspnea: following with  pulmonary: unfortunately, symptoms were not much better with nebulizer and inhalers. Stable at this time.          Type 2 diabetes mellitus with hyperglycemia (HCC)    Will check HgA1c today. Previously 7.1. On Jardiance. Further treatment pending results.            Hairy cell leukemia, in relapse (HCC)    Hairy Cell Leukemia: CBC pending from today. Follows with Dr Kruse         Oropharyngeal dysphagia    Dysphagia: stable, has had modified barium swallow done previously          Other Visit Diagnoses         Foreign body of great toe of left foot with infection, sequela    -  Primary        Atypical cervical lymph node - hold on antibiotics for now (just finishing Keflex): not currently ill with lymphadenopathy -- if worsens or persists will add antibiotic. (Does have hx of sialadenitis-- will trial sucking on lemon drops, to see if any improvement occurs). If still persisting, needs to fu sooner with oncology. Pending CBC today.     Left toe wound/infection: planning for great toe hardware removal next week: 7/8/25. He is finishing Keflex today. Toe continues to drain. No fever/chills at this time. Keeping wrapped with Telfa dressing.      Constipation: intermittent and manageable    Peripheral edema: intermittent (chronic) - better in the AM. Improves with compression stockings. Encouraged to limit salt and elevate legs    Pt seen with Dr Ackerman today in office    Ok to proceed with surgery. Reviewed EKG with Dr Osborne and Dr Ackerman- Pt cleared from cardiac standpoint (EKG today: Sinus rhythm - 1st degree block AV block - No significant change from 5/16/25). Will stop Eliquis 3 days prior to surgery. Hold other medications morning of surgery. Pt has substantial comorbidities which render him at least a moderate surgical risk.   No further optimization available, and this instrumentation needs to be removed to save the toe.  Proceed with caution.            NEXT SCHEDULED  APPT:  Visit date not found

## 2025-07-08 ENCOUNTER — APPOINTMENT (OUTPATIENT)
Dept: RADIOLOGY | Facility: HOSPITAL | Age: 80
DRG: 504 | End: 2025-07-08
Payer: MEDICARE

## 2025-07-08 ENCOUNTER — ANESTHESIA EVENT (OUTPATIENT)
Dept: OPERATING ROOM | Facility: HOSPITAL | Age: 80
End: 2025-07-08
Payer: MEDICARE

## 2025-07-08 ENCOUNTER — ANESTHESIA (OUTPATIENT)
Dept: OPERATING ROOM | Facility: HOSPITAL | Age: 80
End: 2025-07-08
Payer: MEDICARE

## 2025-07-08 ENCOUNTER — HOSPITAL ENCOUNTER (INPATIENT)
Facility: HOSPITAL | Age: 80
LOS: 3 days | Discharge: HOME HEALTH | DRG: 504 | End: 2025-07-11
Attending: ORTHOPAEDIC SURGERY | Admitting: ORTHOPAEDIC SURGERY
Payer: MEDICARE

## 2025-07-08 DIAGNOSIS — T84.60XA: ICD-10-CM

## 2025-07-08 DIAGNOSIS — E05.90 HYPERTHYROIDISM: ICD-10-CM

## 2025-07-08 DIAGNOSIS — L08.9 LEFT FOOT INFECTION: Primary | ICD-10-CM

## 2025-07-08 LAB — GLUCOSE BLD MANUAL STRIP-MCNC: 134 MG/DL (ref 74–99)

## 2025-07-08 PROCEDURE — 7100000002 HC RECOVERY ROOM TIME - EACH INCREMENTAL 1 MINUTE: Performed by: ORTHOPAEDIC SURGERY

## 2025-07-08 PROCEDURE — 3600000009 HC OR TIME - EACH INCREMENTAL 1 MINUTE - PROCEDURE LEVEL FOUR: Performed by: ORTHOPAEDIC SURGERY

## 2025-07-08 PROCEDURE — 82947 ASSAY GLUCOSE BLOOD QUANT: CPT

## 2025-07-08 PROCEDURE — 88305 TISSUE EXAM BY PATHOLOGIST: CPT | Mod: TC,STJLAB | Performed by: ORTHOPAEDIC SURGERY

## 2025-07-08 PROCEDURE — 2500000001 HC RX 250 WO HCPCS SELF ADMINISTERED DRUGS (ALT 637 FOR MEDICARE OP): Performed by: PHYSICIAN ASSISTANT

## 2025-07-08 PROCEDURE — 3700000001 HC GENERAL ANESTHESIA TIME - INITIAL BASE CHARGE: Performed by: ORTHOPAEDIC SURGERY

## 2025-07-08 PROCEDURE — A20680 PR REMOVAL DEEP IMPLANT: Performed by: NURSE ANESTHETIST, CERTIFIED REGISTERED

## 2025-07-08 PROCEDURE — 3700000002 HC GENERAL ANESTHESIA TIME - EACH INCREMENTAL 1 MINUTE: Performed by: ORTHOPAEDIC SURGERY

## 2025-07-08 PROCEDURE — A20680 PR REMOVAL DEEP IMPLANT: Performed by: STUDENT IN AN ORGANIZED HEALTH CARE EDUCATION/TRAINING PROGRAM

## 2025-07-08 PROCEDURE — 7100000001 HC RECOVERY ROOM TIME - INITIAL BASE CHARGE: Performed by: ORTHOPAEDIC SURGERY

## 2025-07-08 PROCEDURE — 99100 ANES PT EXTEME AGE<1 YR&>70: CPT | Performed by: STUDENT IN AN ORGANIZED HEALTH CARE EDUCATION/TRAINING PROGRAM

## 2025-07-08 PROCEDURE — 2500000002 HC RX 250 W HCPCS SELF ADMINISTERED DRUGS (ALT 637 FOR MEDICARE OP, ALT 636 FOR OP/ED): Performed by: ORTHOPAEDIC SURGERY

## 2025-07-08 PROCEDURE — 0QBR0ZZ EXCISION OF LEFT TOE PHALANX, OPEN APPROACH: ICD-10-PCS | Performed by: ORTHOPAEDIC SURGERY

## 2025-07-08 PROCEDURE — 1100000001 HC PRIVATE ROOM DAILY

## 2025-07-08 PROCEDURE — 2500000005 HC RX 250 GENERAL PHARMACY W/O HCPCS

## 2025-07-08 PROCEDURE — 2500000001 HC RX 250 WO HCPCS SELF ADMINISTERED DRUGS (ALT 637 FOR MEDICARE OP): Performed by: ORTHOPAEDIC SURGERY

## 2025-07-08 PROCEDURE — 2720000007 HC OR 272 NO HCPCS: Performed by: ORTHOPAEDIC SURGERY

## 2025-07-08 PROCEDURE — 2500000004 HC RX 250 GENERAL PHARMACY W/ HCPCS (ALT 636 FOR OP/ED)

## 2025-07-08 PROCEDURE — 0SPQ04Z REMOVAL OF INTERNAL FIXATION DEVICE FROM LEFT TOE PHALANGEAL JOINT, OPEN APPROACH: ICD-10-PCS | Performed by: ORTHOPAEDIC SURGERY

## 2025-07-08 PROCEDURE — 87077 CULTURE AEROBIC IDENTIFY: CPT | Mod: STJLAB | Performed by: ORTHOPAEDIC SURGERY

## 2025-07-08 PROCEDURE — 2500000004 HC RX 250 GENERAL PHARMACY W/ HCPCS (ALT 636 FOR OP/ED): Performed by: ORTHOPAEDIC SURGERY

## 2025-07-08 PROCEDURE — 3600000004 HC OR TIME - INITIAL BASE CHARGE - PROCEDURE LEVEL FOUR: Performed by: ORTHOPAEDIC SURGERY

## 2025-07-08 RX ORDER — OXYCODONE HYDROCHLORIDE 5 MG/1
2.5 TABLET ORAL EVERY 4 HOURS PRN
Status: DISCONTINUED | OUTPATIENT
Start: 2025-07-08 | End: 2025-07-08

## 2025-07-08 RX ORDER — FENTANYL CITRATE 50 UG/ML
INJECTION, SOLUTION INTRAMUSCULAR; INTRAVENOUS AS NEEDED
Status: DISCONTINUED | OUTPATIENT
Start: 2025-07-08 | End: 2025-07-08

## 2025-07-08 RX ORDER — GABAPENTIN 600 MG/1
1200 TABLET ORAL NIGHTLY
Status: DISCONTINUED | OUTPATIENT
Start: 2025-07-08 | End: 2025-07-11 | Stop reason: HOSPADM

## 2025-07-08 RX ORDER — OXYCODONE HYDROCHLORIDE 5 MG/1
5 TABLET ORAL EVERY 6 HOURS PRN
Status: DISCONTINUED | OUTPATIENT
Start: 2025-07-08 | End: 2025-07-08

## 2025-07-08 RX ORDER — LIDOCAINE HYDROCHLORIDE 10 MG/ML
0.1 INJECTION, SOLUTION INFILTRATION; PERINEURAL ONCE
Status: DISCONTINUED | OUTPATIENT
Start: 2025-07-08 | End: 2025-07-08 | Stop reason: HOSPADM

## 2025-07-08 RX ORDER — LISINOPRIL 20 MG/1
20 TABLET ORAL DAILY
Status: DISCONTINUED | OUTPATIENT
Start: 2025-07-09 | End: 2025-07-11 | Stop reason: HOSPADM

## 2025-07-08 RX ORDER — METOCLOPRAMIDE HYDROCHLORIDE 5 MG/ML
10 INJECTION INTRAMUSCULAR; INTRAVENOUS ONCE AS NEEDED
Status: DISCONTINUED | OUTPATIENT
Start: 2025-07-08 | End: 2025-07-08 | Stop reason: HOSPADM

## 2025-07-08 RX ORDER — OXYCODONE HYDROCHLORIDE 5 MG/1
5 TABLET ORAL EVERY 4 HOURS PRN
Status: DISCONTINUED | OUTPATIENT
Start: 2025-07-08 | End: 2025-07-08 | Stop reason: HOSPADM

## 2025-07-08 RX ORDER — ISOSORBIDE MONONITRATE 30 MG/1
30 TABLET, EXTENDED RELEASE ORAL DAILY
Status: DISCONTINUED | OUTPATIENT
Start: 2025-07-09 | End: 2025-07-11 | Stop reason: HOSPADM

## 2025-07-08 RX ORDER — PANTOPRAZOLE SODIUM 40 MG/1
40 TABLET, DELAYED RELEASE ORAL
Status: DISCONTINUED | OUTPATIENT
Start: 2025-07-09 | End: 2025-07-11 | Stop reason: HOSPADM

## 2025-07-08 RX ORDER — POLYETHYLENE GLYCOL 3350 17 G/17G
17 POWDER, FOR SOLUTION ORAL DAILY
Status: DISCONTINUED | OUTPATIENT
Start: 2025-07-08 | End: 2025-07-11 | Stop reason: HOSPADM

## 2025-07-08 RX ORDER — DULOXETIN HYDROCHLORIDE 60 MG/1
120 CAPSULE, DELAYED RELEASE ORAL DAILY
Status: DISCONTINUED | OUTPATIENT
Start: 2025-07-08 | End: 2025-07-11 | Stop reason: HOSPADM

## 2025-07-08 RX ORDER — ONDANSETRON HYDROCHLORIDE 2 MG/ML
4 INJECTION, SOLUTION INTRAVENOUS ONCE AS NEEDED
Status: DISCONTINUED | OUTPATIENT
Start: 2025-07-08 | End: 2025-07-08 | Stop reason: HOSPADM

## 2025-07-08 RX ORDER — VANCOMYCIN HYDROCHLORIDE 1 G/20ML
INJECTION, POWDER, LYOPHILIZED, FOR SOLUTION INTRAVENOUS AS NEEDED
Status: DISCONTINUED | OUTPATIENT
Start: 2025-07-08 | End: 2025-07-08 | Stop reason: HOSPADM

## 2025-07-08 RX ORDER — OXYCODONE HYDROCHLORIDE 5 MG/1
5 TABLET ORAL EVERY 4 HOURS PRN
Refills: 0 | Status: DISCONTINUED | OUTPATIENT
Start: 2025-07-08 | End: 2025-07-11 | Stop reason: HOSPADM

## 2025-07-08 RX ORDER — OXYCODONE HYDROCHLORIDE 5 MG/1
2.5 TABLET ORAL EVERY 4 HOURS PRN
Refills: 0 | Status: DISCONTINUED | OUTPATIENT
Start: 2025-07-08 | End: 2025-07-11 | Stop reason: HOSPADM

## 2025-07-08 RX ORDER — GABAPENTIN 600 MG/1
600 TABLET ORAL DAILY
Status: DISCONTINUED | OUTPATIENT
Start: 2025-07-09 | End: 2025-07-11 | Stop reason: HOSPADM

## 2025-07-08 RX ORDER — ROSUVASTATIN CALCIUM 20 MG/1
20 TABLET, COATED ORAL DAILY
Status: DISCONTINUED | OUTPATIENT
Start: 2025-07-08 | End: 2025-07-11 | Stop reason: HOSPADM

## 2025-07-08 RX ORDER — SODIUM CHLORIDE, SODIUM LACTATE, POTASSIUM CHLORIDE, CALCIUM CHLORIDE 600; 310; 30; 20 MG/100ML; MG/100ML; MG/100ML; MG/100ML
100 INJECTION, SOLUTION INTRAVENOUS CONTINUOUS
Status: DISCONTINUED | OUTPATIENT
Start: 2025-07-08 | End: 2025-07-08 | Stop reason: HOSPADM

## 2025-07-08 RX ORDER — GABAPENTIN 600 MG/1
1200 TABLET ORAL NIGHTLY
COMMUNITY

## 2025-07-08 RX ORDER — ACETAMINOPHEN 325 MG/1
975 TABLET ORAL ONCE
Status: DISCONTINUED | OUTPATIENT
Start: 2025-07-08 | End: 2025-07-08 | Stop reason: HOSPADM

## 2025-07-08 RX ORDER — FAMOTIDINE 10 MG/ML
20 INJECTION, SOLUTION INTRAVENOUS ONCE
Status: DISCONTINUED | OUTPATIENT
Start: 2025-07-08 | End: 2025-07-08 | Stop reason: HOSPADM

## 2025-07-08 RX ORDER — ONDANSETRON HYDROCHLORIDE 2 MG/ML
INJECTION, SOLUTION INTRAVENOUS AS NEEDED
Status: DISCONTINUED | OUTPATIENT
Start: 2025-07-08 | End: 2025-07-08

## 2025-07-08 RX ORDER — TIOTROPIUM BROMIDE AND OLODATEROL 3.124; 2.736 UG/1; UG/1
2 SPRAY, METERED RESPIRATORY (INHALATION) DAILY
COMMUNITY

## 2025-07-08 RX ORDER — LISINOPRIL 20 MG/1
20 TABLET ORAL DAILY
Status: DISCONTINUED | OUTPATIENT
Start: 2025-07-09 | End: 2025-07-08

## 2025-07-08 RX ORDER — PROPOFOL 10 MG/ML
INJECTION, EMULSION INTRAVENOUS AS NEEDED
Status: DISCONTINUED | OUTPATIENT
Start: 2025-07-08 | End: 2025-07-08

## 2025-07-08 RX ORDER — ACETAMINOPHEN 325 MG/1
650 TABLET ORAL EVERY 6 HOURS SCHEDULED
Status: DISCONTINUED | OUTPATIENT
Start: 2025-07-08 | End: 2025-07-11 | Stop reason: HOSPADM

## 2025-07-08 RX ORDER — DIPHENHYDRAMINE HYDROCHLORIDE 50 MG/ML
12.5 INJECTION, SOLUTION INTRAMUSCULAR; INTRAVENOUS ONCE AS NEEDED
Status: DISCONTINUED | OUTPATIENT
Start: 2025-07-08 | End: 2025-07-08 | Stop reason: HOSPADM

## 2025-07-08 RX ORDER — LIDOCAINE HYDROCHLORIDE 20 MG/ML
INJECTION, SOLUTION EPIDURAL; INFILTRATION; INTRACAUDAL; PERINEURAL AS NEEDED
Status: DISCONTINUED | OUTPATIENT
Start: 2025-07-08 | End: 2025-07-08

## 2025-07-08 RX ORDER — SODIUM CHLORIDE, SODIUM LACTATE, POTASSIUM CHLORIDE, CALCIUM CHLORIDE 600; 310; 30; 20 MG/100ML; MG/100ML; MG/100ML; MG/100ML
50 INJECTION, SOLUTION INTRAVENOUS CONTINUOUS
Status: ACTIVE | OUTPATIENT
Start: 2025-07-08 | End: 2025-07-09

## 2025-07-08 RX ORDER — ALBUTEROL SULFATE 0.83 MG/ML
2.5 SOLUTION RESPIRATORY (INHALATION) ONCE AS NEEDED
Status: DISCONTINUED | OUTPATIENT
Start: 2025-07-08 | End: 2025-07-08 | Stop reason: HOSPADM

## 2025-07-08 RX ORDER — OXYCODONE HYDROCHLORIDE 5 MG/1
5 TABLET ORAL EVERY 6 HOURS PRN
Qty: 28 TABLET | Refills: 0 | Status: SHIPPED | OUTPATIENT
Start: 2025-07-08

## 2025-07-08 RX ORDER — NORETHINDRONE AND ETHINYL ESTRADIOL 0.5-0.035
KIT ORAL AS NEEDED
Status: DISCONTINUED | OUTPATIENT
Start: 2025-07-08 | End: 2025-07-08

## 2025-07-08 RX ORDER — NALOXONE HYDROCHLORIDE 0.4 MG/ML
0.2 INJECTION, SOLUTION INTRAMUSCULAR; INTRAVENOUS; SUBCUTANEOUS EVERY 5 MIN PRN
Status: DISCONTINUED | OUTPATIENT
Start: 2025-07-08 | End: 2025-07-11 | Stop reason: HOSPADM

## 2025-07-08 RX ORDER — VANCOMYCIN/0.9 % SOD CHLORIDE 1.5G/250ML
1.5 PLASTIC BAG, INJECTION (ML) INTRAVENOUS EVERY 12 HOURS
Status: COMPLETED | OUTPATIENT
Start: 2025-07-09 | End: 2025-07-09

## 2025-07-08 RX ORDER — HYDROMORPHONE HYDROCHLORIDE 0.2 MG/ML
0.2 INJECTION INTRAMUSCULAR; INTRAVENOUS; SUBCUTANEOUS EVERY 5 MIN PRN
Status: DISCONTINUED | OUTPATIENT
Start: 2025-07-08 | End: 2025-07-08 | Stop reason: HOSPADM

## 2025-07-08 RX ORDER — VANCOMYCIN HYDROCHLORIDE 1 G/200ML
INJECTION, SOLUTION INTRAVENOUS AS NEEDED
Status: DISCONTINUED | OUTPATIENT
Start: 2025-07-08 | End: 2025-07-08

## 2025-07-08 RX ORDER — CLINDAMYCIN HYDROCHLORIDE 300 MG/1
300 CAPSULE ORAL 3 TIMES DAILY
Qty: 21 CAPSULE | Refills: 0 | Status: SHIPPED | OUTPATIENT
Start: 2025-07-08 | End: 2025-07-11 | Stop reason: HOSPADM

## 2025-07-08 RX ORDER — LABETALOL HYDROCHLORIDE 5 MG/ML
5 INJECTION, SOLUTION INTRAVENOUS ONCE AS NEEDED
Status: DISCONTINUED | OUTPATIENT
Start: 2025-07-08 | End: 2025-07-08 | Stop reason: HOSPADM

## 2025-07-08 RX ORDER — BUPIVACAINE HYDROCHLORIDE 5 MG/ML
INJECTION, SOLUTION EPIDURAL; INTRACAUDAL; PERINEURAL AS NEEDED
Status: DISCONTINUED | OUTPATIENT
Start: 2025-07-08 | End: 2025-07-08 | Stop reason: HOSPADM

## 2025-07-08 RX ORDER — HYDRALAZINE HYDROCHLORIDE 20 MG/ML
5 INJECTION INTRAMUSCULAR; INTRAVENOUS EVERY 30 MIN PRN
Status: DISCONTINUED | OUTPATIENT
Start: 2025-07-08 | End: 2025-07-08 | Stop reason: HOSPADM

## 2025-07-08 RX ORDER — OXYCODONE AND ACETAMINOPHEN 5; 325 MG/1; MG/1
1 TABLET ORAL EVERY 4 HOURS PRN
Status: DISCONTINUED | OUTPATIENT
Start: 2025-07-08 | End: 2025-07-08 | Stop reason: HOSPADM

## 2025-07-08 RX ORDER — ASPIRIN 325 MG
325 TABLET, DELAYED RELEASE (ENTERIC COATED) ORAL 2 TIMES DAILY
Status: CANCELLED | OUTPATIENT
Start: 2025-07-08

## 2025-07-08 RX ORDER — SODIUM CHLORIDE, SODIUM LACTATE, POTASSIUM CHLORIDE, CALCIUM CHLORIDE 600; 310; 30; 20 MG/100ML; MG/100ML; MG/100ML; MG/100ML
INJECTION, SOLUTION INTRAVENOUS CONTINUOUS PRN
Status: DISCONTINUED | OUTPATIENT
Start: 2025-07-08 | End: 2025-07-08

## 2025-07-08 RX ORDER — FENOFIBRATE 160 MG/1
160 TABLET ORAL
Status: DISCONTINUED | OUTPATIENT
Start: 2025-07-08 | End: 2025-07-11 | Stop reason: HOSPADM

## 2025-07-08 RX ORDER — METHIMAZOLE 5 MG/1
5 TABLET ORAL WEEKLY
Status: DISCONTINUED | OUTPATIENT
Start: 2025-07-14 | End: 2025-07-11 | Stop reason: HOSPADM

## 2025-07-08 RX ADMIN — EPHEDRINE SULFATE 5 MG: 50 INJECTION, SOLUTION INTRAVENOUS at 14:14

## 2025-07-08 RX ADMIN — PROPOFOL 150 MG: 10 INJECTION, EMULSION INTRAVENOUS at 13:47

## 2025-07-08 RX ADMIN — FENTANYL CITRATE 25 MCG: 50 INJECTION, SOLUTION INTRAMUSCULAR; INTRAVENOUS at 14:49

## 2025-07-08 RX ADMIN — DEXAMETHASONE SODIUM PHOSPHATE 4 MG: 4 INJECTION INTRA-ARTICULAR; INTRALESIONAL; INTRAMUSCULAR; INTRAVENOUS; SOFT TISSUE at 13:51

## 2025-07-08 RX ADMIN — LIDOCAINE HYDROCHLORIDE 80 MG: 20 INJECTION, SOLUTION EPIDURAL; INFILTRATION; INTRACAUDAL; PERINEURAL at 13:47

## 2025-07-08 RX ADMIN — DULOXETINE 120 MG: 60 CAPSULE, DELAYED RELEASE ORAL at 17:33

## 2025-07-08 RX ADMIN — FENOFIBRATE 160 MG: 160 TABLET ORAL at 17:33

## 2025-07-08 RX ADMIN — EPHEDRINE SULFATE 10 MG: 50 INJECTION, SOLUTION INTRAVENOUS at 14:18

## 2025-07-08 RX ADMIN — ROSUVASTATIN CALCIUM 20 MG: 20 TABLET, FILM COATED ORAL at 21:57

## 2025-07-08 RX ADMIN — SODIUM CHLORIDE, SODIUM LACTATE, POTASSIUM CHLORIDE, AND CALCIUM CHLORIDE 50 ML/HR: .6; .31; .03; .02 INJECTION, SOLUTION INTRAVENOUS at 22:00

## 2025-07-08 RX ADMIN — VANCOMYCIN HYDROCHLORIDE 1 G: 1 INJECTION, SOLUTION INTRAVENOUS at 13:51

## 2025-07-08 RX ADMIN — ACETAMINOPHEN 650 MG: 325 TABLET ORAL at 17:33

## 2025-07-08 RX ADMIN — SODIUM CHLORIDE, POTASSIUM CHLORIDE, SODIUM LACTATE AND CALCIUM CHLORIDE: 600; 310; 30; 20 INJECTION, SOLUTION INTRAVENOUS at 13:43

## 2025-07-08 RX ADMIN — GABAPENTIN 1200 MG: 600 TABLET, FILM COATED ORAL at 21:57

## 2025-07-08 RX ADMIN — FENTANYL CITRATE 50 MCG: 50 INJECTION, SOLUTION INTRAMUSCULAR; INTRAVENOUS at 13:47

## 2025-07-08 RX ADMIN — SODIUM CHLORIDE, SODIUM LACTATE, POTASSIUM CHLORIDE, AND CALCIUM CHLORIDE 50 ML/HR: .6; .31; .03; .02 INJECTION, SOLUTION INTRAVENOUS at 17:34

## 2025-07-08 RX ADMIN — EPHEDRINE SULFATE 10 MG: 50 INJECTION, SOLUTION INTRAVENOUS at 14:41

## 2025-07-08 RX ADMIN — ONDANSETRON 4 MG: 2 INJECTION, SOLUTION INTRAMUSCULAR; INTRAVENOUS at 14:36

## 2025-07-08 SDOH — ECONOMIC STABILITY: INCOME INSECURITY: IN THE PAST 12 MONTHS HAS THE ELECTRIC, GAS, OIL, OR WATER COMPANY THREATENED TO SHUT OFF SERVICES IN YOUR HOME?: NO

## 2025-07-08 SDOH — ECONOMIC STABILITY: FOOD INSECURITY: WITHIN THE PAST 12 MONTHS, THE FOOD YOU BOUGHT JUST DIDN'T LAST AND YOU DIDN'T HAVE MONEY TO GET MORE.: NEVER TRUE

## 2025-07-08 SDOH — SOCIAL STABILITY: SOCIAL INSECURITY: WITHIN THE LAST YEAR, HAVE YOU BEEN HUMILIATED OR EMOTIONALLY ABUSED IN OTHER WAYS BY YOUR PARTNER OR EX-PARTNER?: NO

## 2025-07-08 SDOH — SOCIAL STABILITY: SOCIAL INSECURITY: HAVE YOU HAD ANY THOUGHTS OF HARMING ANYONE ELSE?: NO

## 2025-07-08 SDOH — ECONOMIC STABILITY: FOOD INSECURITY: WITHIN THE PAST 12 MONTHS, YOU WORRIED THAT YOUR FOOD WOULD RUN OUT BEFORE YOU GOT THE MONEY TO BUY MORE.: NEVER TRUE

## 2025-07-08 SDOH — SOCIAL STABILITY: SOCIAL INSECURITY: WITHIN THE LAST YEAR, HAVE YOU BEEN AFRAID OF YOUR PARTNER OR EX-PARTNER?: NO

## 2025-07-08 SDOH — SOCIAL STABILITY: SOCIAL INSECURITY: ARE YOU OR HAVE YOU BEEN THREATENED OR ABUSED PHYSICALLY, EMOTIONALLY, OR SEXUALLY BY ANYONE?: NO

## 2025-07-08 SDOH — SOCIAL STABILITY: SOCIAL INSECURITY: DO YOU FEEL ANYONE HAS EXPLOITED OR TAKEN ADVANTAGE OF YOU FINANCIALLY OR OF YOUR PERSONAL PROPERTY?: NO

## 2025-07-08 SDOH — SOCIAL STABILITY: SOCIAL INSECURITY
WITHIN THE LAST YEAR, HAVE YOU BEEN RAPED OR FORCED TO HAVE ANY KIND OF SEXUAL ACTIVITY BY YOUR PARTNER OR EX-PARTNER?: NO

## 2025-07-08 SDOH — ECONOMIC STABILITY: HOUSING INSECURITY: DO YOU FEEL UNSAFE GOING BACK TO THE PLACE WHERE YOU LIVE?: NO

## 2025-07-08 SDOH — SOCIAL STABILITY: SOCIAL INSECURITY: WERE YOU ABLE TO COMPLETE ALL THE BEHAVIORAL HEALTH SCREENINGS?: YES

## 2025-07-08 SDOH — SOCIAL STABILITY: SOCIAL INSECURITY
WITHIN THE LAST YEAR, HAVE YOU BEEN KICKED, HIT, SLAPPED, OR OTHERWISE PHYSICALLY HURT BY YOUR PARTNER OR EX-PARTNER?: NO

## 2025-07-08 SDOH — SOCIAL STABILITY: SOCIAL INSECURITY: ARE THERE ANY APPARENT SIGNS OF INJURIES/BEHAVIORS THAT COULD BE RELATED TO ABUSE/NEGLECT?: NO

## 2025-07-08 SDOH — SOCIAL STABILITY: SOCIAL INSECURITY: HAS ANYONE EVER THREATENED TO HURT YOUR FAMILY OR YOUR PETS?: NO

## 2025-07-08 SDOH — SOCIAL STABILITY: SOCIAL INSECURITY: DOES ANYONE TRY TO KEEP YOU FROM HAVING/CONTACTING OTHER FRIENDS OR DOING THINGS OUTSIDE YOUR HOME?: NO

## 2025-07-08 SDOH — SOCIAL STABILITY: SOCIAL INSECURITY: HAVE YOU HAD THOUGHTS OF HARMING ANYONE ELSE?: NO

## 2025-07-08 SDOH — SOCIAL STABILITY: SOCIAL INSECURITY: ABUSE: ADULT

## 2025-07-08 SDOH — HEALTH STABILITY: MENTAL HEALTH: CURRENT SMOKER: 0

## 2025-07-08 SDOH — SOCIAL STABILITY: SOCIAL INSECURITY: DO YOU FEEL UNSAFE GOING BACK TO THE PLACE WHERE YOU ARE LIVING?: NO

## 2025-07-08 ASSESSMENT — ACTIVITIES OF DAILY LIVING (ADL)
HEARING - RIGHT EAR: DIFFICULTY WITH NOISE
ADEQUATE_TO_COMPLETE_ADL: YES
PATIENT'S MEMORY ADEQUATE TO SAFELY COMPLETE DAILY ACTIVITIES?: YES
TOILETING: INDEPENDENT
ASSISTIVE_DEVICE: EYEGLASSES
DRESSING YOURSELF: INDEPENDENT
JUDGMENT_ADEQUATE_SAFELY_COMPLETE_DAILY_ACTIVITIES: YES
BATHING: INDEPENDENT
HEARING - LEFT EAR: DIFFICULTY WITH NOISE
LACK_OF_TRANSPORTATION: NO
WALKS IN HOME: INDEPENDENT
FEEDING YOURSELF: INDEPENDENT
GROOMING: INDEPENDENT

## 2025-07-08 ASSESSMENT — PAIN - FUNCTIONAL ASSESSMENT
PAIN_FUNCTIONAL_ASSESSMENT: 0-10

## 2025-07-08 ASSESSMENT — COGNITIVE AND FUNCTIONAL STATUS - GENERAL
WALKING IN HOSPITAL ROOM: A LITTLE
PATIENT BASELINE BEDBOUND: NO
DAILY ACTIVITIY SCORE: 24
MOBILITY SCORE: 21
CLIMB 3 TO 5 STEPS WITH RAILING: A LITTLE
MOVING TO AND FROM BED TO CHAIR: A LITTLE

## 2025-07-08 ASSESSMENT — PAIN SCALES - GENERAL
PAINLEVEL_OUTOF10: 2
PAINLEVEL_OUTOF10: 2
PAINLEVEL_OUTOF10: 1
PAIN_LEVEL: 0
PAINLEVEL_OUTOF10: 2
PAINLEVEL_OUTOF10: 0 - NO PAIN
PAINLEVEL_OUTOF10: 2

## 2025-07-08 ASSESSMENT — LIFESTYLE VARIABLES
AUDIT-C TOTAL SCORE: 4
AUDIT-C TOTAL SCORE: 4
HOW OFTEN DO YOU HAVE 6 OR MORE DRINKS ON ONE OCCASION: NEVER
HOW OFTEN DO YOU HAVE A DRINK CONTAINING ALCOHOL: 4 OR MORE TIMES A WEEK
HOW MANY STANDARD DRINKS CONTAINING ALCOHOL DO YOU HAVE ON A TYPICAL DAY: 1 OR 2
SKIP TO QUESTIONS 9-10: 1

## 2025-07-08 ASSESSMENT — COLUMBIA-SUICIDE SEVERITY RATING SCALE - C-SSRS
1. IN THE PAST MONTH, HAVE YOU WISHED YOU WERE DEAD OR WISHED YOU COULD GO TO SLEEP AND NOT WAKE UP?: NO
6. HAVE YOU EVER DONE ANYTHING, STARTED TO DO ANYTHING, OR PREPARED TO DO ANYTHING TO END YOUR LIFE?: NO
2. HAVE YOU ACTUALLY HAD ANY THOUGHTS OF KILLING YOURSELF?: NO

## 2025-07-08 ASSESSMENT — PATIENT HEALTH QUESTIONNAIRE - PHQ9
SUM OF ALL RESPONSES TO PHQ9 QUESTIONS 1 & 2: 1
1. LITTLE INTEREST OR PLEASURE IN DOING THINGS: SEVERAL DAYS
2. FEELING DOWN, DEPRESSED OR HOPELESS: NOT AT ALL

## 2025-07-08 ASSESSMENT — PAIN DESCRIPTION - DESCRIPTORS
DESCRIPTORS: DISCOMFORT;DULL
DESCRIPTORS: SHARP

## 2025-07-08 NOTE — ANESTHESIA POSTPROCEDURE EVALUATION
Patient: Galen Villasenor Jr.    Procedure Summary       Date: 07/08/25 Room / Location: [facility] OR 02 / [facility]    Anesthesia Start: 1342 Anesthesia Stop: 1502    Procedures:       REMOVAL, HARDWARE, LOWER EXTREMITY (Left: Ankle)      DEBRIDEMENT, LOWER EXTREMITY (Left: Ankle) Diagnosis: (M20.22 - Hallux rigidus, left foot, T84.60xA - Infection and inflammatory reaction due to internal fixation device of unspecified site, initial encounter)    Surgeons: Antonio Houston MD Responsible Provider: Hector Avila DO    Anesthesia Type: general ASA Status: 3            Anesthesia Type: general    Vitals Value Taken Time   /70 07/08/25 15:01   Temp 36 07/08/25 15:02   Pulse 74 07/08/25 15:02   Resp 15 07/08/25 15:02   SpO2 98 07/08/25 15:02   Vitals shown include unfiled device data.    Anesthesia Post Evaluation    Patient location during evaluation: PACU  Patient participation: complete - patient participated  Level of consciousness: awake and alert  Pain score: 0  Pain management: adequate  Multimodal analgesia pain management approach  Airway patency: patent  Cardiovascular status: acceptable  Respiratory status: acceptable and face mask  Hydration status: acceptable  Postoperative Nausea and Vomiting: none        There were no known notable events for this encounter.

## 2025-07-08 NOTE — ANESTHESIA PREPROCEDURE EVALUATION
Patient: Galen Villasenor Jr.    Procedure Information       Date/Time: 07/08/25 1200    Procedures:       REMOVAL, HARDWARE, LOWER EXTREMITY (Left: Ankle)      DEBRIDEMENT, LOWER EXTREMITY (Left: Ankle)    Location: [facility] OR 02 / [facility]    Surgeons: Antonio Houston MD            Relevant Problems   Cardiac   (+) Atrial flutter (Multi)   (+) Atypical atrial flutter   (+) Coronary artery disease involving native coronary artery of native heart without angina pectoris   (+) Mixed hyperlipidemia   (+) Paroxysmal atrial fibrillation (Multi)   (+) Primary hypertension      Pulmonary   (+) Chronic obstructive pulmonary disease (Multi)      GI   (+) Esophageal dysphagia      Endocrine   (+) Hyperthyroidism   (+) Multinodular goiter   (+) Toxic thyroid nodule   (+) Type 2 diabetes mellitus without complication, without long-term current use of insulin      Hematology   (+) Anemia   (+) Hairy cell leukemia, in relapse (Multi)   (+) Pancytopenia       Clinical information reviewed:   Tobacco  Allergies  Meds   Med Hx  Surg Hx   Fam Hx  Soc Hx        NPO Detail:  NPO/Void Status  Carbohydrate Drink Given Prior to Surgery? : N  Date of Last Liquid: 07/08/25  Time of Last Liquid: 0800  Date of Last Solid: 07/07/25  Time of Last Solid: 2330  Last Intake Type: Clear fluids  Time of Last Void: 0900         Physical Exam    Airway  Mallampati: II  TM distance: >3 FB  Neck ROM: full  Mouth opening: 3 or more finger widths     Cardiovascular   Rhythm: regular  Rate: normal     Dental - normal exam     Pulmonary Breath sounds clear to auscultation     Abdominal            Anesthesia Plan    History of general anesthesia?: yes  History of complications of general anesthesia?: no    ASA 3     general     The patient is not a current smoker.    intravenous induction   Postoperative pain plan includes opioids.  Anesthetic plan and risks discussed with patient.    Plan discussed with [facility].

## 2025-07-08 NOTE — DISCHARGE INSTRUCTIONS
Dr. Houston's Post-op Foot and Ankle Instructions    - Please keep your dressings in place until follow-up.  Do not get them wet.  If your dressings become saturated with drainage please cover it with an additional Ace wrap.  Do not remove any ace wraps.  If your dressings continue to saturate, please contact the office as they will need to be changed prior to your scheduled follow-up.  -You may weight bear in your post-op shoe.  Try to place the majority of your weight through your heel.    -Please take the pain medication prescribed as directed.  You can also use ibuprofen unless your primary doctor has directed you not to use this.  Please do not use ibuprofen if it irritates your stomach or if you have kidney issues or on blood thinners.  Do not take more than 800 mg of ibuprofen every 8 hours. You can also alternate ibuprofen with Tylenol (acetaminophen) so you are taking a dose of either Ibuprofen or Tylenol every 4 hours.  Do not take more than 650 mg of Tylenol every 8 hours.     -Please keep the foot elevated as much as possible, particularly for the first 2 weeks after surgery.  You should plan to keep your foot elevated at least 45 mins of every hour.  Try to keep the foot elevated above the level of your heart.  You can use a stack of pillows or blankets to achieve this height.    -Please resume your regular anticoagulation for prevention of blood clots (deep vein thrombosis prophylaxis).    -Narcotic pain medications can cause constipation.  You can either take Senna or use metamucil to help with regular bowel movements should you develop constipation.  These can be obtained over the counter.  Please remember to stay hydrated and drink plenty of water as this helps to prevent constipation too.  -You have also been prescribed Ondansetron (Zofran) to help with nausea.  This can be taken as needed.  -Please call the office with any questions or concerns.  [phone] (main line) or [phone] ([facility]  [facility]irm[facility]- Dr. Houston's ).

## 2025-07-08 NOTE — ANESTHESIA PROCEDURE NOTES
Airway  Date/Time: 7/8/2025 1:48 PM  Reason: elective    Airway not difficult    Staffing  Performed: JULIANA   Authorized by: Hector Avila DO    Performed by: Elmo Watts  Patient location during procedure: OR    Patient Condition  Indications for airway management: anesthesia  Patient position: sniffing  MILS maintained throughout  No planned trial extubation  Sedation level: deep     Final Airway Details   Preoxygenated: yes  Final airway type: supraglottic airway  Successful airway: classic  Size: 4   Ventilation between attempts: BVM  Number of attempts at approach: 1  Number of other approaches attempted: 0

## 2025-07-09 ENCOUNTER — APPOINTMENT (OUTPATIENT)
Dept: RADIOLOGY | Facility: HOSPITAL | Age: 80
DRG: 504 | End: 2025-07-09
Payer: MEDICARE

## 2025-07-09 LAB
ANION GAP SERPL CALC-SCNC: 12 MMOL/L (ref 10–20)
BUN SERPL-MCNC: 24 MG/DL (ref 6–23)
CALCIUM SERPL-MCNC: 9.1 MG/DL (ref 8.6–10.3)
CHLORIDE SERPL-SCNC: 106 MMOL/L (ref 98–107)
CO2 SERPL-SCNC: 27 MMOL/L (ref 21–32)
CREAT SERPL-MCNC: 1.25 MG/DL (ref 0.5–1.3)
EGFRCR SERPLBLD CKD-EPI 2021: 58 ML/MIN/1.73M*2
ERYTHROCYTE [DISTWIDTH] IN BLOOD BY AUTOMATED COUNT: 15.1 % (ref 11.5–14.5)
GLUCOSE BLD MANUAL STRIP-MCNC: 129 MG/DL (ref 74–99)
GLUCOSE BLD MANUAL STRIP-MCNC: 135 MG/DL (ref 74–99)
GLUCOSE BLD MANUAL STRIP-MCNC: 163 MG/DL (ref 74–99)
GLUCOSE SERPL-MCNC: 133 MG/DL (ref 74–99)
HCT VFR BLD AUTO: 41.7 % (ref 41–52)
HGB BLD-MCNC: 13.2 G/DL (ref 13.5–17.5)
MCH RBC QN AUTO: 30.1 PG (ref 26–34)
MCHC RBC AUTO-ENTMCNC: 31.7 G/DL (ref 32–36)
MCV RBC AUTO: 95 FL (ref 80–100)
NRBC BLD-RTO: 0 /100 WBCS (ref 0–0)
PLATELET # BLD AUTO: 111 X10*3/UL (ref 150–450)
POTASSIUM SERPL-SCNC: 4.4 MMOL/L (ref 3.5–5.3)
RBC # BLD AUTO: 4.38 X10*6/UL (ref 4.5–5.9)
SODIUM SERPL-SCNC: 141 MMOL/L (ref 136–145)
WBC # BLD AUTO: 4.3 X10*3/UL (ref 4.4–11.3)

## 2025-07-09 PROCEDURE — 82947 ASSAY GLUCOSE BLOOD QUANT: CPT

## 2025-07-09 PROCEDURE — 2500000004 HC RX 250 GENERAL PHARMACY W/ HCPCS (ALT 636 FOR OP/ED): Performed by: ORTHOPAEDIC SURGERY

## 2025-07-09 PROCEDURE — 2500000002 HC RX 250 W HCPCS SELF ADMINISTERED DRUGS (ALT 637 FOR MEDICARE OP, ALT 636 FOR OP/ED): Performed by: ORTHOPAEDIC SURGERY

## 2025-07-09 PROCEDURE — 36415 COLL VENOUS BLD VENIPUNCTURE: CPT | Performed by: ORTHOPAEDIC SURGERY

## 2025-07-09 PROCEDURE — 2500000001 HC RX 250 WO HCPCS SELF ADMINISTERED DRUGS (ALT 637 FOR MEDICARE OP): Performed by: ORTHOPAEDIC SURGERY

## 2025-07-09 PROCEDURE — 97161 PT EVAL LOW COMPLEX 20 MIN: CPT | Mod: GP

## 2025-07-09 PROCEDURE — 1100000001 HC PRIVATE ROOM DAILY

## 2025-07-09 PROCEDURE — 97165 OT EVAL LOW COMPLEX 30 MIN: CPT | Mod: GO | Performed by: OCCUPATIONAL THERAPIST

## 2025-07-09 PROCEDURE — 2500000001 HC RX 250 WO HCPCS SELF ADMINISTERED DRUGS (ALT 637 FOR MEDICARE OP): Performed by: PHYSICIAN ASSISTANT

## 2025-07-09 PROCEDURE — 2500000004 HC RX 250 GENERAL PHARMACY W/ HCPCS (ALT 636 FOR OP/ED)

## 2025-07-09 PROCEDURE — 85027 COMPLETE CBC AUTOMATED: CPT | Performed by: ORTHOPAEDIC SURGERY

## 2025-07-09 PROCEDURE — 82374 ASSAY BLOOD CARBON DIOXIDE: CPT | Performed by: ORTHOPAEDIC SURGERY

## 2025-07-09 RX ORDER — LIDOCAINE HYDROCHLORIDE 10 MG/ML
5 INJECTION, SOLUTION EPIDURAL; INFILTRATION; INTRACAUDAL; PERINEURAL ONCE
Status: DISCONTINUED | OUTPATIENT
Start: 2025-07-09 | End: 2025-07-11 | Stop reason: HOSPADM

## 2025-07-09 RX ADMIN — ROSUVASTATIN CALCIUM 20 MG: 20 TABLET, FILM COATED ORAL at 09:07

## 2025-07-09 RX ADMIN — ISOSORBIDE MONONITRATE 30 MG: 30 TABLET, EXTENDED RELEASE ORAL at 09:09

## 2025-07-09 RX ADMIN — GABAPENTIN 1200 MG: 600 TABLET, FILM COATED ORAL at 20:12

## 2025-07-09 RX ADMIN — FENOFIBRATE 160 MG: 160 TABLET ORAL at 17:17

## 2025-07-09 RX ADMIN — ACETAMINOPHEN 650 MG: 325 TABLET ORAL at 17:17

## 2025-07-09 RX ADMIN — EMPAGLIFLOZIN 12.5 MG: 25 TABLET, FILM COATED ORAL at 09:08

## 2025-07-09 RX ADMIN — APIXABAN 5 MG: 5 TABLET, FILM COATED ORAL at 20:12

## 2025-07-09 RX ADMIN — PIPERACILLIN SODIUM AND TAZOBACTAM SODIUM 3.38 G: 3; .375 INJECTION, SOLUTION INTRAVENOUS at 18:26

## 2025-07-09 RX ADMIN — GABAPENTIN 600 MG: 600 TABLET, FILM COATED ORAL at 09:07

## 2025-07-09 RX ADMIN — ACETAMINOPHEN 650 MG: 325 TABLET ORAL at 12:52

## 2025-07-09 RX ADMIN — APIXABAN 5 MG: 5 TABLET, FILM COATED ORAL at 09:07

## 2025-07-09 RX ADMIN — Medication 1.5 G: at 02:22

## 2025-07-09 RX ADMIN — DULOXETINE 120 MG: 60 CAPSULE, DELAYED RELEASE ORAL at 09:07

## 2025-07-09 ASSESSMENT — COGNITIVE AND FUNCTIONAL STATUS - GENERAL
STANDING UP FROM CHAIR USING ARMS: A LITTLE
TOILETING: A LITTLE
MOVING TO AND FROM BED TO CHAIR: A LITTLE
MOBILITY SCORE: 20
DAILY ACTIVITIY SCORE: 19
HELP NEEDED FOR BATHING: A LITTLE
CLIMB 3 TO 5 STEPS WITH RAILING: A LITTLE
DRESSING REGULAR UPPER BODY CLOTHING: A LITTLE
WALKING IN HOSPITAL ROOM: A LITTLE
DRESSING REGULAR LOWER BODY CLOTHING: A LITTLE
PERSONAL GROOMING: A LITTLE

## 2025-07-09 ASSESSMENT — PAIN - FUNCTIONAL ASSESSMENT
PAIN_FUNCTIONAL_ASSESSMENT: 0-10

## 2025-07-09 ASSESSMENT — PAIN SCALES - GENERAL
PAINLEVEL_OUTOF10: 0 - NO PAIN

## 2025-07-09 ASSESSMENT — ACTIVITIES OF DAILY LIVING (ADL): ADL_ASSISTANCE: INDEPENDENT

## 2025-07-09 NOTE — CONSULTS
Inpatient consult to Infectious Diseases  Consult performed by: Donis Umana MD  Consult ordered by: Janneth Kim PA-C  Reason for consult: s/p left lower extremity hardware removal on 7/8; + osteomyelitis, Per Dr. Houston, will likely need PICC          History Of Present Illness  Galen Villasenor Jr. is a 79 y.o. with PMH of Afib on eliquis, hypertension, CAD, type 2 diabetes mellitus admitted for MTP removal of hardware and I and D for osteomyelitis. Patient endorses some pain the procedure site otherwise denies fever, chest pain or cough. Today is postop day 2 after MTP removal of hardware and I & D for osteomyelitis.      Past Medical History  He has a past medical history of Abnormal ECG, ADHD (attention deficit hyperactivity disorder), Adverse effect of anesthesia, Anemia, Anxiety, Arrhythmia, Arthritis, Atherosclerotic heart disease of native coronary artery without angina pectoris (04/04/2017), Atrial fibrillation (Multi), Cancer (Multi) (02), Cataract, CHF (congestive heart failure), Clotting disorder (Multi), Colon polyp, COPD (chronic obstructive pulmonary disease) (Multi), CTS (carpal tunnel syndrome) (03), Depression, Diabetes mellitus (Multi), Difficulty walking, Disease of thyroid gland, Dizziness, Dysphagia, Easy bruising, GERD (gastroesophageal reflux disease), Glaucoma, Head injury (6/7 Yrs.old), Hearing aid worn, History of blood transfusion, HL (hearing loss), Hypertension, Joint pain, Memory loss, Palpitations, Personal history of malignant neoplasm of prostate (04/04/2017), Personal history of other diseases of the circulatory system (04/04/2017), Personal history of other diseases of the respiratory system (04/04/2017), Personal history of other endocrine, nutritional and metabolic disease (04/04/2017), Personal history of other endocrine, nutritional and metabolic disease (04/04/2017), Personal history of other specified conditions (04/04/2017), Platelet disorder (Multi), Pneumonia,  Prostate cancer (Multi), Restless leg syndrome, Seizures (Multi), Shingles, Shortness of breath, Spinal stenosis, Syncope, TIA (transient ischemic attack), Type 2 diabetes mellitus, Vertigo, and Vision loss.    Surgical History  He has a past surgical history that includes Rotator cuff repair (04/04/2017); Total knee arthroplasty (04/04/2017); Carpal tunnel release (04/04/2017); Coronary angioplasty (04/04/2017); Other surgical history (04/04/2017); Spinal fusion (04/04/2017); Cardiac electrophysiology procedure (N/A, 01/22/2024); Cardiac electrophysiology procedure (N/A, 03/01/2024); Cardiac electrophysiology procedure (N/A, 06/12/2024); Carotid stent; Cervical laminectomy; Cervical discectomy (1/14); Cervical fusion; Lumbar laminectomy; Ablation of dysrhythmic focus; Cardiac catheterization; Tonsillectomy; Colonoscopy; Flexible sigmoidoscopy; Upper gastrointestinal endoscopy; Cataract extraction; Intraocular lens insertion; Joint replacement; Knee Arthroplasty; Toe Surgery; Foot surgery; and Prostate surgery.     Social History  He reports that he quit smoking about 45 years ago. His smoking use included cigarettes and pipe. He started smoking about 68 years ago. He has a 23 pack-year smoking history. He has been exposed to tobacco smoke. He has never used smokeless tobacco. He reports current alcohol use of about 7.0 standard drinks of alcohol per week. He reports that he does not use drugs.    Family History  Family History[1]     Allergies  Anesthetics - terrie type- parabens, Procaine, Quinolones, and Keflex [cephalexin]         Physical Exam  HENT:      Head: Normocephalic and atraumatic.   Eyes:      Extraocular Movements: Extraocular movements intact.   Cardiovascular:      Rate and Rhythm: Normal rate.      Heart sounds: Normal heart sounds.   Pulmonary:      Effort: Pulmonary effort is normal.      Breath sounds: Normal breath sounds.   Abdominal:      General: Abdomen is flat. Bowel sounds are normal.       Palpations: Abdomen is soft.   Musculoskeletal:      Comments: LLE with Dressings    Neurological:      General: No focal deficit present.      Mental Status: He is alert and oriented to person, place, and time.   Psychiatric:         Mood and Affect: Mood normal.          Last Recorded Vitals  /59 (BP Location: Right arm, Patient Position: Lying)   Pulse 62   Temp 36 °C (96.8 °F) (Temporal)   Resp 18   Wt 94.3 kg (208 lb)   SpO2 97%     Relevant Results  Results for orders placed or performed during the hospital encounter of 07/08/25 (from the past 24 hours)   Tissue/Wound Culture/Smear    Specimen: DIGIT FIRST, LEFT FOOT; Swab   Result Value Ref Range    Gram Stain (1+) Rare Polymorphonuclear leukocytes     Gram Stain No organisms seen    CBC   Result Value Ref Range    WBC 4.3 (L) 4.4 - 11.3 x10*3/uL    nRBC 0.0 0.0 - 0.0 /100 WBCs    RBC 4.38 (L) 4.50 - 5.90 x10*6/uL    Hemoglobin 13.2 (L) 13.5 - 17.5 g/dL    Hematocrit 41.7 41.0 - 52.0 %    MCV 95 80 - 100 fL    MCH 30.1 26.0 - 34.0 pg    MCHC 31.7 (L) 32.0 - 36.0 g/dL    RDW 15.1 (H) 11.5 - 14.5 %    Platelets 111 (L) 150 - 450 x10*3/uL   Basic metabolic panel   Result Value Ref Range    Glucose 133 (H) 74 - 99 mg/dL    Sodium 141 136 - 145 mmol/L    Potassium 4.4 3.5 - 5.3 mmol/L    Chloride 106 98 - 107 mmol/L    Bicarbonate 27 21 - 32 mmol/L    Anion Gap 12 10 - 20 mmol/L    Urea Nitrogen 24 (H) 6 - 23 mg/dL    Creatinine 1.25 0.50 - 1.30 mg/dL    eGFR 58 (L) >60 mL/min/1.73m*2    Calcium 9.1 8.6 - 10.3 mg/dL   POCT GLUCOSE   Result Value Ref Range    POCT Glucose 129 (H) 74 - 99 mg/dL     *Note: Due to a large number of results and/or encounters for the requested time period, some results have not been displayed. A complete set of results can be found in Results Review.   FL fluoro images no charge  Result Date: 7/8/2025  These images are not reportable by radiology and will not be interpreted by  Radiologists.     Assessment/Plan   # Left  first MTP OM s/p hardware removal and I&D  # Hypertension  # Type II DM  # Afib  Plan  - Start him on zosyn  - Needs PICC line to continue the antibiotics on discharge  - Follow up culture results  - Closely monitor for antibiotics side effects including rash, Diarrhea/CDI, thrombocytopenia, SHERIDAN, etc.   -ID will continue to follow    Discussed with attending physician  Justin Simmons MD  PGY II Internal Medicine resident               [1]   Family History  Problem Relation Name Age of Onset    Lung cancer Mother mom     Migraines Mother mom     Cancer Father      Alcohol abuse Father      Other (cardiac disorder) Father      Brain cancer Sister      Cancer Brother      Migraines Sister Hien     Cancer Mother mom     Cancer Sister Hien

## 2025-07-09 NOTE — PROGRESS NOTES
Physical Therapy    Physical Therapy Evaluation    Patient Name: Galen Villasenor Jr.  MRN: [id number]  Today's Date: 7/9/2025   Time Calculation  Start Time: 1420  Stop Time: 1430  Time Calculation (min): 10 min  4103/4103-A    Assessment/Plan   PT Assessment  PT Assessment Results: Decreased strength, Decreased range of motion, Decreased endurance, Impaired balance, Decreased mobility, Pain, Orthopedic restrictions  Rehab Prognosis: Good  Evaluation/Treatment Tolerance: Patient tolerated treatment well  Medical Staff Made Aware: Yes  End of Session Communication: Bedside nurse  Assessment Comment: Pt presents today below baseline level of function and requires continued PT during hospital stay. Pt requires PT at a low intensity level at discharge to maximize functional mobility and safety.    End of Session Patient Position: Up in chair, Alarm on  IP OR SWING BED PT PLAN  Inpatient or Swing Bed: Inpatient  PT Plan  Treatment/Interventions: Bed mobility, Transfer training, Gait training, Balance training, Neuromuscular re-education, Strengthening, Endurance training, Range of motion, Therapeutic exercise, Therapeutic activity, Home exercise program  PT Plan: Ongoing PT  PT Frequency: 5 times per week  PT Discharge Recommendations: Low intensity level of continued care  Equipment Recommended upon Discharge: Wheeled walker  PT Recommended Transfer Status: Assist x1  PT - OK to Discharge: Yes (To next level of care when cleared by medical team)    Subjective     General Visit Information:  General  Reason for Referral: recent surgery  Referred By: Antonio Houston MD  Past Medical History Relevant to Rehab: Pt underwent REMOVAL, HARDWARE, LOWER EXTREMITY (L), DEBRIDEMENT, LOWER EXTREMITY (L) 7/8/25. PMH: CAD, anemia, atrial flutter, T2DM, HTN  Family/Caregiver Present: No  Co-Treatment: OT  Co-Treatment Reason: for safety  Prior to Session Communication: Bedside nurse  Patient Position Received: Alarm on, Up in  room  Preferred Learning Style: verbal  General Comment: Pt agreeable to PT, nursing cleared for treatment.    Home Living:  Home Living  Type of Home: House  Lives With: Spouse  Home Layout: One level  Home Access: Level entry  Bathroom Shower/Tub: Walk-in shower  Bathroom Equipment: Grab bars in shower, Shower chair with back    Prior Level of Function:  Prior Function Per Pt/Caregiver Report  ADL Assistance: Independent  Homemaking Assistance: Independent  Ambulatory Assistance: Independent (mod I with FWW)    Precautions:  Precautions  LE Weight Bearing Status: Heel Weight Bearing in Post-Op Shoe  Medical Precautions: Fall precautions       Objective     Pain:  Pain Assessment  Pain Assessment: 0-10  0-10 (Numeric) Pain Score: 0 - No pain    Cognition:  Cognition  Overall Cognitive Status: Within Functional Limits  Orientation Level: Oriented X4    General Assessments:      Activity Tolerance  Endurance: Endurance does not limit participation in activity  Sensation  Sensation Comment: denies numbness and tingling        Static Sitting Balance  Static Sitting-Comment/Number of Minutes: good  Dynamic Sitting Balance  Dynamic Sitting-Comments: good  Static Standing Balance  Static Standing-Comment/Number of Minutes: fair  Dynamic Standing Balance  Dynamic Standing-Comments: fair    Functional Assessments:     Bed Mobility  Bed Mobility: No  Transfers  Transfer: Yes  Transfer 1  Transfer From 1: Sit to  Transfer to 1: Stand  Transfer Device 1: Walker  Transfer Level of Assistance 1: Close supervision  Transfers 2  Transfer From 2: Stand to  Transfer to 2: Sit  Transfer Level of Assistance 2: Close supervision  Ambulation/Gait Training  Ambulation/Gait Training Performed: Yes  Ambulation/Gait Training 1  Device 1: Rolling walker  Gait Support Devices: Gait belt  Assistance 1: Close supervision  Quality of Gait 1: Decreased step length, Inconsistent stride length (step to gait, pt with good carry over of heel weight  bearing)  Comments/Distance (ft) 1: 40 feet x 2        Extremity/Trunk Assessments:        Strength RLE  RLE Overall Strength: Greater than or equal to 3/5 as evidenced by functional mobility  LLE   LLE :  (WFL, ankle not assessed due to recent sx)    Outcome Measures:     [facility] Basic Mobility  Turning from your back to your side while in a flat bed without using bedrails: None  Moving from lying on your back to sitting on the side of a flat bed without using bedrails: None  Moving to and from bed to chair (including a wheelchair): A little  Standing up from a chair using your arms (e.g. wheelchair or bedside chair): A little  To walk in hospital room: A little  Climbing 3-5 steps with railing: A little  Basic Mobility - Total Score: 20           Goals:  Encounter Problems       Encounter Problems (Active)       PT Problem       Pt will complete sit <> stand and bed <> chair transfers with mod I.  (Progressing)       Start:  07/09/25    Expected End:  07/23/25            Pt will ambulate 150 feet mod I using FWW while maintaining heel weight bearing status.  (Progressing)       Start:  07/09/25    Expected End:  07/23/25            Pt will progress to completing 3 x 20 supine/seated exercises in order to increase strength and improve gait mechanics.   (Not Progressing)       Start:  07/09/25    Expected End:  07/23/25                 Education Documentation  Precautions, taught by Elle Qureshi, PT at 7/9/2025  3:43 PM.  Learner: Patient  Readiness: Acceptance  Method: Explanation  Response: Verbalizes Understanding    Body Mechanics, taught by Elle Qureshi PT at 7/9/2025  3:43 PM.  Learner: Patient  Readiness: Acceptance  Method: Explanation  Response: Verbalizes Understanding    Mobility Training, taught by Elle Qureshi, PT at 7/9/2025  3:43 PM.  Learner: Patient  Readiness: Acceptance  Method: Explanation  Response: Verbalizes Understanding    Education Comments  No comments found.

## 2025-07-09 NOTE — PROGRESS NOTES
"Galen Villasenor Jr. is a 79 y.o. male on day 1 of admission presenting with Left foot infection.  Postop day 1 status post first MTP removal of hardware and I&D for osteomyelitis.    Subjective   Patient is doing well this a.m.  He denies any real pain in the foot.  He otherwise feels well.  We again discussed plans for infectious disease consult and IV antibiotics.       Objective     Physical Exam  LLE MSK  - Dressings were reinforced overnight but are clean, dry and intact  - Sensation is intact to light touch in the toes  - Wiggles toes  - Foot warm and well-perfused  - Expected ongoing swelling is noted in the hallux    Last Recorded Vitals  Blood pressure 119/58, pulse 71, temperature 35.9 °C (96.6 °F), temperature source Temporal, resp. rate 18, height 1.803 m (5' 11\"), weight 94.3 kg (208 lb), SpO2 95%.  Intake/Output last 3 Shifts:  I/O last 3 completed shifts:  In: 1909.2 (20.2 mL/kg) [I.V.:1209.2 (12.8 mL/kg); IV Piggyback:700]  Out: 600 (6.4 mL/kg) [Urine:600 (0.2 mL/kg/hr)]  Weight: 94.3 kg     Relevant Results      Scheduled medications  Scheduled Medications[1]  Continuous medications  Continuous Medications[2]  PRN medications  PRN Medications[3]  Results for orders placed or performed during the hospital encounter of 07/08/25 (from the past 24 hours)   POCT GLUCOSE   Result Value Ref Range    POCT Glucose 134 (H) 74 - 99 mg/dL   Tissue/Wound Culture/Smear    Specimen: DIGIT FIRST, LEFT FOOT; Swab   Result Value Ref Range    Gram Stain (1+) Rare Polymorphonuclear leukocytes     Gram Stain No organisms seen    CBC   Result Value Ref Range    WBC 4.3 (L) 4.4 - 11.3 x10*3/uL    nRBC 0.0 0.0 - 0.0 /100 WBCs    RBC 4.38 (L) 4.50 - 5.90 x10*6/uL    Hemoglobin 13.2 (L) 13.5 - 17.5 g/dL    Hematocrit 41.7 41.0 - 52.0 %    MCV 95 80 - 100 fL    MCH 30.1 26.0 - 34.0 pg    MCHC 31.7 (L) 32.0 - 36.0 g/dL    RDW 15.1 (H) 11.5 - 14.5 %    Platelets 111 (L) 150 - 450 x10*3/uL   Basic metabolic panel   Result Value " Ref Range    Glucose 133 (H) 74 - 99 mg/dL    Sodium 141 136 - 145 mmol/L    Potassium 4.4 3.5 - 5.3 mmol/L    Chloride 106 98 - 107 mmol/L    Bicarbonate 27 21 - 32 mmol/L    Anion Gap 12 10 - 20 mmol/L    Urea Nitrogen 24 (H) 6 - 23 mg/dL    Creatinine 1.25 0.50 - 1.30 mg/dL    eGFR 58 (L) >60 mL/min/1.73m*2    Calcium 9.1 8.6 - 10.3 mg/dL   POCT GLUCOSE   Result Value Ref Range    POCT Glucose 129 (H) 74 - 99 mg/dL                            Assessment & Plan  Left foot infection  Postop day 1 status post first MTP removal of hardware and I&D for osteomyelitis.    - Heel weightbearing left lower extremity in postop shoe  - Regular diet  - Eliquis resumed postop  - Patient on empiric vancomycin while cultures pending  - Follow-up cultures  - Infectious disease consult to help guide antibiotic management moving forward  - Patient will most likely require PICC line or midline  - Will plan to change patient dressings before discharge from hospital  - Patient requires a postop shoe  - Patient will require outpatient follow-up with me in 2 weeks  - Dispo to home pending cultures, ID recommendations          I spent 30 minutes in the professional and overall care of this patient.      Antonio Houston MD           [1] acetaminophen, 650 mg, oral, q6h RONNELL  apixaban, 5 mg, oral, BID  DULoxetine, 120 mg, oral, Daily  empagliflozin, 12.5 mg, oral, Daily  fenofibrate, 160 mg, oral, Daily with evening meal  gabapentin, 1,200 mg, oral, Nightly  gabapentin, 600 mg, oral, Daily  insulin regular, 0-5 Units, subcutaneous, TID AC  isosorbide mononitrate ER, 30 mg, oral, Daily  lisinopril, 20 mg, oral, Daily  [START ON 7/14/2025] methIMAzole, 5 mg, oral, Weekly  pantoprazole, 40 mg, oral, Daily before breakfast  polyethylene glycol, 17 g, oral, Daily  rosuvastatin, 20 mg, oral, Daily    [2] lactated Ringer's, 50 mL/hr, Last Rate: 50 mL/hr (07/09/25 0545)  oxygen, 2 L/min, Last Rate: Stopped (07/08/25 1652)    [3] PRN medications:  HYDROmorphone, naloxone, oxyCODONE, oxyCODONE

## 2025-07-09 NOTE — CARE PLAN
Report from day shift RN. Patient sitting up in bed, no needs at this time. Bed alarm on and call light in reach.     Patient with no issues during shift. Patient had no pain, ambulated to the bathroom a few times with little to no assistance. Bed alarm on and patient called appropriately to get up. Patient wore surgical shoe when out of bed.

## 2025-07-09 NOTE — OP NOTE
REMOVAL, HARDWARE, LOWER EXTREMITY (L), DEBRIDEMENT, LOWER EXTREMITY (L) Operative Note     Date: 2025  OR Location: [facility]    Name: Galen Villasenor , : 1945, Age: 79 y.o., MRN: [id number], Sex: male    Diagnosis  Pre-op Diagnosis     * Left foot infection [L08.9]     * Osteomyelitis of great toe of left foot (Multi) [M86.9] Post-op Diagnosis     * Left foot infection [L08.9]     * Osteomyelitis of great toe of left foot (Multi) [M86.9]     Procedures  REMOVAL, HARDWARE, LOWER EXTREMITY  24272 - AK REMOVAL IMPLANT DEEP    DEBRIDEMENT, LOWER EXTREMITY  80898 - AK DEBRIDEMENT BONE 1ST 20 SQ CM/<      Surgeons      * Antonio Houston - Primary    Resident/Fellow/Other Assistant:  Surgeons and Role:  * No surgeons found with a matching role *    Staff:   Surgical Assistant:   Nayeliulator: Gerson  Scrub Person: Sandra Wolfeub Person: Jake Wolfeub Person: Stephanie    Anesthesia Staff: Anesthesiologist: Hector Avila DO  CRNA: RAVEN Brink-CRNA  SRNA: Elmo Watts    Procedure Summary  Anesthesia: General  ASA: III  Estimated Blood Loss:  10 mL  Intra-op Medications:   Administrations occurring from 1200 to 1415 on 25:   Medication Name Total Dose   dexAMETHasone (Decadron) 4 mg/mL IV Syringe 2 mL 4 mg   ePHEDrine injection 5 mg   fentaNYL (Sublimaze) injection 50 mcg/mL 50 mcg   LR infusion Cannot be calculated   lidocaine PF (Xylocaine-MPF) local injection 2 % 80 mg   propofol (Diprivan) injection 10 mg/mL 150 mg   vancomycin IV 1 g in 200 mL D5W - premix 1 g              Anesthesia Record               Intraprocedure I/O Totals          Intake    LR infusion 600.00 mL    vancomycin 1 gram/200 mL 200.00 mL    Total Intake 800 mL          Specimen:   ID Type Source Tests Collected by Time   1 : left toe mal union site tissue Tissue SOFT TISSUE RESECTION SURGICAL PATHOLOGY EXAM Antonio Houston MD 2025 1415   A : left first toe swab Swab DIGIT FIRST, LEFT FOOT TISSUE/WOUND CULTURE/SMEAR  Antonio Houston MD 7/8/2025 1416                 Drains and/or Catheters: * None in log *    Tourniquet Times:     Total Tourniquet Time Documented:  Leg (Left) - 38 minutes  Total: Leg (Left) - 38 minutes      Implants: None    Findings: 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 is an 79 y.o. male who is having surgery for M20.22 - Hallux rigidus, left foot, T84.60xA - Infection and inflammatory reaction due to internal fixation device of unspecified site, initial encounter. In review, patient had presented to my clinic with complaints of a painful contracture of his left great toe.  He had a long history with prior surgical intervention on this toe.  In August 2022 he had had an implant arthroplasty performed in the first MTP joint by an outside surgeon.  He subsequently had significant ongoing drainage following this procedure and found out later that there was a longstanding infection which required subsequent removal of the implant arthroplasty later that year.  He was on an extended course of antibiotics.  He subsequently cleared the infection but was told that no further surgical intervention could be done on the toe.  Over time he developed a progressive contracture of the toe.  There was pain related to the contracture as well as pain related to significant degenerative changes of the first MTP joint.   About 10 weeks ago he had undergone a complicated 1st MTP arthrodesis with tenotomies and tendon lengthening to address the significant contracture of the hallus.  We had also performed an interphalangeal arthrodesis that was pinned with pins removed 6 weeks postop.  The postoperative course was complicated by wound healing issues as well as drainage.  Given the high risk for wound complications the patient was on prophylactic antibiotics.  Unfortunately several weeks ago he developed increased  drainage from the medial aspect of the hallux where he had had a sinus from the previous surgery.  We had again placed the patient back on antibiotics but he had persistent drainage that did not resolve.  Based on this I recommended removal of hardware and irrigation and debridement.  He was on an antibiotic holiday for approximately 1 week prior to surgery.  We reviewed the risk benefits and alternatives to surgery as well as expected postoperative course.  We discussed risk including wound healing complications, ongoing infection, pain, stiffness, recurrent deformity or contracture, nonunion or malunion of the first MTP arthrodesis after removal of hardware, damage to surrounding nerves or blood vessels, bleeding, risk of blood clots, risk of anesthetic and risk of potential amputation.  He was understanding of these risks and wished to proceed forward with surgical intervention.    The patient was seen in the preoperative area. The risks, benefits, complications, treatment options, non-operative alternatives, expected recovery and outcomes were discussed with the patient. The possibilities of reaction to medication, pulmonary aspiration, injury to surrounding structures, bleeding, recurrent infection, the need for additional procedures, failure to diagnose a condition, and creating a complication requiring transfusion or operation were discussed with the patient. The patient concurred with the proposed plan, giving informed consent.  The site of surgery was properly noted/marked if necessary per policy. The patient has been actively warmed in preoperative area. Preoperative antibiotics have been ordered and given within 1 hours of incision. Venous thrombosis prophylaxis have been ordered including unilateral sequential compression device    Procedure Details: After giving informed consent and being marked in the preoperative holding area the patient was brought back to the operating room.  He was transferred to  the operative table and placed in a supine position.  All bony prominences were well-padded.  Antibiotics were held for culture.  General anesthesia was then induced.  The patient was then prepped and draped in the usual sterile fashion such the left lower extremity was exposed.  A timeout was then performed to confirm the correct patient, procedure, laterality and site of surgery.  We confirmed that all instruments were available in the room.  The surgical team was in agreement.    At the beginning of the case the left lower extremity was exsanguinated using an Esmarch bandage.  The Esmarch was then applied to the left calf as a tourniquet.  We then identified the previous dorsal incision over the first MTP joint.  The incision was then reopened sharply utilizing a 15 blade knife.  This was carried through skin and subcutaneous tissue.  The EHL tendon was identified and protected.  Elevators were then used to expose the underlying plate.  We began by removing the screws in the proximal aspect of the plate and then turned our attention distally.  Towards the distal extent of the plate near the proximal phalanx base there was noted to be purulent material overlying the plate.  This was removed with a rongeur and a curette.  This was sent for pathology.  At this point we then removed the screws from the distal cluster and the plate was removed.  Unfortunately at this point we have noticed that there was some purulence that extended into the proximal phalanx base.  There was also noted to be necrotic appearing bone that was devitalized.  This was again debrided with a combination of a curette and rongeur.  There did appear to be some inherent stability of the first MTP arthrodesis from potentially some plantar union of the metatarsal to the proximal phalanx.  Extensive debridement of bone was performed at this point utilizing a curette to remove any purulent material.  We also obtained a culture swab.  At this point  antibiotics were given.  The wound was then copiously irrigated with sterile normal saline and Irrisept.  We confirmed again that we were pleased with the debridement of the bone and the adjacent soft tissues.  Once this was done fluoroscopy was brought in and we obtained fluoroscopic images demonstrating removal of all the hardware.  At this point the tourniquet was let down and hemostasis was achieved.  I placed 1 g of vancomycin into the wound.  The wound was then closed in a layered fashion utilizing 3-0 Monocryl to close the deep dermis and 3-0 nylon in a horizontal mattress fashion to close the skin.  The previous sinus tract was also debrided utilizing a curette.  A Monocryl suture was placed in this skin tract once it had been debrided to help with hemostasis.  At this point we again confirmed that there was some inherent stability to the prior arthrodesis and there was minimal motion clinically of the hallux at the first MTP joint.  I then proceeded to inject 10 mL of 0.5% plain Marcaine around the surgical site.  Sterile dressings were then applied.  The patient was then awoken from anesthesia and transferred to the PACU in stable condition having tolerated the procedure well.  All counts were correct at the end of the procedure.    Postoperatively the patient will be admitted to the hospital.  We will obtain an infectious disease consult to help guide appropriate antibiotic treatment moving forward in light of the patient's osteomyelitis.  We will have the patient on empiric vancomycin in the interim.  He will resume his prior DVT prophylaxis.  He will be heel weightbearing in a postop shoe.  He can be discharged from the hospital pending final infectious disease plan and will return to my clinic 2 weeks after surgery.  Evidence of Infection: Yes; Pus Below the level of the fascia (organ/space)  Complications:  None; patient tolerated the procedure well.    Disposition: PACU - hemodynamically  stable.  Condition: stable     Additional Details: None    Attending Attestation: I was present and scrubbed for the entire procedure.    Antonio Houston  Phone Number: [phone]

## 2025-07-09 NOTE — CARE PLAN
Problem: Diabetes  Goal: Maintain glucose levels >70mg/dl to <250mg/dl throughout shift  Outcome: Progressing  Goal: No changes in neurological exam by end of shift  Outcome: Progressing  Goal: Vital signs within normal range for age by end of shift  Outcome: Progressing     Problem: Pain - Adult  Goal: Verbalizes/displays adequate comfort level or baseline comfort level  Outcome: Progressing     Problem: Safety - Adult  Goal: Free from fall injury  Outcome: Progressing   The patient's goals for the shift include      The clinical goals for the shift include Pain management

## 2025-07-09 NOTE — PROGRESS NOTES
07/09/25 0907   Discharge Planning   Living Arrangements Spouse/significant other   Support Systems Spouse/significant other   Type of Residence Private residence   Home or Post Acute Services In home services   Type of Home Care Services Home nursing visits;Home OT;Home PT   Expected Discharge Disposition Home H   Patient Choice   Provider Choice list and CMS website (https://medicare.gov/care-compare#search) for post-acute Quality and Resource Measure Data were provided and reviewed with: Patient     Met with patient at bedside. Admitted for L foot infection, hardware removal and possible osteomyelitis. Pt lives with spouse and was independent PTA with no HHC. Uses a walking boot. PCP is Bladimir Ackerman. Pt feels he is able to manage his health and understands his medications. Was able to drive and obtain meds. No financial concerns. Therapy evals and cultures pending. Pt may need PICC and IV infusion. AOC is [facility]. Internal referral will be needed. Spouse Giselle will be teachable caregiver. Antonio Houston will follow. TCC team will continue to follow.

## 2025-07-09 NOTE — PROGRESS NOTES
Occupational Therapy    Evaluation    Patient Name: Galen Villasenor Jr.  MRN: [id number]  Today's Date: 7/9/2025  Time Calculation  Start Time: 1415  Stop Time: 1425  Time Calculation (min): 10 min  4103/4103-A  Eval only     Assessment  IP OT Assessment  Prognosis: Good  End of Session Communication: Bedside nurse  End of Session Patient Position: Up in chair, Alarm on  Patient presents with decline in ADLs, functional transfers, functional mobility and would benefit from OT during acute stay to improve functional independence and safety. Recommend low intensity OT to maximize functional independence and safety.     Plan:  Treatment Interventions: ADL retraining, Functional transfer training, Patient/family training, Equipment evaluation/education, Compensatory technique education  OT Frequency: 5 times per week  OT Discharge Recommendations: Low intensity level of continued care  Equipment Recommended upon Discharge: Wheeled walker (shower chair)  OT - OK to Discharge: Yes from acute care OT services to the next level of care when cleared by medical team      Subjective   Current Problem:  1. Left foot infection  oxyCODONE (Roxicodone) 5 mg immediate release tablet    clindamycin (Cleocin) 300 mg capsule      2. Hyperthyroidism        3. Internal fixation device (pin, daniel, or screw) infection-inflammation  Surgical Pathology Exam    Surgical Pathology Exam    Tissue/Wound Culture/Smear    Tissue/Wound Culture/Smear          General:  General  Reason for Referral: recent surgery  Referred By: Dr Rosemarie MD  Past Medical History Relevant to Rehab: Admitted 7/8/2025 after having the following completed:first MTP removal of hardware and I&D for osteomyelitis.  PMH: a-flutter, COPD, HLD, HTN, DM II, CAD  Co-Treatment: PT  Co-Treatment Reason: for discharge planning  Prior to Session Communication: Bedside nurse  Patient Position Received: Up in chair, Alarm on  Preferred Learning Style: verbal  General Comment: Patient  seated in bedside chair and agreeable to participate in OT evaluation.    Precautions:  LE Weight Bearing Status: Heel Weight Bearing in Post-Op Shoe (left LE)  Medical Precautions: Fall precautions    Pain:  Pain Assessment  Pain Assessment: 0-10  0-10 (Numeric) Pain Score: 0 - No pain    Objective   Cognition:  Overall Cognitive Status: Within Functional Limits    Home Living:  Home Living Comments: Lives in 1 story home with spouse with 0 BRIANNE.  Has WIS with GB and seat. Has first floor laundry     Prior Function:  Prior Function Comments: Was independent with all ADLs, IADLs and functional mobility task with WW    ADL:  LE Dressing Assistance: Minimal (to don shorts)  ADL Comments: Educated patient on safety and comp strategies with LB dressing and patient verbalized understanding.    Activity Tolerance:  Endurance: Endurance does not limit participation in activity    Bed Mobility/Transfers:   Transfers  Transfer:  (SBA sit <>stand with min verbal cues for safety and technique and to maintain left LE WB status. Patient with good return demonstration of understanding)    Ambulation/Gait Training:  Functional Mobility  Functional Mobility Performed:  (CGA with WW functional mobility task with WW maintaining left LE WB.  Surgical shoe donned prior to functional assessment)    Extremities: RUE   RUE : Within Functional Limits and LUE   LUE: Within Functional Limits    Outcome Measures: [facility] Daily Activity  Putting on and taking off regular lower body clothing: A little  Bathing (including washing, rinsing, drying): A little  Putting on and taking off regular upper body clothing: A little  Toileting, which includes using toilet, bedpan or urinal: A little  Taking care of personal grooming such as brushing teeth: A little  Eating Meals: None  Daily Activity - Total Score: 19    EDUCATION:  Education  Individual(s) Educated: Patient  Education Provided: Fall precautons (left LE WB status, comp strategies and safety  with LB dressing)  Patient Response to Education: Patient/Caregiver Verbalized Understanding of Information    Goals:   Encounter Problems       Encounter Problems (Active)       Dressings Lower Extremities       STG - Patient will complete lower body dressing independently maintaining left LE WB status  (Progressing)       Start:  07/09/25    Expected End:  07/23/25               Functional Balance       STG-Patient will be independent with assistive device dynamic stand task >3 minutes for ADL completion maintaining left LE WB status (Progressing)       Start:  07/09/25    Expected End:  07/23/25               OT Transfers       STG-Patient will be independent with functional transfers demonstrating good safety maintaining left LE WB status  (Progressing)       Start:  07/09/25    Expected End:  07/23/25               Safety       STG-Patient will independently verbalize and demonstrate left LE WB precautions with all functional tasks   (Progressing)       Start:  07/09/25    Expected End:  07/23/25

## 2025-07-10 ENCOUNTER — APPOINTMENT (OUTPATIENT)
Dept: RADIOLOGY | Facility: HOSPITAL | Age: 80
DRG: 504 | End: 2025-07-10
Payer: MEDICARE

## 2025-07-10 LAB
GLUCOSE BLD MANUAL STRIP-MCNC: 119 MG/DL (ref 74–99)
GLUCOSE BLD MANUAL STRIP-MCNC: 135 MG/DL (ref 74–99)
GLUCOSE BLD MANUAL STRIP-MCNC: 160 MG/DL (ref 74–99)
GLUCOSE BLD MANUAL STRIP-MCNC: 262 MG/DL (ref 74–99)

## 2025-07-10 PROCEDURE — 36573 INSJ PICC RS&I 5 YR+: CPT

## 2025-07-10 PROCEDURE — 1100000001 HC PRIVATE ROOM DAILY

## 2025-07-10 PROCEDURE — 97116 GAIT TRAINING THERAPY: CPT | Mod: GP,CQ

## 2025-07-10 PROCEDURE — 2500000002 HC RX 250 W HCPCS SELF ADMINISTERED DRUGS (ALT 637 FOR MEDICARE OP, ALT 636 FOR OP/ED): Performed by: ORTHOPAEDIC SURGERY

## 2025-07-10 PROCEDURE — 2500000004 HC RX 250 GENERAL PHARMACY W/ HCPCS (ALT 636 FOR OP/ED): Performed by: ORTHOPAEDIC SURGERY

## 2025-07-10 PROCEDURE — 2500000001 HC RX 250 WO HCPCS SELF ADMINISTERED DRUGS (ALT 637 FOR MEDICARE OP): Performed by: PHYSICIAN ASSISTANT

## 2025-07-10 PROCEDURE — 97530 THERAPEUTIC ACTIVITIES: CPT | Mod: GP,CQ

## 2025-07-10 PROCEDURE — 2500000004 HC RX 250 GENERAL PHARMACY W/ HCPCS (ALT 636 FOR OP/ED): Performed by: PHYSICIAN ASSISTANT

## 2025-07-10 PROCEDURE — 2500000001 HC RX 250 WO HCPCS SELF ADMINISTERED DRUGS (ALT 637 FOR MEDICARE OP): Performed by: ORTHOPAEDIC SURGERY

## 2025-07-10 PROCEDURE — 02HV33Z INSERTION OF INFUSION DEVICE INTO SUPERIOR VENA CAVA, PERCUTANEOUS APPROACH: ICD-10-PCS | Performed by: ORTHOPAEDIC SURGERY

## 2025-07-10 PROCEDURE — 2780000003 HC OR 278 NO HCPCS

## 2025-07-10 PROCEDURE — 82947 ASSAY GLUCOSE BLOOD QUANT: CPT

## 2025-07-10 PROCEDURE — 2500000004 HC RX 250 GENERAL PHARMACY W/ HCPCS (ALT 636 FOR OP/ED)

## 2025-07-10 PROCEDURE — 97535 SELF CARE MNGMENT TRAINING: CPT | Mod: GO,CO

## 2025-07-10 PROCEDURE — C1751 CATH, INF, PER/CENT/MIDLINE: HCPCS

## 2025-07-10 RX ORDER — VANCOMYCIN HYDROCHLORIDE 1.25 G/250ML
1250 INJECTION, SOLUTION INTRAVITREAL EVERY 24 HOURS
Status: DISCONTINUED | OUTPATIENT
Start: 2025-07-10 | End: 2025-07-10

## 2025-07-10 RX ORDER — VANCOMYCIN HYDROCHLORIDE 1 G/20ML
INJECTION, POWDER, LYOPHILIZED, FOR SOLUTION INTRAVENOUS DAILY PRN
Status: DISCONTINUED | OUTPATIENT
Start: 2025-07-10 | End: 2025-07-10

## 2025-07-10 RX ADMIN — ROSUVASTATIN CALCIUM 20 MG: 20 TABLET, FILM COATED ORAL at 09:49

## 2025-07-10 RX ADMIN — VANCOMYCIN HYDROCHLORIDE 1250 MG: 1.25 INJECTION, SOLUTION INTRAVITREAL at 11:27

## 2025-07-10 RX ADMIN — GABAPENTIN 1200 MG: 600 TABLET, FILM COATED ORAL at 21:51

## 2025-07-10 RX ADMIN — PIPERACILLIN SODIUM AND TAZOBACTAM SODIUM 3.38 G: 3; .375 INJECTION, SOLUTION INTRAVENOUS at 21:52

## 2025-07-10 RX ADMIN — EMPAGLIFLOZIN 12.5 MG: 25 TABLET, FILM COATED ORAL at 09:49

## 2025-07-10 RX ADMIN — PANTOPRAZOLE SODIUM 40 MG: 40 TABLET, DELAYED RELEASE ORAL at 06:01

## 2025-07-10 RX ADMIN — FENOFIBRATE 160 MG: 160 TABLET ORAL at 17:48

## 2025-07-10 RX ADMIN — ISOSORBIDE MONONITRATE 30 MG: 30 TABLET, EXTENDED RELEASE ORAL at 09:49

## 2025-07-10 RX ADMIN — DULOXETINE 120 MG: 60 CAPSULE, DELAYED RELEASE ORAL at 09:49

## 2025-07-10 RX ADMIN — APIXABAN 5 MG: 5 TABLET, FILM COATED ORAL at 09:49

## 2025-07-10 RX ADMIN — POLYETHYLENE GLYCOL 3350 17 G: 17 POWDER, FOR SOLUTION ORAL at 09:49

## 2025-07-10 RX ADMIN — APIXABAN 5 MG: 5 TABLET, FILM COATED ORAL at 21:51

## 2025-07-10 RX ADMIN — PIPERACILLIN SODIUM AND TAZOBACTAM SODIUM 3.38 G: 3; .375 INJECTION, SOLUTION INTRAVENOUS at 01:24

## 2025-07-10 RX ADMIN — GABAPENTIN 600 MG: 600 TABLET, FILM COATED ORAL at 09:49

## 2025-07-10 RX ADMIN — PIPERACILLIN SODIUM AND TAZOBACTAM SODIUM 3.38 G: 3; .375 INJECTION, SOLUTION INTRAVENOUS at 13:14

## 2025-07-10 ASSESSMENT — COGNITIVE AND FUNCTIONAL STATUS - GENERAL
MOVING TO AND FROM BED TO CHAIR: A LITTLE
DRESSING REGULAR LOWER BODY CLOTHING: A LITTLE
STANDING UP FROM CHAIR USING ARMS: A LITTLE
MOBILITY SCORE: 20
WALKING IN HOSPITAL ROOM: A LITTLE
CLIMB 3 TO 5 STEPS WITH RAILING: A LITTLE
DAILY ACTIVITIY SCORE: 23

## 2025-07-10 ASSESSMENT — PAIN SCALES - GENERAL
PAINLEVEL_OUTOF10: 0 - NO PAIN

## 2025-07-10 ASSESSMENT — PAIN - FUNCTIONAL ASSESSMENT
PAIN_FUNCTIONAL_ASSESSMENT: 0-10

## 2025-07-10 ASSESSMENT — ACTIVITIES OF DAILY LIVING (ADL): HOME_MANAGEMENT_TIME_ENTRY: 30

## 2025-07-10 NOTE — PROGRESS NOTES
Galen Villasenor Jr. is a 79 y.o. male on day 2 of admission presenting with Left foot infection.      Subjective   Patient seen and examined this morning at the bedside. No overnight events.      Objective     Last Recorded Vitals  /75 (BP Location: Left arm, Patient Position: Lying)   Pulse 75   Temp 36.1 °C (97 °F) (Temporal)   Resp 17   Wt 94.3 kg (208 lb)   SpO2 95%   Intake/Output last 3 Shifts:    Intake/Output Summary (Last 24 hours) at 7/10/2025 1533  Last data filed at 7/9/2025 2100  Gross per 24 hour   Intake 199.17 ml   Output --   Net 199.17 ml       Admission Weight  Weight: 94.3 kg (208 lb) (07/08/25 1100)    Daily Weight  07/08/25 : 94.3 kg (208 lb)    Image Results  Bedside PICC Imaging  These images are not reportable by radiology and will not be interpreted   by  Radiologists.      Physical Exam  HENT:      Head: Normocephalic and atraumatic.   Eyes:      Extraocular Movements: Extraocular movements intact.   Cardiovascular:      Rate and Rhythm: Normal rate.      Heart sounds: Normal heart sounds.   Pulmonary:      Effort: Pulmonary effort is normal.      Breath sounds: Normal breath sounds.   Abdominal:      General: Abdomen is flat. Bowel sounds are normal.      Palpations: Abdomen is soft.   Musculoskeletal:      Comments: LLE with Dressings    Neurological:      General: No focal deficit present.      Mental Status: He is alert and oriented to person, place, and time.   Psychiatric:         Mood and Affect: Mood normal.   Relevant Results  Results for orders placed or performed during the hospital encounter of 07/08/25 (from the past 24 hours)   POCT GLUCOSE   Result Value Ref Range    POCT Glucose 119 (H) 74 - 99 mg/dL   POCT GLUCOSE   Result Value Ref Range    POCT Glucose 160 (H) 74 - 99 mg/dL   POCT GLUCOSE   Result Value Ref Range    POCT Glucose 135 (H) 74 - 99 mg/dL     *Note: Due to a large number of results and/or encounters for the requested time period, some results  have not been displayed. A complete set of results can be found in Results Review.    Bedside PICC Imaging  Result Date: 7/10/2025  These images are not reportable by radiology and will not be interpreted by  Radiologists.    FL fluoro images no charge  Result Date: 7/8/2025  These images are not reportable by radiology and will not be interpreted by  Radiologists.        Assessment & Plan  Left foot infection      # Left first MTP OM s/p hardware removal and I&D  # Hypertension  # Type II DM  # Afib  Plan  - Continue zosyn on discharge for 4-6 weeks  - Follow up culture results  - Closely monitor for antibiotics side effects including rash, Diarrhea/CDI, thrombocytopenia, SHERIDAN, etc.   -ID will continue to follow     Discussed with attending physician  Justin Simmons MD  PGY II Internal Medicine resident

## 2025-07-10 NOTE — PROGRESS NOTES
Occupational Therapy    OT Treatment    Patient Name: Galen Villasenor Jr.  MRN: [id number]  Today's Date: 7/10/2025  Time Calculation  Start Time: 1131  Stop Time: 1201  Time Calculation (min): 30 min       4103/4103-A    Assessment:  OT Assessment: Discussed home going with problem solving as needed.  Patient feels confident in his ability to manage ADLs on his own, although his wife will be there to assist need be.  No further OT needs at this time.  End of Session Patient Position: Up in chair, Alarm on       Plan:  OT Frequency: 5 times per week     Subjective     Current Problem:  Problem List[1]    General:  OT Received On: 07/10/25  Reason for Referral: recent surgery  Referred By: Antonio Houston MD  Past Medical History Relevant to Rehab: Pt underwent REMOVAL, HARDWARE, LOWER EXTREMITY (L), DEBRIDEMENT, LOWER EXTREMITY (L) 7/8/25. PMH: CAD, anemia, atrial flutter, T2DM, HTN  Prior to Session Communication: Bedside nurse  Patient Position Received: Up in chair, Alarm on  General Comment: Patient was very wlling to work with OT.    Vital Signs:       Pain:  Pain Assessment  Pain Assessment: 0-10  0-10 (Numeric) Pain Score: 0 - No pain  Objective      Activities of Daily Living:    Grooming  Grooming Level of Assistance: Close supervision  Grooming Where Assessed: Standing sinkside  Grooming Comments: Walk from recliner to sink using wheeled walker. Cues for safe walker placement at the sink.  Patient stood for ADL task, no assist, good balance. (Patient does not anticipate needing any assist from his wife with ADLs and also plans on changing his own dressing as needed.)                   Functional Standing Tolerance:       Bed Mobility/Transfers: Bed Mobility  Bed Mobility: No  Transfers  Transfer: Yes  Transfer 1  Transfer From 1: Sit to  Transfer to 1: Stand  Transfer Device 1: Walker, Gait belt  Transfer Level of Assistance 1: Contact guard  Trials/Comments 1: Patient walked from recliner to the sink using  wheeled walker and demonstrated walker safety by keeping the walker around him.                Therapy/Activity:               Strength:       Other Activity:       Outcome Measures:[facility] Daily Activity  Putting on and taking off regular lower body clothing: A little  Bathing (including washing, rinsing, drying): None  Putting on and taking off regular upper body clothing: None  Toileting, which includes using toilet, bedpan or urinal: None  Taking care of personal grooming such as brushing teeth: None  Eating Meals: None  Daily Activity - Total Score: 23  Education Documentation  No documentation found.  Education Comments  No comments found.           EDUCATION:  Education  Individual(s) Educated: Patient  Education Provided: Fall precautons (left LE WB status, comp strategies and safety with LB dressing)  Patient Response to Education: Patient/Caregiver Verbalized Understanding of Information    Goals:  Encounter Problems       Encounter Problems (Active)       Dressings Lower Extremities       STG - Patient will complete lower body dressing independently maintaining left LE WB status  (Progressing)       Start:  07/09/25    Expected End:  07/23/25               Functional Balance       STG-Patient will be independent with assistive device dynamic stand task >3 minutes for ADL completion maintaining left LE WB status (Progressing)       Start:  07/09/25    Expected End:  07/23/25               OT Transfers       STG-Patient will be independent with functional transfers demonstrating good safety maintaining left LE WB status  (Progressing)       Start:  07/09/25    Expected End:  07/23/25               Safety       STG-Patient will independently verbalize and demonstrate left LE WB precautions with all functional tasks   (Progressing)       Start:  07/09/25    Expected End:  07/23/25                              [1]   Patient Active Problem List  Diagnosis    Acquired hammertoe of right foot    Esophageal  dysphagia    Hallux limitus of left foot    Heartburn    Paroxysmal atrial fibrillation (Multi)    Tubular adenoma of colon    Hyperthyroidism    Itching of ear    Multinodular goiter    Otalgia of right ear    Toxic thyroid nodule    Hairy cell leukemia, in relapse (Multi)    Pancytopenia    Coronary artery disease involving native coronary artery of native heart without angina pectoris    Primary hypertension    Mixed hyperlipidemia    Chronic obstructive pulmonary disease (Multi)    Type 2 diabetes mellitus without complication, without long-term current use of insulin    Atypical atrial flutter    Atrial flutter (Multi)    Chemotherapy-induced neutropenia    Visit for monitoring Tikosyn therapy    Anemia    Left foot infection

## 2025-07-10 NOTE — PROGRESS NOTES
"Galen Villasenor Jr. is a 79 y.o. male on day 2 of admission presenting with Left foot infection.  Postop day 2 status post first MTP removal of hardware and I&D for osteomyelitis.    Subjective   Patient is doing well this a.m.  He denies any pain in the foot.  He otherwise feels well.  We discussed ongoing inpatient plans and patient is in agreement.       Objective     Physical Exam  LLE MSK  - Dressings clean, dry, intact  - Sensation is intact to light touch in the toes  - Wiggles toes  - Foot warm and well-perfused  - Expected ongoing swelling is noted in the hallux    Last Recorded Vitals  Blood pressure 108/61, pulse 63, temperature 37 °C (98.6 °F), temperature source Temporal, resp. rate 16, height 1.803 m (5' 11\"), weight 94.3 kg (208 lb), SpO2 93%.  Intake/Output last 3 Shifts:  I/O last 3 completed shifts:  In: 1735.8 (18.4 mL/kg) [I.V.:1185.8 (12.6 mL/kg); IV Piggyback:550]  Out: - (0 mL/kg)   Weight: 94.3 kg     Relevant Results      Scheduled medications  Scheduled Medications[1]  Continuous medications  Continuous Medications[2]  PRN medications  PRN Medications[3]  Results for orders placed or performed during the hospital encounter of 07/08/25 (from the past 24 hours)   POCT GLUCOSE   Result Value Ref Range    POCT Glucose 135 (H) 74 - 99 mg/dL   POCT GLUCOSE   Result Value Ref Range    POCT Glucose 163 (H) 74 - 99 mg/dL   POCT GLUCOSE   Result Value Ref Range    POCT Glucose 119 (H) 74 - 99 mg/dL     *Note: Due to a large number of results and/or encounters for the requested time period, some results have not been displayed. A complete set of results can be found in Results Review.                            Assessment & Plan  Left foot infection  Postop day 2 status post first MTP removal of hardware and I&D for osteomyelitis.    - Heel weightbearing left lower extremity in postop shoe  - Regular diet  - Eliquis resumed postop  - Vanc/Zosyn per ID  - Follow-up cultures - NGTD  - Infectious disease " consult to help guide antibiotic management moving forward, appreciate recommendations  - PICC today  - Will plan to change patient dressings before discharge from hospital  - Patient will require outpatient follow-up with me in 2 weeks  - Dispo to home pending cultures, final ID recommendations          I spent 30 minutes in the professional and overall care of this patient.      Antonio Houston MD             [1] acetaminophen, 650 mg, oral, q6h RONNELL  apixaban, 5 mg, oral, BID  DULoxetine, 120 mg, oral, Daily  empagliflozin, 12.5 mg, oral, Daily  fenofibrate, 160 mg, oral, Daily with evening meal  gabapentin, 1,200 mg, oral, Nightly  gabapentin, 600 mg, oral, Daily  insulin regular, 0-5 Units, subcutaneous, TID AC  isosorbide mononitrate ER, 30 mg, oral, Daily  lidocaine, 5 mL, infiltration, Once  lisinopril, 20 mg, oral, Daily  [START ON 7/14/2025] methIMAzole, 5 mg, oral, Weekly  pantoprazole, 40 mg, oral, Daily before breakfast  piperacillin-tazobactam, 3.375 g, intravenous, q8h  polyethylene glycol, 17 g, oral, Daily  rosuvastatin, 20 mg, oral, Daily     [2] oxygen, 2 L/min, Last Rate: Stopped (07/08/25 1652)     [3] PRN medications: alteplase, HYDROmorphone, naloxone, oxyCODONE, oxyCODONE

## 2025-07-10 NOTE — CONSULTS
Vancomycin Dosing by Pharmacy- INITIAL    Galen Villasenor Jr. is a 79 y.o. year old male who Pharmacy has been consulted for vancomycin dosing for osteomyelitis/septic arthritis. Based on the patient's indication and renal status this patient will be dosed based on a goal AUC of 400-600.     Renal function is currently stable.    Visit Vitals  /56 (BP Location: Right arm, Patient Position: Lying)   Pulse 63   Temp 35.7 °C (96.3 °F) (Temporal)   Resp 17        Lab Results   Component Value Date    CREATININE 1.25 2025    CREATININE 1.25 2025    CREATININE 1.38 (H) 2024    CREATININE 1.27 10/31/2024        Patient weight is as follows:   Vitals:    25 1100   Weight: 94.3 kg (208 lb)       Cultures:  No results found for the encounter in last 14 days.        I/O last 3 completed shifts:  In: 1735.8 (18.4 mL/kg) [I.V.:1185.8 (12.6 mL/kg); IV Piggyback:550]  Out: - (0 mL/kg)   Weight: 94.3 kg   I/O during current shift:  No intake/output data recorded.    Temp (24hrs), Av.3 °C (97.3 °F), Min:35.7 °C (96.3 °F), Max:37 °C (98.6 °F)         Assessment/Plan     Patient has already been given a loading dose of 1500 mg.  Will initiate vancomycin maintenance, 1250 mg every 24 hours.    This dosing regimen is predicted by [employer] to result in the following pharmacokinetic parameters:  <<<<<PASTE [employer] DATA HERE>>>>>  Loading dose: N/A  Regimen: 1250 mg IV every 24 hours.  Start time: 10:17 on 07/10/2025  Exposure target: AUC24 (range) 400-600 mg/L.hr   VJK78-05: 414 mg/L.hr  AUC24,ss: 473 mg/L.hr  Probability of AUC24 > 400: 67 %  Ctrough,ss: 14.5 mg/L  Probability of Ctrough,ss > 20: 25 %      Follow-up level will be ordered on  at 0500 unless clinically indicated sooner.  Will continue to monitor renal function daily while on vancomycin and order serum creatinine at least every 48 hours if not already ordered.  Follow for continued vancomycin needs, clinical response, and  signs/symptoms of toxicity.       TODD LANDAVERDE

## 2025-07-10 NOTE — PROGRESS NOTES
Physical Therapy    Physical Therapy    Physical Therapy Treatment    Patient Name: Galen Villasenor Jr.  MRN: [id number]  Today's Date: 7/10/2025  Time Calculation  Start Time: 0820  Stop Time: 0844  Time Calculation (min): 24 min     4103/4103-A       07/10/25 0820   PT  Visit   PT Received On 07/10/25   Response to Previous Treatment Patient with no complaints from previous session.   General   Reason for Referral recent surgery   Referred By Antonio Houston MD   Past Medical History Relevant to Rehab Pt underwent REMOVAL, HARDWARE, LOWER EXTREMITY (L), DEBRIDEMENT, LOWER EXTREMITY (L) 7/8/25. PMH: CAD, anemia, atrial flutter, T2DM, HTN   Prior to Session Communication Bedside nurse   Patient Position Received Bed, 2 rail up;Alarm on   Preferred Learning Style verbal   General Comment Pt agreeable to therapy, nursing cleared for treatment.   Precautions   LE Weight Bearing Status Heel Weight Bearing in Post-Op Shoe   Medical Precautions Fall precautions   Pain Assessment   Pain Assessment 0-10   0-10 (Numeric) Pain Score 0 - No pain   Cognition   Overall Cognitive Status WFL   Orientation Level Oriented X4   Therapeutic Activity   Therapeutic Activity Performed Yes   Therapeutic Activity 1 standing dynamic activities reaching outside MARY while crossing midline. unilateral UE support and unsupported. One minor retro LOB noted, pt able to self correct, CGA for safety.   Therapeutic Activity 2 seated dynamic balance while reaching outside MARY, SBA, no LOB noted   Bed Mobility   Bed Mobility Yes   Bed Mobility 1   Bed Mobility 1 Supine to sitting   Level of Assistance 1 Close supervision   Bed Mobility Comments 1 HOB elecvated 45 degrees   Ambulation/Gait Training   Ambulation/Gait Training Performed Yes   Ambulation/Gait Training 1   Surface 1 Level tile   Device 1 Rolling walker   Gait Support Devices Gait belt   Assistance 1 Close supervision   Quality of Gait 1 Decreased step length;Inconsistent stride  length  (step to gait pattern. Pt able to maintain heel weight bearing)   Comments/Distance (ft) 1 80'   Transfers   Transfer Yes   Transfer 1   Transfer From 1 Bed to   Transfer to 1 Sit;Stand   Technique 1 Stand to sit;Sit to stand   Transfer Device 1 Walker;Gait belt   Transfer Level of Assistance 1 Close supervision;Minimal verbal cues   Trials/Comments 1 x2 cues for safe approach to chair d/t pt prematurely pushing WW to side increasing fall risk. Good follow through with safety cues.   Activity Tolerance   Endurance Endurance does not limit participation in activity   PT Assessment   PT Assessment Results Decreased strength;Decreased range of motion;Decreased endurance;Impaired balance;Decreased mobility;Pain;Orthopedic restrictions   Rehab Prognosis Good   Evaluation/Treatment Tolerance Patient tolerated treatment well   End of Session Communication Bedside nurse   Assessment Comment Pt put forth good effort.   End of Session Patient Position Bed, 2 rail up  (Nurse in room for picc line placement)     Outcome Measures:  [facility] Basic Mobility  Turning from your back to your side while in a flat bed without using bedrails: None  Moving from lying on your back to sitting on the side of a flat bed without using bedrails: None  Moving to and from bed to chair (including a wheelchair): A little  Standing up from a chair using your arms (e.g. wheelchair or bedside chair): A little  To walk in hospital room: A little  Climbing 3-5 steps with railing: A little  Basic Mobility - Total Score: 20                             EDUCATION:     Education Documentation  Precautions, taught by Juliet Lange PTA at 7/10/2025  8:51 AM.  Learner: Patient  Readiness: Acceptance  Method: Explanation  Response: Verbalizes Understanding, Demonstrated Understanding, Needs Reinforcement    Body Mechanics, taught by Juliet Lange PTA at 7/10/2025  8:51 AM.  Learner: Patient  Readiness: Acceptance  Method: Explanation  Response:  Verbalizes Understanding, Demonstrated Understanding, Needs Reinforcement    Mobility Training, taught by Juliet Lange PTA at 7/10/2025  8:51 AM.  Learner: Patient  Readiness: Acceptance  Method: Explanation  Response: Verbalizes Understanding, Demonstrated Understanding, Needs Reinforcement    Education Comments  No comments found.        GOALS:  Encounter Problems       Encounter Problems (Active)       PT Problem       Pt will complete sit <> stand and bed <> chair transfers with mod I.  (Progressing)       Start:  07/09/25    Expected End:  07/23/25            Pt will ambulate 150 feet mod I using FWW while maintaining heel weight bearing status.  (Progressing)       Start:  07/09/25    Expected End:  07/23/25            Pt will progress to completing 3 x 20 supine/seated exercises in order to increase strength and improve gait mechanics.   (Not Progressing)       Start:  07/09/25    Expected End:  07/23/25

## 2025-07-10 NOTE — PROGRESS NOTES
Vancomycin Dosing by Pharmacy- Cessation of Therapy    Consult to pharmacy for vancomycin dosing has been discontinued by the prescriber, pharmacy will sign off at this time.    Please call pharmacy if there are further questions or re-enter a consult if vancomycin is resumed.     TODD LANDAVERDE

## 2025-07-10 NOTE — DOCUMENTATION CLARIFICATION NOTE
"    PATIENT:               PIERCE WILLETT  ACCT #:                  [id number]  MRN:                       [id number]  :                       1945  ADMIT DATE:       2025 10:08 AM  DISCH DATE:  RESPONDING PROVIDER #:        [id number]          PROVIDER RESPONSE TEXT:    Chronic Diastolic Congestive Heart Failure    CDI QUERY TEXT:    Clarification    Instruction:    Based on your assessment of the patient and the clinical information, please provide the requested documentation by clicking on the appropriate radio button and enter any additional information if prompted.    Question: Please further clarify the type and acuity of congestive heart failure    When answering this query, please exercise your independent professional judgment. The fact that a question is being asked, does not imply that any particular answer is desired or expected.    The patient's clinical indicators include:  Clinical Information:  * 79 year old Male admitted for removal of infected hardware to left great toe    Clinical Indicators:  * 25 H&P \"Normal left ventricular systolic function, ejection fraction 70%. Noninvasive risk stratification-low risk\"  * 25 ID consult \"CHF\"  * Historical data 25- \"CONCLUSIONS:  1. The left ventricular systolic function is normal, with a visually estimated ejection fraction of 55-60%.  2. No regional wall motion abnormalities.  3. There is normal right ventricular global systolic function.  4. Trivial to 1+ mitral regurgitation.  5. Trivial to 1+ tricuspid regurgitation with estimated RVSP 22 mmHg\"    Treatment:  * Routine meds as at home -  * 25 to date Jardiance 25 mg daily  * 25 to date Isosorbide ER 30 mg q 24 hrs    Risk Factors:  * HTN  * Infected toe wound  Options provided:  -- Chronic Diastolic Congestive Heart Failure  -- Other - I will add my own diagnosis  -- Refer to Clinical Documentation Reviewer    Query created by: Lolita Bernard on 7/10/2025 2:15 " PM      Electronically signed by:  BEBETO ÁLVAREZ MD 7/10/2025 3:30 PM

## 2025-07-10 NOTE — PROCEDURES
4 Swedish 44 cm length Bard single lumen power PICC inserted to right basilic vein using modified seldinger technique and ultrasound guidance with single attempt. Insertion site was cleansed with chloraprep x2, pt draped in normal sterile fashion and usual sterile precautions observed.  Catheter tip confirmed at cavoatrial junction by sherlock intravascular ECG. PICC flushes easily and has brisk blood return. PICC secured with stat lock and dressed with biopatch and tegaderm. PICC is ready for immediate use. Pt tolerated procedure well with no apparent complications.

## 2025-07-11 ENCOUNTER — HOME INFUSION (OUTPATIENT)
Dept: INFUSION THERAPY | Age: 80
End: 2025-07-11
Payer: MEDICARE

## 2025-07-11 ENCOUNTER — DOCUMENTATION (OUTPATIENT)
Dept: HOME HEALTH SERVICES | Facility: HOME HEALTH | Age: 80
End: 2025-07-11
Payer: MEDICARE

## 2025-07-11 ENCOUNTER — HOME HEALTH ADMISSION (OUTPATIENT)
Dept: HOME HEALTH SERVICES | Facility: HOME HEALTH | Age: 80
End: 2025-07-11
Payer: MEDICARE

## 2025-07-11 VITALS
RESPIRATION RATE: 16 BRPM | DIASTOLIC BLOOD PRESSURE: 62 MMHG | TEMPERATURE: 96.8 F | OXYGEN SATURATION: 99 % | SYSTOLIC BLOOD PRESSURE: 121 MMHG | HEART RATE: 77 BPM | HEIGHT: 71 IN | BODY MASS INDEX: 29.12 KG/M2 | WEIGHT: 208 LBS

## 2025-07-11 DIAGNOSIS — L08.9 LEFT FOOT INFECTION: ICD-10-CM

## 2025-07-11 LAB
ALBUMIN SERPL BCP-MCNC: 4.4 G/DL (ref 3.4–5)
ALP SERPL-CCNC: 40 U/L (ref 33–136)
ALT SERPL W P-5'-P-CCNC: 14 U/L (ref 10–52)
ANION GAP SERPL CALC-SCNC: 10 MMOL/L (ref 10–20)
AST SERPL W P-5'-P-CCNC: 12 U/L (ref 9–39)
BACTERIA SPEC CULT: ABNORMAL
BASOPHILS # BLD AUTO: 0.01 X10*3/UL (ref 0–0.1)
BASOPHILS NFR BLD AUTO: 0.4 %
BILIRUB SERPL-MCNC: 0.7 MG/DL (ref 0–1.2)
BUN SERPL-MCNC: 23 MG/DL (ref 6–23)
CALCIUM SERPL-MCNC: 9.4 MG/DL (ref 8.6–10.3)
CHLORIDE SERPL-SCNC: 100 MMOL/L (ref 98–107)
CO2 SERPL-SCNC: 29 MMOL/L (ref 21–32)
CREAT SERPL-MCNC: 1.39 MG/DL (ref 0.5–1.3)
EGFRCR SERPLBLD CKD-EPI 2021: 51 ML/MIN/1.73M*2
EOSINOPHIL # BLD AUTO: 0.02 X10*3/UL (ref 0–0.4)
EOSINOPHIL NFR BLD AUTO: 0.8 %
ERYTHROCYTE [DISTWIDTH] IN BLOOD BY AUTOMATED COUNT: 15.1 % (ref 11.5–14.5)
GLUCOSE BLD MANUAL STRIP-MCNC: 104 MG/DL (ref 74–99)
GLUCOSE BLD MANUAL STRIP-MCNC: 122 MG/DL (ref 74–99)
GLUCOSE BLD MANUAL STRIP-MCNC: 135 MG/DL (ref 74–99)
GLUCOSE SERPL-MCNC: 204 MG/DL (ref 74–99)
GRAM STN SPEC: ABNORMAL
GRAM STN SPEC: ABNORMAL
HCT VFR BLD AUTO: 42.8 % (ref 41–52)
HGB BLD-MCNC: 13.8 G/DL (ref 13.5–17.5)
IMM GRANULOCYTES # BLD AUTO: 0.01 X10*3/UL (ref 0–0.5)
IMM GRANULOCYTES NFR BLD AUTO: 0.4 % (ref 0–0.9)
LYMPHOCYTES # BLD AUTO: 0.32 X10*3/UL (ref 0.8–3)
LYMPHOCYTES NFR BLD AUTO: 12.6 %
MCH RBC QN AUTO: 30.3 PG (ref 26–34)
MCHC RBC AUTO-ENTMCNC: 32.2 G/DL (ref 32–36)
MCV RBC AUTO: 94 FL (ref 80–100)
MONOCYTES # BLD AUTO: 0.06 X10*3/UL (ref 0.05–0.8)
MONOCYTES NFR BLD AUTO: 2.4 %
NEUTROPHILS # BLD AUTO: 2.12 X10*3/UL (ref 1.6–5.5)
NEUTROPHILS NFR BLD AUTO: 83.4 %
NRBC BLD-RTO: 0 /100 WBCS (ref 0–0)
PLATELET # BLD AUTO: 113 X10*3/UL (ref 150–450)
POTASSIUM SERPL-SCNC: 4 MMOL/L (ref 3.5–5.3)
PROT SERPL-MCNC: 6.7 G/DL (ref 6.4–8.2)
RBC # BLD AUTO: 4.55 X10*6/UL (ref 4.5–5.9)
SODIUM SERPL-SCNC: 135 MMOL/L (ref 136–145)
WBC # BLD AUTO: 2.5 X10*3/UL (ref 4.4–11.3)

## 2025-07-11 PROCEDURE — 80053 COMPREHEN METABOLIC PANEL: CPT | Performed by: PHYSICIAN ASSISTANT

## 2025-07-11 PROCEDURE — 2500000001 HC RX 250 WO HCPCS SELF ADMINISTERED DRUGS (ALT 637 FOR MEDICARE OP): Performed by: ORTHOPAEDIC SURGERY

## 2025-07-11 PROCEDURE — 2500000004 HC RX 250 GENERAL PHARMACY W/ HCPCS (ALT 636 FOR OP/ED)

## 2025-07-11 PROCEDURE — 97116 GAIT TRAINING THERAPY: CPT | Mod: GP,CQ

## 2025-07-11 PROCEDURE — 36415 COLL VENOUS BLD VENIPUNCTURE: CPT | Performed by: PHYSICIAN ASSISTANT

## 2025-07-11 PROCEDURE — 2500000002 HC RX 250 W HCPCS SELF ADMINISTERED DRUGS (ALT 637 FOR MEDICARE OP, ALT 636 FOR OP/ED): Performed by: ORTHOPAEDIC SURGERY

## 2025-07-11 PROCEDURE — 2500000001 HC RX 250 WO HCPCS SELF ADMINISTERED DRUGS (ALT 637 FOR MEDICARE OP): Performed by: PHYSICIAN ASSISTANT

## 2025-07-11 PROCEDURE — 85025 COMPLETE CBC W/AUTO DIFF WBC: CPT | Performed by: PHYSICIAN ASSISTANT

## 2025-07-11 PROCEDURE — 82947 ASSAY GLUCOSE BLOOD QUANT: CPT

## 2025-07-11 RX ORDER — POLYETHYLENE GLYCOL 3350 17 G/17G
17 POWDER, FOR SOLUTION ORAL DAILY
Qty: 7 PACKET | Refills: 0 | Status: SHIPPED | OUTPATIENT
Start: 2025-07-12 | End: 2025-07-19

## 2025-07-11 RX ORDER — ACETAMINOPHEN 325 MG/1
650 TABLET ORAL EVERY 6 HOURS PRN
Start: 2025-07-11 | End: 2025-08-10

## 2025-07-11 RX ADMIN — APIXABAN 5 MG: 5 TABLET, FILM COATED ORAL at 08:54

## 2025-07-11 RX ADMIN — PIPERACILLIN SODIUM AND TAZOBACTAM SODIUM 4.5 G: 4; .5 INJECTION, SOLUTION INTRAVENOUS at 13:47

## 2025-07-11 RX ADMIN — GABAPENTIN 600 MG: 600 TABLET, FILM COATED ORAL at 08:54

## 2025-07-11 RX ADMIN — ACETAMINOPHEN 650 MG: 325 TABLET ORAL at 17:32

## 2025-07-11 RX ADMIN — PIPERACILLIN SODIUM AND TAZOBACTAM SODIUM 3.38 G: 3; .375 INJECTION, SOLUTION INTRAVENOUS at 05:04

## 2025-07-11 RX ADMIN — PANTOPRAZOLE SODIUM 40 MG: 40 TABLET, DELAYED RELEASE ORAL at 06:06

## 2025-07-11 RX ADMIN — ROSUVASTATIN CALCIUM 20 MG: 20 TABLET, FILM COATED ORAL at 08:54

## 2025-07-11 RX ADMIN — FENOFIBRATE 160 MG: 160 TABLET ORAL at 17:32

## 2025-07-11 RX ADMIN — ISOSORBIDE MONONITRATE 30 MG: 30 TABLET, EXTENDED RELEASE ORAL at 08:54

## 2025-07-11 RX ADMIN — DULOXETINE 120 MG: 60 CAPSULE, DELAYED RELEASE ORAL at 08:54

## 2025-07-11 RX ADMIN — LISINOPRIL 20 MG: 20 TABLET ORAL at 08:54

## 2025-07-11 RX ADMIN — EMPAGLIFLOZIN 12.5 MG: 25 TABLET, FILM COATED ORAL at 08:54

## 2025-07-11 ASSESSMENT — COGNITIVE AND FUNCTIONAL STATUS - GENERAL
CLIMB 3 TO 5 STEPS WITH RAILING: A LITTLE
MOBILITY SCORE: 23

## 2025-07-11 ASSESSMENT — PAIN SCALES - GENERAL: PAINLEVEL_OUTOF10: 0 - NO PAIN

## 2025-07-11 ASSESSMENT — PAIN - FUNCTIONAL ASSESSMENT: PAIN_FUNCTIONAL_ASSESSMENT: 0-10

## 2025-07-11 NOTE — PROGRESS NOTES
07/11/25 1448   Discharge Planning   Living Arrangements Spouse/significant other   Support Systems Spouse/significant other   Type of Residence Private residence   Home or Post Acute Services In home services   Type of Home Care Services Home OT;Home PT;Home nursing visits   Expected Discharge Disposition Home H     Medically ready to discharge on Zosyn 4.5 g IV Q6 with end date of 8/12/2025. Dr Umana will follow. Spouse, Giselle is teachable caregiver. [facility] referral was sent. [facility] will verify insurance, set up delivery of meds and supplies and let us know SOC.

## 2025-07-11 NOTE — PROGRESS NOTES
DIAGNOSIS L foot infection  Allergies[1]   REVIEW OF LABS AT DISCHARGE  LINE INFO: PT HAS A SL PICC TO BE MANAGED PER [facility] PROTOCOL  PT ORDERED Zosyn 4.5 gm q6 THRU 8/12  LABS ORDERED INCLUDE CBC/diff, CMP  SYSTEM CADD pump  CARE PLAN DONE    Galen SALINAS Jacklyn John IS A 79 y.o. male ORDERED Zosyn  FOLLOWED BY Dr Umana    SOC confirmed for 7/12    RPh spoke with pt, informed him of medication name, delivery, dosing, etc. He verbalized understanding of instruction regarding medication and receipt/storage of package. Verified delivery address as [address], [facility] [address].  to call on way, delivery ok by 9 pm.    RX DISPENSED THE FOLLOWING WITH SUPPLIES TO MATCH WITH DELIVERY 7/11  4x Zosyn 24-hr bags  DOS 7/12-7/15    FOLLOW UP 7/15 PROGRESS, LABS, DELIVERY STRAIGHT        [1]   Allergies  Allergen Reactions    Anesthetics - Renetta Type- Parabens Seizure    Procaine Seizure    Quinolones Other     drop foot    Keflex [Cephalexin] Rash

## 2025-07-11 NOTE — CARE PLAN
The patient's goals for the shift include      The clinical goals for the shift include Pt will have adequate pain management through end of shift  Pt being discharged to home with iv atb. Dr. Umana gave ok for pt to miss overnight dose of zosyn, [facility] to be there tomorrow.

## 2025-07-11 NOTE — DOCUMENTATION CLARIFICATION NOTE
"    PATIENT:               PIERCE WILLETT  ACCT #:                  [id number]  MRN:                       [id number]  :                       1945  ADMIT DATE:       2025 10:08 AM  DISCH DATE:  RESPONDING PROVIDER #:        [id number]          PROVIDER RESPONSE TEXT:    Excisional debridement down to and including bone proximal phalanx of the hallux and 1st metatarsal    CDI QUERY TEXT:    Clarification    Instruction:    Based on your assessment of the patient and the clinical information, please provide the requested documentation by clicking on the appropriate radio button and enter any additional information if prompted.    Question: Please further clarify the debridement procedure as    When answering this query, please exercise your independent professional judgment. The fact that a question is being asked, does not imply that any particular answer is desired or expected.    The patient's clinical indicators include:  Clinical Information: This query refers to the procedure performed on 25 and documented as \"DEBRIDEMENT, LOWER EXTREMITY\" and \"There was also noted to be necrotic appearing bone that was devitalized.  This was again debrided with a combination of a curette and rongeur\"  Options provided:  -- Excisional debridement down to and including tendon  -- Excisional debridement down to and including bone, Please specify bone involved below  -- Non-excisional debridement down to and including muscle  -- Non-excisional debridement down to and including bone, Please specify bone involved below  -- Other - I will add my own diagnosis  -- Refer to Clinical Documentation Reviewer    Query created by: Lolita Bernard on 7/10/2025 1:52 PM      Electronically signed by:  AMALIA ESCAMILLA MD 2025 6:49 AM          "

## 2025-07-11 NOTE — PROGRESS NOTES
Galen Villasenor Jr. is a 79 y.o. male on day 3 of admission presenting with Left foot infection.      Subjective   Patient seen and examined this morning at the bedside. No overnight events.      Objective     Last Recorded Vitals  /54 (BP Location: Left arm, Patient Position: Lying)   Pulse 66   Temp 36.1 °C (97 °F) (Temporal)   Resp 16   Wt 94.3 kg (208 lb)   SpO2 95%   Intake/Output last 3 Shifts:    Intake/Output Summary (Last 24 hours) at 7/11/2025 1102  Last data filed at 7/11/2025 0158  Gross per 24 hour   Intake 150 ml   Output --   Net 150 ml       Admission Weight  Weight: 94.3 kg (208 lb) (07/08/25 1100)    Daily Weight  07/08/25 : 94.3 kg (208 lb)    Image Results  Bedside PICC Imaging  These images are not reportable by radiology and will not be interpreted   by  Radiologists.      Physical Exam  HENT:      Head: Normocephalic and atraumatic.   Eyes:      Extraocular Movements: Extraocular movements intact.   Cardiovascular:      Rate and Rhythm: Normal rate.      Heart sounds: Normal heart sounds.   Pulmonary:      Effort: Pulmonary effort is normal.      Breath sounds: Normal breath sounds.   Abdominal:      General: Abdomen is flat. Bowel sounds are normal.      Palpations: Abdomen is soft.   Musculoskeletal:      Comments: LLE with Dressings    Neurological:      General: No focal deficit present.      Mental Status: He is alert and oriented to person, place, and time.   Psychiatric:         Mood and Affect: Mood normal.   Relevant Results  Results for orders placed or performed during the hospital encounter of 07/08/25 (from the past 24 hours)   POCT GLUCOSE   Result Value Ref Range    POCT Glucose 160 (H) 74 - 99 mg/dL   POCT GLUCOSE   Result Value Ref Range    POCT Glucose 135 (H) 74 - 99 mg/dL   POCT GLUCOSE   Result Value Ref Range    POCT Glucose 262 (H) 74 - 99 mg/dL   POCT GLUCOSE   Result Value Ref Range    POCT Glucose 104 (H) 74 - 99 mg/dL   CBC and Auto Differential   Result  Value Ref Range    WBC 2.5 (L) 4.4 - 11.3 x10*3/uL    nRBC 0.0 0.0 - 0.0 /100 WBCs    RBC 4.55 4.50 - 5.90 x10*6/uL    Hemoglobin 13.8 13.5 - 17.5 g/dL    Hematocrit 42.8 41.0 - 52.0 %    MCV 94 80 - 100 fL    MCH 30.3 26.0 - 34.0 pg    MCHC 32.2 32.0 - 36.0 g/dL    RDW 15.1 (H) 11.5 - 14.5 %    Platelets 113 (L) 150 - 450 x10*3/uL    Neutrophils % 83.4 40.0 - 80.0 %    Immature Granulocytes %, Automated 0.4 0.0 - 0.9 %    Lymphocytes % 12.6 13.0 - 44.0 %    Monocytes % 2.4 2.0 - 10.0 %    Eosinophils % 0.8 0.0 - 6.0 %    Basophils % 0.4 0.0 - 2.0 %    Neutrophils Absolute 2.12 1.60 - 5.50 x10*3/uL    Immature Granulocytes Absolute, Automated 0.01 0.00 - 0.50 x10*3/uL    Lymphocytes Absolute 0.32 (L) 0.80 - 3.00 x10*3/uL    Monocytes Absolute 0.06 0.05 - 0.80 x10*3/uL    Eosinophils Absolute 0.02 0.00 - 0.40 x10*3/uL    Basophils Absolute 0.01 0.00 - 0.10 x10*3/uL   Comprehensive metabolic panel   Result Value Ref Range    Glucose 204 (H) 74 - 99 mg/dL    Sodium 135 (L) 136 - 145 mmol/L    Potassium 4.0 3.5 - 5.3 mmol/L    Chloride 100 98 - 107 mmol/L    Bicarbonate 29 21 - 32 mmol/L    Anion Gap 10 10 - 20 mmol/L    Urea Nitrogen 23 6 - 23 mg/dL    Creatinine 1.39 (H) 0.50 - 1.30 mg/dL    eGFR 51 (L) >60 mL/min/1.73m*2    Calcium 9.4 8.6 - 10.3 mg/dL    Albumin 4.4 3.4 - 5.0 g/dL    Alkaline Phosphatase 40 33 - 136 U/L    Total Protein 6.7 6.4 - 8.2 g/dL    AST 12 9 - 39 U/L    Bilirubin, Total 0.7 0.0 - 1.2 mg/dL    ALT 14 10 - 52 U/L     *Note: Due to a large number of results and/or encounters for the requested time period, some results have not been displayed. A complete set of results can be found in Results Review.    Bedside PICC Imaging  Result Date: 7/10/2025  These images are not reportable by radiology and will not be interpreted by  Radiologists.    FL fluoro images no charge  Result Date: 7/8/2025  These images are not reportable by radiology and will not be interpreted by   Radiologists.        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  - Closely monitor for antibiotics side effects including rash, Diarrhea/CDI, thrombocytopenia, SHERIDAN, etc.   -Follow up with ID/ Dr Umana on discharge in 2 weeks     Discussed with attending physician  Justin Simmons MD  PGY II Internal Medicine resident

## 2025-07-11 NOTE — PROGRESS NOTES
Physical Therapy    Physical Therapy    Physical Therapy Treatment    Patient Name: Galen Villasenor Jr.  MRN: [id number]  Today's Date: 7/11/2025  Time Calculation  Start Time: 0932  Stop Time: 0955  Time Calculation (min): 23 min     4103/4103-A    Assessment/Plan   PT Assessment  PT Assessment Results: Decreased strength, Decreased range of motion, Decreased endurance, Impaired balance, Decreased mobility, Pain, Orthopedic restrictions  End of Session Communication: Bedside nurse  End of Session Patient Position: Up in chair, Alarm on  PT Plan  Inpatient/Swing Bed or Outpatient: Inpatient  PT Plan  Treatment/Interventions: Bed mobility, Transfer training, Gait training, Balance training, Neuromuscular re-education, Strengthening, Endurance training, Range of motion, Therapeutic exercise, Therapeutic activity, Home exercise program  PT Plan: Ongoing PT  PT Frequency: 5 times per week  PT Discharge Recommendations: Low intensity level of continued care  Equipment Recommended upon Discharge: Wheeled walker  PT Recommended Transfer Status: Assist x1  PT - OK to Discharge: Yes ()     07/11/25 0932   PT  Visit   PT Received On 07/11/25   Response to Previous Treatment Patient with no complaints from previous session.   General   Reason for Referral recent surgery   Referred By Antonio Houston MD   Past Medical History Relevant to Rehab Pt underwent REMOVAL, HARDWARE, LOWER EXTREMITY (L), DEBRIDEMENT, LOWER EXTREMITY (L) 7/8/25. PMH: CAD, anemia, atrial flutter, T2DM, HTN   Prior to Session Communication Bedside nurse   Patient Position Received Up in chair;Alarm on   General Comment Patient pleasant and cooperative   Precautions   LE Weight Bearing Status Heel Weight Bearing in Post-Op Shoe   Medical Precautions Fall precautions   Pain Assessment   Pain Assessment 0-10   0-10 (Numeric) Pain Score 0 - No pain   Pain Location Foot   Pain Orientation Left   Bed Mobility   Bed Mobility No   Ambulation/Gait Training    Ambulation/Gait Training Performed Yes   Ambulation/Gait Training 1   Surface 1 Level tile   Device 1 Rolling walker   Gait Support Devices Gait belt   Assistance 1 Close supervision   Quality of Gait 1 Decreased step length;Inconsistent stride length   Comments/Distance (ft) 1 450  (Patient dmeos good adherance to  heel weight bearing)   Transfers   Transfer Yes   Transfer 1   Technique 1 Stand to sit;Sit to stand   Transfer Device 1 Walker;Gait belt   Transfer Level of Assistance 1 Contact guard   Trials/Comments 1 x2   PT Assessment   PT Assessment Results Decreased strength;Decreased range of motion;Decreased endurance;Impaired balance;Decreased mobility;Pain;Orthopedic restrictions   End of Session Communication Bedside nurse   End of Session Patient Position Up in chair;Alarm on   PT Plan   Inpatient/Swing Bed or Outpatient Inpatient     Outcome Measures:  [facility] Basic Mobility  Turning from your back to your side while in a flat bed without using bedrails: None  Moving from lying on your back to sitting on the side of a flat bed without using bedrails: None  Moving to and from bed to chair (including a wheelchair): None  Standing up from a chair using your arms (e.g. wheelchair or bedside chair): None  To walk in hospital room: None  Climbing 3-5 steps with railing: A little  Basic Mobility - Total Score: 23                             EDUCATION:     Education Documentation  No documentation found.  Education Comments  No comments found.        GOALS:  Encounter Problems       Encounter Problems (Active)       PT Problem       Pt will complete sit <> stand and bed <> chair transfers with mod I.  (Progressing)       Start:  07/09/25    Expected End:  07/23/25            Pt will ambulate 150 feet mod I using FWW while maintaining heel weight bearing status.  (Progressing)       Start:  07/09/25    Expected End:  07/23/25            Pt will progress to completing 3 x 20 supine/seated exercises in order to  increase strength and improve gait mechanics.   (Not Progressing)       Start:  07/09/25    Expected End:  07/23/25

## 2025-07-11 NOTE — CARE PLAN
Patient currently denies OT needs at this time and reports spouse will be home to assist as needed. Will discharge OT order at this time.  Discussed plan of care with NEWELL.

## 2025-07-11 NOTE — CARE PLAN
The patient's goals for the shift include      The clinical goals for the shift include see poc    Problem: Diabetes  Goal: Maintain electrolyte levels within acceptable range throughout shift  Outcome: Progressing  Goal: Maintain glucose levels >70mg/dl to <250mg/dl throughout shift  Outcome: Progressing  Goal: No changes in neurological exam by end of shift  Outcome: Progressing  Goal: Vital signs within normal range for age by end of shift  Outcome: Progressing     Problem: Pain - Adult  Goal: Verbalizes/displays adequate comfort level or baseline comfort level  Outcome: Progressing     Problem: Safety - Adult  Goal: Free from fall injury  Outcome: Progressing     Problem: Discharge Planning  Goal: Discharge to home or other facility with appropriate resources  Outcome: Progressing     Problem: Chronic Conditions and Co-morbidities  Goal: Patient's chronic conditions and co-morbidity symptoms are monitored and maintained or improved  Outcome: Progressing     Problem: Nutrition  Goal: Nutrient intake appropriate for maintaining nutritional needs  Outcome: Progressing

## 2025-07-11 NOTE — HH CARE COORDINATION
Home Care received a Referral for Infusion and Nursing. We have processed the referral for a Start of Care on 7/12/25.     If you have any questions or concerns, please feel free to contact us at [phone]. Follow the prompts, enter your five digit zip code, and you will be directed to your care team on [facility].

## 2025-07-12 ENCOUNTER — HOME CARE VISIT (OUTPATIENT)
Dept: HOME HEALTH SERVICES | Facility: HOME HEALTH | Age: 80
End: 2025-07-12
Payer: MEDICARE

## 2025-07-12 VITALS
DIASTOLIC BLOOD PRESSURE: 66 MMHG | RESPIRATION RATE: 16 BRPM | HEART RATE: 83 BPM | SYSTOLIC BLOOD PRESSURE: 117 MMHG | OXYGEN SATURATION: 100 %

## 2025-07-12 PROCEDURE — G0299 HHS/HOSPICE OF RN EA 15 MIN: HCPCS | Mod: HHH

## 2025-07-12 PROCEDURE — 169592 NO-PAY CLAIM PROCEDURE

## 2025-07-12 ASSESSMENT — ENCOUNTER SYMPTOMS
CHANGE IN APPETITE: UNCHANGED
LOWER EXTREMITY EDEMA: 1
LAST BOWEL MOVEMENT: 67398
STOOL FREQUENCY: MORE THAN TWICE DAILY
DENIES PAIN: 1
APPETITE LEVEL: GOOD
DIARRHEA: 1
PERSON REPORTING PAIN: PATIENT

## 2025-07-12 ASSESSMENT — ACTIVITIES OF DAILY LIVING (ADL)
ENTERING_EXITING_HOME: SUPERVISION
CURRENT_FUNCTION: STAND BY ASSIST
AMBULATION ASSISTANCE: STAND BY ASSIST
OASIS_M1830: 03

## 2025-07-13 ENCOUNTER — HOME CARE VISIT (OUTPATIENT)
Dept: HOME HEALTH SERVICES | Facility: HOME HEALTH | Age: 80
End: 2025-07-13
Payer: MEDICARE

## 2025-07-13 VITALS
SYSTOLIC BLOOD PRESSURE: 117 MMHG | OXYGEN SATURATION: 100 % | HEART RATE: 71 BPM | DIASTOLIC BLOOD PRESSURE: 65 MMHG | TEMPERATURE: 97.5 F

## 2025-07-13 PROCEDURE — G0299 HHS/HOSPICE OF RN EA 15 MIN: HCPCS | Mod: HHH

## 2025-07-13 RX ADMIN — Medication 5 ML: at 11:33

## 2025-07-13 RX ADMIN — Medication 10 ML: at 11:39

## 2025-07-13 RX ADMIN — Medication 10 ML: at 11:32

## 2025-07-13 ASSESSMENT — ENCOUNTER SYMPTOMS
PERSON REPORTING PAIN: PATIENT
BOWEL PATTERN NORMAL: 1
LAST BOWEL MOVEMENT: 67399
HIGHEST PAIN SEVERITY IN PAST 24 HOURS: 0/10
DENIES PAIN: 1
LOWER EXTREMITY EDEMA: 1

## 2025-07-13 ASSESSMENT — ACTIVITIES OF DAILY LIVING (ADL)
CURRENT_FUNCTION: STAND BY ASSIST
AMBULATION ASSISTANCE: STAND BY ASSIST

## 2025-07-14 ENCOUNTER — HOME INFUSION (OUTPATIENT)
Dept: INFUSION THERAPY | Age: 80
End: 2025-07-14
Payer: MEDICARE

## 2025-07-14 LAB
LABORATORY COMMENT REPORT: NORMAL
PATH REPORT.FINAL DX SPEC: NORMAL
PATH REPORT.GROSS SPEC: NORMAL
PATH REPORT.RELEVANT HX SPEC: NORMAL
PATH REPORT.TOTAL CANCER: NORMAL

## 2025-07-14 NOTE — PROGRESS NOTES
Reviewed chart and patient was recently discharged from hospital to Home Health with orders for Zosyn 4.5Gm IV q6h (via CADD pump) through 08/12/25.  Weekly labs ordered and Dr Umana is following.    Baseline labs reviewed and notable for WBC of 2.5.    RN notes reviewed. RN ists and lab draws will be on Wednesdays.    [facility] called patient and left VM checking on progress and stated that 8 days of Zosyn would be delivered on 07/15 with all supplies and flushes. No callback at this time.    Processed refill for 8 bags Zosyn for 07/15 mix and straight delivery. Fill is an 8 day supply and covers 07/16 through 07/23/25    Follow up 07/23 with patient progress and labs as available. Refill 8 bags Zosyn for straight delivery.  
no vaginal discharge/no spotting/no abnormal vaginal bleeding/no pelvic pain

## 2025-07-15 ENCOUNTER — DOCUMENTATION (OUTPATIENT)
Dept: INFUSION THERAPY | Age: 80
End: 2025-07-15
Payer: MEDICARE

## 2025-07-15 NOTE — PROGRESS NOTES
Delivery of the infusion medication and all supplies, including standard amount of flushes and dressing, is scheduled for Tuesday 7/15/25.

## 2025-07-16 ENCOUNTER — HOME CARE VISIT (OUTPATIENT)
Dept: HOME HEALTH SERVICES | Facility: HOME HEALTH | Age: 80
End: 2025-07-16
Payer: MEDICARE

## 2025-07-16 VITALS
DIASTOLIC BLOOD PRESSURE: 63 MMHG | TEMPERATURE: 97.4 F | RESPIRATION RATE: 16 BRPM | SYSTOLIC BLOOD PRESSURE: 127 MMHG | HEART RATE: 77 BPM | OXYGEN SATURATION: 98 %

## 2025-07-16 LAB
ALBUMIN SERPL-MCNC: 4.2 G/DL (ref 3.6–5.1)
ALP SERPL-CCNC: 47 U/L (ref 35–144)
ALT SERPL-CCNC: 15 U/L (ref 9–46)
ANION GAP SERPL CALCULATED.4IONS-SCNC: 9 MMOL/L (CALC) (ref 7–17)
AST SERPL-CCNC: 16 U/L (ref 10–35)
BASOPHILS # BLD AUTO: 10 CELLS/UL (ref 0–200)
BASOPHILS NFR BLD AUTO: 0.4 %
BILIRUB SERPL-MCNC: 0.5 MG/DL (ref 0.2–1.2)
BUN SERPL-MCNC: 18 MG/DL (ref 7–25)
CALCIUM SERPL-MCNC: 8.9 MG/DL (ref 8.6–10.3)
CHLORIDE SERPL-SCNC: 107 MMOL/L (ref 98–110)
CO2 SERPL-SCNC: 25 MMOL/L (ref 20–32)
CREAT SERPL-MCNC: 1.29 MG/DL (ref 0.7–1.22)
EGFRCR SERPLBLD CKD-EPI 2021: 56 ML/MIN/1.73M2
EOSINOPHIL # BLD AUTO: 120 CELLS/UL (ref 15–500)
EOSINOPHIL NFR BLD AUTO: 4.6 %
ERYTHROCYTE [DISTWIDTH] IN BLOOD BY AUTOMATED COUNT: 15.3 % (ref 11–15)
GLUCOSE SERPL-MCNC: 166 MG/DL (ref 65–99)
HCT VFR BLD AUTO: 39.2 % (ref 38.5–50)
HGB BLD-MCNC: 12.4 G/DL (ref 13.2–17.1)
LYMPHOCYTES # BLD AUTO: 411 CELLS/UL (ref 850–3900)
LYMPHOCYTES NFR BLD AUTO: 15.8 %
MCH RBC QN AUTO: 30 PG (ref 27–33)
MCHC RBC AUTO-ENTMCNC: 31.6 G/DL (ref 32–36)
MCV RBC AUTO: 94.9 FL (ref 80–100)
MONOCYTES # BLD AUTO: 81 CELLS/UL (ref 200–950)
MONOCYTES NFR BLD AUTO: 3.1 %
NEUTROPHILS # BLD AUTO: 1979 CELLS/UL (ref 1500–7800)
NEUTROPHILS NFR BLD AUTO: 76.1 %
PLATELET # BLD AUTO: 114 THOUSAND/UL (ref 140–400)
PMV BLD REES-ECKER: 10.8 FL (ref 7.5–12.5)
POTASSIUM SERPL-SCNC: 4.2 MMOL/L (ref 3.5–5.3)
PROT SERPL-MCNC: 6 G/DL (ref 6.1–8.1)
RBC # BLD AUTO: 4.13 MILLION/UL (ref 4.2–5.8)
SODIUM SERPL-SCNC: 141 MMOL/L (ref 135–146)
WBC # BLD AUTO: 2.6 THOUSAND/UL (ref 3.8–10.8)

## 2025-07-16 PROCEDURE — G0299 HHS/HOSPICE OF RN EA 15 MIN: HCPCS | Mod: HHH

## 2025-07-16 RX ADMIN — Medication 20 ML: at 10:40

## 2025-07-16 RX ADMIN — Medication 5 ML: at 10:43

## 2025-07-16 RX ADMIN — Medication 10 ML: at 10:37

## 2025-07-16 RX ADMIN — Medication 10 ML: at 10:55

## 2025-07-16 ASSESSMENT — ENCOUNTER SYMPTOMS
STOOL FREQUENCY: MORE THAN TWICE DAILY
DESCRIPTION OF MEMORY LOSS: SHORT TERM
PAIN: 1
PAIN LOCATION - RELIEVING FACTORS: REST, POSITION
HIGHEST PAIN SEVERITY IN PAST 24 HOURS: 6/10
DIARRHEA: 1
PAIN LOCATION: LEFT FOOT
LOWER EXTREMITY EDEMA: 1
SUBJECTIVE PAIN PROGRESSION: WAXING AND WANING
PAIN LOCATION - PAIN QUALITY: BURNING
PAIN LOCATION - PAIN SEVERITY: 6/10
APPETITE LEVEL: GOOD
CHANGE IN APPETITE: UNCHANGED
LOWEST PAIN SEVERITY IN PAST 24 HOURS: 0/10
PERSON REPORTING PAIN: PATIENT

## 2025-07-18 DIAGNOSIS — L08.9 LEFT FOOT INFECTION: ICD-10-CM

## 2025-07-19 ENCOUNTER — HOME INFUSION (OUTPATIENT)
Dept: INFUSION THERAPY | Age: 80
End: 2025-07-19
Payer: MEDICARE

## 2025-07-19 NOTE — PROGRESS NOTES
Received call from RN Team. Patient out of heparin (3 left) and has 6 NS syringes. Pharmacy will deliver more flushes today.    Called and spoke with patient. Is home and agreeable to delivery by 8pm.  to call.    Follow up as previously planned.

## 2025-07-22 ENCOUNTER — HOME INFUSION (OUTPATIENT)
Dept: INFUSION THERAPY | Age: 80
End: 2025-07-22
Payer: MEDICARE

## 2025-07-22 ENCOUNTER — DOCUMENTATION (OUTPATIENT)
Dept: INFUSION THERAPY | Age: 80
End: 2025-07-22
Payer: MEDICARE

## 2025-07-22 NOTE — PROGRESS NOTES
Delivery of the infusion medication and all supplies, including dressing, CADD-tubing sets, batteries and flushes w/some extras, is scheduled for Wednesday 7/23/25.

## 2025-07-22 NOTE — PROGRESS NOTES
Reviewed chart and patient is 79 yom on Zosyn 4.5Gm IV q6h (via CADD pump) through 8/12/25.  Weekly labs ordered and Dr Umana is following.     7/16 labs reviewed and notable for WBC of 2.6.     [facility] called patient, confirmed doses on hand thru tomorrow. He is agreeable to another week of Zosyn and supplies/flushes to match to be delivered on 7/23. No questions for R[facility] at this time.     Processed refill for 7 bags Zosyn for 7/23 mix and straight delivery. Fill is a 7 day supply and covers 7/24 through 7/30/25     Follow up 7/29 check labs, send OVN

## 2025-07-23 ENCOUNTER — HOME CARE VISIT (OUTPATIENT)
Dept: HOME HEALTH SERVICES | Facility: HOME HEALTH | Age: 80
End: 2025-07-23
Payer: MEDICARE

## 2025-07-23 VITALS
DIASTOLIC BLOOD PRESSURE: 72 MMHG | HEART RATE: 69 BPM | TEMPERATURE: 97.2 F | SYSTOLIC BLOOD PRESSURE: 164 MMHG | OXYGEN SATURATION: 100 % | RESPIRATION RATE: 18 BRPM

## 2025-07-23 PROCEDURE — G0299 HHS/HOSPICE OF RN EA 15 MIN: HCPCS | Mod: HHH

## 2025-07-23 RX ADMIN — Medication 10 ML: at 10:34

## 2025-07-23 RX ADMIN — Medication 5 ML: at 10:37

## 2025-07-23 RX ADMIN — Medication 10 ML: at 10:51

## 2025-07-23 RX ADMIN — Medication 20 ML: at 10:37

## 2025-07-23 ASSESSMENT — ENCOUNTER SYMPTOMS
CHANGE IN APPETITE: UNCHANGED
LAST BOWEL MOVEMENT: 67409
APPETITE LEVEL: FAIR
BOWEL PATTERN NORMAL: 1
DENIES PAIN: 1
PERSON REPORTING PAIN: PATIENT
STOOL FREQUENCY: DAILY

## 2025-07-24 LAB
ALBUMIN SERPL-MCNC: 4.1 G/DL (ref 3.6–5.1)
ALP SERPL-CCNC: 43 U/L (ref 35–144)
ALT SERPL-CCNC: 18 U/L (ref 9–46)
ANION GAP SERPL CALCULATED.4IONS-SCNC: 8 MMOL/L (CALC) (ref 7–17)
AST SERPL-CCNC: 20 U/L (ref 10–35)
BASOPHILS # BLD AUTO: 10 CELLS/UL (ref 0–200)
BASOPHILS NFR BLD AUTO: 0.4 %
BILIRUB SERPL-MCNC: 0.6 MG/DL (ref 0.2–1.2)
BUN SERPL-MCNC: 19 MG/DL (ref 7–25)
CALCIUM SERPL-MCNC: 8.8 MG/DL (ref 8.6–10.3)
CHLORIDE SERPL-SCNC: 109 MMOL/L (ref 98–110)
CO2 SERPL-SCNC: 26 MMOL/L (ref 20–32)
CREAT SERPL-MCNC: 1.05 MG/DL (ref 0.7–1.22)
EGFRCR SERPLBLD CKD-EPI 2021: 71 ML/MIN/1.73M2
EOSINOPHIL # BLD AUTO: 101 CELLS/UL (ref 15–500)
EOSINOPHIL NFR BLD AUTO: 4.2 %
ERYTHROCYTE [DISTWIDTH] IN BLOOD BY AUTOMATED COUNT: 15.7 % (ref 11–15)
GLUCOSE SERPL-MCNC: 181 MG/DL (ref 65–99)
HCT VFR BLD AUTO: 38.9 % (ref 38.5–50)
HGB BLD-MCNC: 12.3 G/DL (ref 13.2–17.1)
LYMPHOCYTES # BLD AUTO: 319 CELLS/UL (ref 850–3900)
LYMPHOCYTES NFR BLD AUTO: 13.3 %
MCH RBC QN AUTO: 30.6 PG (ref 27–33)
MCHC RBC AUTO-ENTMCNC: 31.6 G/DL (ref 32–36)
MCV RBC AUTO: 96.8 FL (ref 80–100)
MONOCYTES # BLD AUTO: 70 CELLS/UL (ref 200–950)
MONOCYTES NFR BLD AUTO: 2.9 %
NEUTROPHILS # BLD AUTO: 1901 CELLS/UL (ref 1500–7800)
NEUTROPHILS NFR BLD AUTO: 79.2 %
PLATELET # BLD AUTO: 98 THOUSAND/UL (ref 140–400)
PMV BLD REES-ECKER: 10.8 FL (ref 7.5–12.5)
POTASSIUM SERPL-SCNC: 3.9 MMOL/L (ref 3.5–5.3)
PROT SERPL-MCNC: 6 G/DL (ref 6.1–8.1)
RBC # BLD AUTO: 4.02 MILLION/UL (ref 4.2–5.8)
SERVICE CMNT-IMP: ABNORMAL
SODIUM SERPL-SCNC: 143 MMOL/L (ref 135–146)
WBC # BLD AUTO: 2.4 THOUSAND/UL (ref 3.8–10.8)

## 2025-07-25 DIAGNOSIS — L08.9 LEFT FOOT INFECTION: ICD-10-CM

## 2025-07-28 ENCOUNTER — HOME INFUSION (OUTPATIENT)
Dept: INFUSION THERAPY | Age: 80
End: 2025-07-28
Payer: MEDICARE

## 2025-07-28 NOTE — PROGRESS NOTES
Reviewed chart and patient is 79 yom on Zosyn 4.5Gm IV q6h (via CADD pump) through 8/12/25.  Weekly labs ordered and Dr Umana is following.  Appt with Dr Umana scheduled 07/31/25 at 1:40pm.     7/22 labs reviewed and notable for WBC still low at 2.4. Platelets low as well at 98.     [facility] called patient, left VM that Pharmacy will only send med through Friday at this time and follow up 07/31 with Dr Umana after appt. No Callback at this time.     Processed refill for 2 bags Zosyn for 7/29 mix and OVN delivery. Fill is a 2 day supply and covers 7/31 through 8/01/25     Follow up 7/31 check labs and chck stop date with Dr Umana. Send refill of Zosyn OVN as appropriate. Patient has PICC.

## 2025-07-29 ENCOUNTER — DOCUMENTATION (OUTPATIENT)
Dept: INFUSION THERAPY | Age: 80
End: 2025-07-29
Payer: MEDICARE

## 2025-07-29 NOTE — PROGRESS NOTES
Spoke w/ patient -- delivery of the infusion medication and all supplies, including dressing, CADD-tubing sets, batteries and flushes, is scheduled for Wednesday 7/30/25.   No questions to [facility] at this time.

## 2025-07-30 ENCOUNTER — HOME CARE VISIT (OUTPATIENT)
Dept: HOME HEALTH SERVICES | Facility: HOME HEALTH | Age: 80
End: 2025-07-30
Payer: MEDICARE

## 2025-07-30 VITALS
SYSTOLIC BLOOD PRESSURE: 136 MMHG | OXYGEN SATURATION: 100 % | RESPIRATION RATE: 16 BRPM | DIASTOLIC BLOOD PRESSURE: 74 MMHG | HEART RATE: 68 BPM | TEMPERATURE: 97.4 F

## 2025-07-30 LAB
ALBUMIN SERPL-MCNC: 4.2 G/DL (ref 3.6–5.1)
ALP SERPL-CCNC: 46 U/L (ref 35–144)
ALT SERPL-CCNC: 30 U/L (ref 9–46)
ANION GAP SERPL CALCULATED.4IONS-SCNC: 9 MMOL/L (CALC) (ref 7–17)
AST SERPL-CCNC: 26 U/L (ref 10–35)
BASOPHILS # BLD AUTO: 11 CELLS/UL (ref 0–200)
BASOPHILS NFR BLD AUTO: 0.5 %
BILIRUB SERPL-MCNC: 0.5 MG/DL (ref 0.2–1.2)
BUN SERPL-MCNC: 19 MG/DL (ref 7–25)
CALCIUM SERPL-MCNC: 9.1 MG/DL (ref 8.6–10.3)
CHLORIDE SERPL-SCNC: 108 MMOL/L (ref 98–110)
CO2 SERPL-SCNC: 25 MMOL/L (ref 20–32)
CREAT SERPL-MCNC: 1.11 MG/DL (ref 0.7–1.22)
EGFRCR SERPLBLD CKD-EPI 2021: 67 ML/MIN/1.73M2
EOSINOPHIL # BLD AUTO: 111 CELLS/UL (ref 15–500)
EOSINOPHIL NFR BLD AUTO: 5.3 %
ERYTHROCYTE [DISTWIDTH] IN BLOOD BY AUTOMATED COUNT: 15.9 % (ref 11–15)
GLUCOSE SERPL-MCNC: 178 MG/DL (ref 65–99)
HCT VFR BLD AUTO: 38.8 % (ref 38.5–50)
HGB BLD-MCNC: 12.2 G/DL (ref 13.2–17.1)
LYMPHOCYTES # BLD AUTO: 353 CELLS/UL (ref 850–3900)
LYMPHOCYTES NFR BLD AUTO: 16.8 %
MCH RBC QN AUTO: 30.3 PG (ref 27–33)
MCHC RBC AUTO-ENTMCNC: 31.4 G/DL (ref 32–36)
MCV RBC AUTO: 96.3 FL (ref 80–100)
MONOCYTES # BLD AUTO: 71 CELLS/UL (ref 200–950)
MONOCYTES NFR BLD AUTO: 3.4 %
NEUTROPHILS # BLD AUTO: 1554 CELLS/UL (ref 1500–7800)
NEUTROPHILS NFR BLD AUTO: 74 %
PLATELET # BLD AUTO: 99 THOUSAND/UL (ref 140–400)
PMV BLD REES-ECKER: 12.1 FL (ref 7.5–12.5)
POTASSIUM SERPL-SCNC: 3.9 MMOL/L (ref 3.5–5.3)
PROT SERPL-MCNC: 6 G/DL (ref 6.1–8.1)
RBC # BLD AUTO: 4.03 MILLION/UL (ref 4.2–5.8)
SODIUM SERPL-SCNC: 142 MMOL/L (ref 135–146)
WBC # BLD AUTO: 2.1 THOUSAND/UL (ref 3.8–10.8)

## 2025-07-30 PROCEDURE — G0299 HHS/HOSPICE OF RN EA 15 MIN: HCPCS | Mod: HHH

## 2025-07-30 RX ADMIN — Medication 5 ML: at 10:39

## 2025-07-30 RX ADMIN — Medication 10 ML: at 10:50

## 2025-07-30 RX ADMIN — Medication 10 ML: at 10:33

## 2025-07-30 RX ADMIN — Medication 20 ML: at 10:36

## 2025-07-30 ASSESSMENT — ACTIVITIES OF DAILY LIVING (ADL)
CURRENT_FUNCTION: STAND BY ASSIST
AMBULATION ASSISTANCE: STAND BY ASSIST

## 2025-07-30 ASSESSMENT — ENCOUNTER SYMPTOMS
CHANGE IN APPETITE: UNCHANGED
BOWEL PATTERN NORMAL: 1
HIGHEST PAIN SEVERITY IN PAST 24 HOURS: 0/10
PERSON REPORTING PAIN: PATIENT
STOOL FREQUENCY: DAILY
LAST BOWEL MOVEMENT: 67416
APPETITE LEVEL: GOOD
LOWER EXTREMITY EDEMA: 1
DENIES PAIN: 1

## 2025-07-31 ENCOUNTER — HOME INFUSION (OUTPATIENT)
Dept: INFUSION THERAPY | Age: 80
End: 2025-07-31
Payer: MEDICARE

## 2025-07-31 NOTE — PROGRESS NOTES
RECEIVED A VO FROM DR. THAKUR TO [place] [facility]AYANA AND BEGIN CEFTAZIDIME 2GM Q8H.  STOP DATE WILL REMAIN SAME AT 8/12.  ALSO SAME LABS TO BE DRAWN.  THIS WILL BE FIRST DOSE FOR PATIENT SO KARINA KIT SENT AND RN TO DO FIRST DOSE.  MADE SURE MD AWARE OF KEFLEX ALLERGY AND HE WAS FINE TO PROCEED.  JUST A SLIGHT RASH REPORTED.  SPOKE WITH PATIENT AND DELIVERY TONIGHT WILL BE FINE.  HC RN SCHEDULED FOR FRIDAY ADMIN OF THE FIRST DOSE.  PROCESSED FILL FOR 21 CEFTAZIDIME SYRN FOR MIX AND DEL. TODAY.  DOS 8/1 THRU 8/7.  RPH TO F/U ON 8/6 FOR OVN REFILL OF REMAINING DOSES.  DSL

## 2025-08-01 ENCOUNTER — HOME CARE VISIT (OUTPATIENT)
Dept: HOME HEALTH SERVICES | Facility: HOME HEALTH | Age: 80
End: 2025-08-01
Payer: MEDICARE

## 2025-08-01 VITALS
OXYGEN SATURATION: 98 % | HEART RATE: 66 BPM | DIASTOLIC BLOOD PRESSURE: 43 MMHG | TEMPERATURE: 97.5 F | SYSTOLIC BLOOD PRESSURE: 169 MMHG | RESPIRATION RATE: 16 BRPM

## 2025-08-01 DIAGNOSIS — L08.9 LEFT FOOT INFECTION: ICD-10-CM

## 2025-08-01 PROCEDURE — G0299 HHS/HOSPICE OF RN EA 15 MIN: HCPCS | Mod: HHH

## 2025-08-01 RX ADMIN — Medication 5 ML: at 10:48

## 2025-08-01 RX ADMIN — Medication 10 ML: at 10:40

## 2025-08-01 RX ADMIN — Medication 10 ML: at 10:47

## 2025-08-01 ASSESSMENT — ENCOUNTER SYMPTOMS
STOOL FREQUENCY: DAILY
APPETITE LEVEL: GOOD
BOWEL PATTERN NORMAL: 1
LAST BOWEL MOVEMENT: 67418
CHANGE IN APPETITE: UNCHANGED

## 2025-08-01 ASSESSMENT — ACTIVITIES OF DAILY LIVING (ADL)
AMBULATION ASSISTANCE: STAND BY ASSIST
CURRENT_FUNCTION: STAND BY ASSIST

## 2025-08-06 ENCOUNTER — HOME INFUSION (OUTPATIENT)
Dept: INFUSION THERAPY | Age: 80
End: 2025-08-06
Payer: MEDICARE

## 2025-08-06 ENCOUNTER — HOME CARE VISIT (OUTPATIENT)
Dept: HOME HEALTH SERVICES | Facility: HOME HEALTH | Age: 80
End: 2025-08-06
Payer: MEDICARE

## 2025-08-06 ENCOUNTER — DOCUMENTATION (OUTPATIENT)
Dept: INFUSION THERAPY | Age: 80
End: 2025-08-06
Payer: MEDICARE

## 2025-08-06 VITALS
TEMPERATURE: 97.5 F | HEART RATE: 70 BPM | RESPIRATION RATE: 16 BRPM | OXYGEN SATURATION: 99 % | DIASTOLIC BLOOD PRESSURE: 62 MMHG | SYSTOLIC BLOOD PRESSURE: 137 MMHG

## 2025-08-06 LAB
ALBUMIN SERPL-MCNC: 4.1 G/DL (ref 3.6–5.1)
ALP SERPL-CCNC: 62 U/L (ref 35–144)
ALT SERPL-CCNC: 56 U/L (ref 9–46)
ANION GAP SERPL CALCULATED.4IONS-SCNC: 10 MMOL/L (CALC) (ref 7–17)
AST SERPL-CCNC: 40 U/L (ref 10–35)
BASOPHILS # BLD AUTO: 11 CELLS/UL (ref 0–200)
BASOPHILS NFR BLD AUTO: 0.5 %
BILIRUB SERPL-MCNC: 0.4 MG/DL (ref 0.2–1.2)
BUN SERPL-MCNC: 21 MG/DL (ref 7–25)
CALCIUM SERPL-MCNC: 9 MG/DL (ref 8.6–10.3)
CHLORIDE SERPL-SCNC: 105 MMOL/L (ref 98–110)
CO2 SERPL-SCNC: 26 MMOL/L (ref 20–32)
CREAT SERPL-MCNC: 1.15 MG/DL (ref 0.7–1.22)
EGFRCR SERPLBLD CKD-EPI 2021: 64 ML/MIN/1.73M2
EOSINOPHIL # BLD AUTO: 110 CELLS/UL (ref 15–500)
EOSINOPHIL NFR BLD AUTO: 5 %
ERYTHROCYTE [DISTWIDTH] IN BLOOD BY AUTOMATED COUNT: 16.4 % (ref 11–15)
GLUCOSE SERPL-MCNC: 203 MG/DL (ref 65–99)
HCT VFR BLD AUTO: 40.3 % (ref 38.5–50)
HGB BLD-MCNC: 12.5 G/DL (ref 13.2–17.1)
LYMPHOCYTES # BLD AUTO: 343 CELLS/UL (ref 850–3900)
LYMPHOCYTES NFR BLD AUTO: 15.6 %
MCH RBC QN AUTO: 30.1 PG (ref 27–33)
MCHC RBC AUTO-ENTMCNC: 31 G/DL (ref 32–36)
MCV RBC AUTO: 97.1 FL (ref 80–100)
MONOCYTES # BLD AUTO: 70 CELLS/UL (ref 200–950)
MONOCYTES NFR BLD AUTO: 3.2 %
NEUTROPHILS # BLD AUTO: 1665 CELLS/UL (ref 1500–7800)
NEUTROPHILS NFR BLD AUTO: 75.7 %
PLATELET # BLD AUTO: 101 THOUSAND/UL (ref 140–400)
PMV BLD REES-ECKER: 11.3 FL (ref 7.5–12.5)
POTASSIUM SERPL-SCNC: 4.1 MMOL/L (ref 3.5–5.3)
PROT SERPL-MCNC: 6.4 G/DL (ref 6.1–8.1)
RBC # BLD AUTO: 4.15 MILLION/UL (ref 4.2–5.8)
SODIUM SERPL-SCNC: 141 MMOL/L (ref 135–146)
WBC # BLD AUTO: 2.2 THOUSAND/UL (ref 3.8–10.8)

## 2025-08-06 PROCEDURE — G0299 HHS/HOSPICE OF RN EA 15 MIN: HCPCS | Mod: HHH

## 2025-08-06 RX ADMIN — Medication 10 ML: at 10:39

## 2025-08-06 RX ADMIN — Medication 5 ML: at 10:40

## 2025-08-06 RX ADMIN — Medication 10 ML: at 10:23

## 2025-08-06 RX ADMIN — Medication 20 ML: at 10:28

## 2025-08-06 ASSESSMENT — ENCOUNTER SYMPTOMS
PAIN: 1
HIGHEST PAIN SEVERITY IN PAST 24 HOURS: 0/10
CHANGE IN APPETITE: UNCHANGED
PERSON REPORTING PAIN: PATIENT
APPETITE LEVEL: GOOD

## 2025-08-06 NOTE — PROGRESS NOTES
Spoke w/ patient - delivery of the infusion medication and all supplies, including standard amount of flushes and dressing, is scheduled for tomorrow, Thursday btwn 4-8 pm.  Also, patient requests a  to  CADD-pump (discontinued abx) during delivery.  No questions to [facility] at this time.

## 2025-08-06 NOTE — PROGRESS NOTES
Galen SALINAS Jacklyn John is receiving IV ceftazidime 2g q8h for L foot infection through 8/12/25. Followed by Dr. Umana  Labs ordered include CBC/diff, CMP    RN visit today, labs drawn. Labs from 7/30 reviewed, generally unremarkable. WBC low at 2.1.  Line SL PICC    RPh contacted patient, infusions going well, no issues with med or line. RN visit today, everything in good shape. Has doses through tomorrow as expected. Agreeable to delivery tomorrow any time for remainder of meds and supplies to match.    h offered to  - pt stated no questions at this time.    Dispensing 8/6 with supplies and flushes to match for OVN delivery:  15x ceftazidime 2g syringes  DOS 8/8-8/12    Follow up 8/12 POC Dr. Umana (appt scheduled 8/14)

## 2025-08-08 DIAGNOSIS — L08.9 LEFT FOOT INFECTION: ICD-10-CM

## 2025-08-12 ENCOUNTER — HOME INFUSION (OUTPATIENT)
Dept: INFUSION THERAPY | Age: 80
End: 2025-08-12
Payer: MEDICARE

## 2025-08-12 DIAGNOSIS — M20.5X2 HALLUX LIMITUS OF LEFT FOOT: Primary | ICD-10-CM

## 2025-08-13 ENCOUNTER — HOME CARE VISIT (OUTPATIENT)
Dept: HOME HEALTH SERVICES | Facility: HOME HEALTH | Age: 80
End: 2025-08-13
Payer: MEDICARE

## 2025-08-13 VITALS
RESPIRATION RATE: 16 BRPM | DIASTOLIC BLOOD PRESSURE: 66 MMHG | SYSTOLIC BLOOD PRESSURE: 146 MMHG | TEMPERATURE: 97.3 F | OXYGEN SATURATION: 96 % | HEART RATE: 77 BPM

## 2025-08-13 PROCEDURE — G0299 HHS/HOSPICE OF RN EA 15 MIN: HCPCS | Mod: HHH

## 2025-08-13 RX ADMIN — Medication 10 ML: at 11:55

## 2025-08-13 ASSESSMENT — ENCOUNTER SYMPTOMS
PERSON REPORTING PAIN: PATIENT
DENIES PAIN: 1

## 2025-08-13 ASSESSMENT — ACTIVITIES OF DAILY LIVING (ADL)
HOME_HEALTH_OASIS: 00
OASIS_M1830: 00

## 2025-08-15 ENCOUNTER — HOME CARE VISIT (OUTPATIENT)
Dept: HOME HEALTH SERVICES | Facility: HOME HEALTH | Age: 80
End: 2025-08-15
Payer: MEDICARE

## 2025-08-27 ENCOUNTER — LAB (OUTPATIENT)
Dept: LAB | Facility: HOSPITAL | Age: 80
End: 2025-08-27
Payer: MEDICARE

## 2025-08-27 DIAGNOSIS — C91.42 HAIRY CELL LEUKEMIA, IN RELAPSE (MULTI): Primary | ICD-10-CM

## 2025-08-27 DIAGNOSIS — C91.42 HAIRY CELL LEUKEMIA, IN RELAPSE (MULTI): ICD-10-CM

## 2025-08-27 LAB
ALBUMIN SERPL BCP-MCNC: 4.5 G/DL (ref 3.4–5)
ALP SERPL-CCNC: 66 U/L (ref 33–136)
ALT SERPL W P-5'-P-CCNC: 51 U/L (ref 10–52)
ANION GAP SERPL CALC-SCNC: 12 MMOL/L (ref 10–20)
AST SERPL W P-5'-P-CCNC: 34 U/L (ref 9–39)
BASOPHILS # BLD AUTO: 0.01 X10*3/UL (ref 0–0.1)
BASOPHILS NFR BLD AUTO: 0.5 %
BILIRUB SERPL-MCNC: 0.6 MG/DL (ref 0–1.2)
BUN SERPL-MCNC: 22 MG/DL (ref 6–23)
CALCIUM SERPL-MCNC: 9.4 MG/DL (ref 8.6–10.3)
CHLORIDE SERPL-SCNC: 105 MMOL/L (ref 98–107)
CO2 SERPL-SCNC: 27 MMOL/L (ref 21–32)
CREAT SERPL-MCNC: 1.19 MG/DL (ref 0.5–1.3)
EGFRCR SERPLBLD CKD-EPI 2021: 61 ML/MIN/1.73M*2
EOSINOPHIL # BLD AUTO: 0.06 X10*3/UL (ref 0–0.4)
EOSINOPHIL NFR BLD AUTO: 2.9 %
ERYTHROCYTE [DISTWIDTH] IN BLOOD BY AUTOMATED COUNT: 15.8 % (ref 11.5–14.5)
GLUCOSE SERPL-MCNC: 216 MG/DL (ref 74–99)
HCT VFR BLD AUTO: 41.6 % (ref 41–52)
HGB BLD-MCNC: 13.4 G/DL (ref 13.5–17.5)
IMM GRANULOCYTES # BLD AUTO: 0.01 X10*3/UL (ref 0–0.5)
IMM GRANULOCYTES NFR BLD AUTO: 0.5 % (ref 0–0.9)
LYMPHOCYTES # BLD AUTO: 0.42 X10*3/UL (ref 0.8–3)
LYMPHOCYTES NFR BLD AUTO: 20.4 %
MCH RBC QN AUTO: 30 PG (ref 26–34)
MCHC RBC AUTO-ENTMCNC: 32.2 G/DL (ref 32–36)
MCV RBC AUTO: 93 FL (ref 80–100)
MONOCYTES # BLD AUTO: 0.04 X10*3/UL (ref 0.05–0.8)
MONOCYTES NFR BLD AUTO: 1.9 %
NEUTROPHILS # BLD AUTO: 1.52 X10*3/UL (ref 1.6–5.5)
NEUTROPHILS NFR BLD AUTO: 73.8 %
NRBC BLD-RTO: 0 /100 WBCS (ref 0–0)
PLATELET # BLD AUTO: 94 X10*3/UL (ref 150–450)
POTASSIUM SERPL-SCNC: 4.3 MMOL/L (ref 3.5–5.3)
PROT SERPL-MCNC: 6.4 G/DL (ref 6.4–8.2)
RBC # BLD AUTO: 4.47 X10*6/UL (ref 4.5–5.9)
SODIUM SERPL-SCNC: 140 MMOL/L (ref 136–145)
WBC # BLD AUTO: 2.1 X10*3/UL (ref 4.4–11.3)

## 2025-08-27 PROCEDURE — 36415 COLL VENOUS BLD VENIPUNCTURE: CPT

## 2025-09-02 ENCOUNTER — OFFICE VISIT (OUTPATIENT)
Dept: HEMATOLOGY/ONCOLOGY | Facility: CLINIC | Age: 80
End: 2025-09-02
Payer: MEDICARE

## 2025-09-02 VITALS
SYSTOLIC BLOOD PRESSURE: 162 MMHG | OXYGEN SATURATION: 97 % | RESPIRATION RATE: 16 BRPM | WEIGHT: 205.03 LBS | HEART RATE: 79 BPM | BODY MASS INDEX: 28.6 KG/M2 | DIASTOLIC BLOOD PRESSURE: 70 MMHG | TEMPERATURE: 96.8 F

## 2025-09-02 DIAGNOSIS — C91.42 HAIRY CELL LEUKEMIA, IN RELAPSE (MULTI): Primary | ICD-10-CM

## 2025-09-02 DIAGNOSIS — E78.2 MIXED HYPERLIPIDEMIA: ICD-10-CM

## 2025-09-02 DIAGNOSIS — I10 PRIMARY HYPERTENSION: ICD-10-CM

## 2025-09-02 DIAGNOSIS — E11.9 TYPE 2 DIABETES MELLITUS WITHOUT COMPLICATION, WITHOUT LONG-TERM CURRENT USE OF INSULIN: ICD-10-CM

## 2025-09-02 DIAGNOSIS — E05.90 HYPERTHYROIDISM: ICD-10-CM

## 2025-09-02 DIAGNOSIS — I25.10 CORONARY ARTERY DISEASE INVOLVING NATIVE CORONARY ARTERY OF NATIVE HEART WITHOUT ANGINA PECTORIS: ICD-10-CM

## 2025-09-02 DIAGNOSIS — I48.0 PAROXYSMAL ATRIAL FIBRILLATION (MULTI): ICD-10-CM

## 2025-09-02 DIAGNOSIS — D61.818 PANCYTOPENIA: ICD-10-CM

## 2025-09-02 PROCEDURE — G2211 COMPLEX E/M VISIT ADD ON: HCPCS | Performed by: INTERNAL MEDICINE

## 2025-09-02 PROCEDURE — 99214 OFFICE O/P EST MOD 30 MIN: CPT | Performed by: INTERNAL MEDICINE

## 2025-09-02 PROCEDURE — 3078F DIAST BP <80 MM HG: CPT | Performed by: INTERNAL MEDICINE

## 2025-09-02 PROCEDURE — 1125F AMNT PAIN NOTED PAIN PRSNT: CPT | Performed by: INTERNAL MEDICINE

## 2025-09-02 PROCEDURE — 3077F SYST BP >= 140 MM HG: CPT | Performed by: INTERNAL MEDICINE

## 2025-09-02 PROCEDURE — 1159F MED LIST DOCD IN RCRD: CPT | Performed by: INTERNAL MEDICINE

## 2025-09-02 PROCEDURE — 1160F RVW MEDS BY RX/DR IN RCRD: CPT | Performed by: INTERNAL MEDICINE

## 2025-09-02 ASSESSMENT — ENCOUNTER SYMPTOMS
NEUROLOGICAL NEGATIVE: 1
CONSTITUTIONAL NEGATIVE: 1
PSYCHIATRIC NEGATIVE: 1
GASTROINTESTINAL NEGATIVE: 1
EYES NEGATIVE: 1
ENDOCRINE NEGATIVE: 1
MUSCULOSKELETAL NEGATIVE: 1
RESPIRATORY NEGATIVE: 1
HEMATOLOGIC/LYMPHATIC NEGATIVE: 1
CARDIOVASCULAR NEGATIVE: 1

## 2025-09-02 ASSESSMENT — PAIN SCALES - GENERAL: PAINLEVEL_OUTOF10: 3

## 2025-10-15 ENCOUNTER — APPOINTMENT (OUTPATIENT)
Dept: CARDIOLOGY | Facility: CLINIC | Age: 80
End: 2025-10-15
Payer: MEDICARE

## 2025-11-19 ENCOUNTER — APPOINTMENT (OUTPATIENT)
Dept: CARDIOLOGY | Facility: CLINIC | Age: 80
End: 2025-11-19
Payer: MEDICARE

## 2025-12-01 ENCOUNTER — APPOINTMENT (OUTPATIENT)
Dept: HEMATOLOGY/ONCOLOGY | Facility: CLINIC | Age: 80
End: 2025-12-01
Payer: MEDICARE

## (undated) DEVICE — SHEATH, GUIDING, VIZIGO, 8.5F WITH CURVE VIZ  MDC

## (undated) DEVICE — PATCHES, EXTERNAL REFERENCE, CARTO3

## (undated) DEVICE — INTERFACE CABLE, EXISITING DX CATHETERS, BLUE PORT (REPROCESS)

## (undated) DEVICE — Device

## (undated) DEVICE — SUTURE, VICRYL, 2-0, 27 IN, SH, UNDYED

## (undated) DEVICE — ELECTRODE, QUICK-COMBO, REDI PACK

## (undated) DEVICE — SUTURE, FIBERWIRE 2-0, T-13 TAPER NEEDLE, 18"

## (undated) DEVICE — SHEATH, PINNACLE, W/.038 GUIDEWIRE, 10 CM,  8FR INTRODUCER, 8FR DIA, 2.5 CM DIALATOR

## (undated) DEVICE — TUBING SET, IRRIGATION, SMARTABLATE

## (undated) DEVICE — CATHETER, DIAGNOSTIC, SOUNDSTAR ECO SMS, 8FR

## (undated) DEVICE — INTRODUCER, HEMOSTASIS, STR/J .038 IN, 8.5FR 12CM

## (undated) DEVICE — CATHETER, ELECTROPHYSIOLOGY, SUPREME QUAD, CRD-2 5 MM, 5 FR

## (undated) DEVICE — GUIDEWIRE, W/TROCAR TIP, .062

## (undated) DEVICE — CABLE, CONNECTOR, 10FT

## (undated) DEVICE — BANDAGE, GAUZE, 6 PLY, KERLIX, 4.5 IN X 4.1 YD, AMD, STERILE

## (undated) DEVICE — SUTURE, ETHILON, 3-0, 18 IN, PS2, BLACK

## (undated) DEVICE — DRESSING, GAUZE, PETROLATUM, PATCH, XEROFORM, 1 X 8 IN, STERILE

## (undated) DEVICE — CATHETER, THERMOCOOL SMART TOUCH, SF, D-F CURVE

## (undated) DEVICE — SUTURE, MONOCRYL, 3-0, 27 IN, PS-2, UNDYED

## (undated) DEVICE — APPLICATOR, CHLORAPREP, W/ORANGE TINT, 26ML

## (undated) DEVICE — CATHETER, OCTARAY, OCTA,GALAXY,3-3-3-3-3,D-CURVE

## (undated) DEVICE — CABLE, CATH TO CARTO SYSTEM, 12 HYP/12 REDEL, YELLOW, 10FT

## (undated) DEVICE — BLADE, OSCILLATING/SAGITTAL, 9MM X 0.38MM X 25MM

## (undated) DEVICE — CABLE, CATH TO CARTO SYSTEM, 12 HYP/10 REDEL (REPROCESSED)

## (undated) DEVICE — HIGH FLOW TIP FOR INTERPULSE HANDPIECE SET

## (undated) DEVICE — BANDAGE, ELASTIC, ACE, W/CLIP, 4 IN X 5 YD, NS

## (undated) DEVICE — TOWEL PACK, STERILE, 4/PACK, BLUE

## (undated) DEVICE — DRAPE MINI, C-ARM, 54 X 64 IN

## (undated) DEVICE — BANDAGE, ELASTIC, PREMIUM, SELF-CLOSE, 6 IN X 5.5 YD, STERILE

## (undated) DEVICE — WOUND SYSTEM, DEBRIDEMENT & CLEANING, O.R DUOPAK

## (undated) DEVICE — ELECTRODE, QUICK-COMBO, EDGE SYSTEM, REDI PACK

## (undated) DEVICE — DRESSING, GAUZE, 16 PLY, 4 X 4 IN, STERILE

## (undated) DEVICE — NEEDLE, NRG TRANSSEPTAL, 98CM, CURVE C0

## (undated) DEVICE — CLOSURE SYSTEM, VASCULAR, MVP 6-12FR, VENOUS

## (undated) DEVICE — CATHETER, PENTARAY, NAV ECO, 7FR, 2-6-2 SPACING, D CURVE

## (undated) DEVICE — SOLUTION, IRRIGATION, STERILE WATER, 1000 ML, POUR BOTTLE

## (undated) DEVICE — CABLE, BLACK, QUADRIPOLAR

## (undated) DEVICE — PADDING, WEBRIL, UNDERCAST, STERILE, 6 IN

## (undated) DEVICE — CATHETHER, CS, BI-DIRECTIONAL, 10 POLES, D-F TYPE

## (undated) DEVICE — CATHETER, ACUSON ACUNAV ULTRASOUND, 8FR 90CM  (REPROCESSED)

## (undated) DEVICE — CATHETER, QDOT MICRO, 8FR, DF, BI-DIRECTIONAL W/ CURVE VISUAL

## (undated) DEVICE — CABLE, OCTARAY, SPLIT HANDLE EXT CABLE, 10FT LG

## (undated) DEVICE — DEVICE, CLOSURE, PERCLOSE, PROSTYLE

## (undated) DEVICE — GUIDEWIRE KIT, SAFESEPT TRANSSEPTAL, .014 X 135CM

## (undated) DEVICE — GLOVE, SURGICAL, PROTEXIS PI , 7.0, PF, LF

## (undated) DEVICE — SUTURE, MONOCRYL PLUS, 3-0 1X27INCH, PS-2 UNDYED

## (undated) DEVICE — SOLUTION, IRRIGATION, SODIUM CHLORIDE 0.9%, 1000 ML, POUR BOTTLE

## (undated) DEVICE — INTRODUCER, SHEATH, FAST-CATH, 7 FR X 23 CM

## (undated) DEVICE — SHEATH, PINNACLE, W/.038 GUIDEWIRE, 10 CM,  9FR INTRODUCER, 9FR DIA, 2.5 CM DIALATOR

## (undated) DEVICE — DRESSING, GAUZE, SUPER KERLIX, 6X6

## (undated) DEVICE — DRILL BIT, 1.7MM

## (undated) DEVICE — CATHETHER, CS, BI-DIRECTIONAL, 7FR X 115CM, F-J CURVE, 2MM TIP, 2-8-2 SPACING

## (undated) DEVICE — CATHETER, ULTRASOUND, SOUNDSTAR, 8F X 90CM

## (undated) DEVICE — BANDAGE, ESMARK, 4 IN X 12 FT, LF